# Patient Record
Sex: FEMALE | Race: BLACK OR AFRICAN AMERICAN | NOT HISPANIC OR LATINO | Employment: OTHER | URBAN - METROPOLITAN AREA
[De-identification: names, ages, dates, MRNs, and addresses within clinical notes are randomized per-mention and may not be internally consistent; named-entity substitution may affect disease eponyms.]

---

## 2017-02-23 ENCOUNTER — OFFICE VISIT (OUTPATIENT)
Dept: OPTOMETRY | Facility: CLINIC | Age: 82
End: 2017-02-23
Payer: MEDICARE

## 2017-02-23 DIAGNOSIS — H52.4 HYPEROPIA WITH ASTIGMATISM AND PRESBYOPIA, BILATERAL: ICD-10-CM

## 2017-02-23 DIAGNOSIS — Z13.5 GLAUCOMA SCREENING: ICD-10-CM

## 2017-02-23 DIAGNOSIS — H16.231 NEUROTROPHIC KERATOPATHY OF RIGHT EYE: Primary | ICD-10-CM

## 2017-02-23 DIAGNOSIS — H52.203 HYPEROPIA WITH ASTIGMATISM AND PRESBYOPIA, BILATERAL: ICD-10-CM

## 2017-02-23 DIAGNOSIS — H52.03 HYPEROPIA WITH ASTIGMATISM AND PRESBYOPIA, BILATERAL: ICD-10-CM

## 2017-02-23 DIAGNOSIS — H25.13 NUCLEAR SCLEROSIS, BILATERAL: ICD-10-CM

## 2017-02-23 PROCEDURE — 92015 DETERMINE REFRACTIVE STATE: CPT | Mod: ,,, | Performed by: OPTOMETRIST

## 2017-02-23 PROCEDURE — 92004 COMPRE OPH EXAM NEW PT 1/>: CPT | Mod: S$PBB,,, | Performed by: OPTOMETRIST

## 2017-02-23 PROCEDURE — 99999 PR PBB SHADOW E&M-NEW PATIENT-LVL III: CPT | Mod: PBBFAC,,, | Performed by: OPTOMETRIST

## 2017-02-23 PROCEDURE — 99203 OFFICE O/P NEW LOW 30 MIN: CPT | Mod: PBBFAC | Performed by: OPTOMETRIST

## 2017-02-23 RX ORDER — RIVAROXABAN 20 MG/1
TABLET, FILM COATED ORAL
Refills: 2 | COMMUNITY
Start: 2017-01-16

## 2017-02-23 RX ORDER — OMEPRAZOLE 40 MG/1
40 CAPSULE, DELAYED RELEASE ORAL NIGHTLY
Refills: 2 | COMMUNITY
Start: 2016-12-12

## 2017-02-23 RX ORDER — AMLODIPINE BESYLATE 5 MG/1
5 TABLET ORAL NIGHTLY
COMMUNITY

## 2017-02-23 RX ORDER — LISINOPRIL 10 MG/1
10 TABLET ORAL NIGHTLY
COMMUNITY

## 2017-02-23 NOTE — PATIENT INSTRUCTIONS
Cataract Surgery FAQs  Frequently Asked Questions    My doctor told me I have a cataract     What do I do next?   Relax. Cataracts are a normal part of aging, and cataract surgery is among the safest and most common procedures performed today.  Most patients who have had cataract surgery report significant improvement in their quality of life because of their improved vision, with a speedy recovery and minimal discomfort or inconvenience.    Your optometrist will recommend a cataract surgeon who will examine your eyes, evaluate the need for surgery, and discuss the details with you and your family.     What exactly is a cataract?   A cataract is simply a darkening or clouding of the eyes internal lens, which blurs your vision.  It is not a film which grows over the eye, but rather a degenerative process which causes the eyes normally clear lens to become cloudy or hazy.     Because this degenerative process occurs slowly over many years, we generally wait until the cataract begins to significantly impact your vision, before recommend surgery.                     How is cataract surgery performed?  Cataract surgery involves removal of the dark or cloudy lens from the eye, and implantation of a new, clear lens implant or IOL.  Cataract surgery is a lens replacement surgery.          Modern cataract surgery is performed as a same-day surgery and typically takes about 15 minutes to complete.  It is performed while you are awake, but you will be medicated so that you are relaxed and comfortable. Of course, the eye is anesthetized so that you dont feel any discomfort, and many people only vaguely remember the actual procedure, recalling only lights and the sensation of moisture on the eye.     Although is takes about a week to fully heal, most people are able to see better and resume their normal activities the very next day!  You will need to use eye drops for about one month after your surgery.     Will I  need glasses after cataract surgery?   The purpose of cataract surgery is to improve the overall quality of your vision, but it does not necessarily eliminate the need for glasses. Although some people dont need to wear glasses after standard cataract surgery, most people will still need to wear glasses for certain tasks.  If you are interested in minimizing or even eliminating the need for glasses, you should ask your surgeon about Refractive Cataract Surgery.    What is Refractive Cataract Surgery?   Refractive Cataract Surgery refers to the use of additional techniques performed either at the time of your cataract surgery or soon afterwards, designed to minimize and often eliminate your need for glasses.  Technologies such as LASIK, Astigmatism Correcting Incisions, Multifocal, Accommodating, or Toric lens implants can all be used, depending on the particular needs of each patient. Only your surgeon can determine if you are a candidate and which techniques are appropriate for your goals and your eye.                         LASIK                Multifocal IOL         Will my insurance cover these additional procedures?   Although your insurance will fully cover your cataract surgery, any other additional procedures -designed solely to eliminate the need for glasses- are considered elective (not medically necessary), and therefore not covered by your insurance.  Rest assured that your vision will be better after cataract surgery, in either case.  You simply need to decide whether minimizing your dependence on glasses is worth the additional investment in your eyes.  (Costs typically range between $1,000 to $2,000 per eye, depending on your needs).    View a 1hr video discussing cataract surgery with live patient questions and answers on the [employer] Web Site (Keywords: [facility] Cataract):    [email or web address]_[email or web address]

## 2017-02-23 NOTE — MR AVS SNAPSHOT
[facility]  [address]  [facility] [address]  Phone: [phone]  Fax: [phone]                  Amirah Davey   2017 10:00 AM   Office Visit    Description:  Female : 1935   Provider:  Azar Ochoa OD   Department:  [facility]           Reason for Visit     Concerns About Ocular Health     Blurred Vision           Diagnoses this Visit        Comments    Neurotrophic keratopathy of right eye    -  Primary     Nuclear sclerosis, bilateral         Glaucoma screening         Hyperopia with astigmatism and presbyopia, bilateral                To Do List           Goals (5 Years of Data)     None      [facility]     [facility] Care [facility] -  Assistance  Registered nurses in the [facility] On Call Center provide clinical advisement, health education, appointment booking, and other advisory services.  Call for this free service at [phone].             Medications           Message regarding Medications     Verify the changes and/or additions to your medication regime listed below are the same as discussed with your clinician today.  If any of these changes or additions are incorrect, please notify your healthcare provider.             Verify that the below list of medications is an accurate representation of the medications you are currently taking.  If none reported, the list may be blank. If incorrect, please contact your healthcare provider. Carry this list with you in case of emergency.           Current Medications     amlodipine (NORVASC) 5 MG tablet Take 5 mg by mouth once daily.    lisinopril 10 MG tablet Take 10 mg by mouth once daily.    omeprazole (PRILOSEC) 40 MG capsule Take 40 mg by mouth once daily.    XARELTO 20 mg Tab TAKE 1 TABLET BY MOUTH DAILY AT DINNER           Clinical Reference Information           Allergies as of 2017     No Known Allergies      Immunizations Administered on Date of Encounter - 2017     None       Orders Placed During Today's Visit      Normal Orders This Visit    Ambulatory Referral to Ophthalmology       [facility] Sign-Up     Activating your [facility] account is as easy as 1-2-3!     1) Visit [email or web address], select Sign Up Now, enter this activation code and your date of birth, then select Next.  [email or web address]  Expires: 4/9/2017 11:14 AM      2) Create a username and password to use when you visit [facility] in the future and select a security question in case you lose your password and select Next.    3) Enter your e-mail address and click Sign Up!    Additional Information  If you have questions, please e-mail [email or web address] or call [phone] to talk to our [facility] staff. Remember, [facility] is NOT to be used for urgent needs. For medical emergencies, dial 911.         Instructions        Cataract Surgery FAQs  Frequently Asked Questions    My doctor told me I have a cataract     What do I do next?   Relax. Cataracts are a normal part of aging, and cataract surgery is among the safest and most common procedures performed today.  Most patients who have had cataract surgery report significant improvement in their quality of life because of their improved vision, with a speedy recovery and minimal discomfort or inconvenience.    Your optometrist will recommend a cataract surgeon who will examine your eyes, evaluate the need for surgery, and discuss the details with you and your family.     What exactly is a cataract?   A cataract is simply a darkening or clouding of the eyes internal lens, which blurs your vision.  It is not a film which grows over the eye, but rather a degenerative process which causes the eyes normally clear lens to become cloudy or hazy.     Because this degenerative process occurs slowly over many years, we generally wait until the cataract begins to significantly impact your vision, before recommend surgery.                     How is cataract surgery  performed?  Cataract surgery involves removal of the dark or cloudy lens from the eye, and implantation of a new, clear lens implant or IOL.  Cataract surgery is a lens replacement surgery.          Modern cataract surgery is performed as a same-day surgery and typically takes about 15 minutes to complete.  It is performed while you are awake, but you will be medicated so that you are relaxed and comfortable. Of course, the eye is anesthetized so that you dont feel any discomfort, and many people only vaguely remember the actual procedure, recalling only lights and the sensation of moisture on the eye.     Although is takes about a week to fully heal, most people are able to see better and resume their normal activities the very next day!  You will need to use eye drops for about one month after your surgery.     Will I need glasses after cataract surgery?   The purpose of cataract surgery is to improve the overall quality of your vision, but it does not necessarily eliminate the need for glasses. Although some people dont need to wear glasses after standard cataract surgery, most people will still need to wear glasses for certain tasks.  If you are interested in minimizing or even eliminating the need for glasses, you should ask your surgeon about Refractive Cataract Surgery.    What is Refractive Cataract Surgery?   Refractive Cataract Surgery refers to the use of additional techniques performed either at the time of your cataract surgery or soon afterwards, designed to minimize and often eliminate your need for glasses.  Technologies such as LASIK, Astigmatism Correcting Incisions, Multifocal, Accommodating, or Toric lens implants can all be used, depending on the particular needs of each patient. Only your surgeon can determine if you are a candidate and which techniques are appropriate for your goals and your eye.                         LASIK                Multifocal IOL         Will my insurance cover  these additional procedures?   Although your insurance will fully cover your cataract surgery, any other additional procedures -designed solely to eliminate the need for glasses- are considered elective (not medically necessary), and therefore not covered by your insurance.  Rest assured that your vision will be better after cataract surgery, in either case.  You simply need to decide whether minimizing your dependence on glasses is worth the additional investment in your eyes.  (Costs typically range between $1,000 to $2,000 per eye, depending on your needs).    View a 1hr video discussing cataract surgery with live patient questions and answers on the [facility] Web Site (Keywords: [facility] Cataract):    [email or web address]_cataracts/           Language Assistance Services     ATTENTION: Language assistance services are available, free of charge. Please call [phone].      ATENCIÓN: Si andreila mamadou, tiene a natarajan disposición servicios gratuitos de asistencia lingüística. Llame al [phone].     JACOBO Ý: N?u b?n nói Ti?ng Vi?t, có các d?ch v? h? tr? ngôn ng? mi?n phí dành cho b?n. G?i s? [phone].         [facility] complies with applicable Federal civil rights laws and does not discriminate on the basis of race, color, national origin, age, disability, or sex.

## 2017-02-23 NOTE — PROGRESS NOTES
ADELE MENDOZA: 3 years ago at [facility]    Patient is here c/o blurry vision OD x 1 month. She has a h/o stroke   03/2013 affected the left side of body. H/o shingles with ocular   involvement in [id number]. She states she could see a black spot in her   vision since her shingles episode but she was able to still see clearly.   The spot is still there but now she is having blurry vision.     No eye meds at this time.        Last edited by Hyacinth Ulloa on 2/23/2017 10:30 AM.         Assessment /Plan     For exam results, see Encounter Report.    Neurotrophic keratopathy of right eye  -Zoster 20 yrs ago, large nasal scar with neovascularization    Nuclear sclerosis, bilateral  -     Ambulatory Referral to Ophthalmology  -Hypermature OD>OS  -referral Dr. Case    Glaucoma screening  -Monitor with annual eye exam and IOP check  -narrow angle OD>OS, benefit from CE    Hyperopia with astigmatism and presbyopia, bilateral  -Hold sRx secondary to cataracts       RTC as directed OMD

## 2017-03-15 ENCOUNTER — OFFICE VISIT (OUTPATIENT)
Dept: OPHTHALMOLOGY | Facility: CLINIC | Age: 82
End: 2017-03-15
Payer: MEDICARE

## 2017-03-15 DIAGNOSIS — H17.9 CORNEAL OPACITY: ICD-10-CM

## 2017-03-15 DIAGNOSIS — H25.13 NUCLEAR SCLEROSIS, BILATERAL: Primary | ICD-10-CM

## 2017-03-15 PROCEDURE — 99212 OFFICE O/P EST SF 10 MIN: CPT | Mod: PBBFAC | Performed by: OPHTHALMOLOGY

## 2017-03-15 PROCEDURE — 99999 PR PBB SHADOW E&M-EST. PATIENT-LVL II: CPT | Mod: PBBFAC,,, | Performed by: OPHTHALMOLOGY

## 2017-03-15 PROCEDURE — 92136 OPHTHALMIC BIOMETRY: CPT | Mod: 50,PBBFAC | Performed by: OPHTHALMOLOGY

## 2017-03-15 PROCEDURE — 92014 COMPRE OPH EXAM EST PT 1/>: CPT | Mod: S$PBB,,, | Performed by: OPHTHALMOLOGY

## 2017-03-15 RX ORDER — LIDOCAINE HYDROCHLORIDE 10 MG/ML
1 INJECTION, SOLUTION EPIDURAL; INFILTRATION; INTRACAUDAL; PERINEURAL ONCE
Status: CANCELLED | OUTPATIENT
Start: 2017-03-15 | End: 2017-03-15

## 2017-03-15 RX ORDER — MOXIFLOXACIN 5 MG/ML
1 SOLUTION/ DROPS OPHTHALMIC
Status: CANCELLED | OUTPATIENT
Start: 2017-03-15

## 2017-03-15 RX ORDER — TROPICAMIDE 10 MG/ML
1 SOLUTION/ DROPS OPHTHALMIC
Status: CANCELLED | OUTPATIENT
Start: 2017-03-15

## 2017-03-15 RX ORDER — PHENYLEPHRINE HYDROCHLORIDE 25 MG/ML
1 SOLUTION/ DROPS OPHTHALMIC
Status: CANCELLED | OUTPATIENT
Start: 2017-03-15

## 2017-03-15 RX ORDER — TETRACAINE HYDROCHLORIDE 5 MG/ML
1 SOLUTION OPHTHALMIC
Status: CANCELLED | OUTPATIENT
Start: 2017-03-15

## 2017-03-15 NOTE — MR AVS SNAPSHOT
[facility]  [address]  [facility] [address]  Phone: [phone]  Fax: [phone]                  Amirah Davey   3/15/2017 9:30 AM   Office Visit    Description:  Female : 1935   Provider:  Ca Case MD   Department:  [facility]           Reason for Visit     Eye Problem           Diagnoses this Visit        Comments    Nuclear sclerosis, bilateral    -  Primary     Corneal opacity                To Do List           Goals (5 Years of Data)     None      [facility]     [facility] On Call Nurse Care Line -  Assistance  Registered nurses in the [facility] On Call Center provide clinical advisement, health education, appointment booking, and other advisory services.  Call for this free service at [phone].             Medications           Message regarding Medications     Verify the changes and/or additions to your medication regime listed below are the same as discussed with your clinician today.  If any of these changes or additions are incorrect, please notify your healthcare provider.             Verify that the below list of medications is an accurate representation of the medications you are currently taking.  If none reported, the list may be blank. If incorrect, please contact your healthcare provider. Carry this list with you in case of emergency.           Current Medications     amlodipine (NORVASC) 5 MG tablet Take 5 mg by mouth once daily.    lisinopril 10 MG tablet Take 10 mg by mouth once daily.    omeprazole (PRILOSEC) 40 MG capsule Take 40 mg by mouth once daily.    XARELTO 20 mg Tab TAKE 1 TABLET BY MOUTH DAILY AT DINNER           Clinical Reference Information           Allergies as of 3/15/2017     No Known Allergies      Immunizations Administered on Date of Encounter - 3/15/2017     None      Orders Placed During Today's Visit      Normal Orders This Visit    IOL Master - OU - Both Eyes       [facility] Sign-Up     Activating  your [facility] account is as easy as 1-2-3!     1) Visit [email or web address], select Sign Up Now, enter this activation code and your date of birth, then select Next.  [email or web address]  Expires: 4/9/2017 12:14 PM      2) Create a username and password to use when you visit [facility] in the future and select a security question in case you lose your password and select Next.    3) Enter your e-mail address and click Sign Up!    Additional Information  If you have questions, please e-mail [employer] [employer] [email or web address] or call [phone] to talk to our [facility] staff. Remember, [facility] is NOT to be used for urgent needs. For medical emergencies, dial 911.         Language Assistance Services     ATTENTION: Language assistance services are available, free of charge. Please call [phone].      ATENCIÓN: Si habla español, tiene a natarajan disposición servicios gratuitos de asistencia lingüística. Llame al [phone].     CHÚ Ý: N?u b?n nói Ti?ng Vi?t, có các d?ch v? h? tr? ngôn ng? mi?n phí dành cho b?n. G?i s? [phone].         [facility] complies with applicable Federal civil rights laws and does not discriminate on the basis of race, color, national origin, age, disability, or sex.

## 2017-03-15 NOTE — PROGRESS NOTES
HPI     Eye Problem    Additional comments: Referred by Dr. Ochoa           Comments   DLS 2/23/17    Pt states she has had a dark spot in her  VA OD since she had the shingles   ~20 yrs ago. Was told it was a cornea scar.  Lately the VA OD has blurred   and is cloudy. OS is still clear compared to the other eye.      POH: shingles with ocular involvement            Stroke affecting left side 2013       Last edited by Jumana Steele on 3/15/2017  9:57 AM. (History)            Assessment /Plan     For exam results, see Encounter Report.    Nuclear sclerosis, bilateral  -     IOL Master - OU - Both Eyes    Corneal opacity      Visually Significant Cataract: Patient reports decreased vision consistent with the clinical amount of lenticular opacity, which reaches the level of visual significance and affects activities of daily living. Risks, benefits, and alternatives to cataract surgery were discussed and the consent reviewed. IOL options were discussed, including ATIOLs and the associated side effects and additional patient cost associated with them.   IOL Selections:   Right eye  IOL: pcboo 21.5 (K scar)     Left eye  IOL: pcboo 22.5    Pt wishes to have right eye done first.  K scar OD

## 2017-04-04 ENCOUNTER — TELEPHONE (OUTPATIENT)
Dept: OPHTHALMOLOGY | Facility: CLINIC | Age: 82
End: 2017-04-04

## 2017-04-04 DIAGNOSIS — H25.11 NUCLEAR SCLEROTIC CATARACT OF RIGHT EYE: Primary | ICD-10-CM

## 2017-04-11 ENCOUNTER — TELEPHONE (OUTPATIENT)
Dept: OPHTHALMOLOGY | Facility: CLINIC | Age: 82
End: 2017-04-11

## 2017-04-11 DIAGNOSIS — H25.12 NUCLEAR SCLEROTIC CATARACT OF LEFT EYE: Primary | ICD-10-CM

## 2017-04-26 ENCOUNTER — TELEPHONE (OUTPATIENT)
Dept: OPTOMETRY | Facility: CLINIC | Age: 82
End: 2017-04-26

## 2017-05-01 ENCOUNTER — SURGERY (OUTPATIENT)
Age: 82
End: 2017-05-01

## 2017-05-01 ENCOUNTER — ANESTHESIA EVENT (OUTPATIENT)
Dept: SURGERY | Facility: OTHER | Age: 82
End: 2017-05-01
Payer: MEDICARE

## 2017-05-01 ENCOUNTER — HOSPITAL ENCOUNTER (OUTPATIENT)
Facility: OTHER | Age: 82
Discharge: HOME | End: 2017-05-01
Attending: OPHTHALMOLOGY | Admitting: OPHTHALMOLOGY
Payer: MEDICARE

## 2017-05-01 ENCOUNTER — ANESTHESIA (OUTPATIENT)
Dept: SURGERY | Facility: OTHER | Age: 82
End: 2017-05-01
Payer: MEDICARE

## 2017-05-01 VITALS
DIASTOLIC BLOOD PRESSURE: 71 MMHG | WEIGHT: 168 LBS | HEIGHT: 60 IN | TEMPERATURE: 98 F | RESPIRATION RATE: 16 BRPM | HEART RATE: 63 BPM | BODY MASS INDEX: 32.98 KG/M2 | OXYGEN SATURATION: 98 % | SYSTOLIC BLOOD PRESSURE: 145 MMHG

## 2017-05-01 DIAGNOSIS — H25.13 NUCLEAR SCLEROSIS, BILATERAL: ICD-10-CM

## 2017-05-01 DIAGNOSIS — H25.11 NUCLEAR SCLEROSIS, RIGHT: Primary | ICD-10-CM

## 2017-05-01 PROBLEM — H25.10 NUCLEAR SCLEROSIS: Status: ACTIVE | Noted: 2017-05-01

## 2017-05-01 PROCEDURE — 25000003 PHARM REV CODE 250: Performed by: OPHTHALMOLOGY

## 2017-05-01 PROCEDURE — 36000707: Performed by: OPHTHALMOLOGY

## 2017-05-01 PROCEDURE — 37000009 HC ANESTHESIA EA ADD 15 MINS: Performed by: OPHTHALMOLOGY

## 2017-05-01 PROCEDURE — 36000706: Performed by: OPHTHALMOLOGY

## 2017-05-01 PROCEDURE — 63600175 PHARM REV CODE 636 W HCPCS: Performed by: NURSE ANESTHETIST, CERTIFIED REGISTERED

## 2017-05-01 PROCEDURE — 71000015 HC POSTOP RECOV 1ST HR: Performed by: OPHTHALMOLOGY

## 2017-05-01 PROCEDURE — 37000008 HC ANESTHESIA 1ST 15 MINUTES: Performed by: OPHTHALMOLOGY

## 2017-05-01 PROCEDURE — V2632 POST CHMBR INTRAOCULAR LENS: HCPCS | Performed by: OPHTHALMOLOGY

## 2017-05-01 PROCEDURE — 66984 XCAPSL CTRC RMVL W/O ECP: CPT | Mod: RT,,, | Performed by: OPHTHALMOLOGY

## 2017-05-01 DEVICE — LENS IOL ITEC PRELOAD 21.5D: Type: IMPLANTABLE DEVICE | Site: EYE | Status: FUNCTIONAL

## 2017-05-01 RX ORDER — ACETAMINOPHEN 325 MG/1
650 TABLET ORAL EVERY 4 HOURS PRN
Status: DISCONTINUED | OUTPATIENT
Start: 2017-05-01 | End: 2017-05-01 | Stop reason: HOSPADM

## 2017-05-01 RX ORDER — MIDAZOLAM HYDROCHLORIDE 1 MG/ML
INJECTION INTRAMUSCULAR; INTRAVENOUS
Status: DISCONTINUED | OUTPATIENT
Start: 2017-05-01 | End: 2017-05-01

## 2017-05-01 RX ORDER — TETRACAINE HYDROCHLORIDE 5 MG/ML
SOLUTION OPHTHALMIC
Status: DISCONTINUED | OUTPATIENT
Start: 2017-05-01 | End: 2017-05-01 | Stop reason: HOSPADM

## 2017-05-01 RX ORDER — LIDOCAINE HYDROCHLORIDE 40 MG/ML
INJECTION, SOLUTION RETROBULBAR
Status: DISCONTINUED | OUTPATIENT
Start: 2017-05-01 | End: 2017-05-01 | Stop reason: HOSPADM

## 2017-05-01 RX ORDER — TROPICAMIDE 10 MG/ML
1 SOLUTION/ DROPS OPHTHALMIC
Status: COMPLETED | OUTPATIENT
Start: 2017-05-01 | End: 2017-05-01

## 2017-05-01 RX ORDER — LIDOCAINE HYDROCHLORIDE 10 MG/ML
1 INJECTION, SOLUTION EPIDURAL; INFILTRATION; INTRACAUDAL; PERINEURAL ONCE
Status: DISCONTINUED | OUTPATIENT
Start: 2017-05-01 | End: 2017-05-01 | Stop reason: HOSPADM

## 2017-05-01 RX ORDER — PHENYLEPHRINE HYDROCHLORIDE 25 MG/ML
1 SOLUTION/ DROPS OPHTHALMIC
Status: COMPLETED | OUTPATIENT
Start: 2017-05-01 | End: 2017-05-01

## 2017-05-01 RX ORDER — MOXIFLOXACIN 5 MG/ML
1 SOLUTION/ DROPS OPHTHALMIC
Status: COMPLETED | OUTPATIENT
Start: 2017-05-01 | End: 2017-05-01

## 2017-05-01 RX ORDER — TETRACAINE HYDROCHLORIDE 5 MG/ML
1 SOLUTION OPHTHALMIC
Status: COMPLETED | OUTPATIENT
Start: 2017-05-01 | End: 2017-05-01

## 2017-05-01 RX ORDER — PROPARACAINE HYDROCHLORIDE 5 MG/ML
1 SOLUTION/ DROPS OPHTHALMIC
Status: DISCONTINUED | OUTPATIENT
Start: 2017-05-01 | End: 2017-05-01 | Stop reason: HOSPADM

## 2017-05-01 RX ORDER — MOXIFLOXACIN 5 MG/ML
SOLUTION/ DROPS OPHTHALMIC
Status: DISCONTINUED | OUTPATIENT
Start: 2017-05-01 | End: 2017-05-01 | Stop reason: HOSPADM

## 2017-05-01 RX ADMIN — PHENYLEPHRINE HYDROCHLORIDE 1 DROP: 25 SOLUTION/ DROPS OPHTHALMIC at 06:05

## 2017-05-01 RX ADMIN — MOXIFLOXACIN HYDROCHLORIDE 1 DROP: 5 SOLUTION/ DROPS OPHTHALMIC at 06:05

## 2017-05-01 RX ADMIN — LIDOCAINE HYDROCHLORIDE 2 DROP: 40 INJECTION, SOLUTION RETROBULBAR; TOPICAL at 07:05

## 2017-05-01 RX ADMIN — MOXIFLOXACIN HYDROCHLORIDE 1 DROP: 5 SOLUTION/ DROPS OPHTHALMIC at 08:05

## 2017-05-01 RX ADMIN — BALANCED SALT SOLUTION ENRICHED WITH BICARBONATE, DEXTROSE, AND GLUTATHIONE 15 ML: KIT at 07:05

## 2017-05-01 RX ADMIN — TROPICAMIDE 1 DROP: 10 SOLUTION/ DROPS OPHTHALMIC at 06:05

## 2017-05-01 RX ADMIN — SODIUM CHONDROITIN SULFATE / SODIUM HYALURONATE 0.5 ML: 0.55-0.5 INJECTION INTRAOCULAR at 07:05

## 2017-05-01 RX ADMIN — TETRACAINE HYDROCHLORIDE 1 DROP: 5 SOLUTION OPHTHALMIC at 06:05

## 2017-05-01 RX ADMIN — MIDAZOLAM HYDROCHLORIDE 2 MG: 1 INJECTION, SOLUTION INTRAMUSCULAR; INTRAVENOUS at 07:05

## 2017-05-01 RX ADMIN — TETRACAINE HYDROCHLORIDE 2 DROP: 5 SOLUTION OPHTHALMIC at 07:05

## 2017-05-01 NOTE — ANESTHESIA POSTPROCEDURE EVALUATION
Anesthesia Post Evaluation    Patient: Amirah Davey    Procedure(s) Performed: Procedure(s) (LRB):  PHACOEMULSIFICATION-ASPIRATION-CATARACT (Right)  INSERTION-INTRAOCULAR LENS (IOL) (Right)    Final Anesthesia Type: MAC  Patient location during evaluation: [facility]  Patient participation: Yes- Able to Participate  Level of consciousness: awake and alert  Post-procedure vital signs: reviewed and stable  Pain management: adequate  Airway patency: patent  PONV status at discharge: No PONV  Anesthetic complications: no      Cardiovascular status: stable  Respiratory status: unassisted, spontaneous ventilation and room air  Hydration status: euvolemic  Follow-up not needed.        Visit Vitals    BP (!) 162/85 (BP Location: Right arm, Patient Position: Lying, BP Method: Automatic)    Pulse 76    Temp 36.8 °C (98.2 °F) (Oral)    Resp 16    Ht 5' (1.524 m)    Wt 76.2 kg (168 lb)    SpO2 99%    Breastfeeding No    BMI 32.81 kg/m2       Pain/Emily Score: Pain Assessment Performed: Yes (5/1/2017  6:03 AM)  Presence of Pain: denies (5/1/2017  6:03 AM)

## 2017-05-01 NOTE — DISCHARGE INSTRUCTIONS
Ca Case MD  [facility]  Department of Ophthalmology      AFTER: Cataract Surgery:  Relax at home and DO NOT exert yourself for the rest of the day.  Plan to see Dr. Case tomorrow at the eye clinic:   [facility] [address]  [place] [address]    Refer to attached eye drop instruction sheet     Precautions:  DO NOT rub your eye.  You may resume moderate activity the day after surgery.  Wear protective sunglasses during the day and a shield at night for 1(one) week.  If you have pain, redness and decreased vision, call Dr. Case (or the on-call doctor after hours) @[phone].            Home Care Instructions for Eye Surgeries    1. ACTIVITY:  Limit your activity today. Relax at home and DO NOT exert yourself for the rest of the day. Increase activity gradually. You may return to work or school as directed by your physician.    2. DIET:  Drink plenty of fluids. Resume your normal diet unless instructed otherwise.    3. PAIN:  Expect a moderate amount of pain. If a prescription for pain is not sent home with you, you may take your commonly used pain reliever as directed. If this is not sufficient, call your physician. You may resume any other prescription medication unless otherwise directed by your physician.     Discuss any problem with your physician as soon as it arises. Do not Delay.      EMERGENCY- If you are unable to contact your physician, please go to the nearest Emergency Room.       Anesthesia: Monitored Anesthesia Care (MAC)    Anesthesia Safety  · Have an adult family member or friend drive you home after the procedure.  · For the first 24 hours after your surgery:  · Do not drive or use heavy equipment.  · Do not make important decisions or sign documents.  · Avoid alcohol.  Have someone stay with you, if possible. They can watch for problems and help keep you safe.  Anesthesia: Monitored Anesthesia Care (MAC)  Youre due to have surgery. During surgery, youll be given  medication called anesthesia. This will keep you comfortable and pain-free. Your surgeon will use monitored anesthesia care (MAC). This sheet tells you more about this type of anesthesia.    What is monitored anesthesia care?  MAC keeps you very drowsy during surgery. You may be awake, but you will likely not remember much. And you wont feel pain. With MAC, medications are given through an IV line into a vein in your arm or hand. A local anesthetic will usually be injected into the skin and muscle around the surgical site to numb it. The anesthesia provider monitors you during the procedure. He or she checks your heart rate and rhythm, blood pressure, and blood oxygen level.  Anesthesia tools and medications that may be near you during your procedure  You will likely have:  · A pulse oximeter on the end of your finger. This measures your blood oxygen level.  · Electrocardiography leads (electrodes) on your chest. These record your heart rate and rhythm.  · Medications given through an IV. These relax you and prevent pain. You may be awake or sleep lightly. If you have local anesthetic, it is injected directly into your skin.  · A facemask to give you oxygen, if needed.  Risks and Possible Complications  MAC has some risks. These include:  · Breathing problems  · Nausea and vomiting  · Allergic reaction to the anesthetic    Anesthesia safety  · Follow all instructions you are given for how long not to eat or drink before your procedure.  · Be sure your doctor knows what medications you take, especially any anti-inflammatory medication or blood thinners. This includes aspirin and any other over-the-counter medications, herbs, and supplements.  · Have an adult family member or friend drive you home after the procedure.  · For the first 24 hours after your surgery:  ¨ Do not drive or use heavy equipment.  ¨ Do not make important decisions or sign documents.  ¨ Avoid alcohol.  ¨ Have someone stay with you, if possible.  They can watch for problems and help keep you safe.  Date Last Reviewed: 10/16/2014  © 3362-6535 [employer]. [address] [place] [address], [place] [address]. All rights reserved. This information is not intended as a substitute for professional medical care. Always follow your healthcare professional's instructions.

## 2017-05-01 NOTE — ANESTHESIA PREPROCEDURE EVALUATION
05/01/2017  Amirah Davey is a 81 y.o., female.    Anesthesia Evaluation    I have reviewed the Patient Summary Reports.    I have reviewed the Nursing Notes.   I have reviewed the Medications.     Review of Systems  Anesthesia Hx:  Denies Family Hx of Anesthesia complications.   Denies Personal Hx of Anesthesia complications.   Hematology/Oncology:     Oncology Normal     Cardiovascular:   Exercise tolerance: good Hypertension    Pulmonary:  Pulmonary Normal    Renal/:  Renal/ Normal     Hepatic/GI:   GERD    Neurological:   CVA    Endocrine:  Endocrine Normal        Physical Exam  General:  Obesity    Airway/Jaw/Neck:  Airway Findings: Mouth Opening: Normal Tongue: Normal  General Airway Assessment: Adult  Mallampati: II  TM Distance: Normal, at least 6 cm      Dental:  Dental Findings: Upper Dentures        Mental Status:  Mental Status Findings:  Alert and Oriented, Cooperative         Anesthesia Plan  Type of Anesthesia, risks & benefits discussed:  Anesthesia Type:  MAC  Patient's Preference:   Intra-op Monitoring Plan:   Intra-op Monitoring Plan Comments:   Post Op Pain Control Plan:   Post Op Pain Control Plan Comments:   Induction:    Beta Blocker:         Informed Consent: Patient understands risks and agrees with Anesthesia plan.  Questions answered.   ASA Score: 3     Day of Surgery Review of History & Physical:    H&P update referred to the surgeon.         Ready For Surgery From Anesthesia Perspective.

## 2017-05-01 NOTE — PLAN OF CARE
Patient prefers to have daughter Sonya present for discharge teaching. Please contact them @[phone].

## 2017-05-01 NOTE — DISCHARGE SUMMARY
Outcome: Successful outpatient ophthalmic surgical procedure  Preprinted Instructions given to patient.  Regular diet.  Activity: No restrictions  Meds: see Med Rec  Condition: stable  Follow up: 1 day with Dr Case  Disposition: Home  Diagnosis: s/p eye surgery

## 2017-05-01 NOTE — PLAN OF CARE
Amirah Farry has met all discharge criteria from Phase II. Vital Signs are stable, ambulating  without difficulty. Discharge instructions given, patient verbalized understanding. Discharged from facility via wheelchair in stable condition.

## 2017-05-01 NOTE — IP AVS SNAPSHOT
[facility], [place], operated by [facility]  [address] [place] [address]  Phone: [phone]           Patient Discharge Instructions   Our goal is to set you up for success. This packet includes information on your condition, medications, and your home care.  It will help you care for yourself to prevent having to return to the hospital.     Please ask your nurse if you have any questions.      There are many details to remember when preparing to leave the hospital. Here is what you will need to do:    1. Take your medicine. If you are prescribed medications, review your Medication List on the following pages. You may have new medications to  at the pharmacy and others that you'll need to stop taking. Review the instructions for how and when to take your medications. Talk with your doctor or nurses if you are unsure of what to do.     2. Go to your follow-up appointments. Specific follow-up information is listed in the following pages. Your may be contacted by a nurse or clinical provider about future appointments. Be sure we have all of the phone numbers to reach you. Please contact your provider's office if you are unable to make an appointment.     3. Watch for warning signs. Your doctor or nurse will give you detailed warning signs to watch for and when to call for assistance. These instructions may also include educational information about your condition. If you experience any of warning signs to your health, call your doctor.               ** Verify the list of medication(s) below is accurate and up to date. Carry this with you in case of emergency. If your medications have changed, please notify your healthcare provider.             Medication List      ASK your doctor about these medications        Additional Info                      amlodipine 5 MG tablet   Commonly known as:  NORVASC   Refills:  0   Dose:  5 mg    Instructions:  Take 5 mg by mouth every evening.     Begin Date    AM     Noon    PM    Bedtime       lisinopril 10 MG tablet   Refills:  0   Dose:  10 mg    Instructions:  Take 10 mg by mouth every evening.     Begin Date    AM    Noon    PM    Bedtime       omeprazole 40 MG capsule   Commonly known as:  PRILOSEC   Refills:  2   Dose:  40 mg    Instructions:  Take 40 mg by mouth every evening.     Begin Date    AM    Noon    PM    Bedtime       XARELTO 20 mg Tab   Refills:  2   Generic drug:  rivaroxaban    Instructions:  TAKE 1 TABLET BY MOUTH DAILY AT DINNER     Begin Date    AM    Noon    PM    Bedtime                  Please bring to all follow up appointments:    1. A copy of your discharge instructions.  2. All medicines you are currently taking in their original bottles.  3. Identification and insurance card.    Please arrive 15 minutes ahead of scheduled appointment time.    Please call 24 hours in advance if you must reschedule your appointment and/or time.        Your Future Surgeries/Procedures     May 01, 2017   Surgery with Ca Case MD   [facility] ([facility])    [address] [place] [address] [phone]            May 29, 2017   Surgery with Ca Case MD   [facility] ([facility])    [address] [place] [address] [phone]                  Discharge Instructions       Ca Case MD  [facility]  Department of Ophthalmology      AFTER: Cataract Surgery:  Relax at home and DO NOT exert yourself for the rest of the day.  Plan to see Dr. Case tomorrow at the eye clinic:   [facility] [place] [address]    Refer to attached eye drop instruction sheet     Precautions:  DO NOT rub your eye.  You may resume moderate activity the day after surgery.  Wear protective sunglasses during the day and a shield at night for 1(one) week.  If you have pain, redness and decreased vision, call Dr. Case (or the on-call doctor after hours)  @[phone].            Home Care Instructions for Eye Surgeries    1. ACTIVITY:  Limit your activity today. Relax at home and DO NOT exert yourself for the rest of the day. Increase activity gradually. You may return to work or school as directed by your physician.    2. DIET:  Drink plenty of fluids. Resume your normal diet unless instructed otherwise.    3. PAIN:  Expect a moderate amount of pain. If a prescription for pain is not sent home with you, you may take your commonly used pain reliever as directed. If this is not sufficient, call your physician. You may resume any other prescription medication unless otherwise directed by your physician.     Discuss any problem with your physician as soon as it arises. Do not Delay.      EMERGENCY- If you are unable to contact your physician, please go to the nearest Emergency Room.       Anesthesia: Monitored Anesthesia Care (MAC)    Anesthesia Safety  · Have an adult family member or friend drive you home after the procedure.  · For the first 24 hours after your surgery:  · Do not drive or use heavy equipment.  · Do not make important decisions or sign documents.  · Avoid alcohol.  Have someone stay with you, if possible. They can watch for problems and help keep you safe.  Anesthesia: Monitored Anesthesia Care (MAC)  Youre due to have surgery. During surgery, youll be given medication called anesthesia. This will keep you comfortable and pain-free. Your surgeon will use monitored anesthesia care (MAC). This sheet tells you more about this type of anesthesia.    What is monitored anesthesia care?  MAC keeps you very drowsy during surgery. You may be awake, but you will likely not remember much. And you wont feel pain. With MAC, medications are given through an IV line into a vein in your arm or hand. A local anesthetic will usually be injected into the skin and muscle around the surgical site to numb it. The anesthesia provider monitors you during the procedure.  He or she checks your heart rate and rhythm, blood pressure, and blood oxygen level.  Anesthesia tools and medications that may be near you during your procedure  You will likely have:  · A pulse oximeter on the end of your finger. This measures your blood oxygen level.  · Electrocardiography leads (electrodes) on your chest. These record your heart rate and rhythm.  · Medications given through an IV. These relax you and prevent pain. You may be awake or sleep lightly. If you have local anesthetic, it is injected directly into your skin.  · A facemask to give you oxygen, if needed.  Risks and Possible Complications  MAC has some risks. These include:  · Breathing problems  · Nausea and vomiting  · Allergic reaction to the anesthetic    Anesthesia safety  · Follow all instructions you are given for how long not to eat or drink before your procedure.  · Be sure your doctor knows what medications you take, especially any anti-inflammatory medication or blood thinners. This includes aspirin and any other over-the-counter medications, herbs, and supplements.  · Have an adult family member or friend drive you home after the procedure.  · For the first 24 hours after your surgery:  ¨ Do not drive or use heavy equipment.  ¨ Do not make important decisions or sign documents.  ¨ Avoid alcohol.  ¨ Have someone stay with you, if possible. They can watch for problems and help keep you safe.  Date Last Reviewed: 10/16/2014  © 7819-8366 [employer]. [address] [place] [address]. All rights reserved. This information is not intended as a substitute for professional medical care. Always follow your healthcare professional's instructions.            Admission Information     Date & Time Provider Department CSN    5/1/2017  5:47 AM Ca Case MD [facility] [id number]      Care Providers     Provider Role Specialty Primary office phone    Ca Case MD Attending Provider  Ophthalmology [phone]    Ca Case MD Surgeon  Ophthalmology [phone]      Your Vitals Were     BP Pulse Temp Resp Height Weight    162/85 (BP Location: Right arm, Patient Position: Lying, BP Method: Automatic) 76 98.2 °F (36.8 °C) (Oral) 16 5' (1.524 m) 76.2 kg (168 lb)    SpO2 BMI             99% 32.81 kg/m2         Recent Lab Values     No lab values to display.      Allergies as of 5/1/2017     No Known Allergies      [facility]     [facility] - 24/7 Assistance  Unless otherwise directed by your provider, please contact [facility], our nurse care line that is available for 24/7 assistance.     Registered nurses in the [facility] provide clinical advisement, health education, appointment booking, and other advisory services.  Call for this free service at [phone].        Advance Directives     An advance directive is a document which, in the event you are no longer able to make decisions for yourself, tells your healthcare team what kind of treatment you do or do not want to receive, or who you would like to make those decisions for you.  If you do not currently have an advance directive, [facility] encourages you to create one.  For more information call:  [phone]), [phone] ([phone]),  or log on to [email or web address].        Language Assistance Services     ATTENTION: Language assistance services are available, free of charge. Please call [phone].      ATENCIÓN: Si habla español, tiene a natarajan disposición servicios gratuitos de asistencia lingüística. Llame al [phone].     CHÚ Ý: N?u b?n nói Ti?ng Vi?t, có các d?ch v? h? tr? ngôn ng? mi?n phí dành cho b?n. G?i s? [phone].        [facility] Information           [facility] Sign-Up     Activating your [facility] account is as easy as 1-2-3!     1) Visit [email or web address], select Sign Up Now, enter this activation code and your date of birth, then select  Next.  [email or web address]  Expires: 6/15/2017  6:00 AM      2) Create a username and password to use when you visit [facility] in the future and select a security question in case you lose your password and select Next.    3) Enter your e-mail address and click Sign Up!    Additional Information  If you have questions, please e-mail [email or web address] or call [phone] to talk to our [facility] staff. Remember, [facility] is NOT to be used for urgent needs. For medical emergencies, dial 911.          [facility] complies with applicable Federal civil rights laws and does not discriminate on the basis of race, color, national origin, age, disability, or sex.

## 2017-05-01 NOTE — OP NOTE
SURGEON:  Ca Case M.D.    PREOPERATIVE DIAGNOSIS:    Nuclear Sclerotic Cataract Right Eye    POSTOPERATIVE DIAGNOSIS:    Nuclear Sclerotic Cataract Right Eye    PROCEDURES:    Phacoemulsification with  intraocular lens, Right eye (02840)    DATE OF SURGERY: 05/01/2017    IMPLANT: pcboo 21.5    ANESTHESIA:  MAC with topical Lidocaine    COMPLICATIONS:  None    ESTIMATED BLOOD LOSS: None    SPECIMENS: None    INDICATIONS:    The patient has a history of painless progressive visual loss and  difficulty with activities of daily living secondary to cataract formation.  After a thorough discussion of the risks, benefits, and alternatives to cataract surgery, including, but not limited to, the rare risks of infection, retinal detachment, hemorrhage, need for additional surgery, loss of vision, and even loss of the eye, the patient voices understanding and desires to proceed.    DESCRIPTION OF PROCEDURE:    The patients IOL calculations were reviewed, and the lens selection confirmed.   After verification and marking of the proper eye in the preop holding area, the patient was brought to the operating room in supine position where the eye was prepped and draped in standard sterile fashion with 5% Betadine and a lid speculum placed in the eye.   Topical 4% Lidocaine was used in addition to the preoperative anesthesia and the procedure was begun by the creation of a paracentesis incision through which viscoelastic was used to fill the anterior chamber.  Next, a keratome blade was used to create a triplanar temporal clear corneal incision and a cystotome and Utrata forceps used to fashion a continuous curvilinear capsulorrhexis.  Hydrodissection was carried out using the Weinstein hydrodissection cannula and the nucleus was found to be mobile.  Phacoemulsification of the nucleus was carried out using a quick chop technique, and all remaining epinuclear and cortical material was removed.  The eye was then reformed with  Viscoelastic and the  intraocular lens was implanted into the capsular bag.  All remaining viscoelastics were removed from the eye and at the end of the case the pupil was round, the lens was well-centered within the capsular bag and all wounds were found to be water tight.  Drops of Vigamox and Pred Forte were instilled and a shield was placed over the eye. The patient will follow up with Dr. Case in the morning.

## 2017-05-02 ENCOUNTER — OFFICE VISIT (OUTPATIENT)
Dept: OPHTHALMOLOGY | Facility: CLINIC | Age: 82
End: 2017-05-02
Attending: OPHTHALMOLOGY
Payer: MEDICARE

## 2017-05-02 DIAGNOSIS — H17.9 CORNEAL OPACITY: ICD-10-CM

## 2017-05-02 DIAGNOSIS — Z98.890 POST-OPERATIVE STATE: Primary | ICD-10-CM

## 2017-05-02 DIAGNOSIS — H25.11 NUCLEAR SCLEROTIC CATARACT OF RIGHT EYE: ICD-10-CM

## 2017-05-02 PROCEDURE — 99999 PR PBB SHADOW E&M-EST. PATIENT-LVL I: CPT | Mod: PBBFAC,,, | Performed by: OPHTHALMOLOGY

## 2017-05-02 PROCEDURE — 99024 POSTOP FOLLOW-UP VISIT: CPT | Mod: ,,, | Performed by: OPHTHALMOLOGY

## 2017-05-02 PROCEDURE — 99211 OFF/OP EST MAY X REQ PHY/QHP: CPT | Mod: PBBFAC | Performed by: OPHTHALMOLOGY

## 2017-05-02 NOTE — MR AVS SNAPSHOT
[facility]  [address] [facility] [address]  Phone: [phone]  Fax: [phone]                  Amirah Davey   2017 8:00 AM   Office Visit    Description:  Female : 1935   Provider:  Ca Case MD   Department:  [facility]           Reason for Visit     Post-op Evaluation           Diagnoses this Visit        Comments    Post-operative state    -  Primary     Nuclear sclerotic cataract of right eye         Corneal opacity                To Do List           Your Future Surgeries/Procedures     May 29, 2017   Surgery with Ca Case MD   [facility]    [address] [facility] [address] [phone]              Goals (5 Years of Data)     None      [facility]     [facility] Care [facility] - 24/7 Assistance  Unless otherwise directed by your provider, please contact [facility], our nurse care line that is available for 24/7 assistance.     Registered nurses in the [facility] provide: appointment scheduling, clinical advisement, health education, and other advisory services.  Call: [phone] (toll free)               Medications           Message regarding Medications     Verify the changes and/or additions to your medication regime listed below are the same as discussed with your clinician today.  If any of these changes or additions are incorrect, please notify your healthcare provider.             Verify that the below list of medications is an accurate representation of the medications you are currently taking.  If none reported, the list may be blank. If incorrect, please contact your healthcare provider. Carry this list with you in case of emergency.           Current Medications     amlodipine (NORVASC) 5 MG tablet Take 5 mg by mouth every evening.     lisinopril 10 MG tablet Take 10 mg by mouth every evening.     omeprazole (PRILOSEC) 40 MG capsule Take  40 mg by mouth every evening.     XARELTO 20 mg Tab TAKE 1 TABLET BY MOUTH DAILY AT DINNER           Clinical Reference Information           Allergies as of 5/2/2017     No Known Allergies      Immunizations Administered on Date of Encounter - 5/2/2017     None      [facility] Sign-Up     Activating your [facility] account is as easy as 1-2-3!     1) Visit [email or web address], select Sign Up Now, enter this activation code and your date of birth, then select Next.  [email or web address]  Expires: 6/15/2017  6:00 AM      2) Create a username and password to use when you visit [facility] in the future and select a security question in case you lose your password and select Next.    3) Enter your e-mail address and click Sign Up!    Additional Information  If you have questions, please e-mail [email or web address] or call [phone] to talk to our [facility] staff. Remember, [facility] is NOT to be used for urgent needs. For medical emergencies, dial 911.         Language Assistance Services     ATTENTION: Language assistance services are available, free of charge. Please call [phone].      ATENCIÓN: Si habla mamadou, tiene a natarajan disposición servicios gratuitos de asistencia lingüística. Llame al [phone].     CHÚ Ý: N?u b?n nói Ti?ng Vi?t, có các d?ch v? h? tr? ngôn ng? mi?n phí dành cho b?n. G?i s? [phone].         [facility] complies with applicable Federal civil rights laws and does not discriminate on the basis of race, color, national origin, age, disability, or sex.

## 2017-05-02 NOTE — PROGRESS NOTES
HPI     S/P Phaco w/IOL OD 5/1/17  Dr. Case    Pt states: her VA OD is improved.  She is very pleased.      Eye meds:  P/m/B TID        Last edited by Jumana Steele on 5/2/2017  9:17 AM.         Assessment /Plan     For exam results, see Encounter Report.    Post-operative state    Nuclear sclerotic cataract of right eye    Corneal opacity      Slit lamp exam:  L/L: nl  K: clear, wound sealed  AC: 1+ cell  Lens: IOL centered and stable    POD1 s/p Phaco/IOL  Appropriate precautions and post op medications reviewed.  Patient instructed to call or come in if symptoms of redness, decreased vision, or pain are experienced.

## 2017-05-16 ENCOUNTER — OFFICE VISIT (OUTPATIENT)
Dept: OPHTHALMOLOGY | Facility: CLINIC | Age: 82
End: 2017-05-16
Attending: OPHTHALMOLOGY
Payer: MEDICARE

## 2017-05-16 DIAGNOSIS — Z98.890 POST-OPERATIVE STATE: Primary | ICD-10-CM

## 2017-05-16 DIAGNOSIS — H25.12 NUCLEAR SCLEROTIC CATARACT OF LEFT EYE: ICD-10-CM

## 2017-05-16 DIAGNOSIS — H17.9 CORNEAL OPACITY: ICD-10-CM

## 2017-05-16 DIAGNOSIS — H25.11 NUCLEAR SCLEROTIC CATARACT OF RIGHT EYE: ICD-10-CM

## 2017-05-16 PROCEDURE — 99999 PR PBB SHADOW E&M-EST. PATIENT-LVL I: CPT | Mod: PBBFAC,,, | Performed by: OPHTHALMOLOGY

## 2017-05-16 PROCEDURE — 99211 OFF/OP EST MAY X REQ PHY/QHP: CPT | Mod: PBBFAC | Performed by: OPHTHALMOLOGY

## 2017-05-16 PROCEDURE — 99024 POSTOP FOLLOW-UP VISIT: CPT | Mod: ,,, | Performed by: OPHTHALMOLOGY

## 2017-05-16 RX ORDER — TETRACAINE HYDROCHLORIDE 5 MG/ML
1 SOLUTION OPHTHALMIC
Status: CANCELLED | OUTPATIENT
Start: 2017-05-16

## 2017-05-16 RX ORDER — LIDOCAINE HYDROCHLORIDE 10 MG/ML
1 INJECTION, SOLUTION EPIDURAL; INFILTRATION; INTRACAUDAL; PERINEURAL ONCE
Status: CANCELLED | OUTPATIENT
Start: 2017-05-16 | End: 2017-05-16

## 2017-05-16 RX ORDER — TROPICAMIDE 10 MG/ML
1 SOLUTION/ DROPS OPHTHALMIC
Status: CANCELLED | OUTPATIENT
Start: 2017-05-16

## 2017-05-16 RX ORDER — MOXIFLOXACIN 5 MG/ML
1 SOLUTION/ DROPS OPHTHALMIC
Status: CANCELLED | OUTPATIENT
Start: 2017-05-16

## 2017-05-16 RX ORDER — PHENYLEPHRINE HYDROCHLORIDE 25 MG/ML
1 SOLUTION/ DROPS OPHTHALMIC
Status: CANCELLED | OUTPATIENT
Start: 2017-05-16

## 2017-05-16 NOTE — PROGRESS NOTES
HPI     S/P Phaco w/IOL OD 5/1/17  Dr. Case    Pt states: her VA OD is improved.  Ready to proceed with cataract surgery   OS    Eye meds:  P/m/B TID        Last edited by Jumana Steele on 5/16/2017  1:17 PM.         Assessment /Plan     For exam results, see Encounter Report.    Post-operative state    Nuclear sclerotic cataract of right eye    Corneal opacity    Nuclear sclerotic cataract of left eye      Slit lamp exam:  L/L: nl  K: clear, wound sealed  AC: trace cell  Iris/Lens: IOL centered and stable    POW1 s/p phaco: Surgery healing well with no signs of infection or abnormal inflammation.    Patient wishes to proceed with surgery in the second eye. Risks, benefits, alternatives reviewed. IOL selection reviewed.     Left eye  IOL: pcboo 22.5

## 2017-05-16 NOTE — MR AVS SNAPSHOT
[facility]  [address] [facility] [address]  Phone: [phone]  Fax: [phone]                  Amirah Davey   2017 1:00 PM   Office Visit    Description:  Female : 1935   Provider:  Ca Case MD   Department:  [facility] Op[facility]           Diagnoses this Visit        Comments    Post-operative state    -  Primary     Nuclear sclerotic cataract of right eye         Corneal opacity         Nuclear sclerotic cataract of left eye                To Do List           Your Future Surgeries/Procedures     May 29, 2017   Surgery with Ca Case MD   [facility] ([facility])    [address] [facility] [address] [phone]              Goals (5 Years of Data)     None      [facility]     [facility] Care [facility] - 24/7 Assistance  Unless otherwise directed by your provider, please contact [facility], our nurse care line that is available for 24/7 assistance.     Registered nurses in the [facility] provide: appointment scheduling, clinical advisement, health education, and other advisory services.  Call: [phone] (toll free)               Medications           Message regarding Medications     Verify the changes and/or additions to your medication regime listed below are the same as discussed with your clinician today.  If any of these changes or additions are incorrect, please notify your healthcare provider.             Verify that the below list of medications is an accurate representation of the medications you are currently taking.  If none reported, the list may be blank. If incorrect, please contact your healthcare provider. Carry this list with you in case of emergency.           Current Medications     amlodipine (NORVASC) 5 MG tablet Take 5 mg by mouth every evening.     lisinopril 10 MG tablet Take 10 mg by mouth every evening.     omeprazole (PRILOSEC) 40 MG capsule Take 40  mg by mouth every evening.     XARELTO 20 mg Tab TAKE 1 TABLET BY MOUTH DAILY AT DINNER           Clinical Reference Information           Allergies as of 5/16/2017     No Known Allergies      Immunizations Administered on Date of Encounter - 5/16/2017     None      [facility] Sign-Up     Activating your [facility] account is as easy as 1-2-3!     1) Visit [email or web address], select Sign Up Now, enter this activation code and your date of birth, then select Next.  [email or web address]  Expires: 6/15/2017  6:00 AM      2) Create a username and password to use when you visit [facility] in the future and select a security question in case you lose your password and select Next.    3) Enter your e-mail address and click Sign Up!    Additional Information  If you have questions, please e-mail [email or web address] or call [phone] to talk to our [facility] staff. Remember, [facility] is NOT to be used for urgent needs. For medical emergencies, dial 911.         Language Assistance Services     ATTENTION: Language assistance services are available, free of charge. Please call [phone].      ATENCIÓN: Si habla español, tiene a natarajan disposición servicios gratuitos de asistencia lingüística. Llame al [phone].     CHÚ Ý: N?u b?n nói Ti?ng Vi?t, có các d?ch v? h? tr? ngôn ng? mi?n phí dành cho b?n. G?i s? [phone].         [facility] complies with applicable Federal civil rights laws and does not discriminate on the basis of race, color, national origin, age, disability, or sex.

## 2017-05-25 ENCOUNTER — TELEPHONE (OUTPATIENT)
Dept: OPTOMETRY | Facility: CLINIC | Age: 82
End: 2017-05-25

## 2017-05-29 ENCOUNTER — ANESTHESIA (OUTPATIENT)
Dept: SURGERY | Facility: OTHER | Age: 82
End: 2017-05-29
Payer: MEDICARE

## 2017-05-29 ENCOUNTER — HOSPITAL ENCOUNTER (OUTPATIENT)
Facility: OTHER | Age: 82
Discharge: HOME | End: 2017-05-29
Attending: OPHTHALMOLOGY | Admitting: OPHTHALMOLOGY
Payer: MEDICARE

## 2017-05-29 ENCOUNTER — ANESTHESIA EVENT (OUTPATIENT)
Dept: SURGERY | Facility: OTHER | Age: 82
End: 2017-05-29
Payer: MEDICARE

## 2017-05-29 ENCOUNTER — SURGERY (OUTPATIENT)
Age: 82
End: 2017-05-29

## 2017-05-29 VITALS
RESPIRATION RATE: 16 BRPM | DIASTOLIC BLOOD PRESSURE: 86 MMHG | BODY MASS INDEX: 32.79 KG/M2 | HEART RATE: 56 BPM | WEIGHT: 167 LBS | HEIGHT: 60 IN | SYSTOLIC BLOOD PRESSURE: 166 MMHG | TEMPERATURE: 98 F | OXYGEN SATURATION: 100 %

## 2017-05-29 DIAGNOSIS — Z98.890 POST-OPERATIVE STATE: ICD-10-CM

## 2017-05-29 DIAGNOSIS — H25.12 NUCLEAR SCLEROTIC CATARACT OF LEFT EYE: Primary | ICD-10-CM

## 2017-05-29 DIAGNOSIS — H25.11 NUCLEAR SCLEROTIC CATARACT OF RIGHT EYE: ICD-10-CM

## 2017-05-29 PROCEDURE — 37000009 HC ANESTHESIA EA ADD 15 MINS: Performed by: OPHTHALMOLOGY

## 2017-05-29 PROCEDURE — 36000706: Performed by: OPHTHALMOLOGY

## 2017-05-29 PROCEDURE — 37000008 HC ANESTHESIA 1ST 15 MINUTES: Performed by: OPHTHALMOLOGY

## 2017-05-29 PROCEDURE — 36000707: Performed by: OPHTHALMOLOGY

## 2017-05-29 PROCEDURE — 25000003 PHARM REV CODE 250: Performed by: OPHTHALMOLOGY

## 2017-05-29 PROCEDURE — 63600175 PHARM REV CODE 636 W HCPCS: Performed by: NURSE ANESTHETIST, CERTIFIED REGISTERED

## 2017-05-29 PROCEDURE — 66984 XCAPSL CTRC RMVL W/O ECP: CPT | Mod: 79,LT,, | Performed by: OPHTHALMOLOGY

## 2017-05-29 PROCEDURE — 25000003 PHARM REV CODE 250: Performed by: NURSE ANESTHETIST, CERTIFIED REGISTERED

## 2017-05-29 PROCEDURE — 71000015 HC POSTOP RECOV 1ST HR: Performed by: OPHTHALMOLOGY

## 2017-05-29 PROCEDURE — V2632 POST CHMBR INTRAOCULAR LENS: HCPCS | Performed by: OPHTHALMOLOGY

## 2017-05-29 DEVICE — IMPLANTABLE DEVICE: Type: IMPLANTABLE DEVICE | Site: EYE | Status: FUNCTIONAL

## 2017-05-29 RX ORDER — LIDOCAINE HYDROCHLORIDE 40 MG/ML
INJECTION, SOLUTION RETROBULBAR
Status: DISCONTINUED | OUTPATIENT
Start: 2017-05-29 | End: 2017-05-29 | Stop reason: HOSPADM

## 2017-05-29 RX ORDER — PROPARACAINE HYDROCHLORIDE 5 MG/ML
1 SOLUTION/ DROPS OPHTHALMIC
Status: DISCONTINUED | OUTPATIENT
Start: 2017-05-29 | End: 2017-05-29 | Stop reason: HOSPADM

## 2017-05-29 RX ORDER — PHENYLEPHRINE HYDROCHLORIDE 25 MG/ML
1 SOLUTION/ DROPS OPHTHALMIC
Status: COMPLETED | OUTPATIENT
Start: 2017-05-29 | End: 2017-05-29

## 2017-05-29 RX ORDER — MIDAZOLAM HYDROCHLORIDE 1 MG/ML
INJECTION INTRAMUSCULAR; INTRAVENOUS
Status: DISCONTINUED | OUTPATIENT
Start: 2017-05-29 | End: 2017-05-29

## 2017-05-29 RX ORDER — TETRACAINE HYDROCHLORIDE 5 MG/ML
SOLUTION OPHTHALMIC
Status: DISCONTINUED | OUTPATIENT
Start: 2017-05-29 | End: 2017-05-29 | Stop reason: HOSPADM

## 2017-05-29 RX ORDER — LIDOCAINE HYDROCHLORIDE 10 MG/ML
1 INJECTION, SOLUTION EPIDURAL; INFILTRATION; INTRACAUDAL; PERINEURAL ONCE
Status: DISCONTINUED | OUTPATIENT
Start: 2017-05-29 | End: 2017-05-29 | Stop reason: HOSPADM

## 2017-05-29 RX ORDER — TETRACAINE HYDROCHLORIDE 5 MG/ML
1 SOLUTION OPHTHALMIC
Status: COMPLETED | OUTPATIENT
Start: 2017-05-29 | End: 2017-05-29

## 2017-05-29 RX ORDER — TROPICAMIDE 10 MG/ML
1 SOLUTION/ DROPS OPHTHALMIC
Status: COMPLETED | OUTPATIENT
Start: 2017-05-29 | End: 2017-05-29

## 2017-05-29 RX ORDER — MOXIFLOXACIN 5 MG/ML
1 SOLUTION/ DROPS OPHTHALMIC
Status: COMPLETED | OUTPATIENT
Start: 2017-05-29 | End: 2017-05-29

## 2017-05-29 RX ORDER — ACETAMINOPHEN 325 MG/1
650 TABLET ORAL EVERY 4 HOURS PRN
Status: DISCONTINUED | OUTPATIENT
Start: 2017-05-29 | End: 2017-05-29 | Stop reason: HOSPADM

## 2017-05-29 RX ORDER — SODIUM CHLORIDE 9 MG/ML
INJECTION, SOLUTION INTRAVENOUS CONTINUOUS PRN
Status: DISCONTINUED | OUTPATIENT
Start: 2017-05-29 | End: 2017-05-29

## 2017-05-29 RX ORDER — MOXIFLOXACIN 5 MG/ML
SOLUTION/ DROPS OPHTHALMIC
Status: DISCONTINUED | OUTPATIENT
Start: 2017-05-29 | End: 2017-05-29 | Stop reason: HOSPADM

## 2017-05-29 RX ADMIN — TROPICAMIDE 1 DROP: 10 SOLUTION/ DROPS OPHTHALMIC at 11:05

## 2017-05-29 RX ADMIN — MIDAZOLAM HYDROCHLORIDE 2 MG: 1 INJECTION, SOLUTION INTRAMUSCULAR; INTRAVENOUS at 12:05

## 2017-05-29 RX ADMIN — PHENYLEPHRINE HYDROCHLORIDE 1 DROP: 25 SOLUTION/ DROPS OPHTHALMIC at 11:05

## 2017-05-29 RX ADMIN — LIDOCAINE HYDROCHLORIDE 4 DROP: 40 INJECTION, SOLUTION RETROBULBAR; TOPICAL at 12:05

## 2017-05-29 RX ADMIN — TETRACAINE HYDROCHLORIDE 1 DROP: 5 SOLUTION OPHTHALMIC at 11:05

## 2017-05-29 RX ADMIN — MOXIFLOXACIN HYDROCHLORIDE 1 DROP: 5 SOLUTION/ DROPS OPHTHALMIC at 01:05

## 2017-05-29 RX ADMIN — MOXIFLOXACIN HYDROCHLORIDE 1 DROP: 5 SOLUTION/ DROPS OPHTHALMIC at 11:05

## 2017-05-29 RX ADMIN — SODIUM CHONDROITIN SULFATE / SODIUM HYALURONATE 0.5 ML: 0.55-0.5 INJECTION INTRAOCULAR at 12:05

## 2017-05-29 RX ADMIN — MOXIFLOXACIN HYDROCHLORIDE 1 DROP: 5 SOLUTION/ DROPS OPHTHALMIC at 12:05

## 2017-05-29 RX ADMIN — BALANCED SALT SOLUTION ENRICHED WITH BICARBONATE, DEXTROSE, AND GLUTATHIONE 500 ML: KIT at 12:05

## 2017-05-29 RX ADMIN — TETRACAINE HYDROCHLORIDE 1 DROP: 5 SOLUTION OPHTHALMIC at 12:05

## 2017-05-29 RX ADMIN — SODIUM CHLORIDE: 0.9 INJECTION, SOLUTION INTRAVENOUS at 12:05

## 2017-05-29 NOTE — ANESTHESIA POSTPROCEDURE EVALUATION
Anesthesia Post Evaluation    Patient: Amirah Davey    Procedure(s) Performed: Procedure(s) (LRB):  PHACOEMULSIFICATION-ASPIRATION-CATARACT (Left)  INSERTION-INTRAOCULAR LENS (IOL) (Left)    Final Anesthesia Type: MAC  Patient location during evaluation: [facility]  Patient participation: Yes- Able to Participate  Level of consciousness: awake and alert  Post-procedure vital signs: reviewed and stable  Pain management: adequate  Airway patency: patent  PONV status at discharge: No PONV  Anesthetic complications: no      Cardiovascular status: blood pressure returned to baseline  Respiratory status: unassisted  Hydration status: euvolemic  Follow-up not needed.        Visit Vitals  BP (!) 162/87 (BP Location: Right arm, Patient Position: Lying, BP Method: Automatic)   Pulse (!) 53   Temp 37 °C (98.6 °F) (Oral)   Resp 16   Ht 5' (1.524 m)   Wt 75.8 kg (167 lb)   SpO2 98%   Breastfeeding? No   BMI 32.61 kg/m²       Pain/Emily Score: Pain Assessment Performed: Yes (5/29/2017 11:42 AM)  Presence of Pain: denies (5/29/2017 11:42 AM)

## 2017-05-29 NOTE — DISCHARGE INSTRUCTIONS
Ca Case MD  [facility]  Department of Ophthalmology      AFTER: Cataract Surgery:  Relax at home and DO NOT exert yourself for the rest of the day.  Plan to see Dr. Case tomorrow at the eye clinic:   [facility] [facility] [address]  [place] [address]    Refer to attached eye drop instruction sheet     Precautions:  DO NOT rub your eye.  You may resume moderate activity the day after surgery.  Wear protective sunglasses during the day and a shield at night for 1(one) week.  If you have pain, redness and decreased vision, call Dr. Case (or the on-call doctor after hours) @[phone].            Home Care Instructions for Eye Surgeries    1. ACTIVITY:  Limit your activity today. Relax at home and DO NOT exert yourself for the rest of the day. Increase activity gradually. You may return to work or school as directed by your physician.    2. DIET:  Drink plenty of fluids. Resume your normal diet unless instructed otherwise.    3. PAIN:  Expect a moderate amount of pain. If a prescription for pain is not sent home with you, you may take your commonly used pain reliever as directed. If this is not sufficient, call your physician. You may resume any other prescription medication unless otherwise directed by your physician.     Discuss any problem with your physician as soon as it arises. Do not Delay.      EMERGENCY- If you are unable to contact your physician, please go to the nearest Emergency Room.       Anesthesia: Monitored Anesthesia Care (MAC)    Anesthesia Safety  · Have an adult family member or friend drive you home after the procedure.  · For the first 24 hours after your surgery:  · Do not drive or use heavy equipment.  · Do not make important decisions or sign documents.  · Avoid alcohol.  · Have someone stay with you, if possible. They can watch for problems and help keep you safe.

## 2017-05-29 NOTE — OP NOTE
SURGEON:  Ca Case M.D.    PREOPERATIVE DIAGNOSIS:    Nuclear Sclerotic Cataract Left Eye    POSTOPERATIVE DIAGNOSIS:    Nuclear Sclerotic Cataract Left Eye    PROCEDURES:    Phacoemulsification with  intraocular lens, Left eye (62236)    DATE OF SURGERY: 05/29/2017    IMPLANT: pcboo 22.5    ANESTHESIA:  MAC with topical Lidocaine    COMPLICATIONS:  None    ESTIMATED BLOOD LOSS: None    SPECIMENS: None    INDICATIONS:    The patient has a history of painless progressive visual loss and  difficulty with activities of daily living secondary to cataract formation.  After a thorough discussion of the risks, benefits, and alternatives to cataract surgery, including, but not limited to, the rare risks of infection, retinal detachment, hemorrhage, need for additional surgery, loss of vision, and even loss of the eye, the patient voices understanding and desires to proceed.    DESCRIPTION OF PROCEDURE:    The patients IOL calculations were reviewed, and the lens selection confirmed.   After verification and marking of the proper eye in the preop holding area, the patient was brought to the operating room in supine position where the eye was prepped and draped in standard sterile fashion with 5% Betadine and a lid speculum placed in the eye.   Topical 4% Lidocaine was used in addition to the preoperative anesthesia and the procedure was begun by the creation of a paracentesis incision through which viscoelastic was used to fill the anterior chamber.  Next, a keratome blade was used to create a triplanar temporal clear corneal incision and a cystotome and Utrata forceps used to fashion a continuous curvilinear capsulorrhexis.  Hydrodissection was carried out using the Weinstein hydrodissection cannula and the nucleus was found to be mobile.  Phacoemulsification of the nucleus was carried out using a quick chop technique, and all remaining epinuclear and cortical material was removed.  The eye was then reformed with  Viscoelastic and the  intraocular lens was implanted into the capsular bag.  All remaining viscoelastics were removed from the eye and at the end of the case the pupil was round, the lens was well-centered within the capsular bag and all wounds were found to be water tight.  Drops of Vigamox and Pred Forte were instilled and a shield was placed over the eye. The patient will follow up with Dr. Case in the morning.

## 2017-05-29 NOTE — ANESTHESIA PREPROCEDURE EVALUATION
05/29/2017  Amirah Davey is a 81 y.o., female.    Pre-op Assessment    I have reviewed the Patient Summary Reports.     I have reviewed the Nursing Notes.   I have reviewed the Medications.     Review of Systems  Anesthesia Hx:  Denies Family Hx of Anesthesia complications.   Denies Personal Hx of Anesthesia complications.   Hematology/Oncology:     Oncology Normal     Cardiovascular:   Exercise tolerance: good Hypertension    Pulmonary:  Pulmonary Normal    Renal/:  Renal/ Normal     Hepatic/GI:   GERD    Neurological:   CVA    Endocrine:  Endocrine Normal        Physical Exam  General:  Obesity    Airway/Jaw/Neck:  Airway Findings: Mouth Opening: Normal Tongue: Normal  General Airway Assessment: Adult  Mallampati: II  TM Distance: Normal, at least 6 cm      Dental:  Dental Findings: Upper Dentures        Mental Status:  Mental Status Findings:  Alert and Oriented, Cooperative         Anesthesia Plan  Type of Anesthesia, risks & benefits discussed:  Anesthesia Type:  MAC  Patient's Preference:   Intra-op Monitoring Plan:   Intra-op Monitoring Plan Comments:   Post Op Pain Control Plan:   Post Op Pain Control Plan Comments:   Induction:    Beta Blocker:         Informed Consent: Patient understands risks and agrees with Anesthesia plan.  Questions answered.   ASA Score: 3     Day of Surgery Review of History & Physical:    H&P update referred to the surgeon.         Ready For Surgery From Anesthesia Perspective.

## 2017-05-29 NOTE — PLAN OF CARE
Patient prefers to have Sonya(daughter) present for discharge teaching. Please contact them @[phone].

## 2017-05-30 ENCOUNTER — OFFICE VISIT (OUTPATIENT)
Dept: OPHTHALMOLOGY | Facility: CLINIC | Age: 82
End: 2017-05-30
Attending: OPHTHALMOLOGY
Payer: MEDICARE

## 2017-05-30 DIAGNOSIS — H25.12 NUCLEAR SCLEROTIC CATARACT OF LEFT EYE: ICD-10-CM

## 2017-05-30 DIAGNOSIS — Z98.890 POST-OPERATIVE STATE: Primary | ICD-10-CM

## 2017-05-30 PROCEDURE — 99999 PR PBB SHADOW E&M-EST. PATIENT-LVL II: CPT | Mod: PBBFAC,,, | Performed by: OPHTHALMOLOGY

## 2017-05-30 PROCEDURE — 99024 POSTOP FOLLOW-UP VISIT: CPT | Mod: ,,, | Performed by: OPHTHALMOLOGY

## 2017-05-30 PROCEDURE — 99212 OFFICE O/P EST SF 10 MIN: CPT | Mod: PBBFAC | Performed by: OPHTHALMOLOGY

## 2017-05-30 NOTE — PROGRESS NOTES
HPI     Post-op Evaluation    Additional comments: 1 day check.            Comments   POD 1 CE with IOL OS    Pt states doing well, no complaints.  Using drops as directed.         Last edited by Gail Napoles on 5/30/2017  1:19 PM. (History)            Assessment /Plan     For exam results, see Encounter Report.    Post-operative state    Nuclear sclerotic cataract of left eye      Slit lamp exam:  L/L: nl  K: clear, wound sealed  AC: 1+ cell  Lens: IOL centered and stable    POD1 s/p Phaco/IOL  Appropriate precautions and post op medications reviewed.  Patient instructed to call or come in if symptoms of redness, decreased vision, or pain are experienced.

## 2017-06-30 ENCOUNTER — OFFICE VISIT (OUTPATIENT)
Dept: OPTOMETRY | Facility: CLINIC | Age: 82
End: 2017-06-30
Payer: MEDICARE

## 2017-06-30 DIAGNOSIS — Z98.42 S/P BILATERAL CATARACT EXTRACTION: Primary | ICD-10-CM

## 2017-06-30 DIAGNOSIS — Z98.41 S/P BILATERAL CATARACT EXTRACTION: Primary | ICD-10-CM

## 2017-06-30 PROCEDURE — 92134 CPTRZ OPH DX IMG PST SGM RTA: CPT | Mod: PBBFAC | Performed by: OPTOMETRIST

## 2017-06-30 PROCEDURE — 99999 PR PBB SHADOW E&M-EST. PATIENT-LVL II: CPT | Mod: PBBFAC,,, | Performed by: OPTOMETRIST

## 2017-06-30 PROCEDURE — 99212 OFFICE O/P EST SF 10 MIN: CPT | Mod: PBBFAC | Performed by: OPTOMETRIST

## 2017-06-30 PROCEDURE — 99024 POSTOP FOLLOW-UP VISIT: CPT | Mod: ,,, | Performed by: OPTOMETRIST

## 2017-06-30 NOTE — PROGRESS NOTES
HPI     Post-op Evaluation    Additional comments: 1 month phaco OS           Comments   Last eye exam was 5/30/17 with Dr. Case.  Patient states vision is better since cataract sx's.  Patient denies diplopia, headaches, flashes/floaters, and pain.    05/01/2017 IMPLANT: pcboo 21.5 OD  05/29/2017 IMPLANT: pcboo 22.5 OS       Last edited by Maya Styles on 6/30/2017  8:08 AM. (History)            Assessment /Plan     For exam results, see Encounter Report.    S/P bilateral cataract extraction                         1.  Pt doing well.  No rx given. Eye health normal OU.  Return care to Dr. Ochoa.

## 2019-06-20 ENCOUNTER — OFFICE VISIT (OUTPATIENT)
Dept: INTERNAL MEDICINE | Facility: CLINIC | Age: 84
End: 2019-06-20
Payer: MEDICARE

## 2019-06-20 ENCOUNTER — LAB VISIT (OUTPATIENT)
Dept: LAB | Facility: HOSPITAL | Age: 84
End: 2019-06-20
Attending: INTERNAL MEDICINE
Payer: MEDICARE

## 2019-06-20 VITALS
TEMPERATURE: 99 F | OXYGEN SATURATION: 96 % | SYSTOLIC BLOOD PRESSURE: 180 MMHG | HEIGHT: 60 IN | DIASTOLIC BLOOD PRESSURE: 88 MMHG | HEART RATE: 44 BPM | WEIGHT: 150.38 LBS | BODY MASS INDEX: 29.52 KG/M2

## 2019-06-20 DIAGNOSIS — I10 HYPERTENSION, UNSPECIFIED TYPE: ICD-10-CM

## 2019-06-20 DIAGNOSIS — R82.90 ABNORMAL URINALYSIS: ICD-10-CM

## 2019-06-20 DIAGNOSIS — B37.0 THRUSH: ICD-10-CM

## 2019-06-20 DIAGNOSIS — R00.1 BRADYCARDIA: Primary | ICD-10-CM

## 2019-06-20 LAB
ALBUMIN SERPL BCP-MCNC: 3.6 G/DL (ref 3.5–5.2)
ALP SERPL-CCNC: 72 U/L (ref 55–135)
ALT SERPL W/O P-5'-P-CCNC: 13 U/L (ref 10–44)
ANION GAP SERPL CALC-SCNC: 10 MMOL/L (ref 8–16)
AST SERPL-CCNC: 15 U/L (ref 10–40)
BILIRUB SERPL-MCNC: 0.3 MG/DL (ref 0.1–1)
BILIRUB SERPL-MCNC: ABNORMAL MG/DL
BLOOD URINE, POC: ABNORMAL
BUN SERPL-MCNC: 15 MG/DL (ref 8–23)
CALCIUM SERPL-MCNC: 9.3 MG/DL (ref 8.7–10.5)
CHLORIDE SERPL-SCNC: 107 MMOL/L (ref 95–110)
CO2 SERPL-SCNC: 22 MMOL/L (ref 23–29)
COLOR, POC UA: YELLOW
CREAT SERPL-MCNC: 1.1 MG/DL (ref 0.5–1.4)
EST. GFR  (AFRICAN AMERICAN): 54 ML/MIN/1.73 M^2
EST. GFR  (NON AFRICAN AMERICAN): 47 ML/MIN/1.73 M^2
GLUCOSE SERPL-MCNC: 97 MG/DL (ref 70–110)
GLUCOSE UR QL STRIP: 250
KETONES UR QL STRIP: ABNORMAL
LEUKOCYTE ESTERASE URINE, POC: ABNORMAL
NITRITE, POC UA: ABNORMAL
PH, POC UA: 5
POTASSIUM SERPL-SCNC: 3.9 MMOL/L (ref 3.5–5.1)
PROT SERPL-MCNC: 7.3 G/DL (ref 6–8.4)
PROTEIN, POC: 30
SODIUM SERPL-SCNC: 139 MMOL/L (ref 136–145)
SPECIFIC GRAVITY, POC UA: 1.01
UROBILINOGEN, POC UA: NORMAL

## 2019-06-20 PROCEDURE — 99999 PR PBB SHADOW E&M-EST. PATIENT-LVL III: CPT | Mod: PBBFAC,,, | Performed by: INTERNAL MEDICINE

## 2019-06-20 PROCEDURE — 93005 ELECTROCARDIOGRAM TRACING: CPT | Mod: PBBFAC | Performed by: INTERNAL MEDICINE

## 2019-06-20 PROCEDURE — 36415 COLL VENOUS BLD VENIPUNCTURE: CPT

## 2019-06-20 PROCEDURE — 99213 OFFICE O/P EST LOW 20 MIN: CPT | Mod: PBBFAC | Performed by: INTERNAL MEDICINE

## 2019-06-20 PROCEDURE — 81002 URINALYSIS NONAUTO W/O SCOPE: CPT | Mod: PBBFAC | Performed by: INTERNAL MEDICINE

## 2019-06-20 PROCEDURE — 99999 PR PBB SHADOW E&M-EST. PATIENT-LVL III: ICD-10-PCS | Mod: PBBFAC,,, | Performed by: INTERNAL MEDICINE

## 2019-06-20 PROCEDURE — 99214 OFFICE O/P EST MOD 30 MIN: CPT | Mod: S$PBB,,, | Performed by: INTERNAL MEDICINE

## 2019-06-20 PROCEDURE — 99214 PR OFFICE/OUTPT VISIT, EST, LEVL IV, 30-39 MIN: ICD-10-PCS | Mod: S$PBB,,, | Performed by: INTERNAL MEDICINE

## 2019-06-20 PROCEDURE — 93010 ELECTROCARDIOGRAM REPORT: CPT | Mod: S$PBB,,, | Performed by: INTERNAL MEDICINE

## 2019-06-20 PROCEDURE — 80053 COMPREHEN METABOLIC PANEL: CPT

## 2019-06-20 PROCEDURE — 93010 EKG 12-LEAD: ICD-10-PCS | Mod: S$PBB,,, | Performed by: INTERNAL MEDICINE

## 2019-06-20 PROCEDURE — 87086 URINE CULTURE/COLONY COUNT: CPT

## 2019-06-20 RX ORDER — PANTOPRAZOLE SODIUM 40 MG/1
TABLET, DELAYED RELEASE ORAL
COMMUNITY
Start: 2015-06-24

## 2019-06-20 RX ORDER — LISINOPRIL 10 MG/1
TABLET ORAL
COMMUNITY
Start: 2015-06-22

## 2019-06-20 RX ORDER — AMLODIPINE BESYLATE 5 MG/1
0.5 TABLET ORAL
COMMUNITY
Start: 2012-08-07 | End: 2019-06-20

## 2019-06-20 RX ORDER — LISINOPRIL 20 MG/1
20 TABLET ORAL DAILY
Qty: 90 TABLET | Refills: 3 | Status: SHIPPED | OUTPATIENT
Start: 2019-06-20 | End: 2020-07-06

## 2019-06-20 RX ORDER — NYSTATIN 100000 [USP'U]/ML
4 SUSPENSION ORAL 4 TIMES DAILY
Qty: 475 ML | Refills: 0 | Status: SHIPPED | OUTPATIENT
Start: 2019-06-20 | End: 2019-06-30

## 2019-06-20 RX ORDER — HYDROCHLOROTHIAZIDE 12.5 MG/1
25 CAPSULE ORAL
COMMUNITY
Start: 2015-06-22

## 2019-06-20 NOTE — PROGRESS NOTES
Subjective:       Patient ID: Amirah Davey is a 83 y.o. female.    Chief Complaint: Hypertension (nausea, tongue discolor)    83 year old lady complains that her BP has been 180-200/ for 3-4 days.  Today she is dizzy and nauseated.  Takes her meds faithfully.  Is usually followed at     Review of Systems   Constitutional: Negative for activity change, chills, fatigue and fever.   HENT: Negative for congestion, ear pain, nosebleeds, postnasal drip, sinus pressure and sore throat.    Eyes: Negative.  Negative for visual disturbance.   Respiratory: Negative for cough, chest tightness, shortness of breath and wheezing.    Cardiovascular: Negative for chest pain.   Gastrointestinal: Negative for abdominal pain, diarrhea, nausea and vomiting.   Genitourinary: Negative for difficulty urinating, dysuria, frequency and urgency.   Musculoskeletal: Negative for arthralgias and neck stiffness.   Skin: Negative for rash.   Neurological: Negative for dizziness, weakness and headaches.   Psychiatric/Behavioral: Negative for sleep disturbance. The patient is not nervous/anxious.        Objective:      Physical Exam   Constitutional: She is oriented to person, place, and time. She appears well-developed and well-nourished.  Non-toxic appearance. No distress.   HENT:   Head: Normocephalic and atraumatic.   Right Ear: Tympanic membrane, external ear and ear canal normal.   Left Ear: Tympanic membrane, external ear and ear canal normal.   Eyes: Pupils are equal, round, and reactive to light. EOM are normal. No scleral icterus.   Neck: Normal range of motion. Neck supple. No thyromegaly present.   Cardiovascular: Regular rhythm and normal heart sounds.  Occasional extrasystoles are present. Bradycardia present.   Pulmonary/Chest: Effort normal and breath sounds normal.   Abdominal: Soft. Bowel sounds are normal. She exhibits no mass. There is no tenderness. There is no rebound.   Musculoskeletal: Normal range of motion.    Lymphadenopathy:     She has no cervical adenopathy.   Neurological: She is alert and oriented to person, place, and time. She has normal reflexes. She displays normal reflexes. No cranial nerve deficit. She exhibits normal muscle tone. Coordination normal.   Skin: Skin is warm and dry.   Psychiatric: She has a normal mood and affect. Her behavior is normal.       Assessment:       1. Bradycardia    2. Hypertension, unspecified type        Plan:   Amirah was seen today for hypertension.    Diagnoses and all orders for this visit:    Bradycardia  -     EKG 12-lead    Hypertension, unspecified type  -     EKG 12-lead  -     POCT urine dipstick without microscope  -     Comprehensive metabolic panel; Future    Discussed need for pacemaker with patient and daughter.  She will think about it.  Advised take 2 extra lisinopril tonight and increase daily dose to 20 mg.  Follow up with PCP

## 2019-06-22 LAB — BACTERIA UR CULT: NO GROWTH

## 2020-07-06 RX ORDER — LISINOPRIL 20 MG/1
TABLET ORAL
Qty: 30 TABLET | Refills: 1 | Status: SHIPPED | OUTPATIENT
Start: 2020-07-06

## 2022-07-21 ENCOUNTER — IMMUNIZATION (OUTPATIENT)
Dept: PRIMARY CARE CLINIC | Facility: CLINIC | Age: 87
End: 2022-07-21
Payer: MEDICARE

## 2022-07-21 DIAGNOSIS — Z23 NEED FOR VACCINATION: Primary | ICD-10-CM

## 2022-07-21 PROCEDURE — 0064A COVID-19, MRNA, LNP-S, PF, 100 MCG/0.25 ML DOSE VACCINE (MODERNA BOOSTER): CPT | Mod: PBBFAC | Performed by: INTERNAL MEDICINE

## 2022-07-21 PROCEDURE — 91306 COVID-19, MRNA, LNP-S, PF, 100 MCG/0.25 ML DOSE VACCINE (MODERNA BOOSTER): CPT | Mod: PBBFAC | Performed by: INTERNAL MEDICINE

## 2022-10-13 ENCOUNTER — IMMUNIZATION (OUTPATIENT)
Dept: PRIMARY CARE CLINIC | Facility: CLINIC | Age: 87
End: 2022-10-13
Payer: MEDICARE

## 2022-10-13 DIAGNOSIS — Z23 NEED FOR VACCINATION: Primary | ICD-10-CM

## 2022-10-13 PROCEDURE — 91313 COVID-19, MRNA, LNP-S, BIVALENT BOOSTER, PF, 50 MCG/0.5 ML: CPT | Mod: PBBFAC | Performed by: INTERNAL MEDICINE

## 2022-10-13 PROCEDURE — 0134A COVID-19, MRNA, LNP-S, BIVALENT BOOSTER, PF, 50 MCG/0.5 ML: CPT | Mod: PBBFAC | Performed by: INTERNAL MEDICINE

## 2024-01-01 ENCOUNTER — HOSPITAL ENCOUNTER (INPATIENT)
Facility: HOSPITAL | Age: 89
LOS: 2 days | DRG: 683 | End: 2024-07-28
Attending: EMERGENCY MEDICINE | Admitting: STUDENT IN AN ORGANIZED HEALTH CARE EDUCATION/TRAINING PROGRAM
Payer: MEDICARE

## 2024-01-01 VITALS
SYSTOLIC BLOOD PRESSURE: 114 MMHG | RESPIRATION RATE: 19 BRPM | DIASTOLIC BLOOD PRESSURE: 55 MMHG | OXYGEN SATURATION: 90 % | HEIGHT: 61 IN | TEMPERATURE: 98 F | BODY MASS INDEX: 21.97 KG/M2 | WEIGHT: 116.38 LBS | HEART RATE: 84 BPM

## 2024-01-01 DIAGNOSIS — E86.0 DEHYDRATION: ICD-10-CM

## 2024-01-01 DIAGNOSIS — R41.82 ALTERED MENTAL STATUS: ICD-10-CM

## 2024-01-01 DIAGNOSIS — E87.0 HYPERNATREMIA: ICD-10-CM

## 2024-01-01 DIAGNOSIS — R07.9 CHEST PAIN: ICD-10-CM

## 2024-01-01 LAB
ALBUMIN SERPL BCP-MCNC: 3.3 G/DL (ref 3.5–5.2)
ALP SERPL-CCNC: 57 U/L (ref 55–135)
ALT SERPL W/O P-5'-P-CCNC: 16 U/L (ref 10–44)
ANION GAP SERPL CALC-SCNC: 16 MMOL/L (ref 8–16)
AST SERPL-CCNC: 28 U/L (ref 10–40)
BACTERIA #/AREA URNS AUTO: ABNORMAL /HPF
BASOPHILS # BLD AUTO: 0.02 K/UL (ref 0–0.2)
BASOPHILS NFR BLD: 0.1 % (ref 0–1.9)
BILIRUB SERPL-MCNC: 0.5 MG/DL (ref 0.1–1)
BILIRUB UR QL STRIP: ABNORMAL
BUN SERPL-MCNC: 61 MG/DL (ref 8–23)
CALCIUM SERPL-MCNC: 10.2 MG/DL (ref 8.7–10.5)
CHLORIDE SERPL-SCNC: 122 MMOL/L (ref 95–110)
CLARITY UR REFRACT.AUTO: ABNORMAL
CO2 SERPL-SCNC: 22 MMOL/L (ref 23–29)
COLOR UR AUTO: YELLOW
CREAT SERPL-MCNC: 3.8 MG/DL (ref 0.5–1.4)
DIFFERENTIAL METHOD BLD: ABNORMAL
EOSINOPHIL # BLD AUTO: 0 K/UL (ref 0–0.5)
EOSINOPHIL NFR BLD: 0.1 % (ref 0–8)
ERYTHROCYTE [DISTWIDTH] IN BLOOD BY AUTOMATED COUNT: 19.4 % (ref 11.5–14.5)
EST. GFR  (NO RACE VARIABLE): 10.9 ML/MIN/1.73 M^2
GLUCOSE SERPL-MCNC: 94 MG/DL (ref 70–110)
GLUCOSE UR QL STRIP: NEGATIVE
HCT VFR BLD AUTO: 37.1 % (ref 37–48.5)
HGB BLD-MCNC: 11 G/DL (ref 12–16)
HGB UR QL STRIP: ABNORMAL
HYALINE CASTS UR QL AUTO: 106 /LPF
IMM GRANULOCYTES # BLD AUTO: 0.18 K/UL (ref 0–0.04)
IMM GRANULOCYTES NFR BLD AUTO: 1 % (ref 0–0.5)
KETONES UR QL STRIP: ABNORMAL
LEUKOCYTE ESTERASE UR QL STRIP: NEGATIVE
LYMPHOCYTES # BLD AUTO: 1.4 K/UL (ref 1–4.8)
LYMPHOCYTES NFR BLD: 7.5 % (ref 18–48)
MAGNESIUM SERPL-MCNC: 2.1 MG/DL (ref 1.6–2.6)
MCH RBC QN AUTO: 24.2 PG (ref 27–31)
MCHC RBC AUTO-ENTMCNC: 29.6 G/DL (ref 32–36)
MCV RBC AUTO: 82 FL (ref 82–98)
MICROSCOPIC COMMENT: ABNORMAL
MONOCYTES # BLD AUTO: 1.1 K/UL (ref 0.3–1)
MONOCYTES NFR BLD: 6.2 % (ref 4–15)
NEUTROPHILS # BLD AUTO: 15.5 K/UL (ref 1.8–7.7)
NEUTROPHILS NFR BLD: 85.1 % (ref 38–73)
NITRITE UR QL STRIP: NEGATIVE
NRBC BLD-RTO: 0 /100 WBC
OHS QRS DURATION: 110 MS
OHS QTC CALCULATION: 468 MS
PH UR STRIP: 5 [PH] (ref 5–8)
PLATELET # BLD AUTO: 276 K/UL (ref 150–450)
PMV BLD AUTO: ABNORMAL FL (ref 9.2–12.9)
POTASSIUM SERPL-SCNC: 3.3 MMOL/L (ref 3.5–5.1)
PROT SERPL-MCNC: 7.5 G/DL (ref 6–8.4)
PROT UR QL STRIP: ABNORMAL
RBC # BLD AUTO: 4.55 M/UL (ref 4–5.4)
RBC #/AREA URNS AUTO: 2 /HPF (ref 0–4)
SODIUM SERPL-SCNC: 160 MMOL/L (ref 136–145)
SP GR UR STRIP: 1.03 (ref 1–1.03)
SQUAMOUS #/AREA URNS AUTO: 1 /HPF
URN SPEC COLLECT METH UR: ABNORMAL
WBC # BLD AUTO: 18.21 K/UL (ref 3.9–12.7)
WBC #/AREA URNS AUTO: 4 /HPF (ref 0–5)

## 2024-01-01 PROCEDURE — 99239 HOSP IP/OBS DSCHRG MGMT >30: CPT | Mod: ,,, | Performed by: STUDENT IN AN ORGANIZED HEALTH CARE EDUCATION/TRAINING PROGRAM

## 2024-01-01 PROCEDURE — 11000001 HC ACUTE MED/SURG PRIVATE ROOM

## 2024-01-01 PROCEDURE — 99285 EMERGENCY DEPT VISIT HI MDM: CPT | Mod: 25

## 2024-01-01 PROCEDURE — 63600175 PHARM REV CODE 636 W HCPCS: Performed by: STUDENT IN AN ORGANIZED HEALTH CARE EDUCATION/TRAINING PROGRAM

## 2024-01-01 PROCEDURE — 96360 HYDRATION IV INFUSION INIT: CPT

## 2024-01-01 PROCEDURE — 21400001 HC TELEMETRY ROOM

## 2024-01-01 PROCEDURE — 99497 ADVNCD CARE PLAN 30 MIN: CPT | Mod: 25,,, | Performed by: STUDENT IN AN ORGANIZED HEALTH CARE EDUCATION/TRAINING PROGRAM

## 2024-01-01 PROCEDURE — 81001 URINALYSIS AUTO W/SCOPE: CPT

## 2024-01-01 PROCEDURE — 80053 COMPREHEN METABOLIC PANEL: CPT

## 2024-01-01 PROCEDURE — G0378 HOSPITAL OBSERVATION PER HR: HCPCS

## 2024-01-01 PROCEDURE — 93010 ELECTROCARDIOGRAM REPORT: CPT | Mod: ,,, | Performed by: INTERNAL MEDICINE

## 2024-01-01 PROCEDURE — 93005 ELECTROCARDIOGRAM TRACING: CPT

## 2024-01-01 PROCEDURE — 99223 1ST HOSP IP/OBS HIGH 75: CPT | Mod: AI,,, | Performed by: STUDENT IN AN ORGANIZED HEALTH CARE EDUCATION/TRAINING PROGRAM

## 2024-01-01 PROCEDURE — 63600175 PHARM REV CODE 636 W HCPCS: Mod: JZ,JG

## 2024-01-01 PROCEDURE — 83735 ASSAY OF MAGNESIUM: CPT

## 2024-01-01 PROCEDURE — 99233 SBSQ HOSP IP/OBS HIGH 50: CPT | Mod: ,,, | Performed by: STUDENT IN AN ORGANIZED HEALTH CARE EDUCATION/TRAINING PROGRAM

## 2024-01-01 PROCEDURE — 99238 HOSP IP/OBS DSCHRG MGMT 30/<: CPT | Mod: ,,, | Performed by: STUDENT IN AN ORGANIZED HEALTH CARE EDUCATION/TRAINING PROGRAM

## 2024-01-01 PROCEDURE — 85025 COMPLETE CBC W/AUTO DIFF WBC: CPT

## 2024-01-01 PROCEDURE — 63600175 PHARM REV CODE 636 W HCPCS: Performed by: PHYSICIAN ASSISTANT

## 2024-01-01 RX ORDER — ONDANSETRON HYDROCHLORIDE 2 MG/ML
4 INJECTION, SOLUTION INTRAVENOUS EVERY 8 HOURS PRN
Status: DISCONTINUED | OUTPATIENT
Start: 2024-01-01 | End: 2024-01-01

## 2024-01-01 RX ORDER — LORAZEPAM 2 MG/ML
1 INJECTION INTRAMUSCULAR EVERY 6 HOURS PRN
Status: DISCONTINUED | OUTPATIENT
Start: 2024-01-01 | End: 2024-01-01

## 2024-01-01 RX ORDER — GLUCAGON 1 MG
1 KIT INJECTION
Status: DISCONTINUED | OUTPATIENT
Start: 2024-01-01 | End: 2024-01-01

## 2024-01-01 RX ORDER — MORPHINE SULFATE 4 MG/ML
4 INJECTION, SOLUTION INTRAMUSCULAR; INTRAVENOUS EVERY 30 MIN PRN
Status: DISCONTINUED | OUTPATIENT
Start: 2024-01-01 | End: 2024-01-01 | Stop reason: HOSPADM

## 2024-01-01 RX ORDER — HALOPERIDOL 5 MG/ML
5 INJECTION INTRAMUSCULAR EVERY 6 HOURS PRN
Status: DISCONTINUED | OUTPATIENT
Start: 2024-01-01 | End: 2024-01-01

## 2024-01-01 RX ORDER — GLYCOPYRROLATE 0.2 MG/ML
0.3 INJECTION INTRAMUSCULAR; INTRAVENOUS EVERY 4 HOURS PRN
Status: DISCONTINUED | OUTPATIENT
Start: 2024-01-01 | End: 2024-01-01

## 2024-01-01 RX ORDER — MORPHINE SULFATE 4 MG/ML
4 INJECTION, SOLUTION INTRAMUSCULAR; INTRAVENOUS EVERY 30 MIN PRN
Status: DISCONTINUED | OUTPATIENT
Start: 2024-01-01 | End: 2024-01-01

## 2024-01-01 RX ORDER — LORAZEPAM 2 MG/ML
1 INJECTION INTRAMUSCULAR EVERY 30 MIN PRN
Status: DISCONTINUED | OUTPATIENT
Start: 2024-01-01 | End: 2024-01-01

## 2024-01-01 RX ORDER — IBUPROFEN 200 MG
24 TABLET ORAL
Status: DISCONTINUED | OUTPATIENT
Start: 2024-01-01 | End: 2024-01-01

## 2024-01-01 RX ORDER — OLANZAPINE 10 MG/2ML
5 INJECTION, POWDER, FOR SOLUTION INTRAMUSCULAR EVERY 6 HOURS PRN
Status: DISCONTINUED | OUTPATIENT
Start: 2024-01-01 | End: 2024-01-01

## 2024-01-01 RX ORDER — GLYCOPYRROLATE 0.2 MG/ML
0.3 INJECTION INTRAMUSCULAR; INTRAVENOUS EVERY 4 HOURS PRN
Status: DISCONTINUED | OUTPATIENT
Start: 2024-01-01 | End: 2024-01-01 | Stop reason: HOSPADM

## 2024-01-01 RX ORDER — BISACODYL 10 MG/1
10 SUPPOSITORY RECTAL DAILY PRN
Status: DISCONTINUED | OUTPATIENT
Start: 2024-01-01 | End: 2024-01-01 | Stop reason: HOSPADM

## 2024-01-01 RX ORDER — OLANZAPINE 10 MG/2ML
2.5 INJECTION, POWDER, FOR SOLUTION INTRAMUSCULAR
Status: COMPLETED | OUTPATIENT
Start: 2024-01-01 | End: 2024-01-01

## 2024-01-01 RX ORDER — IBUPROFEN 200 MG
16 TABLET ORAL
Status: DISCONTINUED | OUTPATIENT
Start: 2024-01-01 | End: 2024-01-01

## 2024-01-01 RX ORDER — DEXTROSE, SODIUM CHLORIDE, SODIUM LACTATE, POTASSIUM CHLORIDE, AND CALCIUM CHLORIDE 5; .6; .31; .03; .02 G/100ML; G/100ML; G/100ML; G/100ML; G/100ML
INJECTION, SOLUTION INTRAVENOUS CONTINUOUS
Status: DISCONTINUED | OUTPATIENT
Start: 2024-01-01 | End: 2024-01-01

## 2024-01-01 RX ORDER — ACETAMINOPHEN 325 MG/1
650 TABLET ORAL EVERY 4 HOURS PRN
Status: DISCONTINUED | OUTPATIENT
Start: 2024-01-01 | End: 2024-01-01

## 2024-01-01 RX ORDER — ALUMINUM HYDROXIDE, MAGNESIUM HYDROXIDE, AND SIMETHICONE 1200; 120; 1200 MG/30ML; MG/30ML; MG/30ML
30 SUSPENSION ORAL 4 TIMES DAILY PRN
Status: DISCONTINUED | OUTPATIENT
Start: 2024-01-01 | End: 2024-01-01

## 2024-01-01 RX ORDER — NALOXONE HCL 0.4 MG/ML
0.02 VIAL (ML) INJECTION
Status: DISCONTINUED | OUTPATIENT
Start: 2024-01-01 | End: 2024-01-01

## 2024-01-01 RX ORDER — ACETAMINOPHEN 650 MG/1
650 SUPPOSITORY RECTAL EVERY 6 HOURS PRN
Status: DISCONTINUED | OUTPATIENT
Start: 2024-01-01 | End: 2024-01-01 | Stop reason: HOSPADM

## 2024-01-01 RX ORDER — ONDANSETRON HYDROCHLORIDE 2 MG/ML
4 INJECTION, SOLUTION INTRAVENOUS EVERY 8 HOURS PRN
Status: DISCONTINUED | OUTPATIENT
Start: 2024-01-01 | End: 2024-01-01 | Stop reason: HOSPADM

## 2024-01-01 RX ORDER — QUETIAPINE FUMARATE 25 MG/1
25 TABLET, FILM COATED ORAL 2 TIMES DAILY PRN
Status: DISCONTINUED | OUTPATIENT
Start: 2024-01-01 | End: 2024-01-01

## 2024-01-01 RX ORDER — HALOPERIDOL 5 MG/ML
5 INJECTION INTRAMUSCULAR EVERY 6 HOURS PRN
Status: DISCONTINUED | OUTPATIENT
Start: 2024-01-01 | End: 2024-01-01 | Stop reason: HOSPADM

## 2024-01-01 RX ORDER — TALC
6 POWDER (GRAM) TOPICAL NIGHTLY PRN
Status: DISCONTINUED | OUTPATIENT
Start: 2024-01-01 | End: 2024-01-01

## 2024-01-01 RX ORDER — MORPHINE SULFATE 4 MG/ML
4 INJECTION, SOLUTION INTRAMUSCULAR; INTRAVENOUS EVERY 4 HOURS PRN
Status: DISCONTINUED | OUTPATIENT
Start: 2024-01-01 | End: 2024-01-01

## 2024-01-01 RX ORDER — LORAZEPAM 2 MG/ML
1 INJECTION INTRAMUSCULAR EVERY 30 MIN PRN
Status: DISCONTINUED | OUTPATIENT
Start: 2024-01-01 | End: 2024-01-01 | Stop reason: HOSPADM

## 2024-01-01 RX ORDER — PROCHLORPERAZINE EDISYLATE 5 MG/ML
5 INJECTION INTRAMUSCULAR; INTRAVENOUS EVERY 6 HOURS PRN
Status: DISCONTINUED | OUTPATIENT
Start: 2024-01-01 | End: 2024-01-01 | Stop reason: HOSPADM

## 2024-01-01 RX ORDER — SODIUM CHLORIDE 0.9 % (FLUSH) 0.9 %
10 SYRINGE (ML) INJECTION EVERY 12 HOURS PRN
Status: DISCONTINUED | OUTPATIENT
Start: 2024-01-01 | End: 2024-01-01 | Stop reason: HOSPADM

## 2024-01-01 RX ADMIN — MORPHINE SULFATE 4 MG: 4 INJECTION INTRAVENOUS at 12:07

## 2024-01-01 RX ADMIN — OLANZAPINE 2.5 MG: 10 INJECTION, POWDER, FOR SOLUTION INTRAMUSCULAR at 04:07

## 2024-01-01 RX ADMIN — SODIUM CHLORIDE, SODIUM LACTATE, POTASSIUM CHLORIDE, CALCIUM CHLORIDE AND DEXTROSE MONOHYDRATE: 5; 600; 310; 30; 20 INJECTION, SOLUTION INTRAVENOUS at 10:07

## 2024-01-01 RX ADMIN — SODIUM CHLORIDE, SODIUM LACTATE, POTASSIUM CHLORIDE, CALCIUM CHLORIDE AND DEXTROSE MONOHYDRATE: 5; 600; 310; 30; 20 INJECTION, SOLUTION INTRAVENOUS at 06:07

## 2024-01-01 RX ADMIN — SODIUM CHLORIDE, SODIUM LACTATE, POTASSIUM CHLORIDE, CALCIUM CHLORIDE AND DEXTROSE MONOHYDRATE: 5; 600; 310; 30; 20 INJECTION, SOLUTION INTRAVENOUS at 02:07

## 2024-01-01 RX ADMIN — LORAZEPAM 1 MG: 2 INJECTION INTRAMUSCULAR; INTRAVENOUS at 07:07

## 2024-01-01 RX ADMIN — MORPHINE SULFATE 4 MG: 4 INJECTION INTRAVENOUS at 08:07

## 2024-01-01 RX ADMIN — LORAZEPAM 1 MG: 2 INJECTION INTRAMUSCULAR; INTRAVENOUS at 11:07

## 2024-01-01 RX ADMIN — MORPHINE SULFATE 4 MG: 4 INJECTION INTRAVENOUS at 04:07

## 2024-01-01 RX ADMIN — LORAZEPAM 1 MG: 2 INJECTION INTRAMUSCULAR; INTRAVENOUS at 12:07

## 2024-01-01 RX ADMIN — HALOPERIDOL LACTATE 5 MG: 5 INJECTION, SOLUTION INTRAMUSCULAR at 08:07

## 2024-01-01 RX ADMIN — SODIUM CHLORIDE, POTASSIUM CHLORIDE, SODIUM LACTATE AND CALCIUM CHLORIDE 1000 ML: 600; 310; 30; 20 INJECTION, SOLUTION INTRAVENOUS at 03:07

## 2024-01-01 RX ADMIN — MORPHINE SULFATE 4 MG: 4 INJECTION INTRAVENOUS at 01:07

## 2024-01-01 RX ADMIN — MORPHINE SULFATE 4 MG: 4 INJECTION INTRAVENOUS at 07:07

## 2024-01-01 RX ADMIN — LORAZEPAM 1 MG: 2 INJECTION INTRAMUSCULAR; INTRAVENOUS at 02:07

## 2024-01-01 RX ADMIN — MORPHINE SULFATE 4 MG: 4 INJECTION INTRAVENOUS at 11:07

## 2024-05-23 ENCOUNTER — HOSPITAL ENCOUNTER (INPATIENT)
Facility: HOSPITAL | Age: 89
LOS: 8 days | Discharge: NURSING, REHAB OR LONG-TERM CARE | DRG: 065 | End: 2024-05-31
Attending: STUDENT IN AN ORGANIZED HEALTH CARE EDUCATION/TRAINING PROGRAM | Admitting: PSYCHIATRY & NEUROLOGY
Payer: MEDICARE

## 2024-05-23 DIAGNOSIS — I69.391 DYSPHAGIA DUE TO RECENT STROKE: ICD-10-CM

## 2024-05-23 DIAGNOSIS — I48.91 ATRIAL FIBRILLATION: ICD-10-CM

## 2024-05-23 DIAGNOSIS — I63.411 CEREBROVASCULAR ACCIDENT (CVA) DUE TO EMBOLISM OF RIGHT MIDDLE CEREBRAL ARTERY: ICD-10-CM

## 2024-05-23 DIAGNOSIS — I63.9 ISCHEMIC STROKE: ICD-10-CM

## 2024-05-23 DIAGNOSIS — R13.10 DYSPHAGIA, UNSPECIFIED TYPE: Primary | ICD-10-CM

## 2024-05-23 DIAGNOSIS — I63.411 EMBOLIC STROKE INVOLVING RIGHT MIDDLE CEREBRAL ARTERY: ICD-10-CM

## 2024-05-23 PROBLEM — E66.9 OBESITY (BMI 30-39.9): Status: ACTIVE | Noted: 2024-05-23

## 2024-05-23 PROBLEM — H25.12 NUCLEAR SCLEROTIC CATARACT OF LEFT EYE: Status: RESOLVED | Noted: 2017-05-29 | Resolved: 2024-05-23

## 2024-05-23 PROBLEM — G81.94 HEMIPARESIS, LEFT: Status: ACTIVE | Noted: 2024-05-23

## 2024-05-23 PROBLEM — R47.1 DYSARTHRIA DUE TO ACUTE STROKE: Status: ACTIVE | Noted: 2024-05-23

## 2024-05-23 PROBLEM — Z98.890 POST-OPERATIVE STATE: Status: RESOLVED | Noted: 2017-05-29 | Resolved: 2024-05-23

## 2024-05-23 PROBLEM — H25.10 NUCLEAR SCLEROSIS: Status: RESOLVED | Noted: 2017-05-01 | Resolved: 2024-05-23

## 2024-05-23 PROCEDURE — 36415 COLL VENOUS BLD VENIPUNCTURE: CPT | Performed by: NURSE PRACTITIONER

## 2024-05-23 PROCEDURE — 11000001 HC ACUTE MED/SURG PRIVATE ROOM

## 2024-05-23 PROCEDURE — 80061 LIPID PANEL: CPT | Performed by: NURSE PRACTITIONER

## 2024-05-23 PROCEDURE — 25000003 PHARM REV CODE 250: Performed by: NURSE PRACTITIONER

## 2024-05-23 PROCEDURE — 84443 ASSAY THYROID STIM HORMONE: CPT | Performed by: NURSE PRACTITIONER

## 2024-05-23 RX ORDER — ACETAMINOPHEN 650 MG/1
650 SUPPOSITORY RECTAL EVERY 6 HOURS PRN
Status: DISCONTINUED | OUTPATIENT
Start: 2024-05-24 | End: 2024-05-28

## 2024-05-23 RX ORDER — SODIUM CHLORIDE 9 MG/ML
INJECTION, SOLUTION INTRAVENOUS CONTINUOUS
Status: DISCONTINUED | OUTPATIENT
Start: 2024-05-23 | End: 2024-05-26

## 2024-05-23 RX ORDER — LABETALOL HCL 20 MG/4 ML
10 SYRINGE (ML) INTRAVENOUS EVERY 6 HOURS PRN
Status: DISCONTINUED | OUTPATIENT
Start: 2024-05-24 | End: 2024-05-31 | Stop reason: HOSPADM

## 2024-05-23 RX ORDER — SODIUM CHLORIDE 0.9 % (FLUSH) 0.9 %
10 SYRINGE (ML) INJECTION
Status: DISCONTINUED | OUTPATIENT
Start: 2024-05-23 | End: 2024-05-31 | Stop reason: HOSPADM

## 2024-05-23 RX ORDER — ASPIRIN 300 MG/1
300 SUPPOSITORY RECTAL DAILY
Status: DISCONTINUED | OUTPATIENT
Start: 2024-05-24 | End: 2024-05-27

## 2024-05-23 RX ORDER — ENOXAPARIN SODIUM 100 MG/ML
40 INJECTION SUBCUTANEOUS EVERY 24 HOURS
Status: DISCONTINUED | OUTPATIENT
Start: 2024-05-24 | End: 2024-05-25

## 2024-05-23 RX ORDER — BISACODYL 10 MG/1
10 SUPPOSITORY RECTAL DAILY PRN
Status: DISCONTINUED | OUTPATIENT
Start: 2024-05-23 | End: 2024-05-31 | Stop reason: HOSPADM

## 2024-05-23 RX ORDER — PANTOPRAZOLE SODIUM 40 MG/10ML
40 INJECTION, POWDER, LYOPHILIZED, FOR SOLUTION INTRAVENOUS DAILY
Status: DISCONTINUED | OUTPATIENT
Start: 2024-05-24 | End: 2024-05-31 | Stop reason: HOSPADM

## 2024-05-23 RX ORDER — ATORVASTATIN CALCIUM 40 MG/1
40 TABLET, FILM COATED ORAL DAILY
Status: DISCONTINUED | OUTPATIENT
Start: 2024-05-24 | End: 2024-05-29

## 2024-05-23 RX ORDER — ONDANSETRON HYDROCHLORIDE 2 MG/ML
4 INJECTION, SOLUTION INTRAVENOUS EVERY 12 HOURS PRN
Status: DISCONTINUED | OUTPATIENT
Start: 2024-05-24 | End: 2024-05-31 | Stop reason: HOSPADM

## 2024-05-23 RX ADMIN — SODIUM CHLORIDE: 9 INJECTION, SOLUTION INTRAVENOUS at 11:05

## 2024-05-24 ENCOUNTER — DOCUMENTATION ONLY (OUTPATIENT)
Dept: ELECTROPHYSIOLOGY | Facility: CLINIC | Age: 89
End: 2024-05-24
Payer: MEDICARE

## 2024-05-24 ENCOUNTER — DOCUMENTATION ONLY (OUTPATIENT)
Dept: CARDIOLOGY | Facility: HOSPITAL | Age: 89
End: 2024-05-24
Payer: MEDICARE

## 2024-05-24 PROBLEM — Z74.09 REDUCED MOBILITY: Status: ACTIVE | Noted: 2024-05-24

## 2024-05-24 PROBLEM — G81.90 HEMIPLEGIA: Status: ACTIVE | Noted: 2024-05-23

## 2024-05-24 PROBLEM — E87.6 HYPOKALEMIA: Status: ACTIVE | Noted: 2024-05-24

## 2024-05-24 PROBLEM — Z79.01 CHRONIC ANTICOAGULATION: Status: ACTIVE | Noted: 2024-05-24

## 2024-05-24 PROBLEM — E83.42 HYPOMAGNESEMIA: Status: ACTIVE | Noted: 2024-05-24

## 2024-05-24 LAB
ALBUMIN SERPL BCP-MCNC: 2.6 G/DL (ref 3.5–5.2)
ALP SERPL-CCNC: 49 U/L (ref 55–135)
ALT SERPL W/O P-5'-P-CCNC: 22 U/L (ref 10–44)
ANION GAP SERPL CALC-SCNC: 9 MMOL/L (ref 8–16)
APTT PPP: 31 SEC (ref 21–32)
AST SERPL-CCNC: 24 U/L (ref 10–40)
BASOPHILS # BLD AUTO: 0.03 K/UL (ref 0–0.2)
BASOPHILS NFR BLD: 0.5 % (ref 0–1.9)
BILIRUB SERPL-MCNC: 0.5 MG/DL (ref 0.1–1)
BUN SERPL-MCNC: 13 MG/DL (ref 8–23)
CALCIUM SERPL-MCNC: 8.8 MG/DL (ref 8.7–10.5)
CHLORIDE SERPL-SCNC: 109 MMOL/L (ref 95–110)
CHOLEST SERPL-MCNC: 146 MG/DL (ref 120–199)
CHOLEST/HDLC SERPL: 3.4 {RATIO} (ref 2–5)
CO2 SERPL-SCNC: 18 MMOL/L (ref 23–29)
CREAT SERPL-MCNC: 0.8 MG/DL (ref 0.5–1.4)
DIFFERENTIAL METHOD BLD: ABNORMAL
EOSINOPHIL # BLD AUTO: 0.1 K/UL (ref 0–0.5)
EOSINOPHIL NFR BLD: 2.1 % (ref 0–8)
ERYTHROCYTE [DISTWIDTH] IN BLOOD BY AUTOMATED COUNT: 15.3 % (ref 11.5–14.5)
EST. GFR  (NO RACE VARIABLE): >60 ML/MIN/1.73 M^2
GLUCOSE SERPL-MCNC: 79 MG/DL (ref 70–110)
HCT VFR BLD AUTO: 31.4 % (ref 37–48.5)
HDLC SERPL-MCNC: 43 MG/DL (ref 40–75)
HDLC SERPL: 29.5 % (ref 20–50)
HGB BLD-MCNC: 9.8 G/DL (ref 12–16)
IMM GRANULOCYTES # BLD AUTO: 0.09 K/UL (ref 0–0.04)
IMM GRANULOCYTES NFR BLD AUTO: 1.5 % (ref 0–0.5)
INR PPP: 1.1 (ref 0.8–1.2)
LDLC SERPL CALC-MCNC: 83.2 MG/DL (ref 63–159)
LYMPHOCYTES # BLD AUTO: 0.9 K/UL (ref 1–4.8)
LYMPHOCYTES NFR BLD: 14.2 % (ref 18–48)
MAGNESIUM SERPL-MCNC: 1.8 MG/DL (ref 1.6–2.6)
MCH RBC QN AUTO: 25.3 PG (ref 27–31)
MCHC RBC AUTO-ENTMCNC: 31.2 G/DL (ref 32–36)
MCV RBC AUTO: 81 FL (ref 82–98)
MONOCYTES # BLD AUTO: 0.7 K/UL (ref 0.3–1)
MONOCYTES NFR BLD: 12.2 % (ref 4–15)
NEUTROPHILS # BLD AUTO: 4.2 K/UL (ref 1.8–7.7)
NEUTROPHILS NFR BLD: 69.5 % (ref 38–73)
NONHDLC SERPL-MCNC: 103 MG/DL
NRBC BLD-RTO: 0 /100 WBC
PHOSPHATE SERPL-MCNC: 2.9 MG/DL (ref 2.7–4.5)
PLATELET # BLD AUTO: 208 K/UL (ref 150–450)
PMV BLD AUTO: 11.2 FL (ref 9.2–12.9)
POCT GLUCOSE: 101 MG/DL (ref 70–110)
POCT GLUCOSE: 74 MG/DL (ref 70–110)
POTASSIUM SERPL-SCNC: 3.8 MMOL/L (ref 3.5–5.1)
PROT SERPL-MCNC: 6.1 G/DL (ref 6–8.4)
PROTHROMBIN TIME: 11.8 SEC (ref 9–12.5)
RBC # BLD AUTO: 3.87 M/UL (ref 4–5.4)
SODIUM SERPL-SCNC: 136 MMOL/L (ref 136–145)
TRIGL SERPL-MCNC: 99 MG/DL (ref 30–150)
TROPONIN I SERPL DL<=0.01 NG/ML-MCNC: 0.01 NG/ML (ref 0–0.03)
TSH SERPL DL<=0.005 MIU/L-ACNC: 1.48 UIU/ML (ref 0.4–4)
WBC # BLD AUTO: 6.07 K/UL (ref 3.9–12.7)

## 2024-05-24 PROCEDURE — 92610 EVALUATE SWALLOWING FUNCTION: CPT

## 2024-05-24 PROCEDURE — 84100 ASSAY OF PHOSPHORUS: CPT | Performed by: NURSE PRACTITIONER

## 2024-05-24 PROCEDURE — 84484 ASSAY OF TROPONIN QUANT: CPT | Performed by: NURSE PRACTITIONER

## 2024-05-24 PROCEDURE — 25000003 PHARM REV CODE 250

## 2024-05-24 PROCEDURE — 85610 PROTHROMBIN TIME: CPT | Performed by: NURSE PRACTITIONER

## 2024-05-24 PROCEDURE — 94761 N-INVAS EAR/PLS OXIMETRY MLT: CPT

## 2024-05-24 PROCEDURE — 80053 COMPREHEN METABOLIC PANEL: CPT | Performed by: NURSE PRACTITIONER

## 2024-05-24 PROCEDURE — 63600175 PHARM REV CODE 636 W HCPCS: Performed by: NURSE PRACTITIONER

## 2024-05-24 PROCEDURE — 99223 1ST HOSP IP/OBS HIGH 75: CPT | Mod: AI,FS,, | Performed by: PSYCHIATRY & NEUROLOGY

## 2024-05-24 PROCEDURE — 36415 COLL VENOUS BLD VENIPUNCTURE: CPT | Performed by: NURSE PRACTITIONER

## 2024-05-24 PROCEDURE — 83735 ASSAY OF MAGNESIUM: CPT | Performed by: NURSE PRACTITIONER

## 2024-05-24 PROCEDURE — 63600175 PHARM REV CODE 636 W HCPCS

## 2024-05-24 PROCEDURE — 11000001 HC ACUTE MED/SURG PRIVATE ROOM

## 2024-05-24 PROCEDURE — 97535 SELF CARE MNGMENT TRAINING: CPT

## 2024-05-24 PROCEDURE — 92523 SPEECH SOUND LANG COMPREHEN: CPT

## 2024-05-24 PROCEDURE — C9113 INJ PANTOPRAZOLE SODIUM, VIA: HCPCS | Performed by: NURSE PRACTITIONER

## 2024-05-24 PROCEDURE — 25000003 PHARM REV CODE 250: Performed by: NURSE PRACTITIONER

## 2024-05-24 PROCEDURE — 85730 THROMBOPLASTIN TIME PARTIAL: CPT | Performed by: NURSE PRACTITIONER

## 2024-05-24 PROCEDURE — 85025 COMPLETE CBC W/AUTO DIFF WBC: CPT | Performed by: NURSE PRACTITIONER

## 2024-05-24 PROCEDURE — 99222 1ST HOSP IP/OBS MODERATE 55: CPT | Mod: GC,,, | Performed by: STUDENT IN AN ORGANIZED HEALTH CARE EDUCATION/TRAINING PROGRAM

## 2024-05-24 RX ORDER — INSULIN ASPART 100 [IU]/ML
0-5 INJECTION, SOLUTION INTRAVENOUS; SUBCUTANEOUS EVERY 12 HOURS PRN
Status: DISCONTINUED | OUTPATIENT
Start: 2024-05-24 | End: 2024-05-31 | Stop reason: HOSPADM

## 2024-05-24 RX ORDER — POTASSIUM CHLORIDE 7.45 MG/ML
10 INJECTION INTRAVENOUS
Status: COMPLETED | OUTPATIENT
Start: 2024-05-24 | End: 2024-05-24

## 2024-05-24 RX ORDER — MAGNESIUM SULFATE HEPTAHYDRATE 40 MG/ML
2 INJECTION, SOLUTION INTRAVENOUS ONCE
Status: COMPLETED | OUTPATIENT
Start: 2024-05-24 | End: 2024-05-24

## 2024-05-24 RX ORDER — GLUCAGON 1 MG
1 KIT INJECTION
Status: DISCONTINUED | OUTPATIENT
Start: 2024-05-24 | End: 2024-05-31 | Stop reason: HOSPADM

## 2024-05-24 RX ORDER — NYSTATIN 100000 [USP'U]/ML
500000 SUSPENSION ORAL 4 TIMES DAILY
Status: DISCONTINUED | OUTPATIENT
Start: 2024-05-24 | End: 2024-05-31 | Stop reason: HOSPADM

## 2024-05-24 RX ADMIN — POTASSIUM CHLORIDE 10 MEQ: 7.46 INJECTION, SOLUTION INTRAVENOUS at 10:05

## 2024-05-24 RX ADMIN — MAGNESIUM SULFATE HEPTAHYDRATE 2 G: 40 INJECTION, SOLUTION INTRAVENOUS at 09:05

## 2024-05-24 RX ADMIN — NYSTATIN 500000 UNITS: 100000 SUSPENSION ORAL at 01:05

## 2024-05-24 RX ADMIN — POTASSIUM CHLORIDE 10 MEQ: 7.46 INJECTION, SOLUTION INTRAVENOUS at 09:05

## 2024-05-24 RX ADMIN — NYSTATIN 500000 UNITS: 100000 SUSPENSION ORAL at 05:05

## 2024-05-24 RX ADMIN — SODIUM CHLORIDE: 9 INJECTION, SOLUTION INTRAVENOUS at 06:05

## 2024-05-24 RX ADMIN — PANTOPRAZOLE SODIUM 40 MG: 40 INJECTION, POWDER, FOR SOLUTION INTRAVENOUS at 09:05

## 2024-05-24 RX ADMIN — ENOXAPARIN SODIUM 40 MG: 40 INJECTION SUBCUTANEOUS at 05:05

## 2024-05-24 RX ADMIN — ASPIRIN 300 MG: 300 SUPPOSITORY RECTAL at 09:05

## 2024-05-24 RX ADMIN — DEXTROSE MONOHYDRATE 125 ML: 100 INJECTION, SOLUTION INTRAVENOUS at 12:05

## 2024-05-24 RX ADMIN — NYSTATIN 500000 UNITS: 100000 SUSPENSION ORAL at 09:05

## 2024-05-24 NOTE — ASSESSMENT & PLAN NOTE
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 No thrombolytics as is on Xarelto. Went to IR but procedure aborted due to distal clot. So TICI 0. Etiology cardio embolic due to chronic afib.     Antithrombotics: ASA supp for now as no NG tube    Statins: Lipitor 40 mg daily but unable to get as no NG tube    Aggressive risk factor modification: HTN, Diet, Exercise, Obesity, A-Fib     Rehab efforts: The patient has been evaluated by a stroke team provider and the therapy needs have been fully considered based off the presenting complaints and exam findings. The following therapy evaluations are needed: PT evaluate and treat, OT evaluate and treat, SLP evaluate and treat, PM&R evaluate for appropriate placement    Diagnostics ordered/pending: None     VTE prophylaxis: Enoxaparin (BMI > 40) 30 mg SQ every 12 hours  Mechanical prophylaxis: Place SCDs    BP parameters: Infarct: SBP <180

## 2024-05-24 NOTE — PLAN OF CARE
[facility])  Initial Discharge Assessment       Primary Care Provider: Ivana Templeton MD    Admission Diagnosis: Cerebrovascular accident (CVA) due to embolism of right middle cerebral artery [I63.411]    Admission Date: 5/23/2024  Expected Discharge Date:     Transition of Care Barriers: (P) None    Payor: MEDICARE / Plan: MEDICARE PART A & B / Product Type: Government /     Extended Emergency Contact Information  Primary Emergency Contact: Sonya Sylvester  Address: [address] [place] [address] [facility]  Home Phone: [phone]  Mobile Phone: [phone]  Relation: Daughter    Discharge Plan A: (P) Rehab  Discharge Plan B: (P) Home Health, Home with family      [id number] - [place], [place] - [address]  [address]  [facility] [address]  Phone: [phone] Fax: [phone]      Initial Assessment (most recent)       Adult Discharge Assessment - 05/24/24 1227          Discharge Assessment    Assessment Type Discharge Planning Assessment (P)      Confirmed/corrected address, phone number and insurance Yes (P)      Confirmed Demographics Correct on Facesheet (P)      Source of Information family (P)      If unable to respond/provide information was family/caregiver contacted? Yes (P)      Contact Name/Number wiliam Hemphill [phone] (P)      When was your last doctors appointment? 08/09/23 (P)      Communicated JOAN with patient/caregiver Date not available/Unable to determine (P)      Reason For Admission stroke (P)      People in Home child(toan), adult (P)      Facility Arrived From: [facility] (P)      Do you expect to return to your current living situation? Yes (P)      Do you have help at home or someone to help you manage your care at home? Yes (P)      Who are your caregiver(s) and their phone number(s)? wiliam Sylvester [phone] (P)      Prior to hospitilization cognitive status: Unable to Assess (P)      Current cognitive  status: Alert/Oriented (P)      Walking or Climbing Stairs Difficulty no (P)      Dressing/Bathing Difficulty no (P)      Home Layout Able to live on 1st floor (P)      Equipment Currently Used at Home none (P)      Readmission within 30 days? No (P)      Patient currently being followed by outpatient case management? No (P)      Do you currently have service(s) that help you manage your care at home? No (P)      Do you take prescription medications? Yes (P)      Do you have prescription coverage? Yes (P)      Coverage medicare (P)      Do you have any problems affording any of your prescribed medications? No (P)      Is the patient taking medications as prescribed? yes (P)      Who is going to help you get home at discharge? dtr Kanchan (P)      How do you get to doctors appointments? car, drives self;family or friend will provide (P)      Are you on dialysis? No (P)      Do you take coumadin? No (P)      Discharge Plan A Rehab (P)      Discharge Plan B Home Health;Home with family (P)      Discharge Plan discussed with: Adult children (P)      Transition of Care Barriers None (P)         Physical Activity    On average, how many days per week do you engage in moderate to strenuous exercise (like a brisk walk)? Patient declined (P)      On average, how many minutes do you engage in exercise at this level? Patient declined (P)         Financial Resource Strain    How hard is it for you to pay for the very basics like food, housing, medical care, and heating? Patient declined (P)         Housing Stability    In the last 12 months, was there a time when you were not able to pay the mortgage or rent on time? Patient declined (P)      At any time in the past 12 months, were you homeless or living in a shelter (including now)? Patient declined (P)         Transportation Needs    Has the lack of transportation kept you from medical appointments, meetings, work or from getting things needed for daily living? Patient declined  (P)         Food Insecurity    Within the past 12 months, you worried that your food would run out before you got the money to buy more. Patient declined (P)      Within the past 12 months, the food you bought just didn't last and you didn't have money to get more. Patient declined (P)         Stress    Do you feel stress - tense, restless, nervous, or anxious, or unable to sleep at night because your mind is troubled all the time - these days? Patient declined (P)         Social Isolation    How often do you feel lonely or isolated from those around you?  Patient declined (P)         Alcohol Use    Q1: How often do you have a drink containing alcohol? Patient declined (P)      Q2: How many drinks containing alcohol do you have on a typical day when you are drinking? Patient declined (P)      Q3: How often do you have six or more drinks on one occasion? Patient declined (P)         Utilities    In the past 12 months has the electric, gas, oil, or water company threatened to shut off services in your home? Patient declined (P)         Health Literacy    How often do you need to have someone help you when you read instructions, pamphlets, or other written material from your doctor or pharmacy? Patient declines to respond (P)         OTHER    Name(s) of People in Home daughter Kanchan (P)                    SW attempted to meet with patient at bedside to complete assessment, but she asked that SW reach out to her daughter, who was in the waiting room.  KWADWO met with dtr Kanchan.  Patient lives with Kanchan in a [facility] with no steps.  Patient was independent with ADL's and and ambulation PTA and did not have any DME or HH needs.  She was also driving.  Patient is not on HD or Coumadin.      Dispo pending therapy recs.      Discharge Plan A and Plan B have been determined by review of patient's clinical status, future medical and therapeutic needs, and coverage/benefits for post-acute care in coordination with multidisciplinary team  members.    Harmony Box, LMSW [facility]  [phone]

## 2024-05-24 NOTE — CARE UPDATE
I have reviewed the chart of Amirah Davey who is hospitalized for the following:    Active Hospital Problems    Diagnosis    *Cerebrovascular accident (CVA) due to embolism of right middle cerebral artery    Chronic anticoagulation    Reduced mobility    Hypokalemia     POA  Monitor with cmp  replaced      Hypomagnesemia     POA  Monitor with cmp  replaced      Hemiplegia    Dysarthria due to acute stroke    Dysphagia due to recent stroke    Obesity (BMI 30-39.9)    Hypertension, benign    Chronic a-fib        Paige Camargo NP  [facility]

## 2024-05-24 NOTE — CONSULTS
"  [facility] ([facility])  Adult Nutrition  Consult Note    SUMMARY     Recommendations   1. If/when medically feasible rec'd initiation of TF's; Glucerna 1.5 to goal rate @ 40 ml/hr to provide 1440 kcals, 79 g of protein, and 729 ml of fluid   2. RD following    Goals: Meet % of EEN/EPN by RD f/u date  Nutrition Goal Status: goal not met  Communication of RD Recs: POC    Assessment and Plan    Nutrition Problem  Inadequate energy intake     Related to (etiology):   Physiological demands     Signs and Symptoms (as evidenced by):   NPO status     Interventions (treatment strategy):  Collaboration of nutrition care w/ other providers     Nutrition Diagnosis Status:   New      Reason for Assessment    Reason For Assessment: consult  Diagnosis: cardiac disease  Relevant Medical History: HTN, stroke  Interdisciplinary Rounds: did not attend  General Information Comments: RD consulted for education/full assessment. Pt is currently NPO w/ no alternative means of nutrition at this time- speech is recommending pt to remain NPO. Pt w/ issues chewing/swallowing. Alternate recommendations provided in POC. Per chart review limited weight hx available. Unable to complete NFPE, will complete during RD f/u. LBM noted-5/23/24  Nutrition Discharge Planning: Pt not appropriate for cardiac diet education at this time, RD will follow-up and see if education is appropriate during follow-up.    Nutrition Risk Screen    Nutrition Risk Screen: difficulty chewing/swallowing    Nutrition/Diet History    Spiritual, Cultural Beliefs, Anabaptist Practices, Values that Affect Care: no    Anthropometrics    Temp: 98.7 °F (37.1 °C)  Height Method: Stated  Height: 4' 11" (149.9 cm)  Height (inches): 59 in  Weight Method: Bed Scale  Weight: 67.5 kg (148 lb 13 oz)  Weight (lb): 148.81 lb  Ideal Body Weight (IBW), Female: 95 lb  % Ideal Body Weight, Female (lb): 156.64 %  BMI (Calculated): 30  BMI Grade: 30 - 34.9- obesity - grade I   "     Lab/Procedures/Meds    Pertinent Labs Reviewed: reviewed  Pertinent Labs Comments: GFR 47  Pertinent Medications Reviewed: reviewed  Pertinent Medications Comments: -    Estimated/Assessed Needs    Weight Used For Calorie Calculations: 67.5 kg (148 lb 13 oz)  Energy Calorie Requirements (kcal): 1687  Energy Need Method: Kcal/kg (25 kcal/kg)  Protein Requirements: 81 g (1.2 g/kg)  Weight Used For Protein Calculations: 67.5 kg (148 lb 13 oz)        RDA Method (mL): 1687         Nutrition Prescription Ordered    Current Diet Order: NPO    Evaluation of Received Nutrient/Fluid Intake    I/O: -483 ml since admit  Energy Calories Required: not meeting needs  Protein Required: not meeting needs  Fluid Required: not meeting needs  Total Fluid Intake (mL/kg): 1 ml or fluid per MD  Tolerance: tolerating  % Intake of Estimated Energy Needs: 0 - 25 %  % Meal Intake: NPO    Nutrition Risk    Level of Risk/Frequency of Follow-up: low ((1x/week))     Monitor and Evaluation    Food and Nutrient Intake: energy intake, food and beverage intake  Food and Nutrient Adminstration: diet order  Knowledge/Beliefs/Attitudes: food and nutrition knowledge/skill, beliefs and attitudes  Physical Activity and Function: nutrition-related ADLs and IADLs, factors affecting access to physical activity  Anthropometric Measurements: height/length, weight, weight change, body mass index, growth pattern indices/percentile ranks  Biochemical Data, Medical Tests and Procedures: electrolyte and renal panel, glucose/endocrine profile, inflammatory profile, lipid profile, gastrointestinal profile  Nutrition-Focused Physical Findings: overall appearance, extremities, muscles and bones, head and eyes, skin       Nutrition Follow-Up    RD Follow-up?: Yes

## 2024-05-24 NOTE — HPI
87 y/o female who on 5-18-24 called out to her daughter and was banging on the cabinets for help. Her daughter had worked the night shift. Patient with left sided weakness, dysarthria. She was taken to [facility] ED for evaluation and was found to have a R MCA infarct with distal M2 occlusion. She was not a thrombolytic candidate as she is on Xarelto for her chronic afib.  She was taken to intervention for possible thrombectomy and procedure was aborted due to clot too far out and unsafe to try to get it.  Was placed on Zosyn 4.5 gm IV Q8H on 5-21-24 and stopped on 5-22-24 for pneumoperitoneum. This was found after unable to place NG tube multiple times. IR was consulted for PEG and CTA abdomen revealed this. GI was consulted and was planning on doing EGD with PEG placement in am. Dysphagia with inability to manage secretions. She is using Yanker suction frequently. She had a US of neck soft tissue  done on 5-21-24 and no masses seen.   Patient [facility] as direct admit to [facility] under vascular neurology.  Examination reveals left hemiplegia, decrease in sensation on left, dysarthria, follow commands on right, answers question appropriately.facial droop.

## 2024-05-24 NOTE — CONSULTS
Inpatient consult to Physical Medicine Rehab  Consult performed by: Alina Maynard NP  Consult ordered by: Doris Porter NP      Consult received.     Alina Maynard NP  Physical Medicine & Rehabilitation   05/24/2024

## 2024-05-24 NOTE — PROGRESS NOTES
Received a call/message from Neurology  Department in relation to this patient needing to be scheduled for a MRI and has a [employer] Pacemaker.  Patient's Device is MRI compatible/conditional.  To meet protocol the Pacemaker/ICD must have been implanted no less than 6 weeks prior to scheduled date of Scan.  ICD/PPM and leads must be from same .  MRI informed ordering MD must input a Cardiac Device Check in clinic and hospital order, patient must have an xray within 6 months or less prior to MRI reprogramming.  Chest xray to be reviewed by Radiologist.       MRI has been added on for today.  Device rep has agreed to be available.       [id number] Serial number [id number] device MRI conditional for 1.5 Estela magnets

## 2024-05-24 NOTE — H&P
Onur Wright - Neurosurgery ([facility])  Vascular Neurology  Comprehensive Stroke Center  History & Physical    Consults  Assessment/Plan:     Patient is a 88 y.o. year old female with:    * Cerebrovascular accident (CVA) due to embolism of right middle cerebral artery  89 y/o female with R MCA stroke due to R M2 occlusio. No thrombolytics as is on Xarelto. Went to IR but procedure aborted due to distal clot. So TICI 0. Etiology cardio embolic due to chronic afib.     Antithrombotics: ASA supp for now as no NG tube    Statins: Lipitor 40 mg daily but unable to get as no NG tube    Aggressive risk factor modification: HTN, Diet, Exercise, Obesity, A-Fib     Rehab efforts: The patient has been evaluated by a stroke team provider and the therapy needs have been fully considered based off the presenting complaints and exam findings. The following therapy evaluations are needed: PT evaluate and treat, OT evaluate and treat, SLP evaluate and treat, PM&R evaluate for appropriate placement    Diagnostics ordered/pending: None     VTE prophylaxis: Enoxaparin (BMI > 40) 30 mg SQ every 12 hours  Mechanical prophylaxis: Place SCDs    BP parameters: Infarct: SBP <180        Chronic a-fib  Stroke risk factor  Was on Xarelto, will need to resume once feeding route resolved    Hypertension, benign  Stroke risk factor  SBP <180 as 5 days out    Hemiparesis, left  Due to stroke  Aggressive therapy      Dysarthria due to acute stroke  Due to stroke  Aggressive therapy          Dysphagia due to recent stroke  Due to stroke  Aggressive therapy  Speech therapy consult  May need PEG tube         Obesity (BMI 30-39.9)  Stroke risk factor   on diet and exercise        STROKE DOCUMENTATION          NIH Scale:  1a. Level of Consciousness: 0-->Alert, keenly responsive  1b. LOC Questions: 0-->Answers both questions correctly  1c. LOC Commands: 0-->Performs both tasks correctly  2. Best Gaze: 0-->Normal  3. Visual: 0-->No visual loss  4.  Facial Palsy: 1-->Minor paralysis (flattened nasolabial fold, asymmetry on smiling)  5a. Motor Arm, Left: 4-->No movement  5b. Motor Arm, Right: 1-->Drift, limb holds 90 (or 45) degrees, but drifts down before full 10 secs, does not hit bed or other support  6a. Motor Leg, Left: 4-->No movement  6b. Motor Leg, Right: 1-->Drift, leg falls by the end of the 5-sec period but does not hit bed  7. Limb Ataxia: 0-->Absent  8. Sensory: 1-->Mild-to-moderate sensory loss, patient feels pinprick is less sharp or is dull on the affected side, or there is a loss of superficial pain with pinprick, but patient is aware of being touched  9. Best Language: 0-->No aphasia, normal  10. Dysarthria: 2-->Severe dysarthria, patients speech is so slurred as to be unintelligible in the absence of or out of proportion to any dysphasia, or is mute/anarthric  11. Extinction and Inattention (formerly Neglect): 0-->No abnormality  Total (NIH Stroke Scale): 14     Modified Luis Miguel Score: 2  Alexander Coma Scale:15   ABCD2 Score:    [id number] Score:7  HAS -BLED Score:   ICH Score:   Hunt & Marie Classification:      Thrombolysis Candidate?  Stroke on 5-18-24 transfer from [facility]    Delays to Thrombolysis?  Not Applicable    Interventional Revascularization Candidate?   Is the patient eligible for mechanical endovascular reperfusion (KETURAH)?   Stroke on 5-18-24 transfer from [facility]    Delays to Thrombectomy? Not Applicable    Hemorrhagic change of an Ischemic Stroke: Does this patient have an ischemic stroke with hemorrhagic changes? No         Subjective:     History of Present Illness:  89 y/o female who on 5-18-24 called out to her daughter and was banging on the cabinets for help. Her daughter had worked the night shift. Patient with left sided weakness, dysarthria. She was taken to [facility] ED for evaluation and was found to have a R MCA infarct with distal M2 occlusion. She was not a thrombolytic candidate as she is on Xarelto for her chronic afib.  She  was taken to intervention for possible thrombectomy and procedure was aborted due to clot too far out and unsafe to try to get it.  Was placed on Zosyn 4.5 gm IV Q8H on 5-21-24 and stopped on 5-22-24 for pneumoperitoneum. This was found after unable to place NG tube multiple times. IR was consulted for PEG and CTA abdomen revealed this. GI was consulted and was planning on doing EGD with PEG placement in am. Dysphagia with inability to manage secretions. She is using Yanker suction frequently. She had a US of neck soft tissue  done on 5-21-24 and no masses seen.   Patient was transferred to [facility] as direct admit to [facility]PU under vascular neurology.  Examination reveals left hemiplegia, decrease in sensation on left, dysarthria, follow commands on right, answers question appropriately.facial droop.           Past Medical History:   Diagnosis Date    A-fib     Cerebrovascular accident (CVA) due to embolism of right middle cerebral artery 05/23/2024    Current use of long term anticoagulation     on Xarelto    Dysphagia due to recent stroke 05/23/2024    Hypertension     Stroke 03/20/2013     Past Surgical History:   Procedure Laterality Date    CARDIAC PACEMAKER PLACEMENT      Medtronic device MRI conditional for 1.5 Estela magnets    CATARACT EXTRACTION W/  INTRAOCULAR LENS IMPLANT Right 05/01/2017    Dr. Case    CATARACT EXTRACTION W/  INTRAOCULAR LENS IMPLANT Left 05/29/2017    Dr. Case    EYE SURGERY      HYSTERECTOMY       Social History     Tobacco Use    Smoking status: Never   Substance Use Topics    Alcohol use: No     Review of patient's allergies indicates:  No Known Allergies    Medications: I have reviewed the current medication administration record.    Medications Prior to Admission   Medication Sig Dispense Refill Last Dose    amlodipine (NORVASC) 5 MG tablet Take 5 mg by mouth every evening.        hydroCHLOROthiazide (MICROZIDE) 12.5 mg capsule Take 25 mg by mouth.       lisinopriL  (PRINIVIL,ZESTRIL) 20 MG tablet TAKE 1 TABLET BY MOUTH EVERY DAY 30 tablet 1     lisinopril 10 MG tablet Take 10 mg by mouth every evening.        lisinopril 10 MG tablet Take by mouth.       omeprazole (PRILOSEC) 40 MG capsule Take 40 mg by mouth every evening.   2     pantoprazole (PROTONIX) 40 MG tablet Take by mouth.       rivaroxaban (XARELTO) 20 mg Tab Take by mouth.       XARELTO 20 mg Tab TAKE 1 TABLET BY MOUTH DAILY AT DINNER  2        Review of Systems   Constitutional:  Negative for chills and fever.   HENT:  Positive for trouble swallowing. Negative for ear pain and facial swelling.    Eyes:  Negative for pain and itching.   Respiratory:  Positive for cough. Negative for shortness of breath.    Cardiovascular:  Negative for chest pain and leg swelling.   Gastrointestinal:  Negative for abdominal distention and abdominal pain.   Endocrine: Negative for polydipsia and polyphagia.   Genitourinary:  Negative for dyspareunia and dysuria.   Musculoskeletal:  Positive for arthralgias and back pain.   Skin:  Negative for rash and wound.   Allergic/Immunologic: Positive for immunocompromised state.   Neurological:  Positive for facial asymmetry, speech difficulty, weakness and numbness.   Hematological:  Bruises/bleeds easily.   Psychiatric/Behavioral:  Negative for agitation and confusion.      Objective:     Vital Signs (Most Recent):  Temp: 98.2 °F (36.8 °C) (05/23/24 2351)  Pulse: 70 (05/23/24 2358)  Resp: 16 (05/23/24 2351)  BP: (!) 159/72 (05/23/24 2351)  SpO2: (!) 94 % (05/23/24 2351)    Vital Signs Range (Last 24H):  Temp:  [96.9 °F (36.1 °C)-98.3 °F (36.8 °C)]   Pulse:  [69-95]   Resp:  [14-18]   BP: (141-177)/(62-91)   SpO2:  [94 %-100 %]        Physical Exam  Vitals and nursing note reviewed.   Constitutional:       Appearance: Normal appearance. She is obese.   HENT:      Head: Normocephalic and atraumatic.   Eyes:      Extraocular Movements: Extraocular movements intact.      Conjunctiva/sclera:  "Conjunctivae normal.      Pupils: Pupils are equal, round, and reactive to light.   Cardiovascular:      Rate and Rhythm: Normal rate and regular rhythm.   Pulmonary:      Effort: Pulmonary effort is normal.      Breath sounds: Normal breath sounds.   Abdominal:      General: Abdomen is flat. Bowel sounds are normal.      Palpations: Abdomen is soft.   Musculoskeletal:         General: Normal range of motion.      Cervical back: Normal range of motion.   Skin:     General: Skin is warm and dry.   Neurological:      Mental Status: She is alert and oriented to person, place, and time.      Sensory: Sensory deficit present.      Motor: Weakness present.   Psychiatric:         Mood and Affect: Mood normal.         Behavior: Behavior normal.              Neurological Exam:   LOC: alert  Attention Span: Good   Language: No aphasia  Articulation: Dysarthria  Orientation: Person, Place, Time   Visual Fields: Full  EOM (CN III, IV, VI): Full/intact  Pupils (CN II, III): PERRL  Facial Sensation (CN V): Normal  Facial Movement (CN VII): Lower facial weakness on the Left  Gag Reflex: present  Reflexes: flexor plantar responses bilaterally  Motor: Arm left  Plegia 0/5  Leg left  Plegia 0/5  Arm right  Normal 5/5  Leg right Normal 5/5  Cerebellum: No evidence of appendicular or axial ataxia  Sensation: Unruly-hypoesthesia left  Tone: Flaccid  LUE  and LLE      Laboratory:  CMP: No results for input(s): "GLUCOSE", "CALCIUM", "ALBUMIN", "PROT", "NA", "K", "CO2", "CL", "BUN", "CREATININE", "ALKPHOS", "ALT", "AST", "BILITOT" in the last 24 hours.  CBC:   Recent Labs   Lab 05/20/24 2007   WBC 10.0   HGB 11.0*   HCT 34.2*   MCV 79.2*   MCH 25.5*   MCHC 32.2     Lipid Panel:   Recent Labs   Lab 05/19/24  0300   CHOL 166   LDLCALC 89   HDL 65*   TRIG 59     Coagulation:   Recent Labs   Lab 05/19/24 0300   INR 1.0   APTT 26.1     Hgb A1C:   Recent Labs   Lab 05/19/24 0300   HGBA1C 6.4*     TSH:   Recent Labs   Lab 05/19/24  1434   TSH " 0.42*       Diagnostic Results:      Brain imaging:  No images to review as all done at [facility] but reports read    Vessel Imaging:      Cardiac Evaluation:           Doris Porter NP  [facility]  Department of Vascular Neurology   [facility] - Neurosurgery ([facility])

## 2024-05-24 NOTE — PLAN OF CARE
Problem: SLP  Goal: SLP Goal  Description: Speech Language Pathology Goals  Goals expected to be met by 5/31/24    1. Pt will participate in ongoing assessment of swallow function to determine safest, least restrictive means of nutrition/hydration  2. Pt will complete dysphagia exercises to improve pharyngeal strength/coordination x 10 ea with good effort 90% of attempts, MAX A  3. Pt will complete OMEs x10 ea to improve labial/lingual strength and coordination, with good effort 90% of attempts, MOD A  4. Pt will repeat short phrases with 90% intelligibility, MIN A  5. Pt will recall 2+ clear speech strategies, 90% of attempts, MIN A  6. Pt will attend to structured task up to 2 minutes in length at least 1x/session, Supervision  7. Pt will participate in ongoing assessment of reading, writing, visiospatial and delayed memory skills to determine ongoing POC needs  8. Educate Pt and family on aspiration precautions and SLP POC  9. Educate Pt and family on speech strategies and safety awareness     Outcome: Progressing     SLP Evaluation initiated. Pt presents with Mild Cognitive Impairment, Dysarthria and Dysphagia. RN notified and aware Pt with coated lingual surface. Full report to follow. REC: continue NPO with alternative means nutrition/hydration/medication and ST to continue to follow.     5/24/2024

## 2024-05-24 NOTE — PLAN OF CARE
05/24/24 1235   Post-Acute Status   Post-Acute Authorization Placement   Post-Acute Placement Status Referrals Sent   Discharge Plan   Discharge Plan A Rehab   Discharge Plan B Home with family;Home Health     Met with daughter Kanchan to review discharge recommendation of Rehab and is agreeable to plan    Patient/family provided list of facilities in-network with patient's payor plan. Providers that are owned, operated, or affiliated with [facility] are included on the list.     Notified that referral sent to below listed facilities from in-network list based on proximity to home/family support:   [facility]  2.  3.  4.  5. (can send more than 5)    Patient/family instructed to identify preference.    Preferred Facility: (if more than 1, listed in order of descending preference)  [facility]    If an additional preferred facility not listed above is identified, additional referral to be sent. If above facilities unable to accept, will send additional referrals to in-network providers.     Discharge Plan A and Plan B have been determined by review of patient's clinical status, future medical and therapeutic needs, and coverage/benefits for post-acute care in coordination with multidisciplinary team members.      Harmony Box, CURT  [facility]  [phone]

## 2024-05-24 NOTE — SUBJECTIVE & OBJECTIVE
Past Medical History:   Diagnosis Date    A-fib     Cerebrovascular accident (CVA) due to embolism of right middle cerebral artery 05/23/2024    Current use of long term anticoagulation     on Xarelto    Dysphagia due to recent stroke 05/23/2024    Hypertension     Stroke 03/20/2013     Past Surgical History:   Procedure Laterality Date    CARDIAC PACEMAKER PLACEMENT      Medtronic device MRI conditional for 1.5 Estela magnets    CATARACT EXTRACTION W/  INTRAOCULAR LENS IMPLANT Right 05/01/2017    Dr. Case    CATARACT EXTRACTION W/  INTRAOCULAR LENS IMPLANT Left 05/29/2017    Dr. Case    EYE SURGERY      HYSTERECTOMY       Social History     Tobacco Use    Smoking status: Never   Substance Use Topics    Alcohol use: No     Review of patient's allergies indicates:  No Known Allergies    Medications: I have reviewed the current medication administration record.    Medications Prior to Admission   Medication Sig Dispense Refill Last Dose    amlodipine (NORVASC) 5 MG tablet Take 5 mg by mouth every evening.        hydroCHLOROthiazide (MICROZIDE) 12.5 mg capsule Take 25 mg by mouth.       lisinopriL (PRINIVIL,ZESTRIL) 20 MG tablet TAKE 1 TABLET BY MOUTH EVERY DAY 30 tablet 1     lisinopril 10 MG tablet Take 10 mg by mouth every evening.        lisinopril 10 MG tablet Take by mouth.       omeprazole (PRILOSEC) 40 MG capsule Take 40 mg by mouth every evening.   2     pantoprazole (PROTONIX) 40 MG tablet Take by mouth.       rivaroxaban (XARELTO) 20 mg Tab Take by mouth.       XARELTO 20 mg Tab TAKE 1 TABLET BY MOUTH DAILY AT DINNER  2        Review of Systems   Constitutional:  Negative for chills and fever.   HENT:  Positive for trouble swallowing. Negative for ear pain and facial swelling.    Eyes:  Negative for pain and itching.   Respiratory:  Positive for cough. Negative for shortness of breath.    Cardiovascular:  Negative for chest pain and leg swelling.   Gastrointestinal:  Negative for abdominal distention  and abdominal pain.   Endocrine: Negative for polydipsia and polyphagia.   Genitourinary:  Negative for dyspareunia and dysuria.   Musculoskeletal:  Positive for arthralgias and back pain.   Skin:  Negative for rash and wound.   Allergic/Immunologic: Positive for immunocompromised state.   Neurological:  Positive for facial asymmetry, speech difficulty, weakness and numbness.   Hematological:  Bruises/bleeds easily.   Psychiatric/Behavioral:  Negative for agitation and confusion.      Objective:     Vital Signs (Most Recent):  Temp: 98.2 °F (36.8 °C) (05/23/24 2351)  Pulse: 70 (05/23/24 2358)  Resp: 16 (05/23/24 2351)  BP: (!) 159/72 (05/23/24 2351)  SpO2: (!) 94 % (05/23/24 2351)    Vital Signs Range (Last 24H):  Temp:  [96.9 °F (36.1 °C)-98.3 °F (36.8 °C)]   Pulse:  [69-95]   Resp:  [14-18]   BP: (141-177)/(62-91)   SpO2:  [94 %-100 %]        Physical Exam  Vitals and nursing note reviewed.   Constitutional:       Appearance: Normal appearance. She is obese.   HENT:      Head: Normocephalic and atraumatic.   Eyes:      Extraocular Movements: Extraocular movements intact.      Conjunctiva/sclera: Conjunctivae normal.      Pupils: Pupils are equal, round, and reactive to light.   Cardiovascular:      Rate and Rhythm: Normal rate and regular rhythm.   Pulmonary:      Effort: Pulmonary effort is normal.      Breath sounds: Normal breath sounds.   Abdominal:      General: Abdomen is flat. Bowel sounds are normal.      Palpations: Abdomen is soft.   Musculoskeletal:         General: Normal range of motion.      Cervical back: Normal range of motion.   Skin:     General: Skin is warm and dry.   Neurological:      Mental Status: She is alert and oriented to person, place, and time.      Sensory: Sensory deficit present.      Motor: Weakness present.   Psychiatric:         Mood and Affect: Mood normal.         Behavior: Behavior normal.              Neurological Exam:   LOC: alert  Attention Span: Good   Language: No  "aphasia  Articulation: Dysarthria  Orientation: Person, Place, Time   Visual Fields: Full  EOM (CN III, IV, VI): Full/intact  Pupils (CN II, III): PERRL  Facial Sensation (CN V): Normal  Facial Movement (CN VII): Lower facial weakness on the Left  Gag Reflex: present  Reflexes: flexor plantar responses bilaterally  Motor: Arm left  Plegia 0/5  Leg left  Plegia 0/5  Arm right  Normal 5/5  Leg right Normal 5/5  Cerebellum: No evidence of appendicular or axial ataxia  Sensation: Unruly-hypoesthesia left  Tone: Flaccid  LUE  and LLE      Laboratory:  CMP: No results for input(s): "GLUCOSE", "CALCIUM", "ALBUMIN", "PROT", "NA", "K", "CO2", "CL", "BUN", "CREATININE", "ALKPHOS", "ALT", "AST", "BILITOT" in the last 24 hours.  CBC:   Recent Labs   Lab 05/20/24 2007   WBC 10.0   HGB 11.0*   HCT 34.2*   MCV 79.2*   MCH 25.5*   MCHC 32.2     Lipid Panel:   Recent Labs   Lab 05/19/24  0300   CHOL 166   LDLCALC 89   HDL 65*   TRIG 59     Coagulation:   Recent Labs   Lab 05/19/24  0300   INR 1.0   APTT 26.1     Hgb A1C:   Recent Labs   Lab 05/19/24  0300   HGBA1C 6.4*     TSH:   Recent Labs   Lab 05/19/24  1434   TSH 0.42*       Diagnostic Results:      Brain imaging:  No images to review as all done at [facility] but reports read    Vessel Imaging:      Cardiac Evaluation:       "

## 2024-05-24 NOTE — PT/OT/SLP EVAL
"Speech Language Pathology Evaluation  Cognitive/Bedside Swallow    Patient Name:  Amirah Davey   MRN:  [id number]  Admitting Diagnosis: Cerebrovascular accident (CVA) due to embolism of right middle cerebral artery    Recommendations:                  General Recommendations:  Dysphagia therapy, Speech/language therapy, and Cognitive-linguistic therapy  Diet recommendations:  NPO, NPO   Aspiration Precautions: Alternate means of nutrition/hydration, Frequent oral care, and Strict aspiration precautions   General Precautions: Standard, aspiration, fall  Communication strategies:  provide increased time to answer and go to room if call light pushed    Assessment:     Amirah Davey is a 88 y.o. female with an SLP diagnosis of Dysphagia, Dysarthria, and Cognitive-Linguistic Impairment.      History:     Past Medical History:   Diagnosis Date    A-fib     Cerebrovascular accident (CVA) due to embolism of right middle cerebral artery 05/23/2024    Current use of long term anticoagulation     on Xarelto    Dysphagia due to recent stroke 05/23/2024    Hypertension     Stroke 03/20/2013       Past Surgical History:   Procedure Laterality Date    CARDIAC PACEMAKER PLACEMENT  09/25/2019    [employer] Model number [id number]  Serial number [id number] device MRI conditional for 1.5 Estela magnets    CATARACT EXTRACTION W/  INTRAOCULAR LENS IMPLANT Right 05/01/2017    Dr. Case    CATARACT EXTRACTION W/  INTRAOCULAR LENS IMPLANT Left 05/29/2017    Dr. Case    EYE SURGERY      HYSTERECTOMY         Social History: Patient lives with her Daughter.    Prior Intubation HX:  none this admission     Modified Barium Swallow: Pt with recently completed MBSS at outside hospital 5/22/24 prior to transfer to this campus which revealed the following impressions:   " Pt demonstrates severe oropharyngeal dysphagia c/b dcr'd labial seal with anterior spillage, dcr'd lingual strength with uncoordinated bolus control/formation and AP transit with " "premature spillage, and dcr'd oral clearance noted across all consistencies. Delayed swallow with dcr'd laryngeal elevation and excursion, reduced epiglottic inversion, and dcr'd laryngeal closure across all consistencies -- pt with frequent episodes of laryngeal penetration and/or aspiration during the swallow (occasionally silent aspiration), despite swallowing maneuvers. However, SLP unsure if pt performed supraglottic swallow consistently/appropriately given inconsistent results noted. Dcr'd tongue base retraction, dcr'd pharyngeal constriction, and reduced PES opening (all of which could be in part 2/2 posterior pharyngeal wall edema noted) with mild-moderate residue spread throughout pharyngeal cavity, though mostly in vallecula. Episodes of retrograde movement and backflow observed with x2 episodes of backflow from UES into pharynx for mildly thick liquids and puree textures."    Chest X-Rays: none recent     Prior diet: Pt and family reported she ate most textures except those with nuts/seeds 2/2 diverticulitis.    Occupation/hobbies/homemaking: Independent with ADLs prior to admit.    Subjective     SLP reviewed Pt with RN  Pt presents lethargic  She explains, "I'm worried about my swallow"   Daughter explains, "that is the clearest it has been, it had been bloody" re: secretions suctioned     Pain/Comfort:  Pain Rating 1: 1/10  Location - Orientation 1: generalized  Location 1: throat  Pain Addressed 1: Nurse notified  Pain Rating Post-Intervention 1: 1/10    Respiratory Status: Room air    Objective:     Cognitive Status:    Arousal/Alertness Delayed response to stimuli intermittently, Pt required occasional cues to wake and attend to structured tasks as assessment progressed.   Attention Sustained attention deficit , mild  Perseveration Not present  Orientation Oriented x4  Memory Immediate Recall Pt recalled 3-4 related items for immediate recall and Delayed: TBA  Problem Solving Solutions : Pt provided " appropriate solutions to household situations provided supervision for clarification x1   Safety awareness : Pt aware of call light precautions   Math reasoning: Simple calculation : intact for single unit addition, time management: WNL      Receptive Language:   Comprehension:      Questions Simple yes/no WNL  Complex yes/no WNL  Commands  One step WNL    Pragmatics:    Pt with inconsistent eye contact    Expressive Language:  Verbal:    Initiation : mildly delayed  Naming Confrontation WNL, Convergent WNL, and Single word responsive naming WNL  Conversational speech No word finding difficulty appreciated t/o conversational speech tasks       Motor Speech:  Dysarthria : Pt with moderately decreased precision and breath support and mildly fast ROS    Voice:   Quality Wet  Intensity : variable    Visual-Spatial:  TBA    Reading:   TBA      Written Expression:   TBA    Oral Musculature Evaluation  Oral Musculature: left weakness, gross asymmetry present  Dentition: uses dentures to eat, scattered dentition  Secretion Management: problems swallowing secretions, coughing on secretions  Mucosal Quality: dry, coated tongue  Mandibular Strength and Mobility: impaired  Oral Labial Strength and Mobility: impaired retraction, impaired coordination  Lingual Strength and Mobility: impaired strength, impaired protrusion  Volitional Cough: present, reduced in strength  Volitional Swallow: elicited, delayed  Voice Prior to PO Intake: wet, variable intensity    Bedside Swallow Eval:   Consistencies Assessed:  Pt with persistent cough on secretions and required Yankauer to clear secretions. PO trials deferred due to wet vocal quality, waxing/waning alertness, reduced strength of cough and known underlying risk of aspiration 2/2 completion of recent MBSS at outside facility prior to transfer to this facility.     Oral Phase:   PRUDENCIO    Pharyngeal Phase:   PRUDENCIO    Compensatory Strategies  PRUDENCIO    Treatment: Pt found upright in bed with  IVs in place and Yankauer to oral cavity.  Her Daughter was at the bedside. Pt with persistent wet vocal quality and used suction t/o assessment. When Pt cued to attempt to swallow secretions, pt with inconsistent timing of initiation of swallow, at times delayed, and eventual cough/gag on secretions. Pt with reduced strength of volitional throat clear and cough on command. Daughter at the bedside explained pt with traumatic attempt for NG placement and persistent c/o pain when swallowing. SLP educated Pt and family on SLP role, anatomy of swallow, phases of swallow, definition and risk of penetration/aspiration, recommendations for ongoing alternative means nutrition/hydration/medication and ongoing SLP POC.  Pt and Daughters asked questions about swallowing and speech therapy, SLP answered questions within ST scope and deferred additional questions to MD team. No additional questions. Pt remained upright in bed with call light in reach, Daughter in the room, upon SLP exit. MD team notified of findings and recommendations followings session.     Goals:   Multidisciplinary Problems       SLP Goals          Problem: SLP    Goal Priority Disciplines Outcome   SLP Goal     SLP Progressing   Description: Speech Language Pathology Goals  Goals expected to be met by 5/31/24    1. Pt will participate in ongoing assessment of swallow function to determine safest, least restrictive means of nutrition/hydration  2. Pt will complete dysphagia exercises to improve pharyngeal strength/coordination x 10 ea with good effort 90% of attempts, MAX A  3. Pt will complete OMEs x10 ea to improve labial/lingual strength and coordination, with good effort 90% of attempts, MOD A  4. Pt will repeat short phrases with 90% intelligibility, MIN A  5. Pt will recall 2+ clear speech strategies, 90% of attempts, MIN A  6. Pt will attend to structured task up to 2 minutes in length at least 1x/session, Supervision  7. Pt will participate in  ongoing assessment of reading, writing, visiospatial and delayed memory skills to determine ongoing POC needs  8. Educate Pt and family on aspiration precautions and SLP POC  9. Educate Pt and family on speech strategies and safety awareness                          Plan:     Patient to be seen:  5 x/week   Plan of Care expires:  06/23/24  Plan of Care reviewed with:  patient, daughter, family   SLP Follow-Up:  Yes       Discharge recommendations:  Therapy Intensity Recommendations at Discharge: High Intensity Therapy   Barriers to Discharge:   NPO    Time Tracking:     SLP Treatment Date:   05/24/24  Speech Start Time:  1011  Speech Stop Time:  1052     Speech Total Time (min):  41 min    Billable Minutes: Eval 10 , Eval Swallow and Oral Function 10, and Self Care/Home Management Training 20 05/24/2024

## 2024-05-24 NOTE — PROGRESS NOTES
An MRI has been ordered on this patient with a [employer] implantable cardiac device.    The patient's medical record has been reviewed for the following:    The SureScan system is implanted in the left or right pectoral region    The SureScan device is operating within the projected service life    The MRI conditional system has been implanted for a minimum of 6 weeks and is considered post the lead maturation period    The patient has no implanted lead extenders, lead adaptors, or abandoned leads    There are no known broken leads or leads with intermittent electrical contact as confirmed by the lead impedance history    Pacing capture thresholds are < 2.0 volts (V) at a pulse width of 0.40 milliseconds (ms)    For pacemakers, the measured pacing lead impedances are >/= 200 Ohms and </= 1500 Ohms    For ICD's, the measured pacing lead impedance value of >/= 200 Ohms and </= 3,000 Ohms; ICD shock lead impedances between 20 and 200 Ohms    There is no noted diaphragmatic stimulation at a pacing output of 5.0V and at a pulse width of 1.0 ms in patients whose device will be programmed to an asynchronous pacing mode    Parameters should be reprogrammed with MRI SureScan mode programmed to ON as per the manufacturers scanning guidelines.    Pacing parameters should be programmed as such: (select one and delete those not applicable)    VOO: (for single ventricular lead devices or patients with persistent atrial fibrillation) program base rate + 10 bpm above the patients intrinsic ventricular or +10 bpm above programmed base rate if ventricular pacing is noted

## 2024-05-24 NOTE — PT/OT/SLP PROGRESS
Occupational Therapy      Patient Name:  Amirah Davey   MRN:  [id number]    Patient not seen today secondary to Off the floor for MRI. Will follow-up 5/25 or as medically ready.    5/24/2024

## 2024-05-24 NOTE — PLAN OF CARE
Recommendations   1. If/when medically feasible rec'd initiation of TF's; Glucerna 1.5 to goal rate @ 40 ml/hr to provide 1440 kcals, 79 g of protein, and 729 ml of fluid   2. RD following     Goals: Meet % of EEN/EPN by RD f/u date  Nutrition Goal Status: goal not met  Communication of RD Recs: POC

## 2024-05-24 NOTE — NURSING
Patient Transferred to NPU Room 907       Upon arrival to the floor, patient greeted and oriented to room. Complete head to toe assessment completed per protocol. VSS, see flowsheet for details. Neuro assessment completed. Primary team (Hedy FARRELL at 2056) notified of patient's transfer to floor. NIHSS assessment completed on floor arrival. Patient's NIHSS score is 15 at this time. SCDs applied to patient per provider's orders. Abdi bedside swallow screen completed per stroke protocol. Patient has failed abdi bedside swallow screen, team notified of results. Stroke book individualized to patient and given to patient upon transfer. Reviewed stroke book with the patient at the bedside, see education flowsheets for details. All current and transfer orders reviewed/reconciled per protocol. All emergency equipment set up in patient's room. Fall  precautions initiated per providers orders. 4 Eyes skin assessment performed, see below for details. Tele and purewick applied to patient. War room notified of box # and pulled up on tele monitor in the nursing station. Waffle mattress applied to patient's per unit protocol for HAPI prevention. HAPI bundle ordered per nursing.  Reviewed assessment and rounding frequency with patient and family. Questions were encouraged and addressed. Repositioned patient for comfort with bed locked in lowest position, side rails up x 3, bed alarm set, and call light within reach. Instructed patient to call staff for mobility/assistance, verbalized understanding. No acute signs of distress noted at this time.           Nurses Note -- 4 Eyes      5/23/2024   8:50 PM      Skin assessed during: Admit      [x] No Altered Skin Integrity Present    [x]Prevention Measures Documented      [] Yes- Altered Skin Integrity Present or Discovered   [] LDA Added if Not in Epic (Describe Wound)   [] New Altered Skin Integrity was Present on Admit and Documented in LDA   [] Wound Image Taken    Wound Care  Consulted? No    Attending Nurse:  Yuli Beckham RN/Staff Member:  Kylie OLIVIA

## 2024-05-24 NOTE — PLAN OF CARE
Problem: Adult Inpatient Plan of Care  Goal: Plan of Care Review  Outcome: Progressing  Goal: Patient-Specific Goal (Individualized)  Outcome: Progressing  Goal: Absence of Hospital-Acquired Illness or Injury  Outcome: Progressing  Goal: Optimal Comfort and Wellbeing  Outcome: Progressing  Goal: Readiness for Transition of Care  Outcome: Progressing     Problem: Stroke, Ischemic (Includes Transient Ischemic Attack)  Goal: Optimal Coping  Outcome: Progressing  Goal: Effective Bowel Elimination  Outcome: Progressing  Goal: Optimal Cerebral Tissue Perfusion  Outcome: Progressing  Goal: Optimal Cognitive Function  Outcome: Progressing  Goal: Improved Communication Skills  Outcome: Progressing  Goal: Optimal Functional Ability  Outcome: Progressing  Goal: Optimal Nutrition Intake  Outcome: Progressing  Goal: Effective Oxygenation and Ventilation  Outcome: Progressing  Goal: Improved Sensorimotor Function  Outcome: Progressing  Goal: Safe and Effective Swallow  Outcome: Progressing  Goal: Effective Urinary Elimination  Outcome: Progressing     Problem: Fall Injury Risk  Goal: Absence of Fall and Fall-Related Injury  Outcome: Progressing     Problem: Infection  Goal: Absence of Infection Signs and Symptoms  Outcome: Progressing     Problem: Skin Injury Risk Increased  Goal: Skin Health and Integrity  Outcome: Progressing             POC updated and reviewed with the patient/daughter at the bedside. Questions regarding POC were encouraged and addressed. VSS, see flowsheets. Patient is AOX 4 at this time. Tele applied to patient per order.  Fall and safety precautions maintained, no signs of injury noted during shift. Patient repositioned with assistance in bed for comfort. Upon exiting room, patient's bed locked in low position, side rails up x 3, bed alarm set, with call light within reach. Instructed patient to call staff for mobility, verbalized understanding. Stroke book and education reviewed at the bedside, see  education flowsheets for details. No acute signs of distress noted at this time.

## 2024-05-24 NOTE — PT/OT/SLP PROGRESS
Physical Therapy      Patient Name:  Amirah Davey   MRN:  [id number]    Patient not seen today secondary to  (SANTI in MRI). Will follow-up as appropriate.

## 2024-05-24 NOTE — CONSULTS
[facility]  Gastroenterology  Consult Note    Patient Name: Amirah Davey  MRN: [id number]  Admission Date: 5/23/2024  Hospital Length of Stay: 1 days  Code Status: Full Code   Attending Provider: Gerardo Rodriges MD   Consulting Provider: Marko Kerr DO  Primary Care Physician: Ivana Templeton MD  Principal Problem:Cerebrovascular accident (CVA) due to embolism of right middle cerebral artery    Inpatient consult to Gastroenterology  Consult performed by: Marko Kerr DO  Consult ordered by: Hedy Cherry DNP        Subjective:     HPI: Amirah Davey is a 88 y.o. female with HTN, history of stroke, atrial fibrillation and recent R- MCA stroke due to R M2 occlusion that GI is now consulted on for G tube placement. Of note, the patient was recently found to have pneumoperitoneum on imaging at OSH and was empirically started on Zosyn on 5/21/24. This was stopped on 5/22/24. Pneumoperitoneum was discovered on imaging after failure to pass an NGT multiple times at OSH. Collateral is obtained from daughter at bedside as patient has difficulty with speech following stroke. No noted abdominal pain. Does have a history of possible small bowel resection 2/2 obstruction from some time during [id number]. This was done at  and records not available here.     Prior Endoscopies:   None on file     Past Medical History:   Diagnosis Date    A-fib     Cerebrovascular accident (CVA) due to embolism of right middle cerebral artery 05/23/2024    Current use of long term anticoagulation     on Xarelto    Dysphagia due to recent stroke 05/23/2024    Hypertension     Stroke 03/20/2013       Past Surgical History:   Procedure Laterality Date    CARDIAC PACEMAKER PLACEMENT  09/25/2019    [employer] Model number [id number]  Serial number [id number] device MRI conditional for 1.5 Estela magnets    CATARACT EXTRACTION W/  INTRAOCULAR LENS IMPLANT Right 05/01/2017    Dr. Case    CATARACT EXTRACTION W/   INTRAOCULAR LENS IMPLANT Left 05/29/2017    Dr. Case    EYE SURGERY      HYSTERECTOMY         No family history on file.    Social History     Socioeconomic History    Marital status:    Tobacco Use    Smoking status: Never   Substance and Sexual Activity    Alcohol use: No     Social Determinants of Health     Financial Resource Strain: Patient Declined (5/24/2024)    Overall Financial Resource Strain (CARDIA)     Difficulty of Paying Living Expenses: Patient declined   Food Insecurity: Patient Declined (5/24/2024)    Hunger Vital Sign     Worried About Running Out of Food in the Last Year: Patient declined     Ran Out of Food in the Last Year: Patient declined   Transportation Needs: Patient Declined (5/24/2024)    TRANSPORTATION NEEDS     Transportation : Patient declined   Physical Activity: Patient Declined (5/24/2024)    Exercise Vital Sign     Days of Exercise per Week: Patient declined     Minutes of Exercise per Session: Patient declined   Stress: Patient Declined (5/24/2024)    [employer] - Occupational Stress Questionnaire     Feeling of Stress : Patient declined   Housing Stability: Patient Declined (5/24/2024)    Housing Stability Vital Sign     Unable to Pay for Housing in the Last Year: Patient declined     Homeless in the Last Year: Patient declined       Current Facility-Administered Medications on File Prior to Encounter   Medication Dose Route Frequency Provider Last Rate Last Admin    [DISCONTINUED] hydrALAZINE injection  10 mg Intravenous Q6H PRN Provider, Generic External Data        [DISCONTINUED] labetalol 20 mg/4 mL (5 mg/mL) IV syring  10 mg Intravenous Q6H PRN Provider, Generic External Data         Current Outpatient Medications on File Prior to Encounter   Medication Sig Dispense Refill    amlodipine (NORVASC) 5 MG tablet Take 5 mg by mouth every evening.       hydroCHLOROthiazide (MICROZIDE) 12.5 mg capsule Take 25 mg by mouth.      lisinopriL  (PRINIVIL,ZESTRIL) 20 MG tablet TAKE 1 TABLET BY MOUTH EVERY DAY 30 tablet 1    lisinopril 10 MG tablet Take 10 mg by mouth every evening.       lisinopril 10 MG tablet Take by mouth.      omeprazole (PRILOSEC) 40 MG capsule Take 40 mg by mouth every evening.   2    pantoprazole (PROTONIX) 40 MG tablet Take by mouth.      rivaroxaban (XARELTO) 20 mg Tab Take by mouth.      XARELTO 20 mg Tab TAKE 1 TABLET BY MOUTH DAILY AT DINNER  2       Review of patient's allergies indicates:  No Known Allergies    Review of Systems   Unable to perform ROS: Other      Objective:     Vitals:    05/24/24 1227   BP:    Pulse: 70   Resp:    Temp:      Constitutional:  not in acute distress  HENT: Head: Normal, normocephalic, atraumatic.  Eyes: conjunctiva clear  Cardiovascular: regular rate and rhythm  Respiratory: normal chest expansion & respiratory effort   and no accessory muscle use  GI: soft, non-tender, without masses or organomegaly  Musculoskeletal: no muscular tenderness noted  Skin: normal color  Neurological: alert    Significant Labs:  Recent Labs   Lab 05/20/24 2007 05/24/24  0545   HGB 11.0* 9.8*       Lab Results   Component Value Date    WBC 6.07 05/24/2024    HGB 9.8 (L) 05/24/2024    HCT 31.4 (L) 05/24/2024    MCV 81 (L) 05/24/2024     05/24/2024       Lab Results   Component Value Date     05/24/2024    K 3.8 05/24/2024     05/24/2024    CO2 18 (L) 05/24/2024    BUN 13 05/24/2024    CREATININE 0.8 05/24/2024    CALCIUM 8.8 05/24/2024    ANIONGAP 9 05/24/2024    ESTGFRAFRICA 54 (A) 06/20/2019    EGFRNONAA 47 (A) 06/20/2019       Lab Results   Component Value Date    ALT 22 05/24/2024    AST 24 05/24/2024    ALKPHOS 49 (L) 05/24/2024    BILITOT 0.5 05/24/2024       Lab Results   Component Value Date    INR 1.1 05/24/2024    INR 1.0 05/19/2024    INR 1.0 05/18/2024       Significant Imaging:  Reviewed pertinent radiology findings.       Assessment/Plan:     Amirah Davey is a 88 y.o. female with  HTN, history of stroke, atrial fibrillation and recent R- MCA stroke due to R M2 occlusion that GI is now consulted on for G tube placement. Of note, the patient was recently found to have pneumoperitoneum on imaging at OSH and was empirically started on Zosyn on 5/21/24. This was stopped on 5/22/24. Pneumoperitoneum was discovered on imaging after failure to pass an NGT multiple times at OSH.     Problem List:  Dysphagia   R-MCA CVA -cardio embolic from A fib   Retroperitoneal Air on CT at OSH     Recommendations:  - Would obtain CT scan here to evaluate status of free air noted on scans at OSH   - Will continue to follow for possible G-tube placement early next week  - Diet NPO at midnight on 5/27   - Please hold AC prior to G tube placement  - Will need to order Ancef prior to G tube placement; GI will order mili procedurally once timing has been established     Thank you for involving us in the care of Amirah Davey. Please call with any additional questions, concerns or changes in the patient's clinical status. We will continue to follow.     Marko Kerr DO  Gastroenterology Fellow PGY- IV  [facility]

## 2024-05-25 LAB
ALBUMIN SERPL BCP-MCNC: 2.7 G/DL (ref 3.5–5.2)
ALP SERPL-CCNC: 53 U/L (ref 55–135)
ALT SERPL W/O P-5'-P-CCNC: 21 U/L (ref 10–44)
ANION GAP SERPL CALC-SCNC: 13 MMOL/L (ref 8–16)
APTT PPP: 31 SEC (ref 21–32)
APTT PPP: 54.7 SEC (ref 21–32)
AST SERPL-CCNC: 23 U/L (ref 10–40)
BASOPHILS # BLD AUTO: 0.02 K/UL (ref 0–0.2)
BASOPHILS # BLD AUTO: 0.03 K/UL (ref 0–0.2)
BASOPHILS NFR BLD: 0.3 % (ref 0–1.9)
BASOPHILS NFR BLD: 0.4 % (ref 0–1.9)
BILIRUB SERPL-MCNC: 0.6 MG/DL (ref 0.1–1)
BUN SERPL-MCNC: 13 MG/DL (ref 8–23)
CALCIUM SERPL-MCNC: 8.9 MG/DL (ref 8.7–10.5)
CHLORIDE SERPL-SCNC: 106 MMOL/L (ref 95–110)
CO2 SERPL-SCNC: 19 MMOL/L (ref 23–29)
CREAT SERPL-MCNC: 0.8 MG/DL (ref 0.5–1.4)
DIFFERENTIAL METHOD BLD: ABNORMAL
DIFFERENTIAL METHOD BLD: ABNORMAL
EOSINOPHIL # BLD AUTO: 0.1 K/UL (ref 0–0.5)
EOSINOPHIL # BLD AUTO: 0.2 K/UL (ref 0–0.5)
EOSINOPHIL NFR BLD: 1.7 % (ref 0–8)
EOSINOPHIL NFR BLD: 2.6 % (ref 0–8)
ERYTHROCYTE [DISTWIDTH] IN BLOOD BY AUTOMATED COUNT: 15 % (ref 11.5–14.5)
ERYTHROCYTE [DISTWIDTH] IN BLOOD BY AUTOMATED COUNT: 15.1 % (ref 11.5–14.5)
EST. GFR  (NO RACE VARIABLE): >60 ML/MIN/1.73 M^2
GLUCOSE SERPL-MCNC: 68 MG/DL (ref 70–110)
HCT VFR BLD AUTO: 32.2 % (ref 37–48.5)
HCT VFR BLD AUTO: 32.9 % (ref 37–48.5)
HGB BLD-MCNC: 10.2 G/DL (ref 12–16)
HGB BLD-MCNC: 10.4 G/DL (ref 12–16)
IMM GRANULOCYTES # BLD AUTO: 0.13 K/UL (ref 0–0.04)
IMM GRANULOCYTES # BLD AUTO: 0.14 K/UL (ref 0–0.04)
IMM GRANULOCYTES NFR BLD AUTO: 1.9 % (ref 0–0.5)
IMM GRANULOCYTES NFR BLD AUTO: 1.9 % (ref 0–0.5)
INR PPP: 1 (ref 0.8–1.2)
LYMPHOCYTES # BLD AUTO: 0.9 K/UL (ref 1–4.8)
LYMPHOCYTES # BLD AUTO: 1 K/UL (ref 1–4.8)
LYMPHOCYTES NFR BLD: 13.4 % (ref 18–48)
LYMPHOCYTES NFR BLD: 13.7 % (ref 18–48)
MCH RBC QN AUTO: 24.9 PG (ref 27–31)
MCH RBC QN AUTO: 25.1 PG (ref 27–31)
MCHC RBC AUTO-ENTMCNC: 31.6 G/DL (ref 32–36)
MCHC RBC AUTO-ENTMCNC: 31.7 G/DL (ref 32–36)
MCV RBC AUTO: 79 FL (ref 82–98)
MCV RBC AUTO: 79 FL (ref 82–98)
MONOCYTES # BLD AUTO: 0.8 K/UL (ref 0.3–1)
MONOCYTES # BLD AUTO: 0.8 K/UL (ref 0.3–1)
MONOCYTES NFR BLD: 11 % (ref 4–15)
MONOCYTES NFR BLD: 11.1 % (ref 4–15)
NEUTROPHILS # BLD AUTO: 4.9 K/UL (ref 1.8–7.7)
NEUTROPHILS # BLD AUTO: 5.2 K/UL (ref 1.8–7.7)
NEUTROPHILS NFR BLD: 70.7 % (ref 38–73)
NEUTROPHILS NFR BLD: 71.3 % (ref 38–73)
NRBC BLD-RTO: 0 /100 WBC
NRBC BLD-RTO: 0 /100 WBC
PLATELET # BLD AUTO: 221 K/UL (ref 150–450)
PLATELET # BLD AUTO: 224 K/UL (ref 150–450)
PMV BLD AUTO: 10.2 FL (ref 9.2–12.9)
PMV BLD AUTO: 11 FL (ref 9.2–12.9)
POCT GLUCOSE: 116 MG/DL (ref 70–110)
POCT GLUCOSE: 154 MG/DL (ref 70–110)
POCT GLUCOSE: 54 MG/DL (ref 70–110)
POCT GLUCOSE: 68 MG/DL (ref 70–110)
POCT GLUCOSE: 72 MG/DL (ref 70–110)
POCT GLUCOSE: 73 MG/DL (ref 70–110)
POTASSIUM SERPL-SCNC: 3.9 MMOL/L (ref 3.5–5.1)
PROT SERPL-MCNC: 6.2 G/DL (ref 6–8.4)
PROTHROMBIN TIME: 11.3 SEC (ref 9–12.5)
RBC # BLD AUTO: 4.07 M/UL (ref 4–5.4)
RBC # BLD AUTO: 4.17 M/UL (ref 4–5.4)
SODIUM SERPL-SCNC: 138 MMOL/L (ref 136–145)
WBC # BLD AUTO: 6.86 K/UL (ref 3.9–12.7)
WBC # BLD AUTO: 7.37 K/UL (ref 3.9–12.7)

## 2024-05-25 PROCEDURE — 97166 OT EVAL MOD COMPLEX 45 MIN: CPT

## 2024-05-25 PROCEDURE — 36415 COLL VENOUS BLD VENIPUNCTURE: CPT | Performed by: NURSE PRACTITIONER

## 2024-05-25 PROCEDURE — 85025 COMPLETE CBC W/AUTO DIFF WBC: CPT | Performed by: NURSE PRACTITIONER

## 2024-05-25 PROCEDURE — 25000003 PHARM REV CODE 250: Performed by: PSYCHIATRY & NEUROLOGY

## 2024-05-25 PROCEDURE — 36415 COLL VENOUS BLD VENIPUNCTURE: CPT | Mod: XB | Performed by: PSYCHIATRY & NEUROLOGY

## 2024-05-25 PROCEDURE — 25000003 PHARM REV CODE 250: Performed by: NURSE PRACTITIONER

## 2024-05-25 PROCEDURE — C9113 INJ PANTOPRAZOLE SODIUM, VIA: HCPCS | Performed by: NURSE PRACTITIONER

## 2024-05-25 PROCEDURE — 85730 THROMBOPLASTIN TIME PARTIAL: CPT | Mod: 91 | Performed by: PSYCHIATRY & NEUROLOGY

## 2024-05-25 PROCEDURE — 85730 THROMBOPLASTIN TIME PARTIAL: CPT

## 2024-05-25 PROCEDURE — 97530 THERAPEUTIC ACTIVITIES: CPT

## 2024-05-25 PROCEDURE — 11000001 HC ACUTE MED/SURG PRIVATE ROOM

## 2024-05-25 PROCEDURE — 80053 COMPREHEN METABOLIC PANEL: CPT | Performed by: NURSE PRACTITIONER

## 2024-05-25 PROCEDURE — 85025 COMPLETE CBC W/AUTO DIFF WBC: CPT | Mod: 91

## 2024-05-25 PROCEDURE — 99233 SBSQ HOSP IP/OBS HIGH 50: CPT | Mod: ,,, | Performed by: PSYCHIATRY & NEUROLOGY

## 2024-05-25 PROCEDURE — 94761 N-INVAS EAR/PLS OXIMETRY MLT: CPT

## 2024-05-25 PROCEDURE — 25000003 PHARM REV CODE 250

## 2024-05-25 PROCEDURE — 85610 PROTHROMBIN TIME: CPT

## 2024-05-25 PROCEDURE — 97112 NEUROMUSCULAR REEDUCATION: CPT

## 2024-05-25 PROCEDURE — 63600175 PHARM REV CODE 636 W HCPCS

## 2024-05-25 PROCEDURE — 97162 PT EVAL MOD COMPLEX 30 MIN: CPT

## 2024-05-25 PROCEDURE — 63600175 PHARM REV CODE 636 W HCPCS: Performed by: NURSE PRACTITIONER

## 2024-05-25 PROCEDURE — 36415 COLL VENOUS BLD VENIPUNCTURE: CPT

## 2024-05-25 RX ORDER — MUPIROCIN 20 MG/G
OINTMENT TOPICAL 2 TIMES DAILY
Status: COMPLETED | OUTPATIENT
Start: 2024-05-25 | End: 2024-05-30

## 2024-05-25 RX ORDER — HEPARIN SODIUM,PORCINE/D5W 25000/250
0-40 INTRAVENOUS SOLUTION INTRAVENOUS CONTINUOUS
Status: DISPENSED | OUTPATIENT
Start: 2024-05-25 | End: 2024-05-29

## 2024-05-25 RX ADMIN — PANTOPRAZOLE SODIUM 40 MG: 40 INJECTION, POWDER, FOR SOLUTION INTRAVENOUS at 11:05

## 2024-05-25 RX ADMIN — MUPIROCIN: 20 OINTMENT TOPICAL at 09:05

## 2024-05-25 RX ADMIN — NYSTATIN 500000 UNITS: 100000 SUSPENSION ORAL at 09:05

## 2024-05-25 RX ADMIN — ASPIRIN 300 MG: 300 SUPPOSITORY RECTAL at 11:05

## 2024-05-25 RX ADMIN — DEXTROSE MONOHYDRATE 125 ML: 100 INJECTION, SOLUTION INTRAVENOUS at 09:05

## 2024-05-25 RX ADMIN — NYSTATIN 500000 UNITS: 100000 SUSPENSION ORAL at 02:05

## 2024-05-25 RX ADMIN — DEXTROSE MONOHYDRATE 125 ML: 100 INJECTION, SOLUTION INTRAVENOUS at 05:05

## 2024-05-25 RX ADMIN — NYSTATIN 500000 UNITS: 100000 SUSPENSION ORAL at 05:05

## 2024-05-25 RX ADMIN — SODIUM CHLORIDE: 9 INJECTION, SOLUTION INTRAVENOUS at 02:05

## 2024-05-25 RX ADMIN — HEPARIN SODIUM 12 UNITS/KG/HR: 10000 INJECTION, SOLUTION INTRAVENOUS at 03:05

## 2024-05-25 NOTE — PT/OT/SLP EVAL
Occupational Therapy   Evaluation    Name: Amirah Davey  MRN: [id number]  Admitting Diagnosis: Cerebrovascular accident (CVA) due to embolism of right middle cerebral artery  Recent Surgery: Procedure(s) (LRB):  INSERTION, PEG TUBE (N/A)      Recommendations:     Discharge Recommendations: High Intensity Therapy  Discharge Equipment Recommendations:  hospital bed, lift device, wheelchair, bedside commode  Barriers to discharge:   (increased (A) required)    Assessment:     Amirah Davey is a 88 y.o. female with a medical diagnosis of Cerebrovascular accident (CVA) due to embolism of right middle cerebral artery.  She presents with decreased independence with ADL's. Performance deficits affecting function: weakness, impaired endurance, impaired sensation, impaired self care skills, impaired functional mobility, impaired balance, decreased safety awareness, decreased lower extremity function, decreased upper extremity function, decreased coordination, visual deficits, abnormal tone, decreased ROM, impaired coordination, impaired fine motor, pain.      Rehab Prognosis: Fair; patient would benefit from acute skilled OT services to address these deficits and reach maximum level of function.       Plan:     Patient to be seen 4 x/week to address the above listed problems via self-care/home management, therapeutic activities, therapeutic exercises, neuromuscular re-education, sensory integration  Plan of Care Expires: 06/24/24  Plan of Care Reviewed with: patient, daughter    Subjective     Chief Complaint: pain in LUE due to high tone  Patient/Family Comments/goals: Pt reported that it hurts to have her arm moved.    Occupational Profile:  Living Environment: Pt resides with daughter in 1 story house with 1 step to enter. Pt was independent with ADLs' & ambulation (used rollator for long distances).  Pt is an active  & is retired from being a nursing assistant for .  Pt enjoys TV & [employer].  Pt has a bathtub for  bathing.  Equipment Used at Home: rollator, shower chair  Assistance upon Discharge: family    Pain/Comfort:  Pain Rating 1: 0/10  Pain Addressed 1: Reposition, Distraction, Cessation of Activity, Nurse notified  Pain Rating Post-Intervention 1: 0/10 (no pain at rest however reported pain during ROM of LUE (high tone))    Patients cultural, spiritual, Scientology conflicts given the current situation: no    Objective:     Communicated with: RN prior to session.  Patient found supine with telemetry, PureWick, peripheral IV, SCD (daughter present during session) upon OT entry to room.    General Precautions: Standard, fall, aspiration, NPO  Orthopedic Precautions: N/A  Braces: N/A  Respiratory Status: Room air    Occupational Performance:    Bed Mobility:    Patient completed Rolling/Turning to Left with  moderate assistance  Patient completed Scooting/Bridging with total assistance scooting forward on EOB safely; total (A) with drawsheet transfer up HOB while supine  Patient completed Supine to Sit with maximal assistance  Patient completed Sit to Supine with total assistance    Functional Mobility/Transfers:  NT due to balance (A) at EOB.    Activities of Daily Living:  Grooming: maximal assistance while seated EOB with (A) for balance    Cognitive/Visual Perceptual:  Cognitive/Psychosocial Skills:     -       Oriented to: Person, Place, Time, and Situation   -       Follows Commands/attention:Follows multistep  commands  -       Communication: dysarthria  -       Safety awareness/insight to disability: impaired   Visual/Perceptual:      -Impaired  tracking, saccades, and noted left sided inattention      Physical Exam:  Sensation:    -       Impaired  light/touch LUE  Dominant hand:    -       right  Upper Extremity Range of Motion:  RUE AROM WFL, LUE PROM shoulder flexion limited by high tone & pain due to tone all others WFL  Upper Extremity Strength: RUE WFL, LUE 0/5 however trace noted for elbow flexion &  internal rotation; noted high tone throughout LUE especially for shoulder flexion, elbow ROM and pronation, & finger ROM   Strength: RUE WFL, 0/5 LUE  Fine Motor Coordination: RUE WFL, LUE impaired for all    AMPAC 6 Click ADL:  AMPAC Total Score: 7    Treatment & Education:  Pt required Mod-Total (A) for postural control while seated EOB due to leaning posterior & to the left due to pushing both with RUE & RLE.  Provided verbal & physical cues to facilitate postural control. Provided verbal & physical cues to decrease pushing with RUE & RLE (when RUE in function) while seated EOB. Provided extensive education on PROM for LUE & LLE that pt could assist with as well as how to perform (via demonstration) and how to work with high tone. Provided education on high tone, safety of LUE shoulder during bed mobility, positionig using pillow under LUE, visual inattention & how to work with that, need for call button to be on pt's right when no one is in room and safety of pt during mobility (pushing, need for platform due to pt height when seated EOB).  Provided education regarding role of OT, POC, & discharge recommendations with pt & daughter verbalizing understanding.  Pt had no further questions & when asked whether there were any concerns pt reported none.          Patient left supine with all lines intact, call button in reach, bed alarm on, RN notified, and daughter present    GOALS:   Multidisciplinary Problems       Occupational Therapy Goals          Problem: Occupational Therapy    Goal Priority Disciplines Outcome Interventions   Occupational Therapy Goal     OT, PT/OT Progressing    Description: Goals to be met by: 6/24/24     Patient will increase functional independence with ADLs by performing:    UE Dressing with Moderate Assistance.  LE Dressing with Maximum Assistance.  Grooming while seated with Minimal Assistance.  Toileting from bedside commode with Maximum Assistance for hygiene and clothing  management.   Sitting at edge of bed x15 minutes with Stand-by Assistance.  Rolling to Bilateral with Minimal Assistance.   Supine to sit with Minimal Assistance.  Stand pivot transfers with Minimal Assistance.                         History:     Past Medical History:   Diagnosis Date    A-fib     Cerebrovascular accident (CVA) due to embolism of right middle cerebral artery 05/23/2024    Current use of long term anticoagulation     on Xarelto    Dysphagia due to recent stroke 05/23/2024    Hypertension     Stroke 03/20/2013         Past Surgical History:   Procedure Laterality Date    CARDIAC PACEMAKER PLACEMENT  09/25/2019    [employer] Model number [id number]  Serial number [id number] device MRI conditional for 1.5 Estela magnets    CATARACT EXTRACTION W/  INTRAOCULAR LENS IMPLANT Right 05/01/2017    Dr. Case    CATARACT EXTRACTION W/  INTRAOCULAR LENS IMPLANT Left 05/29/2017    Dr. Case    EYE SURGERY      HYSTERECTOMY         Time Tracking:     OT Date of Treatment: 05/25/24  OT Start Time: 0931  OT Stop Time: 1010  OT Total Time (min): 39 min    Billable Minutes:Evaluation 15  Therapeutic Activity 14  Neuromuscular Re-education 10    5/25/2024

## 2024-05-25 NOTE — HOSPITAL COURSE
05/25/2024: No acute events overnight. Neuro exam stable. MRI Brain showed moderate sized R MCA infarct with scattered petechial hemorrhage and a suspected small hematoma. Patient's imaging discussed with staff, Dr. Rodriges, and patient's family. Heparin gtt initiated for AC in setting of atrial fibrillation while patient anticipates PEG placement on 5/27. Patient pending abdominal CT for eval of Pneumoperitoneum.  05/26/2024: No acute events overnight. Neuro exam stable. CT abdomen pending. Patient to be NPO at midnight/heparin gtt off at midnight in anticipation of PEG placement by GI tomorrow. Patient still remains NPO. IV labetalol 5mg scheduled q6 hours for BP control.   05/27/2024: Neuro exam stable, PEG placement today.   05/28/2024: New R wrist pain, with hard spot in forearm, looks to be thrombophlebitis, R wrist xray and RUE US pending, hot packs to forearm and wrist, if concern for cellulitis will add antibiotic covering  05/29/2024: continued R arm pain, improving with meds, xray no fracture signs of soft tissue swelling, US pending, PEG tube placement today.  05/30/2024: PEG tube in place, transitioning to meds through G tube, starting trickle feeds tonight  05/31/2024: tolerating tube feeds well, started on eliquis, does not need aspirin or plavix. Small sacral breakdown, able to be D/C to rehab today

## 2024-05-25 NOTE — SUBJECTIVE & OBJECTIVE
Neurologic Chief Complaint: R MCA infarct    Subjective:     Interval History: Patient is seen for follow-up neurological assessment and treatment recommendations: No acute events overnight. Neuro exam stable. MRI Brain showed moderate sized R MCA infarct with scattered petechial hemorrhage and a suspected small hematoma. Patient's imaging discussed with staff, Dr. Rodriges, and patient's family. Heparin gtt initiated for AC in setting of atrial fibrillation while patient anticipates PEG placement on 5/27. Patient pending abdominal CT for eval of Pneumoperitoneum.    HPI, Past Medical, Family, and Social History remains the same as documented in the initial encounter.     Review of Systems   HENT:  Positive for trouble swallowing.    Respiratory:  Positive for cough.    Musculoskeletal:  Positive for arthralgias and back pain.   Neurological:  Positive for facial asymmetry, weakness and numbness.   Hematological:  Bruises/bleeds easily.   All other systems reviewed and are negative.    Scheduled Meds:   aspirin  300 mg Rectal Daily    atorvastatin  40 mg Oral Daily    nystatin  500,000 Units Oral QID    pantoprazole  40 mg Intravenous Daily     Continuous Infusions:   sodium chloride 0.9%   Intravenous Continuous 50 mL/hr at 05/25/24 0643 Rate Verify at 05/25/24 0643    heparin (porcine) in D5W  0-40 Units/kg/hr (Adjusted) Intravenous Continuous         PRN Meds:  Current Facility-Administered Medications:     acetaminophen, 650 mg, Rectal, Q6H PRN    bisacodyL, 10 mg, Rectal, Daily PRN    dextrose 10%, 12.5 g, Intravenous, PRN    dextrose 10%, 12.5 g, Intravenous, PRN    dextrose 10%, 25 g, Intravenous, PRN    dextrose 10%, 25 g, Intravenous, PRN    glucagon (human recombinant), 1 mg, Intramuscular, PRN    insulin aspart U-100, 0-5 Units, Subcutaneous, Q12H PRN    labetalol, 10 mg, Intravenous, Q6H PRN    ondansetron, 4 mg, Intravenous, Q12H PRN    sodium chloride 0.9%, 500 mL, Intravenous, PRN    sodium chloride  "0.9%, 10 mL, Intravenous, PRN    Objective:     Vital Signs (Most Recent):  Temp: 98.8 °F (37.1 °C) (05/25/24 1233)  Pulse: 71 (05/25/24 1233)  Resp: 18 (05/25/24 1233)  BP: (!) 180/87 (05/25/24 1233)  SpO2: 100 % (05/25/24 1233)  BP Location: Left arm    Vital Signs Range (Last 24H):  Temp:  [97.9 °F (36.6 °C)-98.8 °F (37.1 °C)]   Pulse:  []   Resp:  [15-18]   BP: (165-183)/(77-87)   SpO2:  [97 %-100 %]   BP Location: Left arm       Physical Exam  Vitals reviewed.   HENT:      Head: Normocephalic.      Mouth/Throat:      Mouth: Mucous membranes are dry.   Pulmonary:      Effort: Pulmonary effort is normal.   Neurological:      Mental Status: She is alert and oriented to person, place, and time.      Sensory: Sensory deficit present.      Motor: Weakness present.   Psychiatric:         Mood and Affect: Mood normal.         Behavior: Behavior normal.          Neurological Exam:   LOC: alert  Attention Span: Good   Language: decreased fluency  Articulation: Dysarthria  Orientation: Person, Place, Time   Facial Movement (CN VII): Lower facial weakness on the Left  Motor: Arm left  Plegia 0/5  Leg left  Plegia 0/5  Arm right  Paresis: 4/5  Leg right Paresis: 4/5    Laboratory:  CMP:   Recent Labs   Lab 05/25/24  0648   CALCIUM 8.9   ALBUMIN 2.7*   PROT 6.2      K 3.9   CO2 19*      BUN 13   CREATININE 0.8   ALKPHOS 53*   ALT 21   AST 23   BILITOT 0.6     BMP:   Recent Labs   Lab 05/25/24  0648      K 3.9      CO2 19*   BUN 13   CREATININE 0.8   CALCIUM 8.9     CBC:   Recent Labs   Lab 05/25/24  1405   WBC 6.86   RBC 4.17   HGB 10.4*   HCT 32.9*      MCV 79*   MCH 24.9*   MCHC 31.6*     Lipid Panel:   Recent Labs   Lab 05/23/24  2328   CHOL 146   LDLCALC 83.2   HDL 43   TRIG 99     Coagulation:   Recent Labs   Lab 05/24/24  0545   INR 1.1   APTT 31.0     Platelet Aggregation Study: No results for input(s): "PLTAGG", "PLTAGINTERP", "PLTAGREGLACO", "ADPPLTAGGREG" in the last 168 " hours.  Hgb A1C:   Recent Labs   Lab 05/19/24  0300   HGBA1C 6.4*     TSH:   Recent Labs   Lab 05/23/24  2328   TSH 1.475       Diagnostic Results     Brain Imaging:  MRI Brain w/o Contrast 5/24/24  Impression:  Moderate-sized right MCA territorial infarction with scattered petechial hemorrhage and a suspected small hematoma.  Clinical correlation and short-term follow-up with CT is recommended.  Hazy visualization of the right MCA flow void as compared to the contralateral side with suspected occlusion of the distal right MCA.  Further evaluation with CTA may be performed.  Generalized cerebral volume loss and moderate chronic microvascular ischemic change.  Small well-circumscribed T2 hyperintense focus of at the left superomedial extraconal space measuring 0.7 cm.  Findings are nonspecific, and may relate to a small vascular lesion.  Further evaluation with dedicated imaging of the orbits as clinically indicated.     CT Head w/o Contrast 5/21/24  Impression  Evolving subtotal right MCA infarct on a background of moderate to advanced chronic microvascular ischemic disease. No progressive mass effect. No hemorrhagic transformation.    Vessel Imaging:  CTA Head and Neck 5/18/24  Impression  1. Occlusion of one of the right M2 segments.  2. Less than 50 percent stenosis right M1 segment.  3. Findings discussed with stroke team neurology resident.

## 2024-05-25 NOTE — PLAN OF CARE
PT evaluation complete and appropriate goals established.    Problem: Physical Therapy  Goal: Physical Therapy Goal  Description: Goals to be met by: 2024     Patient will increase functional independence with mobility by performin. Supine to sit with MInimal Assistance  2. Sit to supine with MInimal Assistance  3. Sit to stand transfer with Minimal Assistance  4. Bed to chair transfer with Moderate Assistance using LRAD  5. Gait  x 10 feet with Maximal Assistance using LRAD.   6. Sitting at edge of bed x5 minutes with Stand-by Assistance  7. Lower extremity exercise program x15 reps per handout, with assistance as needed    Outcome: Progressing     2024

## 2024-05-25 NOTE — ASSESSMENT & PLAN NOTE
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 No thrombolytics as is on Xarelto. Went to IR but procedure aborted due to distal clot. So TICI 0. Etiology cardio embolic due to chronic afib.     No acute events overnight. Neuro exam stable. MRI Brain showed moderate sized R MCA infarct with scattered petechial hemorrhage and a suspected small hematoma. Patient's imaging discussed with staff, Dr. Rodriges, and patient's family. Heparin gtt initiated for AC in setting of atrial fibrillation while patient anticipates PEG placement on 5/27. Patient pending abdominal CT for eval of Pneumoperitoneum.    Antithrombotics: ASA + minimal intensity heparin gtt. After patient is therapeutic on heparin gtt, ASA can be discontinued.    Statins: Lipitor 40 mg daily but unable to get as no NG tube    Aggressive risk factor modification: HTN, Diet, Exercise, Obesity, A-Fib     Rehab efforts: The patient has been evaluated by a stroke team provider and the therapy needs have been fully considered based off the presenting complaints and exam findings. The following therapy evaluations are needed:  recommendations include high intensity therapy    Diagnostics ordered/pending: Other: CT Abdomen    VTE prophylaxis: Mechanical prophylaxis: Place SCDs  None: Reason for No Pharmacological VTE Prophylaxis: Currently on anticoagulation    BP parameters: Infarct: SBP <180

## 2024-05-25 NOTE — PROGRESS NOTES
Onur Wright - Neurosurgery ([facility])  Vascular Neurology  Comprehensive Stroke Center  Progress Note    Assessment/Plan:     * Cerebrovascular accident (CVA) due to embolism of right middle cerebral artery  87 y/o female with R MCA stroke due to R M2 occlusio. No thrombolytics as is on Xarelto. Went to IR but procedure aborted due to distal clot. So TICI 0. Etiology cardio embolic due to chronic afib.     No acute events overnight. Neuro exam stable. MRI Brain showed moderate sized R MCA infarct with scattered petechial hemorrhage and a suspected small hematoma. Patient's imaging discussed with staff, Dr. Rodriges, and patient's family. Heparin gtt initiated for AC in setting of atrial fibrillation while patient anticipates PEG placement on 5/27. Patient pending abdominal CT for eval of Pneumoperitoneum.    Antithrombotics: ASA + minimal intensity heparin gtt. After patient is therapeutic on heparin gtt, ASA can be discontinued.    Statins: Lipitor 40 mg daily but unable to get as no NG tube    Aggressive risk factor modification: HTN, Diet, Exercise, Obesity, A-Fib     Rehab efforts: The patient has been evaluated by a stroke team provider and the therapy needs have been fully considered based off the presenting complaints and exam findings. The following therapy evaluations are needed:  recommendations include high intensity therapy    Diagnostics ordered/pending: Other: CT Abdomen    VTE prophylaxis: Mechanical prophylaxis: Place SCDs  None: Reason for No Pharmacological VTE Prophylaxis: Currently on anticoagulation    BP parameters: Infarct: SBP <180        Obesity (BMI 30-39.9)  Stroke risk factor   on diet and exercise    Dysphagia due to recent stroke  Due to stroke  Aggressive therapy  Speech therapy consult  PEG placed 5/24        Dysarthria due to acute stroke  Due to stroke  Aggressive therapy          Hemiplegia  Due to stroke  Aggressive therapy      Chronic a-fib  Stroke risk factor  Was on Xarelto,  will need to resume once feeding route resolved    Hypertension, benign  Stroke risk factor  SBP <180 as 5 days out         05/25/2024: No acute events overnight. Neuro exam stable. MRI Brain showed moderate sized R MCA infarct with scattered petechial hemorrhage and a suspected small hematoma. Patient's imaging discussed with staff, Dr. Rodriges, and patient's family. Heparin gtt initiated for AC in setting of atrial fibrillation while patient anticipates PEG placement on 5/27. Patient pending abdominal CT for eval of Pneumoperitoneum.    STROKE DOCUMENTATION        NIH Scale:  1a. Level of Consciousness: 0-->Alert, keenly responsive  1b. LOC Questions: 0-->Answers both questions correctly  1c. LOC Commands: 0-->Performs both tasks correctly  2. Best Gaze: 0-->Normal  3. Visual: 0-->No visual loss  4. Facial Palsy: 1-->Minor paralysis (flattened nasolabial fold, asymmetry on smiling)  5a. Motor Arm, Left: 4-->No movement  5b. Motor Arm, Right: 1-->Drift, limb holds 90 (or 45) degrees, but drifts down before full 10 secs, does not hit bed or other support  6a. Motor Leg, Left: 4-->No movement  6b. Motor Leg, Right: 1-->Drift, leg falls by the end of the 5-sec period but does not hit bed  7. Limb Ataxia: 0-->Absent  8. Sensory: 1-->Mild-to-moderate sensory loss, patient feels pinprick is less sharp or is dull on the affected side, or there is a loss of superficial pain with pinprick, but patient is aware of being touched  9. Best Language: 1-->Mild-to-moderate aphasia, some obvious loss of fluency or facility of comprehension, without significant limitation on ideas expressed or form of expression. Reduction of speech and/or comprehension, however, makes conversation. . . (see row details)  10. Dysarthria: 2-->Severe dysarthria, patients speech is so slurred as to be unintelligible in the absence of or out of proportion to any dysphasia, or is mute/anarthric  11. Extinction and Inattention (formerly Neglect): 0-->No  abnormality  Total (NIH Stroke Scale): 15       Modified Irion Score: 2  [place] Coma Scale:    ABCD2 Score:    [id number] Score:7  HAS -BLED Score:   ICH Score:   Hunt & Marie Classification:      Hemorrhagic change of an Ischemic Stroke: Does this patient have an ischemic stroke with hemorrhagic changes? No     Neurologic Chief Complaint: R MCA infarct    Subjective:     Interval History: Patient is seen for follow-up neurological assessment and treatment recommendations: No acute events overnight. Neuro exam stable. MRI Brain showed moderate sized R MCA infarct with scattered petechial hemorrhage and a suspected small hematoma. Patient's imaging discussed with staff, Dr. Rodriges, and patient's family. Heparin gtt initiated for AC in setting of atrial fibrillation while patient anticipates PEG placement on 5/27. Patient pending abdominal CT for eval of Pneumoperitoneum.    HPI, Past Medical, Family, and Social History remains the same as documented in the initial encounter.     Review of Systems   HENT:  Positive for trouble swallowing.    Respiratory:  Positive for cough.    Musculoskeletal:  Positive for arthralgias and back pain.   Neurological:  Positive for facial asymmetry, weakness and numbness.   Hematological:  Bruises/bleeds easily.   All other systems reviewed and are negative.    Scheduled Meds:   aspirin  300 mg Rectal Daily    atorvastatin  40 mg Oral Daily    nystatin  500,000 Units Oral QID    pantoprazole  40 mg Intravenous Daily     Continuous Infusions:   sodium chloride 0.9%   Intravenous Continuous 50 mL/hr at 05/25/24 0643 Rate Verify at 05/25/24 0643    heparin (porcine) in D5W  0-40 Units/kg/hr (Adjusted) Intravenous Continuous         PRN Meds:  Current Facility-Administered Medications:     acetaminophen, 650 mg, Rectal, Q6H PRN    bisacodyL, 10 mg, Rectal, Daily PRN    dextrose 10%, 12.5 g, Intravenous, PRN    dextrose 10%, 12.5 g, Intravenous, PRN    dextrose 10%, 25 g, Intravenous,  PRN    dextrose 10%, 25 g, Intravenous, PRN    glucagon (human recombinant), 1 mg, Intramuscular, PRN    insulin aspart U-100, 0-5 Units, Subcutaneous, Q12H PRN    labetalol, 10 mg, Intravenous, Q6H PRN    ondansetron, 4 mg, Intravenous, Q12H PRN    sodium chloride 0.9%, 500 mL, Intravenous, PRN    sodium chloride 0.9%, 10 mL, Intravenous, PRN    Objective:     Vital Signs (Most Recent):  Temp: 98.8 °F (37.1 °C) (05/25/24 1233)  Pulse: 71 (05/25/24 1233)  Resp: 18 (05/25/24 1233)  BP: (!) 180/87 (05/25/24 1233)  SpO2: 100 % (05/25/24 1233)  BP Location: Left arm    Vital Signs Range (Last 24H):  Temp:  [97.9 °F (36.6 °C)-98.8 °F (37.1 °C)]   Pulse:  []   Resp:  [15-18]   BP: (165-183)/(77-87)   SpO2:  [97 %-100 %]   BP Location: Left arm       Physical Exam  Vitals reviewed.   HENT:      Head: Normocephalic.      Mouth/Throat:      Mouth: Mucous membranes are dry.   Pulmonary:      Effort: Pulmonary effort is normal.   Neurological:      Mental Status: She is alert and oriented to person, place, and time.      Sensory: Sensory deficit present.      Motor: Weakness present.   Psychiatric:         Mood and Affect: Mood normal.         Behavior: Behavior normal.          Neurological Exam:   LOC: alert  Attention Span: Good   Language: decreased fluency  Articulation: Dysarthria  Orientation: Person, Place, Time   Facial Movement (CN VII): Lower facial weakness on the Left  Motor: Arm left  Plegia 0/5  Leg left  Plegia 0/5  Arm right  Paresis: 4/5  Leg right Paresis: 4/5    Laboratory:  CMP:   Recent Labs   Lab 05/25/24  0648   CALCIUM 8.9   ALBUMIN 2.7*   PROT 6.2      K 3.9   CO2 19*      BUN 13   CREATININE 0.8   ALKPHOS 53*   ALT 21   AST 23   BILITOT 0.6     BMP:   Recent Labs   Lab 05/25/24  0648      K 3.9      CO2 19*   BUN 13   CREATININE 0.8   CALCIUM 8.9     CBC:   Recent Labs   Lab 05/25/24  1405   WBC 6.86   RBC 4.17   HGB 10.4*   HCT 32.9*      MCV 79*   MCH 24.9*  "  MCHC 31.6*     Lipid Panel:   Recent Labs   Lab 05/23/24  2328   CHOL 146   LDLCALC 83.2   HDL 43   TRIG 99     Coagulation:   Recent Labs   Lab 05/24/24  0545   INR 1.1   APTT 31.0     Platelet Aggregation Study: No results for input(s): "PLTAGG", "PLTAGINTERP", "PLTAGREGLACO", "ADPPLTAGGREG" in the last 168 hours.  Hgb A1C:   Recent Labs   Lab 05/19/24  0300   HGBA1C 6.4*     TSH:   Recent Labs   Lab 05/23/24  2328   TSH 1.475       Diagnostic Results     Brain Imaging:  MRI Brain w/o Contrast 5/24/24  Impression:  Moderate-sized right MCA territorial infarction with scattered petechial hemorrhage and a suspected small hematoma.  Clinical correlation and short-term follow-up with CT is recommended.  Hazy visualization of the right MCA flow void as compared to the contralateral side with suspected occlusion of the distal right MCA.  Further evaluation with CTA may be performed.  Generalized cerebral volume loss and moderate chronic microvascular ischemic change.  Small well-circumscribed T2 hyperintense focus of at the left superomedial extraconal space measuring 0.7 cm.  Findings are nonspecific, and may relate to a small vascular lesion.  Further evaluation with dedicated imaging of the orbits as clinically indicated.     CT Head w/o Contrast 5/21/24  Impression  Evolving subtotal right MCA infarct on a background of moderate to advanced chronic microvascular ischemic disease. No progressive mass effect. No hemorrhagic transformation.    Vessel Imaging:  CTA Head and Neck 5/18/24  Impression  1. Occlusion of one of the right M2 segments.  2. Less than 50 percent stenosis right M1 segment.  3. Findings discussed with stroke team neurology resident.         Yeimi Hollins NP  [facility]  Department of Vascular Neurology   [facility] - Neurosurgery ([facility])        "

## 2024-05-25 NOTE — PT/OT/SLP EVAL
Physical Therapy Evaluation and Treatment    Patient Name:  Amirah Davey   MRN:  [id number]    Therapy tech utilized for session to maximize physical performance and safety  Recommendations:     Discharge Recommendations: High Intensity Therapy   Discharge Equipment Recommendations: hospital bed, lift device, wheelchair, bedside commode   Barriers to discharge: Increased level of assist    Assessment:     Amirah Davey is a 88 y.o. female admitted with a medical diagnosis of Cerebrovascular accident (CVA) due to embolism of right middle cerebral artery. She presents with the following impairments/functional limitations: weakness, impaired endurance, impaired sensation, impaired coordination, decreased upper extremity function, decreased lower extremity function, impaired self care skills, impaired functional mobility, gait instability, impaired balance, decreased safety awareness, abnormal tone, impaired fine motor, pain. Pt with good tolerance to therapy evaluation on this date. Pt presenting eager and motivated to participate in therapy session with 2 supportive daughters at bedside. Pt with absent L sided motor activation, poor postural stability while seated at EOB, hypertonicity in LUE/LLE, and decreased activity tolerance. Pt requiring increased assistance to complete all functional mobility this day and is considered a high fall risk at this time. Recommend high intensity therapy following discharge once medically stable in order to reduce fall risk, reduce caregiver burden, improve quality of life, and return to PLOF.  Patient presents with good participation, motivation, and family support to return to prior level of function and demonstrates appropriate endurance, strength, pain control, and fine motor control to participate in up to 3 hours daily or 15hrs weekly of multidisciplinary intensive therapy. Pt would continue to benefit from skilled acute PT in order to address current deficits and progress  "functional mobility.     Rehab Prognosis: Good; patient would benefit from acute skilled PT services 4 x/week to address these deficits and reach maximum level of function.  Recent Surgery: * No surgery found *      Plan:     During this hospitalization, patient to be seen 4 x/week to address the identified rehab impairments via gait training, therapeutic activities, therapeutic exercises, neuromuscular re-education and progress toward the following goals:    Plan of Care Expires:  06/25/24    Subjective     Chief Complaint: None verbalized  Patient/Family Comments/Goals: Pt's daughter: "We are so happy y'all came today to get her started."  Pain/Comfort:  Pain Rating 1: 0/10    Patients cultural, spiritual, Hindu conflicts given the current situation: no    Living Environment:  Living Environment: Patient lives with their daughter  in a single story house with number of outside stair(s): 1 and tub-shower combo.  Prior Level of Function: Prior to admission, patient was independent and driving.  Equipment Used at Home: none.  DME owned (not currently used): rolling walker  Assistance Upon Discharge: family    Objective:     Communicated with nursing prior to session. Patient found HOB elevated with telemetry, PureWick, peripheral IV, SCD upon PT entry to room.    General Precautions: Standard, aspiration, fall  Orthopedic Precautions:N/A    Braces: N/A    Exams:  Cognitive Exam:  Patient is oriented to Person, Place, Time, Situation, follows commands 100% of the time  RLE ROM: WFL  RLE Strength: WFL  LLE ROM: WFL  LLE Strength: 0/5  Sensation: -       Impaired  light/touch LLE and sharp/dull LLE    Functional Mobility:  Bed Mobility:  Verbal cues for sequencing and technique  Scooting: maximal assistance  Supine to Sit: maximal assistance for LE management and trunk management  Sit to Supine: maximal assistance for LE management and trunk management  Transfers:    Sit to Stand: x1 rep from EOB; maximal " assistance with no AD with cues for hand placement and foot placement  LLE block provided  Balance:   Static Sitting: Poor, able to maintain for 10 minute(s) with total assistance progressing to minimal assistance 2/2 posterior lean  Verbal and tactile cues provided for upright posture, increased cervical extension, maintenance of midline orientation, anterior trunk lean, core activation, command following, and improved used of BUE for support.  Dynamic Sitting: Poor: Patient unable to accept challenge or move without loss of balance, total assistance  PT completed PROM 1x10 to all major joints in LLE completed to reduce stiffness, maintain joint integrity, and prevent joint contractures while seated at EOB  Static Standing: Poor, able to maintain for 10 seconds with maximal assistance  Dynamic Standing: not assessed this visit    Therapeutic Activities and Exercises:  Patient educated on role of acute care PT and PT POC, safety while in hospital including calling nurse for mobility, and call light usage  Pt educated on the effects of bed rest and the importance of OOB activity. Pt encouraged to sit UIC majority of day as tolerated and continue daily transfers with nursing assist. Pt verbalized understanding.  Pt educated on importance of maximal participation in therapy session in order to reduce negative effects of prolonged sedentary positioning.   Answered all questions within PT scope of practice and addressed functional mobility concerns.    AM-PAC 6 CLICK MOBILITY  Total Score:10     Patient left HOB elevated with all lines intact, call button in reach, RN notified, bed alarm on, and daughters present.    GOALS:   Multidisciplinary Problems       Physical Therapy Goals          Problem: Physical Therapy    Goal Priority Disciplines Outcome Goal Variances Interventions   Physical Therapy Goal     PT, PT/OT Progressing     Description: Goals to be met by: 6/25/2024     Patient will increase functional  independence with mobility by performin. Supine to sit with MInimal Assistance  2. Sit to supine with MInimal Assistance  3. Sit to stand transfer with Minimal Assistance  4. Bed to chair transfer with Moderate Assistance using LRAD  5. Gait  x 10 feet with Maximal Assistance using LRAD.   6. Sitting at edge of bed x5 minutes with Stand-by Assistance  7. Lower extremity exercise program x15 reps per handout, with assistance as needed                         History:     Past Medical History:   Diagnosis Date    A-fib     Cerebrovascular accident (CVA) due to embolism of right middle cerebral artery 2024    Current use of long term anticoagulation     on Xarelto    Dysphagia due to recent stroke 2024    Hypertension     Stroke 2013       Past Surgical History:   Procedure Laterality Date    CARDIAC PACEMAKER PLACEMENT  2019    [employer] Model number [id number]  Serial number [id number] device MRI conditional for 1.5 Estela magnets    CATARACT EXTRACTION W/  INTRAOCULAR LENS IMPLANT Right 2017    Dr. Case    CATARACT EXTRACTION W/  INTRAOCULAR LENS IMPLANT Left 2017    Dr. Case    EYE SURGERY      HYSTERECTOMY         Time Tracking:     PT Received On: 24  PT Start Time: 08     PT Stop Time: 0831  PT Total Time (min): 25 min     Billable Minutes: Evaluation 10 Therapeutic Activity 15      2024

## 2024-05-25 NOTE — PLAN OF CARE
Problem: Adult Inpatient Plan of Care  Goal: Plan of Care Review  Outcome: Progressing  Goal: Patient-Specific Goal (Individualized)  Outcome: Progressing  Goal: Absence of Hospital-Acquired Illness or Injury  Outcome: Progressing  Goal: Optimal Comfort and Wellbeing  Outcome: Progressing  Goal: Readiness for Transition of Care  Outcome: Progressing     Problem: Stroke, Ischemic (Includes Transient Ischemic Attack)  Goal: Optimal Coping  Outcome: Progressing  Goal: Effective Bowel Elimination  Outcome: Progressing  Goal: Optimal Cerebral Tissue Perfusion  Outcome: Progressing  Goal: Optimal Cognitive Function  Outcome: Progressing  Goal: Improved Communication Skills  Outcome: Progressing  Goal: Optimal Functional Ability  Outcome: Progressing  Goal: Optimal Nutrition Intake  Outcome: Progressing  Goal: Effective Oxygenation and Ventilation  Outcome: Progressing  Goal: Improved Sensorimotor Function  Outcome: Progressing  Goal: Safe and Effective Swallow  Outcome: Progressing  Goal: Effective Urinary Elimination  Outcome: Progressing     Problem: Fall Injury Risk  Goal: Absence of Fall and Fall-Related Injury  Outcome: Progressing     Problem: Infection  Goal: Absence of Infection Signs and Symptoms  Outcome: Progressing     Problem: Skin Injury Risk Increased  Goal: Skin Health and Integrity  Outcome: Progressing               POC updated and reviewed with the patient/daughters  at the bedside. Questions regarding POC were encouraged and addressed. VSS, see flowsheets. Patient is AOX 4 at this time. Tele maintained per order. Fall and safety precautions maintained, no signs of injury noted during shift. Patient repositioned independently and with assistance in bed for comfort. Upon exiting room, patient's bed locked in low position, side rails up x 3, bed alarm set, with call light within reach. Instructed patient to call staff for mobility, verbalized understanding. Stroke book and education reviewed at the  bedside, see education flowsheets for details. No acute signs of distress noted at this time.

## 2024-05-25 NOTE — PLAN OF CARE
Problem: Occupational Therapy  Goal: Occupational Therapy Goal  Description: Goals to be met by: 6/24/24     Patient will increase functional independence with ADLs by performing:    UE Dressing with Moderate Assistance.  LE Dressing with Maximum Assistance.  Grooming while seated with Minimal Assistance.  Toileting from bedside commode with Maximum Assistance for hygiene and clothing management.   Sitting at edge of bed x15 minutes with Stand-by Assistance.  Rolling to Bilateral with Minimal Assistance.   Supine to sit with Minimal Assistance.  Stand pivot transfers with Minimal Assistance.    Outcome: Progressing     OT eval completed.

## 2024-05-25 NOTE — PLAN OF CARE
Problem: Adult Inpatient Plan of Care  Goal: Plan of Care Review  Outcome: Progressing  Goal: Patient-Specific Goal (Individualized)  Outcome: Progressing  Goal: Absence of Hospital-Acquired Illness or Injury  Outcome: Progressing  Goal: Optimal Comfort and Wellbeing  Outcome: Progressing  Goal: Readiness for Transition of Care  Outcome: Progressing     Problem: Stroke, Ischemic (Includes Transient Ischemic Attack)  Goal: Optimal Coping  Outcome: Progressing  Goal: Effective Bowel Elimination  Outcome: Progressing  Goal: Optimal Cerebral Tissue Perfusion  Outcome: Progressing  Goal: Optimal Cognitive Function  Outcome: Progressing  Goal: Improved Communication Skills  Outcome: Progressing  Goal: Optimal Functional Ability  Outcome: Progressing  Goal: Optimal Nutrition Intake  Outcome: Progressing  Goal: Effective Oxygenation and Ventilation  Outcome: Progressing  Goal: Improved Sensorimotor Function  Outcome: Progressing  Goal: Safe and Effective Swallow  Outcome: Progressing  Goal: Effective Urinary Elimination  Outcome: Progressing     Problem: Fall Injury Risk  Goal: Absence of Fall and Fall-Related Injury  Outcome: Progressing     Problem: Infection  Goal: Absence of Infection Signs and Symptoms  Outcome: Progressing     Problem: Skin Injury Risk Increased  Goal: Skin Health and Integrity  Outcome: Progressing

## 2024-05-26 LAB
APTT PPP: 50.9 SEC (ref 21–32)
APTT PPP: 51.4 SEC (ref 21–32)
BASOPHILS # BLD AUTO: 0.03 K/UL (ref 0–0.2)
BASOPHILS NFR BLD: 0.4 % (ref 0–1.9)
DIFFERENTIAL METHOD BLD: ABNORMAL
EOSINOPHIL # BLD AUTO: 0.1 K/UL (ref 0–0.5)
EOSINOPHIL NFR BLD: 0.9 % (ref 0–8)
ERYTHROCYTE [DISTWIDTH] IN BLOOD BY AUTOMATED COUNT: 14.7 % (ref 11.5–14.5)
HCT VFR BLD AUTO: 33 % (ref 37–48.5)
HGB BLD-MCNC: 10.2 G/DL (ref 12–16)
IMM GRANULOCYTES # BLD AUTO: 0.13 K/UL (ref 0–0.04)
IMM GRANULOCYTES NFR BLD AUTO: 1.7 % (ref 0–0.5)
LYMPHOCYTES # BLD AUTO: 0.7 K/UL (ref 1–4.8)
LYMPHOCYTES NFR BLD: 8.8 % (ref 18–48)
MCH RBC QN AUTO: 24.9 PG (ref 27–31)
MCHC RBC AUTO-ENTMCNC: 30.9 G/DL (ref 32–36)
MCV RBC AUTO: 81 FL (ref 82–98)
MONOCYTES # BLD AUTO: 0.9 K/UL (ref 0.3–1)
MONOCYTES NFR BLD: 12.2 % (ref 4–15)
NEUTROPHILS # BLD AUTO: 5.8 K/UL (ref 1.8–7.7)
NEUTROPHILS NFR BLD: 76 % (ref 38–73)
NRBC BLD-RTO: 0 /100 WBC
PLATELET # BLD AUTO: 222 K/UL (ref 150–450)
PMV BLD AUTO: 10.8 FL (ref 9.2–12.9)
POCT GLUCOSE: 107 MG/DL (ref 70–110)
POCT GLUCOSE: 143 MG/DL (ref 70–110)
POCT GLUCOSE: 90 MG/DL (ref 70–110)
POCT GLUCOSE: 97 MG/DL (ref 70–110)
RBC # BLD AUTO: 4.09 M/UL (ref 4–5.4)
WBC # BLD AUTO: 7.61 K/UL (ref 3.9–12.7)

## 2024-05-26 PROCEDURE — 25000003 PHARM REV CODE 250

## 2024-05-26 PROCEDURE — 85730 THROMBOPLASTIN TIME PARTIAL: CPT | Performed by: PSYCHIATRY & NEUROLOGY

## 2024-05-26 PROCEDURE — 25000003 PHARM REV CODE 250: Performed by: NURSE PRACTITIONER

## 2024-05-26 PROCEDURE — 99233 SBSQ HOSP IP/OBS HIGH 50: CPT | Mod: ,,, | Performed by: PSYCHIATRY & NEUROLOGY

## 2024-05-26 PROCEDURE — 85025 COMPLETE CBC W/AUTO DIFF WBC: CPT

## 2024-05-26 PROCEDURE — S5010 5% DEXTROSE AND 0.45% SALINE: HCPCS

## 2024-05-26 PROCEDURE — 63600175 PHARM REV CODE 636 W HCPCS: Mod: JZ,JG

## 2024-05-26 PROCEDURE — 25500020 PHARM REV CODE 255: Performed by: PSYCHIATRY & NEUROLOGY

## 2024-05-26 PROCEDURE — 11000001 HC ACUTE MED/SURG PRIVATE ROOM

## 2024-05-26 PROCEDURE — 63600175 PHARM REV CODE 636 W HCPCS: Performed by: NURSE PRACTITIONER

## 2024-05-26 PROCEDURE — 97530 THERAPEUTIC ACTIVITIES: CPT

## 2024-05-26 PROCEDURE — C9113 INJ PANTOPRAZOLE SODIUM, VIA: HCPCS | Performed by: NURSE PRACTITIONER

## 2024-05-26 PROCEDURE — 36415 COLL VENOUS BLD VENIPUNCTURE: CPT | Performed by: PSYCHIATRY & NEUROLOGY

## 2024-05-26 RX ORDER — LABETALOL HCL 20 MG/4 ML
10 SYRINGE (ML) INTRAVENOUS EVERY 6 HOURS
Status: COMPLETED | OUTPATIENT
Start: 2024-05-26 | End: 2024-05-30

## 2024-05-26 RX ORDER — DEXTROSE MONOHYDRATE AND SODIUM CHLORIDE 5; .45 G/100ML; G/100ML
INJECTION, SOLUTION INTRAVENOUS CONTINUOUS
Status: DISCONTINUED | OUTPATIENT
Start: 2024-05-26 | End: 2024-05-31

## 2024-05-26 RX ORDER — LABETALOL HCL 20 MG/4 ML
5 SYRINGE (ML) INTRAVENOUS EVERY 6 HOURS
Status: DISCONTINUED | OUTPATIENT
Start: 2024-05-26 | End: 2024-05-26

## 2024-05-26 RX ADMIN — PANTOPRAZOLE SODIUM 40 MG: 40 INJECTION, POWDER, FOR SOLUTION INTRAVENOUS at 09:05

## 2024-05-26 RX ADMIN — ASPIRIN 300 MG: 300 SUPPOSITORY RECTAL at 11:05

## 2024-05-26 RX ADMIN — NYSTATIN 500000 UNITS: 100000 SUSPENSION ORAL at 05:05

## 2024-05-26 RX ADMIN — MUPIROCIN: 20 OINTMENT TOPICAL at 09:05

## 2024-05-26 RX ADMIN — LABETALOL HYDROCHLORIDE 5 MG: 5 INJECTION, SOLUTION INTRAVENOUS at 09:05

## 2024-05-26 RX ADMIN — IOHEXOL 75 ML: 350 INJECTION, SOLUTION INTRAVENOUS at 10:05

## 2024-05-26 RX ADMIN — LABETALOL HYDROCHLORIDE 10 MG: 5 INJECTION, SOLUTION INTRAVENOUS at 11:05

## 2024-05-26 RX ADMIN — LABETALOL HYDROCHLORIDE 10 MG: 5 INJECTION, SOLUTION INTRAVENOUS at 04:05

## 2024-05-26 RX ADMIN — NYSTATIN 500000 UNITS: 100000 SUSPENSION ORAL at 09:05

## 2024-05-26 RX ADMIN — DEXTROSE AND SODIUM CHLORIDE: 5; 450 INJECTION, SOLUTION INTRAVENOUS at 09:05

## 2024-05-26 NOTE — ASSESSMENT & PLAN NOTE
89 y/o female with R MCA stroke due to R M2 occlusio. No thrombolytics as is on Xarelto. Went to IR but procedure aborted due to distal clot. So TICI 0. Etiology cardio embolic due to chronic afib.     No acute events overnight. Neuro exam stable. CT abdomen pending. Patient to be NPO at midnight/heparin gtt off at midnight in anticipation of PEG placement by GI tomorrow. Patient still remains NPO. IV labetalol 5mg scheduled q6 hours for BP control.     Antithrombotics: ASA + minimal intensity heparin gtt. After patient is therapeutic on heparin gtt, ASA can be discontinued.    Statins: Lipitor 40 mg daily but unable to get as no NG tube    Aggressive risk factor modification: HTN, Diet, Exercise, Obesity, A-Fib     Rehab efforts: The patient has been evaluated by a stroke team provider and the therapy needs have been fully considered based off the presenting complaints and exam findings. The following therapy evaluations are needed:  recommendations include high intensity therapy    Diagnostics ordered/pending: Other: CT Abdomen    VTE prophylaxis: Mechanical prophylaxis: Place SCDs  None: Reason for No Pharmacological VTE Prophylaxis: Currently on anticoagulation    BP parameters: Infarct: SBP <180

## 2024-05-26 NOTE — PT/OT/SLP PROGRESS
"Occupational Therapy   Treatment    Name: Amirah Davey  MRN: [id number]  Admitting Diagnosis:  Cerebrovascular accident (CVA) due to embolism of right middle cerebral artery       Recommendations:     Discharge Recommendations: High Intensity Therapy  Discharge Equipment Recommendations:  hospital bed, wheelchair, bedside commode  Barriers to discharge:  None    Assessment:     Amirah Davey is a 88 y.o. female with a medical diagnosis of Cerebrovascular accident (CVA) due to embolism of right middle cerebral artery.  She presents stating that she can't stand up. Pt agreed to try after encouragement. Performance deficits affecting function are weakness, impaired endurance, impaired sensation, impaired self care skills, impaired functional mobility, impaired balance, gait instability, decreased upper extremity function, decreased lower extremity function, decreased coordination, visual deficits, abnormal tone, decreased ROM, impaired coordination, impaired fine motor. Patient would benefit from continued skilled acute OT 4x/wk to improve functional mobility, increase independence with ADLs, and address established goals.Recommending high intensity therpay once medically appropriate for discharge to increase maximal independence, reduce burden of care, and ensure safety.     Rehab Prognosis:  Good; patient would benefit from acute skilled OT services to address these deficits and reach maximum level of function.       Plan:     Patient to be seen 4 x/week to address the above listed problems via self-care/home management, therapeutic activities, therapeutic exercises, neuromuscular re-education, sensory integration  Plan of Care Expires: 06/24/24  Plan of Care Reviewed with: patient, spouse, daughter    Subjective   " I'm afraid I'm going to fall."  Chief Complaint: none  Patient/Family Comments/goals: To see improvement in patients confidence.  Pain/Comfort:  Pain Rating 1: 0/10  Pain Rating Post-Intervention 1: " 0/10  Pain Rating Post-Intervention 2: 0/10    Objective:     Communicated with: RN prior to session.  Patient found HOB elevated with telemetry, PureWick, peripheral IV, SCD upon OT entry to room.    General Precautions: Standard, fall, aspiration    Orthopedic Precautions:N/A  Braces: N/A  Respiratory Status: Room air     Occupational Performance:     Bed Mobility:    Patient completed Rolling/Turning to Right with moderate assistance  Patient completed Scooting/Bridging with minimum assistance  Patient completed Supine to Sit with minimum assistance  Patient completed Sit to Supine with minimum assistance     Functional Mobility/Transfers:  Patient completed Sit <> Stand Transfer with moderate assistance  with  hand-held assist and rolling walker   Functional Mobility: Pt able to take three lateral and front steps with HHA and RW  Pt stood for 10 minutes with CGA and was able to release hands from RW and maintain balance.  Pt performed balance exercises standing at EOB. Pt able to complete R<>L lateral shift and regain midline with no instability.  Pt completed three sit<>stand and step with RW trials.    Activities of Daily Living:  Lower Body Dressing: maximal assistance to don socks supine.      [facility] 6 Click ADL: 9    Treatment & Education:  Role of OT and POC  Safety  ADL retraining  Functional mobility training  Discharge planning  Importance of EOB/OOB activity      Patient left HOB elevated with all lines intact, call button in reach, RN notified, and  and daughter present    GOALS:   Multidisciplinary Problems       Occupational Therapy Goals          Problem: Occupational Therapy    Goal Priority Disciplines Outcome Interventions   Occupational Therapy Goal     OT, PT/OT Progressing    Description: Goals to be met by: 6/24/24     Patient will increase functional independence with ADLs by performing:    UE Dressing with Moderate Assistance.  LE Dressing with Maximum Assistance.  Grooming while  seated with Minimal Assistance.  Toileting from bedside commode with Maximum Assistance for hygiene and clothing management.   Sitting at edge of bed x15 minutes with Stand-by Assistance.  Rolling to Bilateral with Minimal Assistance.   Supine to sit with Minimal Assistance.  Stand pivot transfers with Minimal Assistance.                         Time Tracking:     OT Date of Treatment:    OT Start Time: 1620  OT Stop Time: 1703  OT Total Time (min): 43 min    Billable Minutes:Therapeutic Activity 43    OT/PHILIP: OT          5/26/2024

## 2024-05-26 NOTE — NURSING
Nurses Note -- 4 Eyes      5/25/2024   7:15 PM      Skin assessed during: Daily Assessment      [x] No Altered Skin Integrity Present    [x]Prevention Measures Documented      [] Yes- Altered Skin Integrity Present or Discovered   [] LDA Added if Not in Epic (Describe Wound)   [] New Altered Skin Integrity was Present on Admit and Documented in LDA   [] Wound Image Taken    Wound Care Consulted? No    Attending Nurse:  Yuli Beckham RN/Staff Member:  Claudette

## 2024-05-26 NOTE — PROGRESS NOTES
[facility] - Neurosurgery ([facility])  Vascular Neurology  Comprehensive Stroke Center  Progress Note    Assessment/Plan:     * Cerebrovascular accident (CVA) due to embolism of right middle cerebral artery  89 y/o female with R MCA stroke due to R M2 occlusio. No thrombolytics as is on Xarelto. Went to IR but procedure aborted due to distal clot. So TICI 0. Etiology cardio embolic due to chronic afib.     No acute events overnight. Neuro exam stable. CT abdomen pending. Patient to be NPO at midnight/heparin gtt off at midnight in anticipation of PEG placement by GI tomorrow. Patient still remains NPO. IV labetalol 5mg scheduled q6 hours for BP control.     Antithrombotics: ASA + minimal intensity heparin gtt. After patient is therapeutic on heparin gtt, ASA can be discontinued.    Statins: Lipitor 40 mg daily but unable to get as no NG tube    Aggressive risk factor modification: HTN, Diet, Exercise, Obesity, A-Fib     Rehab efforts: The patient has been evaluated by a stroke team provider and the therapy needs have been fully considered based off the presenting complaints and exam findings. The following therapy evaluations are needed:  recommendations include high intensity therapy    Diagnostics ordered/pending: Other: CT Abdomen    VTE prophylaxis: Mechanical prophylaxis: Place SCDs  None: Reason for No Pharmacological VTE Prophylaxis: Currently on anticoagulation    BP parameters: Infarct: SBP <180        Obesity (BMI 30-39.9)  Stroke risk factor   on diet and exercise    Dysphagia due to recent stroke  Due to stroke  Aggressive therapy  Speech therapy consult  PEG placed 5/24        Dysarthria due to acute stroke  Due to stroke  Aggressive therapy          Hemiplegia  Due to stroke  Aggressive therapy      Chronic a-fib  Stroke risk factor  Was on Xarelto, will need to resume once feeding route resolved    Hypertension, benign  Stroke risk factor  SBP <180 as 5 days out  IV labetalol 5mg schedule q6 hours  for BP control 2^ patient being NPO         05/25/2024: No acute events overnight. Neuro exam stable. MRI Brain showed moderate sized R MCA infarct with scattered petechial hemorrhage and a suspected small hematoma. Patient's imaging discussed with staff, Dr. Rodriges, and patient's family. Heparin gtt initiated for AC in setting of atrial fibrillation while patient anticipates PEG placement on 5/27. Patient pending abdominal CT for eval of Pneumoperitoneum.  05/26/2024: No acute events overnight. Neuro exam stable. CT abdomen pending. Patient to be NPO at midnight/heparin gtt off at midnight in anticipation of PEG placement by GI tomorrow. Patient still remains NPO. IV labetalol 5mg scheduled q6 hours for BP control.     STROKE DOCUMENTATION        NIH Scale:  1a. Level of Consciousness: 0-->Alert, keenly responsive  1b. LOC Questions: 0-->Answers both questions correctly  1c. LOC Commands: 0-->Performs both tasks correctly  2. Best Gaze: 0-->Normal  3. Visual: 0-->No visual loss  4. Facial Palsy: 1-->Minor paralysis (flattened nasolabial fold, asymmetry on smiling)  5a. Motor Arm, Left: 4-->No movement  5b. Motor Arm, Right: 1-->Drift, limb holds 90 (or 45) degrees, but drifts down before full 10 secs, does not hit bed or other support  6a. Motor Leg, Left: 4-->No movement  6b. Motor Leg, Right: 1-->Drift, leg falls by the end of the 5-sec period but does not hit bed  7. Limb Ataxia: 0-->Absent  8. Sensory: 1-->Mild-to-moderate sensory loss, patient feels pinprick is less sharp or is dull on the affected side, or there is a loss of superficial pain with pinprick, but patient is aware of being touched  9. Best Language: 1-->Mild-to-moderate aphasia, some obvious loss of fluency or facility of comprehension, without significant limitation on ideas expressed or form of expression. Reduction of speech and/or comprehension, however, makes conversation. . . (see row details)  10. Dysarthria: 2-->Severe dysarthria,  patients speech is so slurred as to be unintelligible in the absence of or out of proportion to any dysphasia, or is mute/anarthric  11. Extinction and Inattention (formerly Neglect): 0-->No abnormality  Total (NIH Stroke Scale): 15       Modified Comal Score: 2  Alexander Coma Scale:    ABCD2 Score:    [id number] Score:7  HAS -BLED Score:   ICH Score:   Hunt & Marie Classification:      Hemorrhagic change of an Ischemic Stroke: Does this patient have an ischemic stroke with hemorrhagic changes? No     Neurologic Chief Complaint: R MCA infarct    Subjective:     Interval History: Patient is seen for follow-up neurological assessment and treatment recommendations: No acute events overnight. Neuro exam stable. CT abdomen pending. Patient to be NPO at midnight/heparin gtt off at midnight in anticipation of PEG placement by GI tomorrow. Patient still remains NPO. IV labetalol 5mg scheduled q6 hours for BP control.     HPI, Past Medical, Family, and Social History remains the same as documented in the initial encounter.     Review of Systems   HENT:  Positive for trouble swallowing.    Respiratory:  Positive for cough.    Musculoskeletal:  Positive for arthralgias and back pain.   Neurological:  Positive for facial asymmetry, weakness and numbness.   Hematological:  Bruises/bleeds easily.   All other systems reviewed and are negative.    Scheduled Meds:   aspirin  300 mg Rectal Daily    atorvastatin  40 mg Oral Daily    mupirocin   Nasal BID    nystatin  500,000 Units Oral QID    pantoprazole  40 mg Intravenous Daily     Continuous Infusions:   sodium chloride 0.9%   Intravenous Continuous   Stopped at 05/25/24 1704    heparin (porcine) in D5W  0-40 Units/kg/hr (Adjusted) Intravenous Continuous 5.6 mL/hr at 05/26/24 0628 10 Units/kg/hr at 05/26/24 0628     PRN Meds:  Current Facility-Administered Medications:     acetaminophen, 650 mg, Rectal, Q6H PRN    bisacodyL, 10 mg, Rectal, Daily PRN    dextrose 10%, 12.5 g,  "Intravenous, PRN    dextrose 10%, 12.5 g, Intravenous, PRN    dextrose 10%, 25 g, Intravenous, PRN    dextrose 10%, 25 g, Intravenous, PRN    glucagon (human recombinant), 1 mg, Intramuscular, PRN    insulin aspart U-100, 0-5 Units, Subcutaneous, Q12H PRN    iohexol, 15 mL, Oral, PRN    labetalol, 10 mg, Intravenous, Q6H PRN    ondansetron, 4 mg, Intravenous, Q12H PRN    sodium chloride 0.9%, 500 mL, Intravenous, PRN    sodium chloride 0.9%, 10 mL, Intravenous, PRN    Objective:     Vital Signs (Most Recent):  Temp: 98 °F (36.7 °C) (05/26/24 0733)  Pulse: 89 (05/26/24 0733)  Resp: 18 (05/26/24 0733)  BP: (!) 178/97 (05/26/24 0733)  SpO2: 97 % (05/26/24 0733)  BP Location: Right arm    Vital Signs Range (Last 24H):  Temp:  [98 °F (36.7 °C)-98.8 °F (37.1 °C)]   Pulse:  [69-89]   Resp:  [15-18]   BP: (153-180)/(71-97)   SpO2:  [94 %-100 %]   BP Location: Right arm       Physical Exam  Vitals reviewed.   HENT:      Head: Normocephalic.      Mouth/Throat:      Mouth: Mucous membranes are dry.   Pulmonary:      Effort: Pulmonary effort is normal.   Neurological:      Mental Status: She is alert and oriented to person, place, and time.      Sensory: Sensory deficit present.      Motor: Weakness present.   Psychiatric:         Mood and Affect: Mood normal.         Behavior: Behavior normal.            Neurological Exam:   LOC: alert  Attention Span: Good   Language: decreased fluency  Articulation: Dysarthria  Orientation: Person, Place, Time   Facial Movement (CN VII): Lower facial weakness on the Left  Motor: Arm left  Plegia 0/5  Leg left  Plegia 0/5  Arm right  Paresis: 4/5  Leg right Paresis: 4/5    Laboratory:  CMP:   No results for input(s): "GLUCOSE", "CALCIUM", "ALBUMIN", "PROT", "NA", "K", "CO2", "CL", "BUN", "CREATININE", "ALKPHOS", "ALT", "AST", "BILITOT" in the last 24 hours.    BMP:   Recent Labs   Lab 05/25/24  0648      K 3.9      CO2 19*   BUN 13   CREATININE 0.8   CALCIUM 8.9     CBC:   Recent " "Labs   Lab 05/26/24  0530   WBC 7.61   RBC 4.09   HGB 10.2*   HCT 33.0*      MCV 81*   MCH 24.9*   MCHC 30.9*     Lipid Panel:   Recent Labs   Lab 05/23/24  2328   CHOL 146   LDLCALC 83.2   HDL 43   TRIG 99     Coagulation:   Recent Labs   Lab 05/25/24  1405 05/25/24  2110 05/26/24  0530   INR 1.0  --   --    APTT 31.0   < > 50.9*    < > = values in this interval not displayed.     Platelet Aggregation Study: No results for input(s): "PLTAGG", "PLTAGINTERP", "PLTAGREGLACO", "ADPPLTAGGREG" in the last 168 hours.  Hgb A1C:   No results for input(s): "HGBA1C" in the last 168 hours.    TSH:   Recent Labs   Lab 05/23/24 2328   TSH 1.475       Diagnostic Results     Brain Imaging:  MRI Brain w/o Contrast 5/24/24  Impression:  Moderate-sized right MCA territorial infarction with scattered petechial hemorrhage and a suspected small hematoma.  Clinical correlation and short-term follow-up with CT is recommended.  Hazy visualization of the right MCA flow void as compared to the contralateral side with suspected occlusion of the distal right MCA.  Further evaluation with CTA may be performed.  Generalized cerebral volume loss and moderate chronic microvascular ischemic change.  Small well-circumscribed T2 hyperintense focus of at the left superomedial extraconal space measuring 0.7 cm.  Findings are nonspecific, and may relate to a small vascular lesion.  Further evaluation with dedicated imaging of the orbits as clinically indicated.     CT Head w/o Contrast 5/21/24  Impression  Evolving subtotal right MCA infarct on a background of moderate to advanced chronic microvascular ischemic disease. No progressive mass effect. No hemorrhagic transformation.    Vessel Imaging:  CTA Head and Neck 5/18/24  Impression  1. Occlusion of one of the right M2 segments.  2. Less than 50 percent stenosis right M1 segment.  3. Findings discussed with stroke team neurology resident.         Yeimi Hollins NP  Comprehensive Stroke " [place]  Department of Vascular Neurology   [facility] - Neurosurgery ([facility])         Returned to patient

## 2024-05-26 NOTE — PLAN OF CARE
Problem: Adult Inpatient Plan of Care  Goal: Plan of Care Review  Outcome: Progressing  Goal: Patient-Specific Goal (Individualized)  Outcome: Progressing  Goal: Absence of Hospital-Acquired Illness or Injury  Outcome: Progressing  Goal: Optimal Comfort and Wellbeing  Outcome: Progressing  Goal: Readiness for Transition of Care  Outcome: Progressing     Problem: Stroke, Ischemic (Includes Transient Ischemic Attack)  Goal: Optimal Coping  Outcome: Progressing  Goal: Effective Bowel Elimination  Outcome: Progressing  Goal: Optimal Cerebral Tissue Perfusion  Outcome: Progressing  Goal: Optimal Cognitive Function  Outcome: Progressing  Goal: Improved Communication Skills  Outcome: Progressing  Goal: Optimal Functional Ability  Outcome: Progressing  Goal: Optimal Nutrition Intake  Outcome: Progressing  Goal: Effective Oxygenation and Ventilation  Outcome: Progressing  Goal: Improved Sensorimotor Function  Outcome: Progressing  Goal: Safe and Effective Swallow  Outcome: Progressing  Goal: Effective Urinary Elimination  Outcome: Progressing     Problem: Fall Injury Risk  Goal: Absence of Fall and Fall-Related Injury  Outcome: Progressing     Problem: Infection  Goal: Absence of Infection Signs and Symptoms  Outcome: Progressing     Problem: Skin Injury Risk Increased  Goal: Skin Health and Integrity  Outcome: Progressing         POC updated and reviewed with the patient/daughters at the bedside. Questions regarding POC were encouraged and addressed. VSS, see flowsheets. Patient is AOX 4 at this time. Tele maintained per order. Heparin gtt continued as ordered and titrated per [facility] nomogram. See MAR for details.  Fall and safety precautions maintained, no signs of injury noted during shift. Patient repositioned independently and  with assistance in bed for comfort. Upon exiting room, patient's bed locked in low position, side rails up x 3, bed alarm set, with call light within reach. Instructed patient to call staff for  mobility, verbalized understanding. Stroke book and education reviewed at the bedside, see education flowsheets for details. No acute signs of distress noted at this time.

## 2024-05-26 NOTE — SUBJECTIVE & OBJECTIVE
Neurologic Chief Complaint: R MCA infarct    Subjective:     Interval History: Patient is seen for follow-up neurological assessment and treatment recommendations: No acute events overnight. Neuro exam stable. CT abdomen pending. Patient to be NPO at midnight/heparin gtt off at midnight in anticipation of PEG placement by GI tomorrow. Patient still remains NPO. IV labetalol 5mg scheduled q6 hours for BP control.     HPI, Past Medical, Family, and Social History remains the same as documented in the initial encounter.     Review of Systems   HENT:  Positive for trouble swallowing.    Respiratory:  Positive for cough.    Musculoskeletal:  Positive for arthralgias and back pain.   Neurological:  Positive for facial asymmetry, weakness and numbness.   Hematological:  Bruises/bleeds easily.   All other systems reviewed and are negative.    Scheduled Meds:   aspirin  300 mg Rectal Daily    atorvastatin  40 mg Oral Daily    mupirocin   Nasal BID    nystatin  500,000 Units Oral QID    pantoprazole  40 mg Intravenous Daily     Continuous Infusions:   sodium chloride 0.9%   Intravenous Continuous   Stopped at 05/25/24 1704    heparin (porcine) in D5W  0-40 Units/kg/hr (Adjusted) Intravenous Continuous 5.6 mL/hr at 05/26/24 0628 10 Units/kg/hr at 05/26/24 0628     PRN Meds:  Current Facility-Administered Medications:     acetaminophen, 650 mg, Rectal, Q6H PRN    bisacodyL, 10 mg, Rectal, Daily PRN    dextrose 10%, 12.5 g, Intravenous, PRN    dextrose 10%, 12.5 g, Intravenous, PRN    dextrose 10%, 25 g, Intravenous, PRN    dextrose 10%, 25 g, Intravenous, PRN    glucagon (human recombinant), 1 mg, Intramuscular, PRN    insulin aspart U-100, 0-5 Units, Subcutaneous, Q12H PRN    iohexol, 15 mL, Oral, PRN    labetalol, 10 mg, Intravenous, Q6H PRN    ondansetron, 4 mg, Intravenous, Q12H PRN    sodium chloride 0.9%, 500 mL, Intravenous, PRN    sodium chloride 0.9%, 10 mL, Intravenous, PRN    Objective:     Vital Signs (Most  "Recent):  Temp: 98 °F (36.7 °C) (05/26/24 0733)  Pulse: 89 (05/26/24 0733)  Resp: 18 (05/26/24 0733)  BP: (!) 178/97 (05/26/24 0733)  SpO2: 97 % (05/26/24 0733)  BP Location: Right arm    Vital Signs Range (Last 24H):  Temp:  [98 °F (36.7 °C)-98.8 °F (37.1 °C)]   Pulse:  [69-89]   Resp:  [15-18]   BP: (153-180)/(71-97)   SpO2:  [94 %-100 %]   BP Location: Right arm       Physical Exam  Vitals reviewed.   HENT:      Head: Normocephalic.      Mouth/Throat:      Mouth: Mucous membranes are dry.   Pulmonary:      Effort: Pulmonary effort is normal.   Neurological:      Mental Status: She is alert and oriented to person, place, and time.      Sensory: Sensory deficit present.      Motor: Weakness present.   Psychiatric:         Mood and Affect: Mood normal.         Behavior: Behavior normal.            Neurological Exam:   LOC: alert  Attention Span: Good   Language: decreased fluency  Articulation: Dysarthria  Orientation: Person, Place, Time   Facial Movement (CN VII): Lower facial weakness on the Left  Motor: Arm left  Plegia 0/5  Leg left  Plegia 0/5  Arm right  Paresis: 4/5  Leg right Paresis: 4/5    Laboratory:  CMP:   No results for input(s): "GLUCOSE", "CALCIUM", "ALBUMIN", "PROT", "NA", "K", "CO2", "CL", "BUN", "CREATININE", "ALKPHOS", "ALT", "AST", "BILITOT" in the last 24 hours.    BMP:   Recent Labs   Lab 05/25/24  0648      K 3.9      CO2 19*   BUN 13   CREATININE 0.8   CALCIUM 8.9     CBC:   Recent Labs   Lab 05/26/24  0530   WBC 7.61   RBC 4.09   HGB 10.2*   HCT 33.0*      MCV 81*   MCH 24.9*   MCHC 30.9*     Lipid Panel:   Recent Labs   Lab 05/23/24  2328   CHOL 146   LDLCALC 83.2   HDL 43   TRIG 99     Coagulation:   Recent Labs   Lab 05/25/24  1405 05/25/24  2110 05/26/24  0530   INR 1.0  --   --    APTT 31.0   < > 50.9*    < > = values in this interval not displayed.     Platelet Aggregation Study: No results for input(s): "PLTAGG", "PLTAGINTERP", "PLTAGREGLACO", "ADPPLTAGGREG" in " "the last 168 hours.  Hgb A1C:   No results for input(s): "HGBA1C" in the last 168 hours.    TSH:   Recent Labs   Lab 05/23/24  2328   TSH 1.475       Diagnostic Results     Brain Imaging:  MRI Brain w/o Contrast 5/24/24  Impression:  Moderate-sized right MCA territorial infarction with scattered petechial hemorrhage and a suspected small hematoma.  Clinical correlation and short-term follow-up with CT is recommended.  Hazy visualization of the right MCA flow void as compared to the contralateral side with suspected occlusion of the distal right MCA.  Further evaluation with CTA may be performed.  Generalized cerebral volume loss and moderate chronic microvascular ischemic change.  Small well-circumscribed T2 hyperintense focus of at the left superomedial extraconal space measuring 0.7 cm.  Findings are nonspecific, and may relate to a small vascular lesion.  Further evaluation with dedicated imaging of the orbits as clinically indicated.     CT Head w/o Contrast 5/21/24  Impression  Evolving subtotal right MCA infarct on a background of moderate to advanced chronic microvascular ischemic disease. No progressive mass effect. No hemorrhagic transformation.    Vessel Imaging:  CTA Head and Neck 5/18/24  Impression  1. Occlusion of one of the right M2 segments.  2. Less than 50 percent stenosis right M1 segment.  3. Findings discussed with stroke team neurology resident.       "

## 2024-05-26 NOTE — ASSESSMENT & PLAN NOTE
Stroke risk factor  SBP <180 as 5 days out  IV labetalol 5mg schedule q6 hours for BP control 2^ patient being NPO

## 2024-05-27 LAB
ALBUMIN SERPL BCP-MCNC: 2.5 G/DL (ref 3.5–5.2)
ALP SERPL-CCNC: 46 U/L (ref 55–135)
ALT SERPL W/O P-5'-P-CCNC: 22 U/L (ref 10–44)
ANION GAP SERPL CALC-SCNC: 8 MMOL/L (ref 8–16)
APTT PPP: 31 SEC (ref 21–32)
APTT PPP: 45.8 SEC (ref 21–32)
AST SERPL-CCNC: 26 U/L (ref 10–40)
BASOPHILS # BLD AUTO: 0.02 K/UL (ref 0–0.2)
BASOPHILS NFR BLD: 0.3 % (ref 0–1.9)
BILIRUB SERPL-MCNC: 0.4 MG/DL (ref 0.1–1)
BUN SERPL-MCNC: 11 MG/DL (ref 8–23)
CALCIUM SERPL-MCNC: 8.6 MG/DL (ref 8.7–10.5)
CHLORIDE SERPL-SCNC: 106 MMOL/L (ref 95–110)
CO2 SERPL-SCNC: 23 MMOL/L (ref 23–29)
CREAT SERPL-MCNC: 0.8 MG/DL (ref 0.5–1.4)
DIFFERENTIAL METHOD BLD: ABNORMAL
EOSINOPHIL # BLD AUTO: 0.1 K/UL (ref 0–0.5)
EOSINOPHIL NFR BLD: 1.1 % (ref 0–8)
ERYTHROCYTE [DISTWIDTH] IN BLOOD BY AUTOMATED COUNT: 14.9 % (ref 11.5–14.5)
EST. GFR  (NO RACE VARIABLE): >60 ML/MIN/1.73 M^2
GLUCOSE SERPL-MCNC: 118 MG/DL (ref 70–110)
HCT VFR BLD AUTO: 32.1 % (ref 37–48.5)
HGB BLD-MCNC: 9.9 G/DL (ref 12–16)
IMM GRANULOCYTES # BLD AUTO: 0.1 K/UL (ref 0–0.04)
IMM GRANULOCYTES NFR BLD AUTO: 1.3 % (ref 0–0.5)
LYMPHOCYTES # BLD AUTO: 0.8 K/UL (ref 1–4.8)
LYMPHOCYTES NFR BLD: 11.1 % (ref 18–48)
MCH RBC QN AUTO: 25.1 PG (ref 27–31)
MCHC RBC AUTO-ENTMCNC: 30.8 G/DL (ref 32–36)
MCV RBC AUTO: 82 FL (ref 82–98)
MONOCYTES # BLD AUTO: 1 K/UL (ref 0.3–1)
MONOCYTES NFR BLD: 13.7 % (ref 4–15)
NEUTROPHILS # BLD AUTO: 5.4 K/UL (ref 1.8–7.7)
NEUTROPHILS NFR BLD: 72.5 % (ref 38–73)
NRBC BLD-RTO: 0 /100 WBC
PLATELET # BLD AUTO: 226 K/UL (ref 150–450)
PMV BLD AUTO: 11.7 FL (ref 9.2–12.9)
POCT GLUCOSE: 130 MG/DL (ref 70–110)
POCT GLUCOSE: 132 MG/DL (ref 70–110)
POCT GLUCOSE: 134 MG/DL (ref 70–110)
POTASSIUM SERPL-SCNC: 3.5 MMOL/L (ref 3.5–5.1)
PROT SERPL-MCNC: 5.9 G/DL (ref 6–8.4)
RBC # BLD AUTO: 3.94 M/UL (ref 4–5.4)
SODIUM SERPL-SCNC: 137 MMOL/L (ref 136–145)
WBC # BLD AUTO: 7.46 K/UL (ref 3.9–12.7)

## 2024-05-27 PROCEDURE — 99233 SBSQ HOSP IP/OBS HIGH 50: CPT | Mod: GC,,, | Performed by: PSYCHIATRY & NEUROLOGY

## 2024-05-27 PROCEDURE — S5010 5% DEXTROSE AND 0.45% SALINE: HCPCS

## 2024-05-27 PROCEDURE — 63600175 PHARM REV CODE 636 W HCPCS

## 2024-05-27 PROCEDURE — 25000003 PHARM REV CODE 250

## 2024-05-27 PROCEDURE — 63600175 PHARM REV CODE 636 W HCPCS: Performed by: NURSE PRACTITIONER

## 2024-05-27 PROCEDURE — 97112 NEUROMUSCULAR REEDUCATION: CPT

## 2024-05-27 PROCEDURE — 85730 THROMBOPLASTIN TIME PARTIAL: CPT | Performed by: PSYCHIATRY & NEUROLOGY

## 2024-05-27 PROCEDURE — 80053 COMPREHEN METABOLIC PANEL: CPT

## 2024-05-27 PROCEDURE — C9113 INJ PANTOPRAZOLE SODIUM, VIA: HCPCS | Performed by: NURSE PRACTITIONER

## 2024-05-27 PROCEDURE — 36415 COLL VENOUS BLD VENIPUNCTURE: CPT | Mod: XB | Performed by: PSYCHIATRY & NEUROLOGY

## 2024-05-27 PROCEDURE — 36415 COLL VENOUS BLD VENIPUNCTURE: CPT

## 2024-05-27 PROCEDURE — 25000003 PHARM REV CODE 250: Performed by: NURSE PRACTITIONER

## 2024-05-27 PROCEDURE — 63600175 PHARM REV CODE 636 W HCPCS: Mod: JZ,JG

## 2024-05-27 PROCEDURE — 92507 TX SP LANG VOICE COMM INDIV: CPT

## 2024-05-27 PROCEDURE — 97110 THERAPEUTIC EXERCISES: CPT

## 2024-05-27 PROCEDURE — 97530 THERAPEUTIC ACTIVITIES: CPT

## 2024-05-27 PROCEDURE — 11000001 HC ACUTE MED/SURG PRIVATE ROOM

## 2024-05-27 PROCEDURE — 85025 COMPLETE CBC W/AUTO DIFF WBC: CPT

## 2024-05-27 RX ORDER — DICLOFENAC SODIUM 10 MG/G
2 GEL TOPICAL 3 TIMES DAILY PRN
Status: DISCONTINUED | OUTPATIENT
Start: 2024-05-27 | End: 2024-05-31 | Stop reason: HOSPADM

## 2024-05-27 RX ADMIN — DEXTROSE AND SODIUM CHLORIDE: 5; 450 INJECTION, SOLUTION INTRAVENOUS at 11:05

## 2024-05-27 RX ADMIN — ACETAMINOPHEN 650 MG: 650 SUPPOSITORY RECTAL at 11:05

## 2024-05-27 RX ADMIN — MUPIROCIN: 20 OINTMENT TOPICAL at 08:05

## 2024-05-27 RX ADMIN — LABETALOL HYDROCHLORIDE 10 MG: 5 INJECTION, SOLUTION INTRAVENOUS at 11:05

## 2024-05-27 RX ADMIN — LABETALOL HYDROCHLORIDE 10 MG: 5 INJECTION, SOLUTION INTRAVENOUS at 05:05

## 2024-05-27 RX ADMIN — NYSTATIN 500000 UNITS: 100000 SUSPENSION ORAL at 05:05

## 2024-05-27 RX ADMIN — DICLOFENAC SODIUM 2 G: 10 GEL TOPICAL at 11:05

## 2024-05-27 RX ADMIN — HEPARIN SODIUM 12 UNITS/KG/HR: 10000 INJECTION, SOLUTION INTRAVENOUS at 02:05

## 2024-05-27 RX ADMIN — MUPIROCIN: 20 OINTMENT TOPICAL at 09:05

## 2024-05-27 RX ADMIN — PANTOPRAZOLE SODIUM 40 MG: 40 INJECTION, POWDER, FOR SOLUTION INTRAVENOUS at 08:05

## 2024-05-27 RX ADMIN — NYSTATIN 500000 UNITS: 100000 SUSPENSION ORAL at 09:05

## 2024-05-27 RX ADMIN — NYSTATIN 500000 UNITS: 100000 SUSPENSION ORAL at 12:05

## 2024-05-27 RX ADMIN — NYSTATIN 500000 UNITS: 100000 SUSPENSION ORAL at 08:05

## 2024-05-27 RX ADMIN — DEXTROSE AND SODIUM CHLORIDE: 5; 450 INJECTION, SOLUTION INTRAVENOUS at 05:05

## 2024-05-27 RX ADMIN — LABETALOL HYDROCHLORIDE 10 MG: 5 INJECTION, SOLUTION INTRAVENOUS at 06:05

## 2024-05-27 NOTE — TREATMENT PLAN
Gastroenterology Treatment Plan    Amirah Davey is a 88 y.o. female admitted to hospital 5/23/2024 (Hospital Day: 5) due to Cerebrovascular accident (CVA) due to embolism of right middle cerebral artery.     Interval History    CT with persistent RP free air.     Objective  Temp:  [97.9 °F (36.6 °C)-99.6 °F (37.6 °C)] 98.4 °F (36.9 °C) (05/27 1055)  Pulse:  [] 69 (05/27 1055)  BP: (132-185)/(64-82) 173/79 (05/27 1055)  Resp:  [15-20] 18 (05/27 1055)  SpO2:  [94 %-99 %] 97 % (05/27 1055)    Laboratory    Lab Results   Component Value Date    WBC 7.46 05/27/2024    HGB 9.9 (L) 05/27/2024    HCT 32.1 (L) 05/27/2024    MCV 82 05/27/2024     05/27/2024       Lab Results   Component Value Date     05/27/2024    K 3.5 05/27/2024     05/27/2024    CO2 23 05/27/2024    BUN 11 05/27/2024    CREATININE 0.8 05/27/2024    CALCIUM 8.6 (L) 05/27/2024       Lab Results   Component Value Date    ALBUMIN 2.5 (L) 05/27/2024    ALT 22 05/27/2024    AST 26 05/27/2024    ALKPHOS 46 (L) 05/27/2024    BILITOT 0.4 05/27/2024       Lab Results   Component Value Date    INR 1.0 05/25/2024    INR 1.1 05/24/2024    INR 1.0 05/19/2024       Assessment    Amirah Davey is a 88 y.o. female with HTN, history of stroke, atrial fibrillation and recent R- MCA stroke due to R M2 occlusion that GI is now consulted on for G tube placement. Of note, the patient was recently found to have pneumoperitoneum on imaging at OSH and was empirically started on Zosyn on 5/21/24. This was stopped on 5/22/24. Pneumoperitoneum was discovered on imaging after failure to pass an NGT multiple times at OSH.      Problem List:  Dysphagia   R-MCA CVA -cardio embolic from A fib   Retroperitoneal Air on CT at OSH      Recommendations:  - Given imaging findings with evidence of persistent RP free air, we are concerned for hollow organ perforation as was clinically questioned at OSH. No plan for endoscopic G tube placement per GI team   - Recommend  surgical consult for possible perforation and potential G tube placement as NGT placement is not possible and failed multiple times   - Can consider TPN for nutrition in the interim   - Antibiotics at the discretion of the primary team     Thank you for involving us in the care of Amirah Davey. Please call with any additional concerns or questions.    We will sign off.     Marko Kerr DO   Gastroenterology Fellow PGY- IV   [facility]

## 2024-05-27 NOTE — HPI
88F with history of CVA, HTN, and Afib (on Heparin gtt) currently admitted to vascular neurology team. Patient has had dysphagia following her recent stroke and requires long term enteral access for feeds and medication. She has not been receiving any feeds as there has been no success in obtaining enteral access. Of note, prior to [place] [facility] an NGT was attempted multiple times without success. A CT scan obtained prior to transfer showed a small foci of free air in the retroperitoneum with no other evidence of hollow viscus injury. She has been hemodynamically stable, no findings of abdominal pain or symptoms on examination by primary team. An interval CT scan was performed here, which showed a single small foci of air in the RP, seen on only one slice of imaging. Normal appearing duodenum, stomach, small bowel, and colon. GI was consulted for PEG placement. Per primary, GI is not comfortable performing PEG placement given the CT results. They have deferred placement of feeding tube to general surgery due to the now, nearly resolved, pneumoperitoneum.   History of , otherwise no intra-abdominal surgeries. She has a small hiatal hernia but does appear to have an acceptable window to attempt PEG placement.

## 2024-05-27 NOTE — PLAN OF CARE
[facility] (Hospital)  Discharge Reassessment    Primary Care Provider: Ivana Templeton MD    Expected Discharge Date: 5/29/2024    Reassessment (most recent)       Discharge Reassessment - 05/27/24 1256          Discharge Reassessment    Assessment Type Discharge Planning Reassessment (P)      Did the patient's condition or plan change since previous assessment? Yes (P)      Discharge Plan discussed with: Adult children (P)      Communicated JOAN with patient/caregiver Date not available/Unable to determine (P)      Discharge Plan A Rehab (P)      Discharge Plan B Home with family;Home Health (P)      Transition of Care Barriers None (P)         Post-Acute Status    Post-Acute Authorization Placement (P)      Post-Acute Placement Status Pending medical clearance/testing (P)                    Patient not medically cleared.  Scheduled for a PEG with GI today, but they were unable to complete due to perforation.  Gen Surgery will be consulted.  Plan for Rehab at [facility] once medically ready.      Discharge Plan A and Plan B have been determined by review of patient's clinical status, future medical and therapeutic needs, and coverage/benefits for post-acute care in coordination with multidisciplinary team members.    Harmony Box, CURT  [facility]  [phone]

## 2024-05-27 NOTE — PLAN OF CARE
Problem: Adult Inpatient Plan of Care  Goal: Plan of Care Review  Outcome: Progressing  Goal: Patient-Specific Goal (Individualized)  Outcome: Progressing  Goal: Absence of Hospital-Acquired Illness or Injury  Outcome: Progressing  Goal: Optimal Comfort and Wellbeing  Outcome: Progressing  Goal: Readiness for Transition of Care  Outcome: Progressing     Problem: Stroke, Ischemic (Includes Transient Ischemic Attack)  Goal: Optimal Coping  Outcome: Progressing  Goal: Effective Bowel Elimination  Outcome: Progressing  Goal: Optimal Cerebral Tissue Perfusion  Outcome: Progressing  Goal: Optimal Cognitive Function  Outcome: Progressing  Goal: Improved Communication Skills  Outcome: Progressing  Goal: Optimal Functional Ability  Outcome: Progressing  Goal: Optimal Nutrition Intake  Outcome: Progressing  Goal: Effective Oxygenation and Ventilation  Outcome: Progressing  Goal: Improved Sensorimotor Function  Outcome: Progressing  Goal: Safe and Effective Swallow  Outcome: Progressing  Goal: Effective Urinary Elimination  Outcome: Progressing     Problem: Fall Injury Risk  Goal: Absence of Fall and Fall-Related Injury  Outcome: Progressing     Problem: Infection  Goal: Absence of Infection Signs and Symptoms  Outcome: Progressing     Problem: Skin Injury Risk Increased  Goal: Skin Health and Integrity  Outcome: Progressing           POC updated and reviewed with the patient/daughters at the bedside. Questions regarding POC were encouraged and addressed. VSS, see flowsheets. Patient is AOX 4 at this time. Tele maintained per order. Heparin GTT held at midnight per provider's orders for upcoming peg placement. CT abdomen and pelvis obtained and results are pending. IVFs continued as ordered. See flowsheets for details. Fall and safety precautions maintained, no signs of injury noted during shift. Patient repositioned with assistance in bed for comfort. Upon exiting room, patient's bed locked in low position, side rails up x  3, bed alarm set, with call light within reach. Instructed patient to call staff for mobility, verbalized understanding. Stroke book and education reviewed at the bedside, see education flowsheets for details. No acute signs of distress noted at this time.

## 2024-05-27 NOTE — PT/OT/SLP PROGRESS
"Speech Language Pathology Treatment    Patient Name:  Amirah Davey   MRN:  [id number]  Admitting Diagnosis: Cerebrovascular accident (CVA) due to embolism of right middle cerebral artery    Recommendations:                 General Recommendations: Dysphagia therapy, Speech/language therapy, and Cognitive-linguistic therapy  Diet recommendations:  NPO, NPO   Aspiration Precautions: Alternate means of nutrition/hydration/medication, Frequent oral care, and Strict aspiration precautions   General Precautions: Standard, aspiration, fall  Communication strategies:  provide increased time to answer and go to room if call light pushed    Assessment:     Amirah Davey is a 88 y.o. female with an SLP diagnosis of Dysphagia, Dysarthria, and Cognitive-Linguistic Impairment.  She presents with lethargy. ST to continue to follow.     Subjective     SLP reviewed Pt with RN, RN cleared for tx, explained Pt NPO for pending PEG placement this service day  Pt presents calm  She explains, "I've been trying" re: management of secretions     Pain/Comfort:  Pain Rating 1: 0/10    Respiratory Status: Room air    Objective:     Has the patient been evaluated by SLP for swallowing?   Yes  Keep patient NPO? Yes     Pt found partially upright in bed with her Daughter at the bedside. Her speech was moderately fast in rate with decreased precision which required mod cues to clarify. Her voice was wet with low intensity.  Her tongue was mildly coated, improved from prior session.  She used Yankauer to suction secretions as session continued. Her daughter explained  Pt continued to use Yankauer over the weekend to manage secretions; however,  Pt able to spontaneously swallow intermittently across attempts to complete dysphagia exercises. She was cued to use effortful swallows to attempt to clear secretions. Pt demonstrated effortful swallow 50% of attempts provided max cues.  She was educated on clear speech strategies and ongoing dysphagia " exercises. She provided return demonstration of lingual/labial OMEs x5 each with good effort across attempts provide mod verbal and visual cues. Pt visibly fatigued with inconsistent eye contact as session continued. MDs entered into the room to meet with Pt and SLP discontinued session for MD rounding.     Goals:   Multidisciplinary Problems       SLP Goals          Problem: SLP    Goal Priority Disciplines Outcome   SLP Goal     SLP Progressing   Description: Speech Language Pathology Goals  Goals expected to be met by 5/31/24    1. Pt will participate in ongoing assessment of swallow function to determine safest, least restrictive means of nutrition/hydration  2. Pt will complete dysphagia exercises to improve pharyngeal strength/coordination x 10 ea with good effort 90% of attempts, MAX A  3. Pt will complete OMEs x10 ea to improve labial/lingual strength and coordination, with good effort 90% of attempts, MOD A  4. Pt will repeat short phrases with 90% intelligibility, MIN A  5. Pt will recall 2+ clear speech strategies, 90% of attempts, MIN A  6. Pt will attend to structured task up to 2 minutes in length at least 1x/session, Supervision  7. Pt will participate in ongoing assessment of reading, writing, visiospatial and delayed memory skills to determine ongoing POC needs  8. Educate Pt and family on aspiration precautions and SLP POC  9. Educate Pt and family on speech strategies and safety awareness                          Plan:     Patient to be seen:  5 x/week   Plan of Care expires:  06/23/24  Plan of Care reviewed with:  patient, daughter   SLP Follow-Up:  Yes       Discharge recommendations:  High Intensity Therapy   Barriers to Discharge:   NPO    Time Tracking:     SLP Treatment Date:   05/27/24  Speech Start Time:  1135  Speech Stop Time:  1147     Speech Total Time (min):  12 min    Billable Minutes: Speech Therapy Individual 12    05/27/2024

## 2024-05-27 NOTE — PT/OT/SLP PROGRESS
Occupational Therapy   Treatment    Name: Amirah Davey  MRN: [id number]  Admitting Diagnosis:  Cerebrovascular accident (CVA) due to embolism of right middle cerebral artery  Day of Surgery    Recommendations:     Discharge Recommendations: High Intensity Therapy  Discharge Equipment Recommendations:  hospital bed, lift device, wheelchair  Barriers to discharge:  None    Assessment:     Amirah Davey is a 88 y.o. female with a medical diagnosis of Cerebrovascular accident (CVA) due to embolism of right middle cerebral artery.  She presents with the following performance deficits affecting function are weakness, impaired endurance, impaired self care skills, impaired functional mobility, gait instability, impaired balance, decreased upper extremity function, decreased lower extremity function, decreased safety awareness, edema, impaired fine motor, impaired coordination, decreased ROM, abnormal tone, impaired joint extensibility. Pt continues to demo good motivation and participation for therapy. She was able to participate in EOB sitting and several sit to stand transfers. However, pt remains limited by L sided deficits. Provided PROM and end range stretching to LUE, which pt reports some sensory sensitivity. Pt and her family were provided education on the importance of positioning of LUE for prevention of contractures. Pt would benefit from continued skilled acute OT services in order to maximize (I) and with ADLs and functional mobility to ensure safe return to PLOF in the least restrictive environment. OT recommending high intensity therapy once pt is medically appropriate for d/c.       Rehab Prognosis:  GOod; patient would benefit from acute skilled OT services to address these deficits and reach maximum level of function.       Plan:     Patient to be seen 4 x/week to address the above listed problems via self-care/home management, therapeutic activities, therapeutic exercises, neuromuscular re-education,  "sensory integration  Plan of Care Expires: 06/24/24  Plan of Care Reviewed with: patient, family    Subjective     Chief Complaint: Uncomfortable in bed   Patient/Family Comments/goals: "can we get her repositioned?"  Pain/Comfort:  Pain Rating 1: 0/10  Pain Addressed 1: Reposition, Cessation of Activity, Nurse notified, Distraction  Pain Rating Post-Intervention 1: 0/10  Pain Rating Post-Intervention 2: 0/10    Objective:     Communicated with: RN prior to session.  Patient found HOB elevated with telemetry, PureWick, peripheral IV, bed alarm upon OT entry to room.    General Precautions: Standard, aspiration, fall    Orthopedic Precautions:N/A  Braces: N/A  Respiratory Status: Room air     Occupational Performance:     Bed Mobility:    Patient completed Supine to Sit with maximal assistance  Patient completed Sit to Supine with maximal assistance     Functional Mobility/Transfers:  Patient completed Sit <> Stand Transfer with Max A x2 x2 reps and Total A x2 for thirs rep  with  hand-held assist    Pt demo difficulty with sustaining upright posture and required consistent cueing for hand/foot placement.     [facility] 6 Click ADL: 12    Treatment & Education:  OT completed PROM to LUE to decrease tightness in all joints. Pt reported some sensitivity with touch, however tolerated well.    Tightness noted in all joints which improved with end range stretching and repetition   Educated on edema management of L hand    Pt and family educated on:   Role of OT, POC, and d/c planning.   Safe transfer techniques and proper body mechanics for fall prevention and improved independence with functional transfers   Importance of OOB activities to increase endurance and tolerance for increased participation in daily ADLs.   Utilizing the call bell to request for assistance with all functional mobility to ensure safety during hospital stay.       Pt and /family verbalized understanding and all questions were addressed within the scope " of OT.     Patient left with bed in chair position with all lines intact, call button in reach, RN notified, and family present    GOALS:   Multidisciplinary Problems       Occupational Therapy Goals          Problem: Occupational Therapy    Goal Priority Disciplines Outcome Interventions   Occupational Therapy Goal     OT, PT/OT Progressing    Description: Goals to be met by: 6/24/24     Patient will increase functional independence with ADLs by performing:    UE Dressing with Moderate Assistance.  LE Dressing with Maximum Assistance.  Grooming while seated with Minimal Assistance.  Toileting from bedside commode with Maximum Assistance for hygiene and clothing management.   Sitting at edge of bed x15 minutes with Stand-by Assistance.  Rolling to Bilateral with Minimal Assistance.   Supine to sit with Minimal Assistance.  Stand pivot transfers with Minimal Assistance.                         Time Tracking:     OT Date of Treatment: 05/27/24  OT Start Time: 1352  OT Stop Time: 1416  OT Total Time (min): 24 min    Billable Minutes:Therapeutic Activity 13  Therapeutic Exercise 10    OT/PHILIP: OT          5/27/2024   Co-treatment performed due to patient's multiple deficits requiring two skilled therapists to appropriately and safely assess patient's strength, endurance, functional mobility, and ADL performance while facilitating functional tasks in addition to accommodating for patient's activity tolerance and medical acuity.

## 2024-05-27 NOTE — ASSESSMENT & PLAN NOTE
89 y/o female with R MCA stroke due to R M2 occlusio. No thrombolytics as is on Xarelto. Went to IR but procedure aborted due to distal clot. So TICI 0. Etiology cardio embolic due to chronic afib.     No acute events overnight. Neuro exam stable. CT abdomen pending. Patient to be NPO at midnight/heparin gtt off at midnight in anticipation of PEG placement by GI on 5/27. Patient still remains NPO. IV labetalol 5mg scheduled q6 hours for BP control. Pt pending rehab placement after peg tube.     Antithrombotics: ASA + minimal intensity heparin gtt. After patient is therapeutic on heparin gtt, ASA can be discontinued.    Statins: Lipitor 40 mg daily but unable to get as no NG tube    Aggressive risk factor modification: HTN, Diet, Exercise, Obesity, A-Fib     Rehab efforts: The patient has been evaluated by a stroke team provider and the therapy needs have been fully considered based off the presenting complaints and exam findings. The following therapy evaluations are needed:  recommendations include high intensity therapy    Diagnostics ordered/pending: Other: CT abd/pelvis    VTE prophylaxis: Mechanical prophylaxis: Place SCDs  None: Reason for No Pharmacological VTE Prophylaxis: Currently on anticoagulation    BP parameters: Infarct: SBP <180

## 2024-05-27 NOTE — SUBJECTIVE & OBJECTIVE
Neurologic Chief Complaint: R MCA infarct    Subjective:     Interval History: Patient is seen for follow-up neurological assessment and treatment recommendations: No acute events overnight. Neuro exam stable. CT abdomen pending. Patient to be NPO at midnight/heparin gtt off at midnight in anticipation of PEG placement by GI on 5/27. Patient still remains NPO. IV labetalol 5mg scheduled q6 hours for BP control.     HPI, Past Medical, Family, and Social History remains the same as documented in the initial encounter.     Review of Systems   HENT:  Positive for trouble swallowing.    Respiratory:  Positive for cough.    Musculoskeletal:  Positive for arthralgias and back pain.   Neurological:  Positive for facial asymmetry, weakness and numbness.   Hematological:  Bruises/bleeds easily.   All other systems reviewed and are negative.    Scheduled Meds:   atorvastatin  40 mg Oral Daily    labetalol  10 mg Intravenous Q6H    mupirocin   Nasal BID    nystatin  500,000 Units Oral QID    pantoprazole  40 mg Intravenous Daily     Continuous Infusions:   dextrose 5 % and 0.45 % NaCl   Intravenous Continuous 50 mL/hr at 05/27/24 0615 Rate Verify at 05/27/24 0615    heparin (porcine) in D5W  0-40 Units/kg/hr (Adjusted) Intravenous Continuous   Paused at 05/26/24 2215     PRN Meds:  Current Facility-Administered Medications:     acetaminophen, 650 mg, Rectal, Q6H PRN    bisacodyL, 10 mg, Rectal, Daily PRN    dextrose 10%, 12.5 g, Intravenous, PRN    dextrose 10%, 12.5 g, Intravenous, PRN    dextrose 10%, 25 g, Intravenous, PRN    dextrose 10%, 25 g, Intravenous, PRN    glucagon (human recombinant), 1 mg, Intramuscular, PRN    insulin aspart U-100, 0-5 Units, Subcutaneous, Q12H PRN    iohexol, 15 mL, Oral, PRN    labetalol, 10 mg, Intravenous, Q6H PRN    ondansetron, 4 mg, Intravenous, Q12H PRN    sodium chloride 0.9%, 500 mL, Intravenous, PRN    sodium chloride 0.9%, 10 mL, Intravenous, PRN    Objective:     Vital Signs (Most  "Recent):  Temp: 99.6 °F (37.6 °C) (05/27/24 0710)  Pulse: 81 (05/27/24 0710)  Resp: 20 (05/27/24 0710)  BP: 137/75 (05/27/24 0710)  SpO2: (!) 94 % (05/27/24 0710)  BP Location: Right arm    Vital Signs Range (Last 24H):  Temp:  [97.9 °F (36.6 °C)-99.6 °F (37.6 °C)]   Pulse:  []   Resp:  [15-20]   BP: (132-185)/(64-82)   SpO2:  [94 %-99 %]   BP Location: Right arm       Physical Exam  Vitals reviewed.   HENT:      Head: Normocephalic.      Mouth/Throat:      Mouth: Mucous membranes are dry.   Pulmonary:      Effort: Pulmonary effort is normal.   Neurological:      Mental Status: She is alert and oriented to person, place, and time.      Sensory: Sensory deficit present.      Motor: Weakness present.   Psychiatric:         Mood and Affect: Mood normal.         Behavior: Behavior normal.            Neurological Exam:   LOC: alert  Attention Span: Good   Language: decreased fluency  Articulation: Dysarthria  Orientation: Person, Place, Time   Facial Movement (CN VII): Lower facial weakness on the Left  Motor: Arm left  Plegia 0/5  Leg left  Plegia 0/5  Arm right  Paresis: 4/5  Leg right Paresis: 4/5    Laboratory:  CMP:   Recent Labs   Lab 05/27/24  0825   CALCIUM 8.6*   ALBUMIN 2.5*   PROT 5.9*      K 3.5   CO2 23      BUN 11   CREATININE 0.8   ALKPHOS 46*   ALT 22   AST 26   BILITOT 0.4       BMP:   Recent Labs   Lab 05/27/24  0825      K 3.5      CO2 23   BUN 11   CREATININE 0.8   CALCIUM 8.6*     CBC:   Recent Labs   Lab 05/27/24  0333   WBC 7.46   RBC 3.94*   HGB 9.9*   HCT 32.1*      MCV 82   MCH 25.1*   MCHC 30.8*     Lipid Panel:   Recent Labs   Lab 05/23/24  2328   CHOL 146   LDLCALC 83.2   HDL 43   TRIG 99     Coagulation:   Recent Labs   Lab 05/25/24  1405 05/25/24  2110 05/26/24  1237   INR 1.0  --   --    APTT 31.0   < > 51.4*    < > = values in this interval not displayed.     Platelet Aggregation Study: No results for input(s): "PLTAGG", "PLTAGINTERP", "PLTAGREGLACO", " ""ADPPLTAGGREG" in the last 168 hours.  Hgb A1C:   No results for input(s): "HGBA1C" in the last 168 hours.    TSH:   Recent Labs   Lab 05/23/24  2328   TSH 1.475       Diagnostic Results     Brain Imaging:  MRI Brain w/o Contrast 5/24/24  Impression:  Moderate-sized right MCA territorial infarction with scattered petechial hemorrhage and a suspected small hematoma.  Clinical correlation and short-term follow-up with CT is recommended.  Hazy visualization of the right MCA flow void as compared to the contralateral side with suspected occlusion of the distal right MCA.  Further evaluation with CTA may be performed.  Generalized cerebral volume loss and moderate chronic microvascular ischemic change.  Small well-circumscribed T2 hyperintense focus of at the left superomedial extraconal space measuring 0.7 cm.  Findings are nonspecific, and may relate to a small vascular lesion.  Further evaluation with dedicated imaging of the orbits as clinically indicated.     CT Head w/o Contrast 5/21/24  Impression  Evolving subtotal right MCA infarct on a background of moderate to advanced chronic microvascular ischemic disease. No progressive mass effect. No hemorrhagic transformation.    Vessel Imaging:  CTA Head and Neck 5/18/24  Impression  1. Occlusion of one of the right M2 segments.  2. Less than 50 percent stenosis right M1 segment.  3. Findings discussed with stroke team neurology resident.       "

## 2024-05-27 NOTE — NURSING
Nurses Note -- 4 Eyes      5/26/2024   7:10 PM      Skin assessed during: Daily Assessment      [x] No Altered Skin Integrity Present    [x]Prevention Measures Documented      [] Yes- Altered Skin Integrity Present or Discovered   [] LDA Added if Not in Epic (Describe Wound)   [] New Altered Skin Integrity was Present on Admit and Documented in LDA   [] Wound Image Taken    Wound Care Consulted? No    Attending Nurse:  Yuli Beckham RN/Staff Member:  Claudette

## 2024-05-27 NOTE — PROGRESS NOTES
[facility] - Neurosurgery ([facility])  Vascular Neurology  Comprehensive Stroke Center  Progress Note    Assessment/Plan:     * Cerebrovascular accident (CVA) due to embolism of right middle cerebral artery  87 y/o female with R MCA stroke due to R M2 occlusio. No thrombolytics as is on Xarelto. Went to IR but procedure aborted due to distal clot. So TICI 0. Etiology cardio embolic due to chronic afib.     No acute events overnight. Neuro exam stable. CT abdomen pending. Patient to be NPO at midnight/heparin gtt off at midnight in anticipation of PEG placement by GI on 5/27. Patient still remains NPO. IV labetalol 5mg scheduled q6 hours for BP control. Pt pending rehab placement after peg tube.     Antithrombotics: ASA + minimal intensity heparin gtt. After patient is therapeutic on heparin gtt, ASA can be discontinued.    Statins: Lipitor 40 mg daily but unable to get as no NG tube    Aggressive risk factor modification: HTN, Diet, Exercise, Obesity, A-Fib     Rehab efforts: The patient has been evaluated by a stroke team provider and the therapy needs have been fully considered based off the presenting complaints and exam findings. The following therapy evaluations are needed:  recommendations include high intensity therapy    Diagnostics ordered/pending: Other: CT abd/pelvis    VTE prophylaxis: Mechanical prophylaxis: Place SCDs  None: Reason for No Pharmacological VTE Prophylaxis: Currently on anticoagulation    BP parameters: Infarct: SBP <180        Obesity (BMI 30-39.9)  Stroke risk factor   on diet and exercise    Dysphagia due to recent stroke  Due to stroke  Aggressive therapy  Speech therapy consult  PEG placed 5/27,        Dysarthria due to acute stroke  Due to stroke  Aggressive therapy          Hemiplegia  Due to stroke  Aggressive therapy      Chronic a-fib  Stroke risk factor  Was on Xarelto, will need to resume once feeding route resolved    Hypertension, benign  Stroke risk factor  SBP <180 as  5 days out  IV labetalol 5mg schedule q6 hours for BP control 2^ patient being NPO         05/25/2024: No acute events overnight. Neuro exam stable. MRI Brain showed moderate sized R MCA infarct with scattered petechial hemorrhage and a suspected small hematoma. Patient's imaging discussed with staff, Dr. Rodriges, and patient's family. Heparin gtt initiated for AC in setting of atrial fibrillation while patient anticipates PEG placement on 5/27. Patient pending abdominal CT for eval of Pneumoperitoneum.  05/26/2024: No acute events overnight. Neuro exam stable. CT abdomen pending. Patient to be NPO at midnight/heparin gtt off at midnight in anticipation of PEG placement by GI tomorrow. Patient still remains NPO. IV labetalol 5mg scheduled q6 hours for BP control.   05/27/2024: Neuro exam stable, PEG placement today.     STROKE DOCUMENTATION        NIH Scale:  1a. Level of Consciousness: 0-->Alert, keenly responsive  1b. LOC Questions: 0-->Answers both questions correctly  1c. LOC Commands: 0-->Performs both tasks correctly  2. Best Gaze: 1-->Partial gaze palsy, gaze is abnormal in one or both eyes, but forced deviation or total gaze paresis is not present  3. Visual: 0-->No visual loss  4. Facial Palsy: 2-->Partial paralysis (total or near-total paralysis of lower face)  5a. Motor Arm, Left: 4-->No movement  5b. Motor Arm, Right: 0-->No drift, limb holds 90 (or 45) degrees for full 10 secs  6a. Motor Leg, Left: 4-->No movement  6b. Motor Leg, Right: 0-->No drift, leg holds 30 degree position for full 5 secs  7. Limb Ataxia: 0-->Absent  8. Sensory: 1-->Mild-to-moderate sensory loss, patient feels pinprick is less sharp or is dull on the affected side, or there is a loss of superficial pain with pinprick, but patient is aware of being touched  9. Best Language: 0-->No aphasia, normal  10. Dysarthria: 1-->Mild-to-moderate dysarthria, patient slurs at least some words and, at worst, can be understood with some  difficulty  11. Extinction and Inattention (formerly Neglect): 0-->No abnormality  Total (NIH Stroke Scale): 13       Modified Person Score: 2  Alexander Coma Scale:    ABCD2 Score:    [id number] Score:7  HAS -BLED Score:   ICH Score:   Hunt & Marie Classification:      Hemorrhagic change of an Ischemic Stroke: Does this patient have an ischemic stroke with hemorrhagic changes? No     Neurologic Chief Complaint: R MCA infarct    Subjective:     Interval History: Patient is seen for follow-up neurological assessment and treatment recommendations: No acute events overnight. Neuro exam stable. CT abdomen pending. Patient to be NPO at midnight/heparin gtt off at midnight in anticipation of PEG placement by GI on 5/27. Patient still remains NPO. IV labetalol 5mg scheduled q6 hours for BP control.     HPI, Past Medical, Family, and Social History remains the same as documented in the initial encounter.     Review of Systems   HENT:  Positive for trouble swallowing.    Respiratory:  Positive for cough.    Musculoskeletal:  Positive for arthralgias and back pain.   Neurological:  Positive for facial asymmetry, weakness and numbness.   Hematological:  Bruises/bleeds easily.   All other systems reviewed and are negative.    Scheduled Meds:   atorvastatin  40 mg Oral Daily    labetalol  10 mg Intravenous Q6H    mupirocin   Nasal BID    nystatin  500,000 Units Oral QID    pantoprazole  40 mg Intravenous Daily     Continuous Infusions:   dextrose 5 % and 0.45 % NaCl   Intravenous Continuous 50 mL/hr at 05/27/24 0615 Rate Verify at 05/27/24 0615    heparin (porcine) in D5W  0-40 Units/kg/hr (Adjusted) Intravenous Continuous   Paused at 05/26/24 2215     PRN Meds:  Current Facility-Administered Medications:     acetaminophen, 650 mg, Rectal, Q6H PRN    bisacodyL, 10 mg, Rectal, Daily PRN    dextrose 10%, 12.5 g, Intravenous, PRN    dextrose 10%, 12.5 g, Intravenous, PRN    dextrose 10%, 25 g, Intravenous, PRN    dextrose 10%, 25 g,  Intravenous, PRN    glucagon (human recombinant), 1 mg, Intramuscular, PRN    insulin aspart U-100, 0-5 Units, Subcutaneous, Q12H PRN    iohexol, 15 mL, Oral, PRN    labetalol, 10 mg, Intravenous, Q6H PRN    ondansetron, 4 mg, Intravenous, Q12H PRN    sodium chloride 0.9%, 500 mL, Intravenous, PRN    sodium chloride 0.9%, 10 mL, Intravenous, PRN    Objective:     Vital Signs (Most Recent):  Temp: 99.6 °F (37.6 °C) (05/27/24 0710)  Pulse: 81 (05/27/24 0710)  Resp: 20 (05/27/24 0710)  BP: 137/75 (05/27/24 0710)  SpO2: (!) 94 % (05/27/24 0710)  BP Location: Right arm    Vital Signs Range (Last 24H):  Temp:  [97.9 °F (36.6 °C)-99.6 °F (37.6 °C)]   Pulse:  []   Resp:  [15-20]   BP: (132-185)/(64-82)   SpO2:  [94 %-99 %]   BP Location: Right arm       Physical Exam  Vitals reviewed.   HENT:      Head: Normocephalic.      Mouth/Throat:      Mouth: Mucous membranes are dry.   Pulmonary:      Effort: Pulmonary effort is normal.   Neurological:      Mental Status: She is alert and oriented to person, place, and time.      Sensory: Sensory deficit present.      Motor: Weakness present.   Psychiatric:         Mood and Affect: Mood normal.         Behavior: Behavior normal.            Neurological Exam:   LOC: alert  Attention Span: Good   Language: decreased fluency  Articulation: Dysarthria  Orientation: Person, Place, Time   Facial Movement (CN VII): Lower facial weakness on the Left  Motor: Arm left  Plegia 0/5  Leg left  Plegia 0/5  Arm right  Paresis: 4/5  Leg right Paresis: 4/5    Laboratory:  CMP:   Recent Labs   Lab 05/27/24  0825   CALCIUM 8.6*   ALBUMIN 2.5*   PROT 5.9*      K 3.5   CO2 23      BUN 11   CREATININE 0.8   ALKPHOS 46*   ALT 22   AST 26   BILITOT 0.4       BMP:   Recent Labs   Lab 05/27/24  0825      K 3.5      CO2 23   BUN 11   CREATININE 0.8   CALCIUM 8.6*     CBC:   Recent Labs   Lab 05/27/24  0333   WBC 7.46   RBC 3.94*   HGB 9.9*   HCT 32.1*      MCV 82   MCH  "25.1*   MCHC 30.8*     Lipid Panel:   Recent Labs   Lab 05/23/24  2328   CHOL 146   LDLCALC 83.2   HDL 43   TRIG 99     Coagulation:   Recent Labs   Lab 05/25/24  1405 05/25/24  2110 05/26/24  1237   INR 1.0  --   --    APTT 31.0   < > 51.4*    < > = values in this interval not displayed.     Platelet Aggregation Study: No results for input(s): "PLTAGG", "PLTAGINTERP", "PLTAGREGLACO", "ADPPLTAGGREG" in the last 168 hours.  Hgb A1C:   No results for input(s): "HGBA1C" in the last 168 hours.    TSH:   Recent Labs   Lab 05/23/24 2328   TSH 1.475       Diagnostic Results     Brain Imaging:  MRI Brain w/o Contrast 5/24/24  Impression:  Moderate-sized right MCA territorial infarction with scattered petechial hemorrhage and a suspected small hematoma.  Clinical correlation and short-term follow-up with CT is recommended.  Hazy visualization of the right MCA flow void as compared to the contralateral side with suspected occlusion of the distal right MCA.  Further evaluation with CTA may be performed.  Generalized cerebral volume loss and moderate chronic microvascular ischemic change.  Small well-circumscribed T2 hyperintense focus of at the left superomedial extraconal space measuring 0.7 cm.  Findings are nonspecific, and may relate to a small vascular lesion.  Further evaluation with dedicated imaging of the orbits as clinically indicated.     CT Head w/o Contrast 5/21/24  Impression  Evolving subtotal right MCA infarct on a background of moderate to advanced chronic microvascular ischemic disease. No progressive mass effect. No hemorrhagic transformation.    Vessel Imaging:  CTA Head and Neck 5/18/24  Impression  1. Occlusion of one of the right M2 segments.  2. Less than 50 percent stenosis right M1 segment.  3. Findings discussed with stroke team neurology resident.         Nicholas Norwood,   [facility]  Department of Vascular Neurology   [facility] - Neurosurgery ([facility])        "

## 2024-05-27 NOTE — ASSESSMENT & PLAN NOTE
88F with multiple medical co-morbidities who requires long term enteral access for dysphagia secondary to recent stroke. GI has deferred PEG placement to Surgery team due to small foci of air seen in the retroperitoneum on CT scan. Patient is entirely asymptomatic, she has no evidence to suggest an ongoing issue/leak. She has contrast through to her colon on CT without evidence of extravasation. If perforation occurred it was almost certainly a microperforation from the numerous attempts at NGT placement, and has since sealed itself off without causing any clinically significant leak or inflammatory reaction.    - General Surgery will place PEG tube, OR date pending discussion with staff. Will update once decided.   - Consent to be obtained, will also consent for possible open G tube if unable to obtain safe window given her hiatal hernia  - Will need to hold Heparin gtt at midnight prior to PEG placement   - No further work up needed at this time for small foci of RP air    Please call with questions or concerns. Will update primary team with OR date once determined.

## 2024-05-27 NOTE — CONSULTS
[facility] - Neurosurgery ([facility])  General Surgery  Consult Note    Patient Name: Amirah Davey  MRN: [id number]  Code Status: Full Code  Admission Date: 5/23/2024  Hospital Length of Stay: 4 days  Attending Physician: Gerardo Rodriges MD  Primary Care Provider: Ivana Templeton MD    Patient information was obtained from patient, past medical records, and primary team.     Inpatient consult to General Surgery  Consult performed by: Debbie Hicks MD  Consult ordered by: Nicholas Norwood DO  Reason for consult: PEG  Assessment/Recommendations: Update:  - Will plan for PEG placement in OR on Wednesday 5/29/24, consent to be obtained. Will include possible open G tube on consent   - Please pause heparin gtt at midnight prior to surgery and hold all tube feeds/diet at midnight as well.    Please call with questions.        Subjective:     Principal Problem: Cerebrovascular accident (CVA) due to embolism of right middle cerebral artery    History of Present Illness: 88F with history of CVA, HTN, and Afib (on Heparin gtt) currently admitted to vascular neurology team. Patient has had dysphagia following her recent stroke and requires long term enteral access for feeds and medication. She has not been receiving any feeds as there has been no success in obtaining enteral access. Of note, prior to [place] [facility] an NGT was attempted multiple times without success. A CT scan obtained prior to transfer showed a small foci of free air in the retroperitoneum with no other evidence of hollow viscus injury. She has been hemodynamically stable, no findings of abdominal pain or symptoms on examination by primary team. An interval CT scan was performed here, which showed a single small foci of air in the RP, seen on only one slice of imaging. Normal appearing duodenum, stomach, small bowel, and colon. GI was consulted for PEG placement. Per primary, GI is not comfortable performing PEG placement given the CT results. They  have deferred placement of feeding tube to general surgery due to the now, nearly resolved, pneumoperitoneum.   History of , otherwise no intra-abdominal surgeries. She has a small hiatal hernia but does appear to have an acceptable window to attempt PEG placement.     No current facility-administered medications on file prior to encounter.     Current Outpatient Medications on File Prior to Encounter   Medication Sig    amlodipine (NORVASC) 5 MG tablet Take 5 mg by mouth every evening.     hydroCHLOROthiazide (MICROZIDE) 12.5 mg capsule Take 25 mg by mouth.    lisinopriL (PRINIVIL,ZESTRIL) 20 MG tablet TAKE 1 TABLET BY MOUTH EVERY DAY    lisinopril 10 MG tablet Take 10 mg by mouth every evening.     lisinopril 10 MG tablet Take by mouth.    omeprazole (PRILOSEC) 40 MG capsule Take 40 mg by mouth every evening.     pantoprazole (PROTONIX) 40 MG tablet Take by mouth.    rivaroxaban (XARELTO) 20 mg Tab Take by mouth.    XARELTO 20 mg Tab TAKE 1 TABLET BY MOUTH DAILY AT DINNER       Review of patient's allergies indicates:  No Known Allergies    Past Medical History:   Diagnosis Date    A-fib     Cerebrovascular accident (CVA) due to embolism of right middle cerebral artery 2024    Current use of long term anticoagulation     on Xarelto    Dysphagia due to recent stroke 2024    Hypertension     Stroke 2013     Past Surgical History:   Procedure Laterality Date    CARDIAC PACEMAKER PLACEMENT  2019    [employer] Model number [id number]  Serial number [id number] device MRI conditional for 1.5 Estela magnets    CATARACT EXTRACTION W/  INTRAOCULAR LENS IMPLANT Right 2017    Dr. Case    CATARACT EXTRACTION W/  INTRAOCULAR LENS IMPLANT Left 2017    Dr. Case    EYE SURGERY      HYSTERECTOMY       Family History    None       Tobacco Use    Smoking status: Never    Smokeless tobacco: Not on file   Substance and Sexual Activity    Alcohol use: No    Drug use: Not on file    Sexual  activity: Not on file     Review of Systems: Pertinent per HPI  Objective:     Vital Signs (Most Recent):  Temp: 98.4 °F (36.9 °C) (05/27/24 1055)  Pulse: 69 (05/27/24 1055)  Resp: 18 (05/27/24 1055)  BP: (!) 173/79 (05/27/24 1055)  SpO2: 97 % (05/27/24 1055) Vital Signs (24h Range):  Temp:  [97.9 °F (36.6 °C)-99.6 °F (37.6 °C)] 98.4 °F (36.9 °C)  Pulse:  [] 69  Resp:  [15-20] 18  SpO2:  [94 %-99 %] 97 %  BP: (132-185)/(64-82) 173/79     Weight: 67.5 kg (148 lb 13 oz)  Body mass index is 30.06 kg/m².     Physical Exam  Vitals reviewed.   Constitutional:       General: She is not in acute distress.  Eyes:      Extraocular Movements: Extraocular movements intact.   Cardiovascular:      Rate and Rhythm: Normal rate.   Pulmonary:      Effort: Pulmonary effort is normal.   Abdominal:      General: There is no distension.      Palpations: Abdomen is soft.      Tenderness: There is no abdominal tenderness. There is no guarding or rebound.   Skin:     General: Skin is warm and dry.   Neurological:      Mental Status: She is alert. Mental status is at baseline.            I have reviewed all pertinent lab results within the past 24 hours.  CBC:   Recent Labs   Lab 05/27/24  0333   WBC 7.46   RBC 3.94*   HGB 9.9*   HCT 32.1*      MCV 82   MCH 25.1*   MCHC 30.8*     CMP:   Recent Labs   Lab 05/27/24  0825   *   CALCIUM 8.6*   ALBUMIN 2.5*   PROT 5.9*      K 3.5   CO2 23      BUN 11   CREATININE 0.8   ALKPHOS 46*   ALT 22   AST 26   BILITOT 0.4       Significant Diagnostics:  I have reviewed all pertinent imaging results/findings within the past 24 hours.    Assessment/Plan:     * Cerebrovascular accident (CVA) due to embolism of right middle cerebral artery  88F with multiple medical co-morbidities who requires long term enteral access for dysphagia secondary to recent stroke. GI has deferred PEG placement to Surgery team due to small foci of air seen in the retroperitoneum on CT scan. Patient  is entirely asymptomatic, she has no evidence to suggest an ongoing issue/leak. She has contrast through to her colon on CT without evidence of extravasation. If perforation occurred it was almost certainly a microperforation from the numerous attempts at NGT placement, and has since sealed itself off without causing any clinically significant leak or inflammatory reaction.    - General Surgery will place PEG tube, OR date pending discussion with staff. Will update once decided.   - Consent to be obtained, will also consent for possible open G tube if unable to obtain safe window given her hiatal hernia  - Will need to hold Heparin gtt at midnight prior to PEG placement   - No further work up needed at this time for small foci of RP air    Please call with questions or concerns. Will update primary team with OR date once determined.      VTE Risk Mitigation (From admission, onward)           Ordered     heparin 25,000 units in dextrose 5% 250 ml (100 units/mL) infusion MINIMAL INTENSITY nomogram - [facility]  Continuous        Question:  Begin at (units/kg/hr)  Answer:  12    05/25/24 1345     IP VTE HIGH RISK PATIENT  Once         05/23/24 2303     Place sequential compression device  Until discontinued         05/23/24 2303                    Thank you for your consult. I will follow-up with patient. Please contact us if you have any additional questions.    Debbie Hicks MD  General Surgery  [facility] Neurosurgery ([facility])

## 2024-05-27 NOTE — SUBJECTIVE & OBJECTIVE
No current facility-administered medications on file prior to encounter.     Current Outpatient Medications on File Prior to Encounter   Medication Sig    amlodipine (NORVASC) 5 MG tablet Take 5 mg by mouth every evening.     hydroCHLOROthiazide (MICROZIDE) 12.5 mg capsule Take 25 mg by mouth.    lisinopriL (PRINIVIL,ZESTRIL) 20 MG tablet TAKE 1 TABLET BY MOUTH EVERY DAY    lisinopril 10 MG tablet Take 10 mg by mouth every evening.     lisinopril 10 MG tablet Take by mouth.    omeprazole (PRILOSEC) 40 MG capsule Take 40 mg by mouth every evening.     pantoprazole (PROTONIX) 40 MG tablet Take by mouth.    rivaroxaban (XARELTO) 20 mg Tab Take by mouth.    XARELTO 20 mg Tab TAKE 1 TABLET BY MOUTH DAILY AT DINNER       Review of patient's allergies indicates:  No Known Allergies    Past Medical History:   Diagnosis Date    A-fib     Cerebrovascular accident (CVA) due to embolism of right middle cerebral artery 05/23/2024    Current use of long term anticoagulation     on Xarelto    Dysphagia due to recent stroke 05/23/2024    Hypertension     Stroke 03/20/2013     Past Surgical History:   Procedure Laterality Date    CARDIAC PACEMAKER PLACEMENT  09/25/2019    [employer] Model number [id number]  Serial number [id number] device MRI conditional for 1.5 Estela magnets    CATARACT EXTRACTION W/  INTRAOCULAR LENS IMPLANT Right 05/01/2017    Dr. Case    CATARACT EXTRACTION W/  INTRAOCULAR LENS IMPLANT Left 05/29/2017    Dr. Case    EYE SURGERY      HYSTERECTOMY       Family History    None       Tobacco Use    Smoking status: Never    Smokeless tobacco: Not on file   Substance and Sexual Activity    Alcohol use: No    Drug use: Not on file    Sexual activity: Not on file     Review of Systems: Pertinent per HPI  Objective:     Vital Signs (Most Recent):  Temp: 98.4 °F (36.9 °C) (05/27/24 1055)  Pulse: 69 (05/27/24 1055)  Resp: 18 (05/27/24 1055)  BP: (!) 173/79 (05/27/24 1055)  SpO2: 97 % (05/27/24 1055) Vital Signs (24h  Range):  Temp:  [97.9 °F (36.6 °C)-99.6 °F (37.6 °C)] 98.4 °F (36.9 °C)  Pulse:  [] 69  Resp:  [15-20] 18  SpO2:  [94 %-99 %] 97 %  BP: (132-185)/(64-82) 173/79     Weight: 67.5 kg (148 lb 13 oz)  Body mass index is 30.06 kg/m².     Physical Exam  Vitals reviewed.   Constitutional:       General: She is not in acute distress.  Eyes:      Extraocular Movements: Extraocular movements intact.   Cardiovascular:      Rate and Rhythm: Normal rate.   Pulmonary:      Effort: Pulmonary effort is normal.   Abdominal:      General: There is no distension.      Palpations: Abdomen is soft.      Tenderness: There is no abdominal tenderness. There is no guarding or rebound.   Skin:     General: Skin is warm and dry.   Neurological:      Mental Status: She is alert. Mental status is at baseline.            I have reviewed all pertinent lab results within the past 24 hours.  CBC:   Recent Labs   Lab 05/27/24  0333   WBC 7.46   RBC 3.94*   HGB 9.9*   HCT 32.1*      MCV 82   MCH 25.1*   MCHC 30.8*     CMP:   Recent Labs   Lab 05/27/24  0825   *   CALCIUM 8.6*   ALBUMIN 2.5*   PROT 5.9*      K 3.5   CO2 23      BUN 11   CREATININE 0.8   ALKPHOS 46*   ALT 22   AST 26   BILITOT 0.4       Significant Diagnostics:  I have reviewed all pertinent imaging results/findings within the past 24 hours.

## 2024-05-27 NOTE — PT/OT/SLP PROGRESS
Physical Therapy Co-Treatment    Patient Name: Amirah Davey   MRN: [id number]    Co-treatment performed for this visit due to patient need for two skilled therapists to ensure patient and staff safety and to accommodate for patient activity tolerance/pain management   Recommendations:     Discharge Recommendations: High Intensity Therapy  Discharge Equipment Recommendations: hospital bed, lift device, wheelchair  Barriers to discharge: Increased level of assist    Assessment:     Amirah Davey is a 88 y.o. female admitted with a medical diagnosis of Cerebrovascular accident (CVA) due to embolism of right middle cerebral artery. She presents with the following impairments/functional limitations: weakness, impaired endurance, impaired self care skills, impaired functional mobility, gait instability, impaired balance, impaired sensation, impaired coordination, decreased ROM, impaired fine motor, decreased upper extremity function, decreased lower extremity function, decreased safety awareness, pain. Pt with good tolerance to therapy session on this date. Pt remains highly motivated to progress with therapy and has excellent family support at bedside. Pt continues to require increased assistance to complete functional mobility 2/2 absent L sided motor activation, poor trunk control, and decreased activity tolerance. Pt with good reception to education and cues provided this day with pt giving maximal effort. Pt remains appropriate for high intensity therapy following discharge once medically stable. Pt would continue to benefit from skilled acute PT in order to address current deficits and progress functional mobility.     Rehab Prognosis: Good; patient continues to benefit from acute skilled PT services to address these deficits and reach maximum level of function.  Recent Surgery: Procedure(s) (LRB):  INSERTION, PEG TUBE (N/A) Day of Surgery    Plan:     During this hospitalization, patient to be seen 4 x/week to  "address the identified rehab impairments via gait training, therapeutic activities, therapeutic exercises, neuromuscular re-education and progress toward the following goals:    Plan of Care Expires:  06/25/24    Subjective     Chief Complaint: Feeling uncomfortable in bed  Patient/Family Comments/Goals: "I can do this side (R) but I can't move that side (L). I've been trying."  Pain/Comfort:  Pain Rating 1: 0/10    Objective:     Communicated with RN prior to session. Patient found supine with telemetry, PureWick, peripheral IV, bed alarm upon PT entry to room.     General Precautions: Standard, aspiration, fall  Orthopedic Precautions: N/A  Braces: N/A    Functional Mobility:  Bed Mobility:  Verbal cues for sequencing and technique  Supine to Sit: maximal assistance for LE management and trunk management  Sit to Supine: maximal assistance for LE management and trunk management  Transfers:    Sit to Stand: x2 reps from EOB with maximal assistance of 2 persons and x1 rep from EOB with total assistance of 2 persons; with no AD with cues for hand placement and foot placement  Verbal cues for increased use of momentum, improved anterior weight shift, and increased RLE motor activation  LLE block provided, buckling noted  Balance:   Static Sitting: Poor, able to maintain for 15 minute(s) with maximal assistance  Verbal and tactile cues provided for upright posture, increased cervical extension, maintenance of midline orientation, anterior trunk lean, core activation, command following, and improved used of BUE for support.  Dynamic Sitting: Poor: Patient unable to accept challenge or move without loss of balance, maximal assistance  PT completed PROM 1x10 to all major joints in LLE completed to reduce stiffness, maintain joint integrity, and prevent joint contractures while seated at EOB  Patient performed 1 set(s) of 5 AAROM repetitions of  the following seated exercises: ankle pumps, long arc quads, marches, and knee " flexion for left LE. Patient required skilled PT for instruction of exercises and appropriate cues to perform exercises safely and appropriately.    Pt completed PROM and gentle stretching to LUE with OT while seated at EOB  Static Standing: Poor, able to maintain for 30 seconds with total assistance of 2 persons  Verbal cues for upright posture, increased hip extension, increased knee extension, and maintenance of midline orientation  LLE physically extended by therapist during standing trials to increase joint proprioception and weightbearing  Dynamic Standing: not assessed this visit    AM-PAC 6 CLICK MOBILITY  Turning over in bed (including adjusting bedclothes, sheets and blankets)?: 2  Sitting down on and standing up from a chair with arms (e.g., wheelchair, bedside commode, etc.): 2  Moving from lying on back to sitting on the side of the bed?: 2  Moving to and from a bed to a chair (including a wheelchair)?: 1  Need to walk in hospital room?: 1  Climbing 3-5 steps with a railing?: 1  Basic Mobility Total Score: 9     Therapeutic Activities and Exercises:  Patient educated on role of acute care PT and PT POC, safety while in hospital including calling nurse for mobility, and call light usage  Pt educated on importance of maximal participation in therapy session in order to reduce negative effects of prolonged sedentary positioning.   Answered all questions within PT scope of practice and addressed functional mobility concerns.    Patient left with bed in chair position with all lines intact, call button in reach, RN notified, bed alarm on, and family present.    GOALS:   Multidisciplinary Problems       Physical Therapy Goals          Problem: Physical Therapy    Goal Priority Disciplines Outcome Goal Variances Interventions   Physical Therapy Goal     PT, PT/OT Progressing     Description: Goals to be met by: 2024     Patient will increase functional independence with mobility by performin.  Supine to sit with MInimal Assistance  2. Sit to supine with MInimal Assistance  3. Sit to stand transfer with Minimal Assistance  4. Bed to chair transfer with Moderate Assistance using LRAD  5. Gait  x 10 feet with Maximal Assistance using LRAD.   6. Sitting at edge of bed x5 minutes with Stand-by Assistance  7. Lower extremity exercise program x15 reps per handout, with assistance as needed                         Time Tracking:     PT Received On: 05/27/24  PT Start Time: 1352     PT Stop Time: 1415  PT Total Time (min): 23 min     Billable Minutes: Therapeutic Activity 10 and Neuromuscular Re-education 13      Treatment Type: Treatment  PT/PTA: PT     Number of PTA visits since last PT visit: 0     05/27/2024

## 2024-05-28 ENCOUNTER — ANESTHESIA EVENT (OUTPATIENT)
Dept: SURGERY | Facility: HOSPITAL | Age: 89
DRG: 065 | End: 2024-05-28
Payer: MEDICARE

## 2024-05-28 PROBLEM — I80.9 THROMBOPHLEBITIS: Status: ACTIVE | Noted: 2024-05-28

## 2024-05-28 LAB
ALBUMIN SERPL BCP-MCNC: 2.4 G/DL (ref 3.5–5.2)
ALP SERPL-CCNC: 48 U/L (ref 55–135)
ALT SERPL W/O P-5'-P-CCNC: 18 U/L (ref 10–44)
ANION GAP SERPL CALC-SCNC: 9 MMOL/L (ref 8–16)
APTT PPP: 51 SEC (ref 21–32)
AST SERPL-CCNC: 21 U/L (ref 10–40)
BASOPHILS # BLD AUTO: 0.03 K/UL (ref 0–0.2)
BASOPHILS NFR BLD: 0.4 % (ref 0–1.9)
BILIRUB SERPL-MCNC: 0.5 MG/DL (ref 0.1–1)
BUN SERPL-MCNC: 11 MG/DL (ref 8–23)
CALCIUM SERPL-MCNC: 8.6 MG/DL (ref 8.7–10.5)
CHLORIDE SERPL-SCNC: 107 MMOL/L (ref 95–110)
CO2 SERPL-SCNC: 21 MMOL/L (ref 23–29)
CREAT SERPL-MCNC: 0.9 MG/DL (ref 0.5–1.4)
DIFFERENTIAL METHOD BLD: ABNORMAL
EOSINOPHIL # BLD AUTO: 0.1 K/UL (ref 0–0.5)
EOSINOPHIL NFR BLD: 1.3 % (ref 0–8)
ERYTHROCYTE [DISTWIDTH] IN BLOOD BY AUTOMATED COUNT: 15 % (ref 11.5–14.5)
EST. GFR  (NO RACE VARIABLE): >60 ML/MIN/1.73 M^2
GLUCOSE SERPL-MCNC: 134 MG/DL (ref 70–110)
HCT VFR BLD AUTO: 30.3 % (ref 37–48.5)
HGB BLD-MCNC: 9.7 G/DL (ref 12–16)
IMM GRANULOCYTES # BLD AUTO: 0.11 K/UL (ref 0–0.04)
IMM GRANULOCYTES NFR BLD AUTO: 1.3 % (ref 0–0.5)
LYMPHOCYTES # BLD AUTO: 1.2 K/UL (ref 1–4.8)
LYMPHOCYTES NFR BLD: 14.1 % (ref 18–48)
MCH RBC QN AUTO: 25.5 PG (ref 27–31)
MCHC RBC AUTO-ENTMCNC: 32 G/DL (ref 32–36)
MCV RBC AUTO: 80 FL (ref 82–98)
MONOCYTES # BLD AUTO: 1.2 K/UL (ref 0.3–1)
MONOCYTES NFR BLD: 14.5 % (ref 4–15)
NEUTROPHILS # BLD AUTO: 5.6 K/UL (ref 1.8–7.7)
NEUTROPHILS NFR BLD: 68.4 % (ref 38–73)
NRBC BLD-RTO: 0 /100 WBC
PLATELET # BLD AUTO: 230 K/UL (ref 150–450)
PMV BLD AUTO: 11.2 FL (ref 9.2–12.9)
POCT GLUCOSE: 126 MG/DL (ref 70–110)
POCT GLUCOSE: 149 MG/DL (ref 70–110)
POCT GLUCOSE: 154 MG/DL (ref 70–110)
POTASSIUM SERPL-SCNC: 3.4 MMOL/L (ref 3.5–5.1)
PROT SERPL-MCNC: 5.9 G/DL (ref 6–8.4)
RBC # BLD AUTO: 3.81 M/UL (ref 4–5.4)
SODIUM SERPL-SCNC: 137 MMOL/L (ref 136–145)
WBC # BLD AUTO: 8.25 K/UL (ref 3.9–12.7)

## 2024-05-28 PROCEDURE — 63600175 PHARM REV CODE 636 W HCPCS

## 2024-05-28 PROCEDURE — 92507 TX SP LANG VOICE COMM INDIV: CPT

## 2024-05-28 PROCEDURE — C9113 INJ PANTOPRAZOLE SODIUM, VIA: HCPCS | Performed by: NURSE PRACTITIONER

## 2024-05-28 PROCEDURE — 63600175 PHARM REV CODE 636 W HCPCS: Mod: JZ,JG

## 2024-05-28 PROCEDURE — 25000003 PHARM REV CODE 250: Performed by: NURSE PRACTITIONER

## 2024-05-28 PROCEDURE — 11000001 HC ACUTE MED/SURG PRIVATE ROOM

## 2024-05-28 PROCEDURE — S5010 5% DEXTROSE AND 0.45% SALINE: HCPCS

## 2024-05-28 PROCEDURE — 63600175 PHARM REV CODE 636 W HCPCS: Performed by: NURSE PRACTITIONER

## 2024-05-28 PROCEDURE — 99222 1ST HOSP IP/OBS MODERATE 55: CPT | Mod: ,,, | Performed by: NURSE PRACTITIONER

## 2024-05-28 PROCEDURE — 25000003 PHARM REV CODE 250: Performed by: PSYCHIATRY & NEUROLOGY

## 2024-05-28 PROCEDURE — 25000003 PHARM REV CODE 250

## 2024-05-28 PROCEDURE — 80053 COMPREHEN METABOLIC PANEL: CPT

## 2024-05-28 PROCEDURE — 97535 SELF CARE MNGMENT TRAINING: CPT

## 2024-05-28 PROCEDURE — 85025 COMPLETE CBC W/AUTO DIFF WBC: CPT

## 2024-05-28 PROCEDURE — 85730 THROMBOPLASTIN TIME PARTIAL: CPT | Performed by: PSYCHIATRY & NEUROLOGY

## 2024-05-28 PROCEDURE — 99233 SBSQ HOSP IP/OBS HIGH 50: CPT | Mod: GC,,, | Performed by: PSYCHIATRY & NEUROLOGY

## 2024-05-28 PROCEDURE — 36415 COLL VENOUS BLD VENIPUNCTURE: CPT | Performed by: PSYCHIATRY & NEUROLOGY

## 2024-05-28 RX ORDER — ACETAMINOPHEN 650 MG/1
650 SUPPOSITORY RECTAL EVERY 6 HOURS
Status: DISCONTINUED | OUTPATIENT
Start: 2024-05-28 | End: 2024-05-30

## 2024-05-28 RX ORDER — LIDOCAINE 50 MG/G
1 PATCH TOPICAL
Status: DISCONTINUED | OUTPATIENT
Start: 2024-05-28 | End: 2024-05-31 | Stop reason: HOSPADM

## 2024-05-28 RX ORDER — POTASSIUM CHLORIDE 7.45 MG/ML
10 INJECTION INTRAVENOUS ONCE
Status: COMPLETED | OUTPATIENT
Start: 2024-05-28 | End: 2024-05-28

## 2024-05-28 RX ADMIN — NYSTATIN 500000 UNITS: 100000 SUSPENSION ORAL at 12:05

## 2024-05-28 RX ADMIN — LABETALOL HYDROCHLORIDE 10 MG: 5 INJECTION, SOLUTION INTRAVENOUS at 12:05

## 2024-05-28 RX ADMIN — ACETAMINOPHEN 650 MG: 650 SUPPOSITORY RECTAL at 12:05

## 2024-05-28 RX ADMIN — LIDOCAINE 1 PATCH: 700 PATCH TOPICAL at 06:05

## 2024-05-28 RX ADMIN — ACETAMINOPHEN 650 MG: 650 SUPPOSITORY RECTAL at 05:05

## 2024-05-28 RX ADMIN — NYSTATIN 500000 UNITS: 100000 SUSPENSION ORAL at 05:05

## 2024-05-28 RX ADMIN — PANTOPRAZOLE SODIUM 40 MG: 40 INJECTION, POWDER, FOR SOLUTION INTRAVENOUS at 09:05

## 2024-05-28 RX ADMIN — NYSTATIN 500000 UNITS: 100000 SUSPENSION ORAL at 09:05

## 2024-05-28 RX ADMIN — LABETALOL HYDROCHLORIDE 10 MG: 5 INJECTION, SOLUTION INTRAVENOUS at 05:05

## 2024-05-28 RX ADMIN — DEXTROSE AND SODIUM CHLORIDE: 5; 450 INJECTION, SOLUTION INTRAVENOUS at 05:05

## 2024-05-28 RX ADMIN — MUPIROCIN: 20 OINTMENT TOPICAL at 09:05

## 2024-05-28 RX ADMIN — POTASSIUM CHLORIDE 10 MEQ: 7.46 INJECTION, SOLUTION INTRAVENOUS at 10:05

## 2024-05-28 NOTE — ASSESSMENT & PLAN NOTE
Pt complaining of new R wrist and forearm pain, patient is tender to the touch on her R forearm and wrist. Tender with movement. No noted warmth or redness, fingers warm to touch. Pt reports tylenol, voltaren and lidocaine patches are helping some. Concern for thrombophlebitis.    Plan  R wrist/forearm xray: some soft tissue swelling, no fracture  R UE ultrasound  Scheduled tylenol suppository with no GI access  Hot packs and warm compresses to area  Concern for cellulitis or sepsis. Low threshold for initiation of antibiotics and sepsis workup

## 2024-05-28 NOTE — PT/OT/SLP PROGRESS
"Speech Language Pathology Treatment    Patient Name:  Amirah Davey   MRN:  [id number]  Admitting Diagnosis: Cerebrovascular accident (CVA) due to embolism of right middle cerebral artery    Recommendations:                 General Recommendations:  Dysphagia therapy, Speech/language therapy, and Cognitive-linguistic therapy  Diet recommendations:  NPO, Liquid Diet Level: NPO   Aspiration Precautions: Strict aspiration precautions   General Precautions: Standard, aspiration, fall  Communication strategies:  provide increased time to answer    Assessment:     Amirah Davey is a 88 y.o. female with an SLP diagnosis of Dysphagia, Dysarthria, and Cognitive-Linguistic Impairment.  She presents with good participation in session    Subjective     "It hurts" re: her wrist  Patient goals: to go home     Pain/Comfort:  Pain Rating 1: 10/10  Location - Side 1: Right  Location 1: wrist  Pain Addressed 1: Pre-medicate for activity, Distraction, Reposition  Pain Rating Post-Intervention 1: 10/10    Respiratory Status: Room air    Objective:     Has the patient been evaluated by SLP for swallowing?   Yes  Keep patient NPO? Yes   Current Respiratory Status:        Pt seen bedside with her family present. Family reported pt did not sleep well due to wrist pain. Nursing aware of pain but pt not able to have pain medications yet. Pt wet vocal quality. Oral suctioning provided. Pt with improved speech intelligibility after oral suctioning. Reviewed speech strategies. Pt completed oral motor exercises with decreased lingual and labial strength and speed noted. Encouraged pt to practice exercises independently. Pt needed min cue to use speech strategies during conversational speech. Pt was ox3. She compared and contrasted items with 100% acc. Pt scheduled for peg tube placement. Education provided to pt and family re: speech and swallowing. All expressed understanding.   Goals:   Multidisciplinary Problems       SLP Goals          Problem: " SLP    Goal Priority Disciplines Outcome   SLP Goal     SLP Progressing   Description: Speech Language Pathology Goals  Goals expected to be met by 5/31/24    1. Pt will participate in ongoing assessment of swallow function to determine safest, least restrictive means of nutrition/hydration  2. Pt will complete dysphagia exercises to improve pharyngeal strength/coordination x 10 ea with good effort 90% of attempts, MAX A  3. Pt will complete OMEs x10 ea to improve labial/lingual strength and coordination, with good effort 90% of attempts, MOD A  4. Pt will repeat short phrases with 90% intelligibility, MIN A  5. Pt will recall 2+ clear speech strategies, 90% of attempts, MIN A  6. Pt will attend to structured task up to 2 minutes in length at least 1x/session, Supervision  7. Pt will participate in ongoing assessment of reading, writing, visiospatial and delayed memory skills to determine ongoing POC needs  8. Educate Pt and family on aspiration precautions and SLP POC  9. Educate Pt and family on speech strategies and safety awareness                          Plan:     Patient to be seen:  5 x/week   Plan of Care expires:  06/23/24  Plan of Care reviewed with:  patient, family   SLP Follow-Up:  Yes       Discharge recommendations:  High Intensity Therapy       Time Tracking:     SLP Treatment Date:   05/28/24  Speech Start Time:  0938  Speech Stop Time:  0955     Speech Total Time (min):  17 min    Billable Minutes: Speech Therapy Individual 9 and Self Care/Home Management Training 8    05/28/2024

## 2024-05-28 NOTE — ASSESSMENT & PLAN NOTE
89 y/o female with R MCA stroke due to R M2 occlusio. No thrombolytics as is on Xarelto. Went to IR but procedure aborted due to distal clot. So TICI 0. Etiology cardio embolic due to chronic afib.     No acute events overnight. Neuro exam stable. CT abdomen pending. Patient to be NPO at midnight/heparin gtt off at midnight in anticipation of PEG placement by GI on 5/27. Pt with some concerning finding of duodenal perf on CT abd/pelvis. Pt does not have peritonitis signs. General surgery did not feel this is of concern and patient vitals are stable, patient looks well. Gen surgery planning to place PEG tube on 5/29. Patient still remains NPO. IV labetalol 5mg scheduled q6 hours for BP control. Pt pending rehab placement after peg tube.     Antithrombotics: ASA + minimal intensity heparin gtt. After patient is therapeutic on heparin gtt, ASA can be discontinued.    Statins: Lipitor 40 mg daily but unable to get as no NG tube    Aggressive risk factor modification: HTN, Diet, Exercise, Obesity, A-Fib     Rehab efforts: The patient has been evaluated by a stroke team provider and the therapy needs have been fully considered based off the presenting complaints and exam findings. The following therapy evaluations are needed:  recommendations include high intensity therapy    Diagnostics ordered/pending: Other: CT abd/pelvis    VTE prophylaxis: Mechanical prophylaxis: Place SCDs  None: Reason for No Pharmacological VTE Prophylaxis: Currently on anticoagulation    BP parameters: Infarct: SBP <180

## 2024-05-28 NOTE — HOSPITAL COURSE
Per chart review,    PT- 05/27    Functional Mobility:  Bed Mobility:  Verbal cues for sequencing and technique  Supine to Sit: maximal assistance for LE management and trunk management  Sit to Supine: maximal assistance for LE management and trunk management  Transfers:    Sit to Stand: x2 reps from EOB with maximal assistance of 2 persons and x1 rep from EOB with total assistance of 2 persons; with no AD.   OT-05/27    Bed Mobility:    Patient completed Supine to Sit with maximal assistance  Patient completed Sit to Supine with maximal assistance      Functional Mobility/Transfers:  Patient completed Sit <> Stand Transfer with Max A x2 x2 reps and Total A x2 for thirs rep  with  hand-held assist.

## 2024-05-28 NOTE — ASSESSMENT & PLAN NOTE
See hospital course for functional status.    Recommendations  -  Encourage mobility, OOB in chair, and early ambulation as appropriate  -  PT/OT evaluate and treat  -  Pain management  -  DVT prophylaxis if appropriate   -  Monitor for and prevent skin breakdown and pressure ulcers  Early mobility, repositioning/weight shifting every 20-30 minutes when sitting, turn patient every 2 hours, proper mattress/overlay and chair cushioning, pressure relief/heel protector boots

## 2024-05-28 NOTE — HPI
Per chart review, 89 y/o female with R MCA stroke due to R M2 occlusio. No thrombolytics as is on Xarelto. IR procedure not performed due to distal clot. Etiology deemed to be cardio embolic in the setting of chronic Afib. PM &R was consulted to evaluate pt for post acute placement.         Functional History: Patient lives  with daughter  in a single  story home with one stair to enter.  Prior to admission, Pt was I with ADLs and mobility.  DME: None.

## 2024-05-28 NOTE — CONSULTS
[facility] ([facility])  Physical Medicine & Rehab  Consult Note    Patient Name: Amirah Davey  MRN: [id number]  Admission Date: 5/23/2024  Hospital Length of Stay: 5 days  Attending Physician: Gerardo Rodriges MD   Consults  Subjective:     Principal Problem: Cerebrovascular accident (CVA) due to embolism of right middle cerebral artery    HPI: Per chart review, 87 y/o female with R MCA stroke due to R M2 occlusio. No thrombolytics as is on Xarelto. IR procedure not performed due to distal clot. Etiology deemed to be cardio embolic in the setting of chronic Afib. PM &R was consulted to evaluate pt for post acute placement.         Functional History: Patient lives  with daughter  in a single  story home with one stair to enter.  Prior to admission, Pt was I with ADLs and mobility.  DME: None.     Hospital Course: Per chart review,    PT- 05/27    Functional Mobility:  Bed Mobility:  Verbal cues for sequencing and technique  Supine to Sit: maximal assistance for LE management and trunk management  Sit to Supine: maximal assistance for LE management and trunk management  Transfers:    Sit to Stand: x2 reps from EOB with maximal assistance of 2 persons and x1 rep from EOB with total assistance of 2 persons; with no AD.   OT-05/27    Bed Mobility:    Patient completed Supine to Sit with maximal assistance  Patient completed Sit to Supine with maximal assistance      Functional Mobility/Transfers:  Patient completed Sit <> Stand Transfer with Max A x2 x2 reps and Total A x2 for thirs rep  with  hand-held assist.          Past Medical History:   Diagnosis Date    A-fib     Cerebrovascular accident (CVA) due to embolism of right middle cerebral artery 05/23/2024    Current use of long term anticoagulation     on Xarelto    Dysphagia due to recent stroke 05/23/2024    Hypertension     Stroke 03/20/2013     Past Surgical History:   Procedure Laterality Date    CARDIAC PACEMAKER PLACEMENT  09/25/2019    [employer]  Model number [id number]  Serial number [id number] device MRI conditional for 1.5 Estela magnets    CATARACT EXTRACTION W/  INTRAOCULAR LENS IMPLANT Right 05/01/2017    Dr. Case    CATARACT EXTRACTION W/  INTRAOCULAR LENS IMPLANT Left 05/29/2017    Dr. Case    EYE SURGERY      HYSTERECTOMY       Review of patient's allergies indicates:  No Known Allergies    Scheduled Medications:    acetaminophen  650 mg Rectal Q6H    atorvastatin  40 mg Oral Daily    labetalol  10 mg Intravenous Q6H    LIDOcaine  1 patch Transdermal Q24H    mupirocin   Nasal BID    nystatin  500,000 Units Oral QID    pantoprazole  40 mg Intravenous Daily    potassium chloride  10 mEq Intravenous Once       PRN Medications:   Current Facility-Administered Medications:     bisacodyL, 10 mg, Rectal, Daily PRN    dextrose 10%, 12.5 g, Intravenous, PRN    dextrose 10%, 25 g, Intravenous, PRN    diclofenac sodium, 2 g, Topical (Top), TID PRN    glucagon (human recombinant), 1 mg, Intramuscular, PRN    insulin aspart U-100, 0-5 Units, Subcutaneous, Q12H PRN    iohexol, 15 mL, Oral, PRN    labetalol, 10 mg, Intravenous, Q6H PRN    ondansetron, 4 mg, Intravenous, Q12H PRN    sodium chloride 0.9%, 500 mL, Intravenous, PRN    sodium chloride 0.9%, 10 mL, Intravenous, PRN    Family History    None       Tobacco Use    Smoking status: Never    Smokeless tobacco: Not on file   Substance and Sexual Activity    Alcohol use: No    Drug use: Not on file    Sexual activity: Not on file     Review of Systems   Constitutional:  Positive for activity change.   Musculoskeletal:  Positive for gait problem.   Neurological:  Positive for facial asymmetry, speech difficulty and weakness.   Psychiatric/Behavioral:  Positive for decreased concentration.      Objective:     Vital Signs (Most Recent):  Temp: 98.7 °F (37.1 °C) (05/28/24 0720)  Pulse: 70 (05/28/24 0720)  Resp: 20 (05/28/24 0720)  BP: 134/63 (05/28/24 0720)  SpO2: 96 % (05/28/24 0720)    Vital Signs (24h  Range):  Temp:  [97.8 °F (36.6 °C)-98.7 °F (37.1 °C)] 98.7 °F (37.1 °C)  Pulse:  [69-82] 70  Resp:  [18-20] 20  SpO2:  [96 %-100 %] 96 %  BP: (134-187)/(63-86) 134/63     Body mass index is 30.06 kg/m².     Physical Exam  Vitals and nursing note reviewed.   Constitutional:       General: She is awake.   Abdominal:      General: There is no distension.      Palpations: Abdomen is soft.   Musculoskeletal:      Cervical back: Normal range of motion and neck supple.      Comments: Left sided hemiparesis   Skin:     General: Skin is warm and dry.   Neurological:      General: No focal deficit present.      Mental Status: She is alert and oriented to person, place, and time.      GCS: GCS eye subscore is 4. GCS verbal subscore is 5. GCS motor subscore is 6.      Cranial Nerves: Dysarthria and facial asymmetry present.      Motor: Weakness present.      Coordination: Coordination abnormal. Finger-Nose-Finger Test abnormal and Heel to Shin Test abnormal. Impaired rapid alternating movements.      Gait: Gait abnormal.   Psychiatric:         Mood and Affect: Mood normal.         Behavior: Behavior normal. Behavior is cooperative.          NEUROLOGICAL EXAMINATION:     MENTAL STATUS   Oriented to person, place, and time.     GAIT AND COORDINATION      Coordination   Finger to nose coordination: abnormal      Diagnostic Results: Labs: Reviewed  Assessment/Plan:     * Cerebrovascular accident (CVA) due to embolism of right middle cerebral artery  -MRI Brain- Moderate-sized right MCA territorial infarction with scattered petechial hemorrhage and a suspected small hematoma .  -CTH-Evolving subtotal right MCA infarct on a background of moderate to advanced chronic microvascular ischemic disease. No progressive mass effect. No hemorrhagic transformation.   -CTA- significant for Right M2 occlusion.   -IR procedure aborted due to distal clot.  -NPO per SLP. Will need PEG tube placement.  -PT/OT rec for high intensity therapies. Pt and  daughter amenable. Would like rehab facility near home in [facility].     Reduced mobility  See hospital course for functional status.    Recommendations  -  Encourage mobility, OOB in chair, and early ambulation as appropriate  -  PT/OT evaluate and treat  -  Pain management  -  DVT prophylaxis if appropriate   -  Monitor for and prevent skin breakdown and pressure ulcers  Early mobility, repositioning/weight shifting every 20-30 minutes when sitting, turn patient every 2 hours, proper mattress/overlay and chair cushioning, pressure relief/heel protector boots     Dysphagia due to recent stroke  -PEG tube in the plan.     Dysarthria due to acute stroke  -Will need aggressive SLP.     Chronic a-fib  -Was on Xarelto. Plan for PEG.     Hypertension, benign  -Monitor BP closely.     PM&R Recommendation:     At this time, the PM&R team has reviewed this patient's ongoing medical case including inpatient diagnosis, medical history, clinical examination, labs, vitals, current social and functional history.  We will continue to follow pt for a potential rehab candidate pending medical stability. Will need PEG tube with TF tolerance. Will need transition from Heparin gtt to PO AC.         Thank you for your consult.     Alina Maynard NP  Department of Physical Medicine & Rehab  [facility] ([facility])

## 2024-05-28 NOTE — SUBJECTIVE & OBJECTIVE
Past Medical History:   Diagnosis Date    A-fib     Cerebrovascular accident (CVA) due to embolism of right middle cerebral artery 05/23/2024    Current use of long term anticoagulation     on Xarelto    Dysphagia due to recent stroke 05/23/2024    Hypertension     Stroke 03/20/2013     Past Surgical History:   Procedure Laterality Date    CARDIAC PACEMAKER PLACEMENT  09/25/2019    [employer] Model number [id number]  Serial number [id number] device MRI conditional for 1.5 Estela magnets    CATARACT EXTRACTION W/  INTRAOCULAR LENS IMPLANT Right 05/01/2017    Dr. Case    CATARACT EXTRACTION W/  INTRAOCULAR LENS IMPLANT Left 05/29/2017    Dr. Case    EYE SURGERY      HYSTERECTOMY       Review of patient's allergies indicates:  No Known Allergies    Scheduled Medications:    acetaminophen  650 mg Rectal Q6H    atorvastatin  40 mg Oral Daily    labetalol  10 mg Intravenous Q6H    LIDOcaine  1 patch Transdermal Q24H    mupirocin   Nasal BID    nystatin  500,000 Units Oral QID    pantoprazole  40 mg Intravenous Daily    potassium chloride  10 mEq Intravenous Once       PRN Medications:   Current Facility-Administered Medications:     bisacodyL, 10 mg, Rectal, Daily PRN    dextrose 10%, 12.5 g, Intravenous, PRN    dextrose 10%, 25 g, Intravenous, PRN    diclofenac sodium, 2 g, Topical (Top), TID PRN    glucagon (human recombinant), 1 mg, Intramuscular, PRN    insulin aspart U-100, 0-5 Units, Subcutaneous, Q12H PRN    iohexol, 15 mL, Oral, PRN    labetalol, 10 mg, Intravenous, Q6H PRN    ondansetron, 4 mg, Intravenous, Q12H PRN    sodium chloride 0.9%, 500 mL, Intravenous, PRN    sodium chloride 0.9%, 10 mL, Intravenous, PRN    Family History    None       Tobacco Use    Smoking status: Never    Smokeless tobacco: Not on file   Substance and Sexual Activity    Alcohol use: No    Drug use: Not on file    Sexual activity: Not on file     Review of Systems   Constitutional:  Positive for activity change.   Musculoskeletal:   Positive for gait problem.   Neurological:  Positive for facial asymmetry, speech difficulty and weakness.   Psychiatric/Behavioral:  Positive for decreased concentration.      Objective:     Vital Signs (Most Recent):  Temp: 98.7 °F (37.1 °C) (05/28/24 0720)  Pulse: 70 (05/28/24 0720)  Resp: 20 (05/28/24 0720)  BP: 134/63 (05/28/24 0720)  SpO2: 96 % (05/28/24 0720)    Vital Signs (24h Range):  Temp:  [97.8 °F (36.6 °C)-98.7 °F (37.1 °C)] 98.7 °F (37.1 °C)  Pulse:  [69-82] 70  Resp:  [18-20] 20  SpO2:  [96 %-100 %] 96 %  BP: (134-187)/(63-86) 134/63     Body mass index is 30.06 kg/m².     Physical Exam  Vitals and nursing note reviewed.   Constitutional:       General: She is awake.   Abdominal:      General: There is no distension.      Palpations: Abdomen is soft.   Musculoskeletal:      Cervical back: Normal range of motion and neck supple.      Comments: Left sided hemiparesis   Skin:     General: Skin is warm and dry.   Neurological:      General: No focal deficit present.      Mental Status: She is alert and oriented to person, place, and time.      GCS: GCS eye subscore is 4. GCS verbal subscore is 5. GCS motor subscore is 6.      Cranial Nerves: Dysarthria and facial asymmetry present.      Motor: Weakness present.      Coordination: Coordination abnormal. Finger-Nose-Finger Test abnormal and Heel to Shin Test abnormal. Impaired rapid alternating movements.      Gait: Gait abnormal.   Psychiatric:         Mood and Affect: Mood normal.         Behavior: Behavior normal. Behavior is cooperative.          NEUROLOGICAL EXAMINATION:     MENTAL STATUS   Oriented to person, place, and time.     GAIT AND COORDINATION      Coordination   Finger to nose coordination: abnormal      Diagnostic Results: Labs: Reviewed

## 2024-05-28 NOTE — PLAN OF CARE
Problem: Adult Inpatient Plan of Care  Goal: Plan of Care Review  Outcome: Progressing  Goal: Optimal Comfort and Wellbeing  Outcome: Progressing     Problem: Stroke, Ischemic (Includes Transient Ischemic Attack)  Goal: Optimal Coping  Outcome: Progressing     Paged stroke team at beginning of shift about patient's c/o of R wrist pain and R shoulder pain. Provider ordered Voltaren ointment and patient was given PRN rectal Tylenol. Patient still was having c/o the pain throughout the night, repaged stroke team and spoke with JAMI Porter, and nothing was ordered. Charge nurse was notified d/t family's concern and patient's continuous c/o pain on that R side. Charge nurse Thien did speak with JAMI Porter, and a Lidocaine patch was ordered. Patient's neuro status and vitals remain stable. No other changes throughout shift. Heparin gtt continued. Wctm.

## 2024-05-28 NOTE — ASSESSMENT & PLAN NOTE
Due to stroke  Aggressive therapy  Speech therapy consult  PEG to be placed on 5/29 with general surgery

## 2024-05-28 NOTE — ANESTHESIA PREPROCEDURE EVALUATION
[facility]  Anesthesia Pre-Operative Evaluation         Patient Name: Amirah Davey  YOB: 1935  MRN: [id number]    SUBJECTIVE:     Pre-operative evaluation for Procedure(s) (LRB):  EGD, WITH PEG TUBE INSERTION (N/A)  GASTROSTOMY (N/A)     05/28/2024    Amirah Davey is a 88 y.o. female w/ a significant PMHx of stroke (2013), afib (on heparin gtt) s/p PPM, HTN, who is admitted for CVA 2/2 embolism of R MCA who requires long term enteral access for dysphagia secondary to recent stroke.    Patient now presents for the above procedure(s).      LDA:        Peripheral IV - Single Lumen 05/23/24 20 G Anterior;Left Forearm (Active)   Site Assessment Clean;Dry;Intact 05/28/24 0610   Extremity Assessment Distal to IV No abnormal discoloration 05/28/24 0610   Line Status Saline locked 05/28/24 0610   Dressing Status Clean;Dry;Intact 05/28/24 0610   Dressing Intervention Integrity maintained 05/28/24 0610   Dressing Change Due 05/27/24 05/28/24 0610   Site Change Due 05/27/24 05/27/24 2010   Reason Not Rotated Poor venous access 05/28/24 0610   Number of days: 5            Peripheral IV - Single Lumen 05/23/24 20 G Anterior;Left Forearm (Active)   Site Assessment Clean;Dry;Intact 05/28/24 0610   Extremity Assessment Distal to IV No abnormal discoloration 05/28/24 0610   Line Status Saline locked 05/28/24 0610   Dressing Status Clean;Dry;Intact 05/28/24 0610   Dressing Intervention Integrity maintained 05/28/24 0610   Dressing Change Due 05/27/24 05/28/24 0610   Site Change Due 05/27/24 05/27/24 2010   Reason Not Rotated Not due 05/28/24 0610   Number of days: 5       Female External Urinary Catheter w/ Suction 05/23/24 2050 (Active)   Skin no redness;no breakdown 05/28/24 0610   Tolerance no signs/symptoms of discomfort 05/28/24 0610   Suction Continuous suction at 50 mmHg 05/27/24 2010    Date of last wick change 05/27/24 05/27/24 2010   Time of last wick change 2000 05/27/24 2010   Output (mL) 500 mL 05/27/24 0615   Number of days: 4       Prev airway: None documented.    Drips:    dextrose 5 % and 0.45 % NaCl   Intravenous Continuous 50 mL/hr at 05/27/24 2303 New Bag at 05/27/24 2303    heparin (porcine) in D5W  0-40 Units/kg/hr (Adjusted) Intravenous Continuous 6.8 mL/hr at 05/28/24 0555 12 Units/kg/hr at 05/28/24 0555       Patient Active Problem List   Diagnosis    Hypertension, benign    Chronic a-fib    Cerebrovascular accident (CVA) due to embolism of right middle cerebral artery    Hemiplegia    Dysarthria due to acute stroke    Dysphagia due to recent stroke    Obesity (BMI 30-39.9)    Chronic anticoagulation    Reduced mobility    Hypokalemia    Hypomagnesemia       Review of patient's allergies indicates:  No Known Allergies    Current Inpatient Medications:   atorvastatin  40 mg Oral Daily    labetalol  10 mg Intravenous Q6H    LIDOcaine  1 patch Transdermal Q24H    mupirocin   Nasal BID    nystatin  500,000 Units Oral QID    pantoprazole  40 mg Intravenous Daily       No current facility-administered medications on file prior to encounter.     Current Outpatient Medications on File Prior to Encounter   Medication Sig Dispense Refill    amlodipine (NORVASC) 5 MG tablet Take 5 mg by mouth every evening.       hydroCHLOROthiazide (MICROZIDE) 12.5 mg capsule Take 25 mg by mouth.      lisinopriL (PRINIVIL,ZESTRIL) 20 MG tablet TAKE 1 TABLET BY MOUTH EVERY DAY 30 tablet 1    lisinopril 10 MG tablet Take 10 mg by mouth every evening.       lisinopril 10 MG tablet Take by mouth.      omeprazole (PRILOSEC) 40 MG capsule Take 40 mg by mouth every evening.   2    pantoprazole (PROTONIX) 40 MG tablet Take by mouth.      rivaroxaban (XARELTO) 20 mg Tab Take by mouth.      XARELTO 20 mg Tab TAKE 1 TABLET BY MOUTH DAILY AT DINNER  2       Past Surgical History:   Procedure Laterality Date    CARDIAC  PACEMAKER PLACEMENT  09/25/2019    Medtr[employer] Model number [id number]  Serial number [id number] device MRI conditional for 1.5 Estela magnets    CATARACT EXTRACTION W/  INTRAOCULAR LENS IMPLANT Right 05/01/2017    Dr. Case    CATARACT EXTRACTION W/  INTRAOCULAR LENS IMPLANT Left 05/29/2017    Dr. Case    EYE SURGERY      HYSTERECTOMY         Social History     Socioeconomic History    Marital status:    Tobacco Use    Smoking status: Never   Substance and Sexual Activity    Alcohol use: No     Social Determinants of Health     Financial Resource Strain: Patient Declined (5/24/2024)    Overall Financial Resource Strain (CARDIA)     Difficulty of Paying Living Expenses: Patient declined   Food Insecurity: Patient Declined (5/24/2024)    Hunger Vital Sign     Worried About Running Out of Food in the Last Year: Patient declined     Ran Out of Food in the Last Year: Patient declined   Transportation Needs: Patient Declined (5/24/2024)    TRANSPORTATION NEEDS     Transportation : Patient declined   Physical Activity: Patient Declined (5/24/2024)    Exercise Vital Sign     Days of Exercise per Week: Patient declined     Minutes of Exercise per Session: Patient declined   Stress: Patient Declined (5/24/2024)    [employer] [place] [employer] - Occupational Stress Questionnaire     Feeling of Stress : Patient declined   Housing Stability: Patient Declined (5/24/2024)    Housing Stability Vital Sign     Unable to Pay for Housing in the Last Year: Patient declined     Homeless in the Last Year: Patient declined       OBJECTIVE:     Vital Signs Range (Last 24H):  Temp:  [36.6 °C (97.8 °F)-37.1 °C (98.7 °F)]   Pulse:  [69-82]   Resp:  [18-20]   BP: (134-187)/(63-86)   SpO2:  [96 %-100 %]       Significant Labs:  Lab Results   Component Value Date    WBC 8.25 05/28/2024    HGB 9.7 (L) 05/28/2024    HCT 30.3 (L) 05/28/2024     05/28/2024    CHOL 146 05/23/2024    TRIG 99 05/23/2024    HDL 43 05/23/2024    ALT  18 05/28/2024    AST 21 05/28/2024     05/28/2024    K 3.4 (L) 05/28/2024     05/28/2024    CREATININE 0.9 05/28/2024    BUN 11 05/28/2024    CO2 21 (L) 05/28/2024    TSH 1.475 05/23/2024    INR 1.0 05/25/2024    HGBA1C 6.4 (H) 05/19/2024       Diagnostic Studies: No relevant studies.    EKG:   Results for orders placed or performed in visit on 06/20/19   EKG 12-lead    Collection Time: 06/20/19  5:25 PM    Narrative    Test Reason : R00.1,I10,    Vent. Rate : 042 BPM     Atrial Rate : 326 BPM     P-R Int : 000 ms          QRS Dur : 076 ms      QT Int : 476 ms       P-R-T Axes : 000 -17 100 degrees     QTc Int : 397 ms    Atrial fibrillation with slow ventricular response with premature  ventricular or aberrantly conducted complexes  LVH with secondary ST-T wave changes  Abnormal R wave progression  Abnormal ECG  No previous ECGs available  Confirmed by Shad Landon MD ([id number]) on 6/21/2019 12:30:51 PM    Referred By: DR LINDSEY           Confirmed By:Shad Landon MD       2D ECHO:  TTE:  No results found for this or any previous visit.    ADAMA:  No results found for this or any previous visit.    ASSESSMENT/PLAN:           Pre-op Assessment    I have reviewed the Patient Summary Reports.     I have reviewed the Nursing Notes. I have reviewed the NPO Status.   I have reviewed the Medications.     Review of Systems  Anesthesia Hx:  No problems with previous Anesthesia   History of prior surgery of interest to airway management or planning:          Denies Family Hx of Anesthesia complications.    Denies Personal Hx of Anesthesia complications.                    Social:  Non-Smoker, No Alcohol Use       Hematology/Oncology:       -- Denies Anemia:                                  Cardiovascular:    Pacemaker Hypertension    Denies CAD.    Dysrhythmias atrial fibrillation   Denies CHF.    no hyperlipidemia                             Pulmonary:    Denies COPD.  Denies Asthma.                    Renal/:   Denies  Chronic Renal Disease.                Hepatic/GI:      Denies GERD.             Musculoskeletal:  Denies Arthritis.               Neurological:   CVA, residual symptoms    Denies Seizures.                                Endocrine:  Denies Diabetes.         Obesity / BMI > 30  Psych:  Denies Psychiatric History.                  Physical Exam  General: Well nourished, Cooperative, Alert and Oriented    Airway:  Mallampati: II   Mouth Opening: Normal  TM Distance: Normal  Tongue: Normal  Neck ROM: Normal ROM    Dental:  Edentulous        Anesthesia Plan  Type of Anesthesia, risks & benefits discussed:    Anesthesia Type: Gen ETT, Gen Natural Airway  Intra-op Monitoring Plan: Standard ASA Monitors  Post Op Pain Control Plan: multimodal analgesia and IV/PO Opioids PRN  Induction:  IV  Airway Plan: Direct, Post-Induction  ASA Score: 3  Day of Surgery Review of History & Physical: H&P Update referred to the surgeon/provider.    Ready For Surgery From Anesthesia Perspective.     .

## 2024-05-28 NOTE — PROGRESS NOTES
[facility] - Neurosurgery ([facility])  Vascular Neurology  Comprehensive Stroke Center  Progress Note    Assessment/Plan:     * Cerebrovascular accident (CVA) due to embolism of right middle cerebral artery  89 y/o female with R MCA stroke due to R M2 occlusio. No thrombolytics as is on Xarelto. Went to IR but procedure aborted due to distal clot. So TICI 0. Etiology cardio embolic due to chronic afib.     No acute events overnight. Neuro exam stable. CT abdomen pending. Patient to be NPO at midnight/heparin gtt off at midnight in anticipation of PEG placement by GI on 5/27. Pt with some concerning finding of duodenal perf on CT abd/pelvis. Pt does not have peritonitis signs. General surgery did not feel this is of concern and patient vitals are stable, patient looks well. Gen surgery planning to place PEG tube on 5/29. Patient still remains NPO. IV labetalol 5mg scheduled q6 hours for BP control. Pt pending rehab placement after peg tube.     Antithrombotics: ASA + minimal intensity heparin gtt. After patient is therapeutic on heparin gtt, ASA can be discontinued.    Statins: Lipitor 40 mg daily but unable to get as no NG tube    Aggressive risk factor modification: HTN, Diet, Exercise, Obesity, A-Fib     Rehab efforts: The patient has been evaluated by a stroke team provider and the therapy needs have been fully considered based off the presenting complaints and exam findings. The following therapy evaluations are needed:  recommendations include high intensity therapy    Diagnostics ordered/pending: Other: CT abd/pelvis    VTE prophylaxis: Mechanical prophylaxis: Place SCDs  None: Reason for No Pharmacological VTE Prophylaxis: Currently on anticoagulation    BP parameters: Infarct: SBP <180        Thrombophlebitis  Pt complaining of new R wrist and forearm pain, patient is tender to the touch on her R forearm and wrist. Tender with movement. No noted warmth or redness, fingers warm to touch. Pt reports tylenol,  voltaren and lidocaine patches are helping some. Concern for thrombophlebitis.    Plan  R wrist/forearm xray   R UE ultrasound  Scheduled tylenol suppository with no GI access  Hot packs and warm compresses to area  Concern for cellulitis or sepsis. Low threshold for initiation of antibiotics and sepsis workup    Obesity (BMI 30-39.9)  Stroke risk factor   on diet and exercise    Dysphagia due to recent stroke  Due to stroke  Aggressive therapy  Speech therapy consult  PEG to be placed on 5/29 with general surgery        Dysarthria due to acute stroke  Due to stroke  Aggressive therapy          Hemiplegia  Due to stroke  Aggressive therapy      Chronic a-fib  Stroke risk factor  Was on Xarelto, will need to resume once feeding route resolved    Hypertension, benign  Stroke risk factor  SBP <180 as 5 days out  IV labetalol 5mg schedule q6 hours for BP control 2^ patient being NPO         05/25/2024: No acute events overnight. Neuro exam stable. MRI Brain showed moderate sized R MCA infarct with scattered petechial hemorrhage and a suspected small hematoma. Patient's imaging discussed with staff, Dr. Rodriges, and patient's family. Heparin gtt initiated for AC in setting of atrial fibrillation while patient anticipates PEG placement on 5/27. Patient pending abdominal CT for eval of Pneumoperitoneum.  05/26/2024: No acute events overnight. Neuro exam stable. CT abdomen pending. Patient to be NPO at midnight/heparin gtt off at midnight in anticipation of PEG placement by GI tomorrow. Patient still remains NPO. IV labetalol 5mg scheduled q6 hours for BP control.   05/27/2024: Neuro exam stable, PEG placement today.   05/28/2024: New R wrist pain, with hard spot in forearm, looks to be thrombophlebitis, R wrist xray and RUE US pending, hot packs to forearm and wrist, if concern for cellulitis will add antibiotic covering      STROKE DOCUMENTATION        NIH Scale:          Modified Schuyler Score: 2  [place] Coma  Scale:    ABCD2 Score:    [id number] Score:7  HAS -BLED Score:   ICH Score:   Hunt & Marie Classification:      Hemorrhagic change of an Ischemic Stroke: Does this patient have an ischemic stroke with hemorrhagic changes? No     Subjective:      Interval History: Patient is seen for follow-up neurological assessment and treatment recommendations: No acute events overnight. Neuro exam stable. CT abdomen pending. Patient to be NPO at midnight/heparin gtt off at midnight in anticipation of PEG placement by General surgery on 5/29. Patient still remains NPO. IV labetalol 5mg scheduled q6 hours for BP control. R wrist/forearm pain concern for thrombophlebitis. RUE US R wrist xray      HPI, Past Medical, Family, and Social History remains the same as documented in the initial encounter.     Review of Systems   HENT:  Positive for trouble swallowing.    Respiratory:  Positive for cough.    Musculoskeletal:  Positive for arthralgias and back pain.   Neurological:  Positive for facial asymmetry, weakness and numbness.   Hematological:  Bruises/bleeds easily.   All other systems reviewed and are negative.     Scheduled Meds:   acetaminophen  650 mg Rectal Q6H    atorvastatin  40 mg Oral Daily    labetalol  10 mg Intravenous Q6H    LIDOcaine  1 patch Transdermal Q24H    mupirocin   Nasal BID    nystatin  500,000 Units Oral QID    pantoprazole  40 mg Intravenous Daily      Continuous Infusions:   dextrose 5 % and 0.45 % NaCl   Intravenous Continuous 50 mL/hr at 05/27/24 2303 New Bag at 05/27/24 2303    heparin (porcine) in D5W  0-40 Units/kg/hr (Adjusted) Intravenous Continuous 6.8 mL/hr at 05/28/24 0555 12 Units/kg/hr at 05/28/24 0555      PRN Meds:  Current Facility-Administered Medications:     bisacodyL, 10 mg, Rectal, Daily PRN    dextrose 10%, 12.5 g, Intravenous, PRN    dextrose 10%, 25 g, Intravenous, PRN    diclofenac sodium, 2 g, Topical (Top), TID PRN    glucagon (human recombinant), 1 mg, Intramuscular, PRN     insulin aspart U-100, 0-5 Units, Subcutaneous, Q12H PRN    iohexol, 15 mL, Oral, PRN    labetalol, 10 mg, Intravenous, Q6H PRN    ondansetron, 4 mg, Intravenous, Q12H PRN    sodium chloride 0.9%, 500 mL, Intravenous, PRN    sodium chloride 0.9%, 10 mL, Intravenous, PRN     Objective:      Vital Signs (Most Recent):  Temp: 98.7 °F (37.1 °C) (05/28/24 0720)  Pulse: 70 (05/28/24 0720)  Resp: 20 (05/28/24 0720)  BP: 134/63 (05/28/24 0720)  SpO2: 96 % (05/28/24 0720)  BP Location: Right arm     Vital Signs Range (Last 24H):  Temp:  [97.8 °F (36.6 °C)-98.7 °F (37.1 °C)]   Pulse:  [69-82]   Resp:  [18-20]   BP: (134-187)/(63-86)   SpO2:  [96 %-100 %]   BP Location: Right arm     Physical Exam  Vitals reviewed.   HENT:      Head: Normocephalic.      Mouth/Throat:      Mouth: Mucous membranes are dry.   Pulmonary:      Effort: Pulmonary effort is normal.   Neurological:      Mental Status: She is alert and oriented to person, place, and time.      Sensory: Sensory deficit present.      Motor: Weakness present.   Psychiatric:         Mood and Affect: Mood normal.         Behavior: Behavior normal.         Difficult exam due to pain in RUE on 5/28. Pain with movement, tender to touch, hard nodule on forearm. Lidocaine patch in place, no redness noted     Neurological Exam:   LOC: alert  Attention Span: Good   Language: decreased fluency  Articulation: Dysarthria  Orientation: Person, Place, Time   Facial Movement (CN VII): Lower facial weakness on the Left  Motor: Arm left  Plegia 0/5  Leg left  Plegia 0/5  Arm right  Paresis: 4/5  Leg right Paresis: 4/5     Laboratory:  CMP:       Recent Labs   Lab 05/28/24 0446   CALCIUM 8.6*   ALBUMIN 2.4*   PROT 5.9*      K 3.4*   CO2 21*      BUN 11   CREATININE 0.9   ALKPHOS 48*   ALT 18   AST 21   BILITOT 0.5         BMP:       Recent Labs   Lab 05/28/24  0446      K 3.4*      CO2 21*   BUN 11   CREATININE 0.9   CALCIUM 8.6*      CBC:       Recent Labs   Lab  "05/28/24  0446   WBC 8.25   RBC 3.81*   HGB 9.7*   HCT 30.3*      MCV 80*   MCH 25.5*   MCHC 32.0      Lipid Panel:       Recent Labs   Lab 05/23/24  2328   CHOL 146   LDLCALC 83.2   HDL 43   TRIG 99      Coagulation:         Recent Labs   Lab 05/25/24  1405 05/25/24  2110 05/28/24  0446   INR 1.0  --   --    APTT 31.0   < > 51.0*    < > = values in this interval not displayed.      Platelet Aggregation Study: No results for input(s): "PLTAGG", "PLTAGINTERP", "PLTAGREGLACO", "ADPPLTAGGREG" in the last 168 hours.  Hgb A1C:   No results for input(s): "HGBA1C" in the last 168 hours.     TSH:       Recent Labs   Lab 05/23/24 2328   TSH 1.475         Diagnostic Results      Brain Imaging:  MRI Brain w/o Contrast 5/24/24  Impression:  Moderate-sized right MCA territorial infarction with scattered petechial hemorrhage and a suspected small hematoma.  Clinical correlation and short-term follow-up with CT is recommended.  Hazy visualization of the right MCA flow void as compared to the contralateral side with suspected occlusion of the distal right MCA.  Further evaluation with CTA may be performed.  Generalized cerebral volume loss and moderate chronic microvascular ischemic change.  Small well-circumscribed T2 hyperintense focus of at the left superomedial extraconal space measuring 0.7 cm.  Findings are nonspecific, and may relate to a small vascular lesion.  Further evaluation with dedicated imaging of the orbits as clinically indicated.     CT Head w/o Contrast 5/21/24  Impression  Evolving subtotal right MCA infarct on a background of moderate to advanced chronic microvascular ischemic disease. No progressive mass effect. No hemorrhagic transformation.     Vessel Imaging:  CTA Head and Neck 5/18/24  Impression  1. Occlusion of one of the right M2 segments.  2. Less than 50 percent stenosis right M1 segment.  3. Findings discussed with stroke team neurology resident.     CT abd/pelvis  Impression:     Tiny focus " of free air in the retroperitoneum, as detailed above.  Recommend correlation with outside imaging studies for stability.  Etiology of the retroperitoneal air is not apparent.  No large volume intraperitoneal free air.     Small left pleural effusion with basilar atelectasis.     Small to moderate size hiatal hernia with questioned thickening of the lower esophagus, possibly suggestive of esophagitis.     Cholelithiasis.    Nicholas Norwood DO  [facility]  Department of Vascular Neurology   [facility] Neuro[facility])

## 2024-05-28 NOTE — PROGRESS NOTES
Onur Wright - Neurosurgery ([facility])  Vascular Neurology  Comprehensive Stroke Center  Progress Note    Assessment/Plan:     * Cerebrovascular accident (CVA) due to embolism of right middle cerebral artery  89 y/o female with R MCA stroke due to R M2 occlusio. No thrombolytics as is on Xarelto. Went to IR but procedure aborted due to distal clot. So TICI 0. Etiology cardio embolic due to chronic afib.     No acute events overnight. Neuro exam stable. CT abdomen pending. Patient to be NPO at midnight/heparin gtt off at midnight in anticipation of PEG placement by GI on 5/27. Pt with some concerning finding of duodenal perf on CT abd/pelvis. Pt does not have peritonitis signs. General surgery was less concerned for the air that was seen and patient vitals are stable, patient looks well. Gen surgery planning to place PEG tube on 5/29. Patient still remains NPO. IV labetalol 5mg scheduled q6 hours for BP control. Pt pending rehab placement after peg tube.     Antithrombotics: ASA + minimal intensity heparin gtt. After patient is therapeutic on heparin gtt, ASA can be discontinued.    Statins: Lipitor 40 mg daily but unable to get as no NG tube    Aggressive risk factor modification: HTN, Diet, Exercise, Obesity, A-Fib     Rehab efforts: The patient has been evaluated by a stroke team provider and the therapy needs have been fully considered based off the presenting complaints and exam findings. The following therapy evaluations are needed:  recommendations include high intensity therapy    Diagnostics ordered/pending: Other: CT abd/pelvis    VTE prophylaxis: Mechanical prophylaxis: Place SCDs  None: Reason for No Pharmacological VTE Prophylaxis: Currently on anticoagulation    BP parameters: Infarct: SBP <180        Thrombophlebitis  Pt complaining of new R wrist and forearm pain, patient is tender to the touch on her R forearm and wrist. Tender with movement. No noted warmth or redness, fingers warm to touch. Pt reports  tylenol, voltaren and lidocaine patches are helping some. Concern for thrombophlebitis.    Plan  R wrist/forearm xray   R UE ultrasound  Scheduled tylenol suppository with no GI access  Hot packs and warm compresses to area  Concern for cellulitis or sepsis. Low threshold for initiation of antibiotics and sepsis workup    Obesity (BMI 30-39.9)  Stroke risk factor   on diet and exercise    Dysphagia due to recent stroke  Due to stroke  Aggressive therapy  Speech therapy consult  PEG to be placed on 5/29 with general surgery        Dysarthria due to acute stroke  Due to stroke  Aggressive therapy          Hemiplegia  Due to stroke  Aggressive therapy      Chronic a-fib  Stroke risk factor  Was on Xarelto, will need to resume once feeding route resolved    Hypertension, benign  Stroke risk factor  SBP <180 as 5 days out  IV labetalol 5mg schedule q6 hours for BP control 2^ patient being NPO         05/25/2024: No acute events overnight. Neuro exam stable. MRI Brain showed moderate sized R MCA infarct with scattered petechial hemorrhage and a suspected small hematoma. Patient's imaging discussed with staff, Dr. Rodriges, and patient's family. Heparin gtt initiated for AC in setting of atrial fibrillation while patient anticipates PEG placement on 5/27. Patient pending abdominal CT for eval of Pneumoperitoneum.  05/26/2024: No acute events overnight. Neuro exam stable. CT abdomen pending. Patient to be NPO at midnight/heparin gtt off at midnight in anticipation of PEG placement by GI tomorrow. Patient still remains NPO. IV labetalol 5mg scheduled q6 hours for BP control.   05/27/2024: Neuro exam stable, PEG placement today.   05/28/2024: New R wrist pain, with hard spot in forearm, looks to be thrombophlebitis, R wrist xray and RUE US pending, hot packs to forearm and wrist, if concern for cellulitis will add antibiotic covering      STROKE DOCUMENTATION        NIH Scale:          Modified Luis Miguel Score: 2  Alexander  Coma Scale:    ABCD2 Score:    [id number] Score:7  HAS -BLED Score:   ICH Score:   Hunt & Marie Classification:      Hemorrhagic change of an Ischemic Stroke: Does this patient have an ischemic stroke with hemorrhagic changes? No     Neurologic Chief Complaint: R MCA infarct    Subjective:     Interval History: Patient is seen for follow-up neurological assessment and treatment recommendations: No acute events overnight. Neuro exam stable. CT abdomen pending. Patient to be NPO at midnight/heparin gtt off at midnight in anticipation of PEG placement by General surgery on 5/29. Patient still remains NPO. IV labetalol 5mg scheduled q6 hours for BP control. R wrist/forearm pain concern for thrombophlebitis. RUE US R wrist xray     HPI, Past Medical, Family, and Social History remains the same as documented in the initial encounter.     Review of Systems   HENT:  Positive for trouble swallowing.    Respiratory:  Positive for cough.    Musculoskeletal:  Positive for arthralgias and back pain.   Neurological:  Positive for facial asymmetry, weakness and numbness.   Hematological:  Bruises/bleeds easily.   All other systems reviewed and are negative.    Scheduled Meds:   acetaminophen  650 mg Rectal Q6H    atorvastatin  40 mg Oral Daily    labetalol  10 mg Intravenous Q6H    LIDOcaine  1 patch Transdermal Q24H    mupirocin   Nasal BID    nystatin  500,000 Units Oral QID    pantoprazole  40 mg Intravenous Daily     Continuous Infusions:   dextrose 5 % and 0.45 % NaCl   Intravenous Continuous 50 mL/hr at 05/27/24 2303 New Bag at 05/27/24 2303    heparin (porcine) in D5W  0-40 Units/kg/hr (Adjusted) Intravenous Continuous 6.8 mL/hr at 05/28/24 0555 12 Units/kg/hr at 05/28/24 0555     PRN Meds:  Current Facility-Administered Medications:     bisacodyL, 10 mg, Rectal, Daily PRN    dextrose 10%, 12.5 g, Intravenous, PRN    dextrose 10%, 25 g, Intravenous, PRN    diclofenac sodium, 2 g, Topical (Top), TID PRN    glucagon (human  recombinant), 1 mg, Intramuscular, PRN    insulin aspart U-100, 0-5 Units, Subcutaneous, Q12H PRN    iohexol, 15 mL, Oral, PRN    labetalol, 10 mg, Intravenous, Q6H PRN    ondansetron, 4 mg, Intravenous, Q12H PRN    sodium chloride 0.9%, 500 mL, Intravenous, PRN    sodium chloride 0.9%, 10 mL, Intravenous, PRN    Objective:     Vital Signs (Most Recent):  Temp: 98.7 °F (37.1 °C) (05/28/24 0720)  Pulse: 70 (05/28/24 0720)  Resp: 20 (05/28/24 0720)  BP: 134/63 (05/28/24 0720)  SpO2: 96 % (05/28/24 0720)  BP Location: Right arm    Vital Signs Range (Last 24H):  Temp:  [97.8 °F (36.6 °C)-98.7 °F (37.1 °C)]   Pulse:  [69-82]   Resp:  [18-20]   BP: (134-187)/(63-86)   SpO2:  [96 %-100 %]   BP Location: Right arm       Physical Exam  Vitals reviewed.   HENT:      Head: Normocephalic.      Mouth/Throat:      Mouth: Mucous membranes are dry.   Pulmonary:      Effort: Pulmonary effort is normal.   Neurological:      Mental Status: She is alert and oriented to person, place, and time.      Sensory: Sensory deficit present.      Motor: Weakness present.   Psychiatric:         Mood and Affect: Mood normal.         Behavior: Behavior normal.          Difficult exam due to pain in RUE on 5/28. Pain with movement, tender to touch, hard nodule on forearm. Lidocaine patch in place, no redness noted    Neurological Exam:   LOC: alert  Attention Span: Good   Language: decreased fluency  Articulation: Dysarthria  Orientation: Person, Place, Time   Facial Movement (CN VII): Lower facial weakness on the Left  Motor: Arm left  Plegia 0/5  Leg left  Plegia 0/5  Arm right  Paresis: 4/5  Leg right Paresis: 4/5    Laboratory:  CMP:   Recent Labs   Lab 05/28/24  0446   CALCIUM 8.6*   ALBUMIN 2.4*   PROT 5.9*      K 3.4*   CO2 21*      BUN 11   CREATININE 0.9   ALKPHOS 48*   ALT 18   AST 21   BILITOT 0.5       BMP:   Recent Labs   Lab 05/28/24  0446      K 3.4*      CO2 21*   BUN 11   CREATININE 0.9   CALCIUM 8.6*  "    CBC:   Recent Labs   Lab 05/28/24  0446   WBC 8.25   RBC 3.81*   HGB 9.7*   HCT 30.3*      MCV 80*   MCH 25.5*   MCHC 32.0     Lipid Panel:   Recent Labs   Lab 05/23/24  2328   CHOL 146   LDLCALC 83.2   HDL 43   TRIG 99     Coagulation:   Recent Labs   Lab 05/25/24  1405 05/25/24  2110 05/28/24  0446   INR 1.0  --   --    APTT 31.0   < > 51.0*    < > = values in this interval not displayed.     Platelet Aggregation Study: No results for input(s): "PLTAGG", "PLTAGINTERP", "PLTAGREGLACO", "ADPPLTAGGREG" in the last 168 hours.  Hgb A1C:   No results for input(s): "HGBA1C" in the last 168 hours.    TSH:   Recent Labs   Lab 05/23/24  2328   TSH 1.475       Diagnostic Results     Brain Imaging:  MRI Brain w/o Contrast 5/24/24  Impression:  Moderate-sized right MCA territorial infarction with scattered petechial hemorrhage and a suspected small hematoma.  Clinical correlation and short-term follow-up with CT is recommended.  Hazy visualization of the right MCA flow void as compared to the contralateral side with suspected occlusion of the distal right MCA.  Further evaluation with CTA may be performed.  Generalized cerebral volume loss and moderate chronic microvascular ischemic change.  Small well-circumscribed T2 hyperintense focus of at the left superomedial extraconal space measuring 0.7 cm.  Findings are nonspecific, and may relate to a small vascular lesion.  Further evaluation with dedicated imaging of the orbits as clinically indicated.     CT Head w/o Contrast 5/21/24  Impression  Evolving subtotal right MCA infarct on a background of moderate to advanced chronic microvascular ischemic disease. No progressive mass effect. No hemorrhagic transformation.    Vessel Imaging:  CTA Head and Neck 5/18/24  Impression  1. Occlusion of one of the right M2 segments.  2. Less than 50 percent stenosis right M1 segment.  3. Findings discussed with stroke team neurology resident.    CT abd/pelvis  Impression:     Tiny " focus of free air in the retroperitoneum, as detailed above.  Recommend correlation with outside imaging studies for stability.  Etiology of the retroperitoneal air is not apparent.  No large volume intraperitoneal free air.     Small left pleural effusion with basilar atelectasis.     Small to moderate size hiatal hernia with questioned thickening of the lower esophagus, possibly suggestive of esophagitis.     Cholelithiasis.    Nicholas Norwood DO  [facility]  Department of Vascular Neurology   [facility])

## 2024-05-28 NOTE — ASSESSMENT & PLAN NOTE
-MRI Brain- Moderate-sized right MCA territorial infarction with scattered petechial hemorrhage and a suspected small hematoma .  -CTH-Evolving subtotal right MCA infarct on a background of moderate to advanced chronic microvascular ischemic disease. No progressive mass effect. No hemorrhagic transformation.   -CTA- significant for Right M2 occlusion.   -IR procedure aborted due to distal clot.  -NPO per SLP. Will need PEG tube placement.  -PT/OT rec for high intensity therapies. Pt and daughter amenable. [place] [facility] [place]

## 2024-05-28 NOTE — ASSESSMENT & PLAN NOTE
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 No thrombolytics as is on Xarelto. Went to IR but procedure aborted due to distal clot. So TICI 0. Etiology cardio embolic due to chronic afib.     No acute events overnight. Neuro exam stable. CT abdomen pending. Patient to be NPO at midnight/heparin gtt off at midnight in anticipation of PEG placement by GI on 5/27. Pt with some concerning finding of duodenal perf on CT abd/pelvis. Pt does not have peritonitis signs. General surgery was less concerned for the air that was seen and patient vitals are stable, patient looks well. Gen surgery planning to place PEG tube on 5/29. Patient still remains NPO. IV labetalol 5mg scheduled q6 hours for BP control. Pt pending rehab placement after peg tube.     Antithrombotics: ASA + minimal intensity heparin gtt. After patient is therapeutic on heparin gtt, ASA can be discontinued.    Statins: Lipitor 40 mg daily but unable to get as no NG tube    Aggressive risk factor modification: HTN, Diet, Exercise, Obesity, A-Fib     Rehab efforts: The patient has been evaluated by a stroke team provider and the therapy needs have been fully considered based off the presenting complaints and exam findings. The following therapy evaluations are needed:  recommendations include high intensity therapy    Diagnostics ordered/pending: Other: CT abd/pelvis    VTE prophylaxis: Mechanical prophylaxis: Place SCDs  None: Reason for No Pharmacological VTE Prophylaxis: Currently on anticoagulation    BP parameters: Infarct: SBP <180

## 2024-05-28 NOTE — SUBJECTIVE & OBJECTIVE
Neurologic Chief Complaint: R MCA infarct    Subjective:     Interval History: Patient is seen for follow-up neurological assessment and treatment recommendations: No acute events overnight. Neuro exam stable. CT abdomen pending. Patient to be NPO at midnight/heparin gtt off at midnight in anticipation of PEG placement by General surgery on 5/29. Patient still remains NPO. IV labetalol 5mg scheduled q6 hours for BP control. R wrist/forearm pain concern for thrombophlebitis. RUE US pending,  R wrist xray no fracture, some soft tissue swelling    HPI, Past Medical, Family, and Social History remains the same as documented in the initial encounter.     Review of Systems   HENT:  Positive for trouble swallowing.    Respiratory:  Positive for cough.    Musculoskeletal:  Positive for arthralgias and back pain.   Neurological:  Positive for facial asymmetry, weakness and numbness.   Hematological:  Bruises/bleeds easily.   All other systems reviewed and are negative.    Scheduled Meds:   acetaminophen  650 mg Rectal Q6H    atorvastatin  40 mg Oral Daily    labetalol  10 mg Intravenous Q6H    LIDOcaine  1 patch Transdermal Q24H    mupirocin   Nasal BID    nystatin  500,000 Units Oral QID    pantoprazole  40 mg Intravenous Daily     Continuous Infusions:   dextrose 5 % and 0.45 % NaCl   Intravenous Continuous 50 mL/hr at 05/27/24 2303 New Bag at 05/27/24 2303    heparin (porcine) in D5W  0-40 Units/kg/hr (Adjusted) Intravenous Continuous 6.8 mL/hr at 05/28/24 0555 12 Units/kg/hr at 05/28/24 0555     PRN Meds:  Current Facility-Administered Medications:     bisacodyL, 10 mg, Rectal, Daily PRN    dextrose 10%, 12.5 g, Intravenous, PRN    dextrose 10%, 25 g, Intravenous, PRN    diclofenac sodium, 2 g, Topical (Top), TID PRN    glucagon (human recombinant), 1 mg, Intramuscular, PRN    insulin aspart U-100, 0-5 Units, Subcutaneous, Q12H PRN    iohexol, 15 mL, Oral, PRN    labetalol, 10 mg, Intravenous, Q6H PRN    ondansetron, 4  mg, Intravenous, Q12H PRN    sodium chloride 0.9%, 500 mL, Intravenous, PRN    sodium chloride 0.9%, 10 mL, Intravenous, PRN    Objective:     Vital Signs (Most Recent):  Temp: 98.7 °F (37.1 °C) (05/28/24 0720)  Pulse: 70 (05/28/24 0720)  Resp: 20 (05/28/24 0720)  BP: 134/63 (05/28/24 0720)  SpO2: 96 % (05/28/24 0720)  BP Location: Right arm    Vital Signs Range (Last 24H):  Temp:  [97.8 °F (36.6 °C)-98.7 °F (37.1 °C)]   Pulse:  [69-82]   Resp:  [18-20]   BP: (134-187)/(63-86)   SpO2:  [96 %-100 %]   BP Location: Right arm       Physical Exam  Vitals reviewed.   HENT:      Head: Normocephalic.      Mouth/Throat:      Mouth: Mucous membranes are dry.   Pulmonary:      Effort: Pulmonary effort is normal.   Neurological:      Mental Status: She is alert and oriented to person, place, and time.      Sensory: Sensory deficit present.      Motor: Weakness present.   Psychiatric:         Mood and Affect: Mood normal.         Behavior: Behavior normal.          Pain with movement, tender to touch, hard nodule on forearm. Lidocaine patch in place, no redness noted    Neurological Exam:   LOC: alert  Attention Span: Good   Language: decreased fluency  Articulation: Dysarthria  Orientation: Person, Place, Time   Facial Movement (CN VII): Lower facial weakness on the Left  Motor: Arm left  Plegia 0/5  Leg left  Plegia 0/5  Arm right  Paresis: 4/5  Leg right Paresis: 4/5    Laboratory:  CMP:   Recent Labs   Lab 05/28/24 0446   CALCIUM 8.6*   ALBUMIN 2.4*   PROT 5.9*      K 3.4*   CO2 21*      BUN 11   CREATININE 0.9   ALKPHOS 48*   ALT 18   AST 21   BILITOT 0.5       BMP:   Recent Labs   Lab 05/28/24  0446      K 3.4*      CO2 21*   BUN 11   CREATININE 0.9   CALCIUM 8.6*     CBC:   Recent Labs   Lab 05/28/24  0446   WBC 8.25   RBC 3.81*   HGB 9.7*   HCT 30.3*      MCV 80*   MCH 25.5*   MCHC 32.0     Lipid Panel:   Recent Labs   Lab 05/23/24  2328   CHOL 146   LDLCALC 83.2   HDL 43   TRIG 99  "    Coagulation:   Recent Labs   Lab 05/25/24  1405 05/25/24  2110 05/28/24  0446   INR 1.0  --   --    APTT 31.0   < > 51.0*    < > = values in this interval not displayed.     Platelet Aggregation Study: No results for input(s): "PLTAGG", "PLTAGINTERP", "PLTAGREGLACO", "ADPPLTAGGREG" in the last 168 hours.  Hgb A1C:   No results for input(s): "HGBA1C" in the last 168 hours.    TSH:   Recent Labs   Lab 05/23/24  2328   TSH 1.475       Diagnostic Results     Brain Imaging:  MRI Brain w/o Contrast 5/24/24  Impression:  Moderate-sized right MCA territorial infarction with scattered petechial hemorrhage and a suspected small hematoma.  Clinical correlation and short-term follow-up with CT is recommended.  Hazy visualization of the right MCA flow void as compared to the contralateral side with suspected occlusion of the distal right MCA.  Further evaluation with CTA may be performed.  Generalized cerebral volume loss and moderate chronic microvascular ischemic change.  Small well-circumscribed T2 hyperintense focus of at the left superomedial extraconal space measuring 0.7 cm.  Findings are nonspecific, and may relate to a small vascular lesion.  Further evaluation with dedicated imaging of the orbits as clinically indicated.     CT Head w/o Contrast 5/21/24  Impression  Evolving subtotal right MCA infarct on a background of moderate to advanced chronic microvascular ischemic disease. No progressive mass effect. No hemorrhagic transformation.    Vessel Imaging:  CTA Head and Neck 5/18/24  Impression  1. Occlusion of one of the right M2 segments.  2. Less than 50 percent stenosis right M1 segment.  3. Findings discussed with stroke team neurology resident.    CT abd/pelvis  Impression:     Tiny focus of free air in the retroperitoneum, as detailed above.  Recommend correlation with outside imaging studies for stability.  Etiology of the retroperitoneal air is not apparent.  No large volume intraperitoneal free air.   "   Small left pleural effusion with basilar atelectasis.     Small to moderate size hiatal hernia with questioned thickening of the lower esophagus, possibly suggestive of esophagitis.     Cholelithiasis.    R wrist xray  Impression:     Abnormal study as above with soft tissue swelling and irregularity about the dorsum of the hand and wrist.  Correlation with physical findings would be helpful.     Advanced degenerative change.  Probable ligamentous disruption scapholunate.

## 2024-05-28 NOTE — PROGRESS NOTES
Nurses Note -- 4 Eyes      5/28/2024   5:49 AM      Skin assessed during: Q Shift Change      [x] No Altered Skin Integrity Present    [x]Prevention Measures Documented      [] Yes- Altered Skin Integrity Present or Discovered   [] LDA Added if Not in Epic (Describe Wound)   [] New Altered Skin Integrity was Present on Admit and Documented in LDA   [] Wound Image Taken    Wound Care Consulted? No    Attending Nurse:  Jaida Beckham RN/Staff Membe: Yuli

## 2024-05-29 ENCOUNTER — DOCUMENTATION ONLY (OUTPATIENT)
Dept: ELECTROPHYSIOLOGY | Facility: CLINIC | Age: 89
End: 2024-05-29
Payer: MEDICARE

## 2024-05-29 ENCOUNTER — ANESTHESIA (OUTPATIENT)
Dept: SURGERY | Facility: HOSPITAL | Age: 89
DRG: 065 | End: 2024-05-29
Payer: MEDICARE

## 2024-05-29 LAB
ALBUMIN SERPL BCP-MCNC: 2.2 G/DL (ref 3.5–5.2)
ALP SERPL-CCNC: 50 U/L (ref 55–135)
ALT SERPL W/O P-5'-P-CCNC: 17 U/L (ref 10–44)
ANION GAP SERPL CALC-SCNC: 7 MMOL/L (ref 8–16)
APTT PPP: 33 SEC (ref 21–32)
APTT PPP: 45.7 SEC (ref 21–32)
AST SERPL-CCNC: 18 U/L (ref 10–40)
BASOPHILS # BLD AUTO: 0.01 K/UL (ref 0–0.2)
BASOPHILS # BLD AUTO: 0.02 K/UL (ref 0–0.2)
BASOPHILS NFR BLD: 0.2 % (ref 0–1.9)
BASOPHILS NFR BLD: 0.3 % (ref 0–1.9)
BILIRUB SERPL-MCNC: 0.5 MG/DL (ref 0.1–1)
BUN SERPL-MCNC: 11 MG/DL (ref 8–23)
CALCIUM SERPL-MCNC: 8.7 MG/DL (ref 8.7–10.5)
CHLORIDE SERPL-SCNC: 108 MMOL/L (ref 95–110)
CO2 SERPL-SCNC: 22 MMOL/L (ref 23–29)
CREAT SERPL-MCNC: 0.9 MG/DL (ref 0.5–1.4)
DIFFERENTIAL METHOD BLD: ABNORMAL
DIFFERENTIAL METHOD BLD: ABNORMAL
EOSINOPHIL # BLD AUTO: 0.1 K/UL (ref 0–0.5)
EOSINOPHIL # BLD AUTO: 0.1 K/UL (ref 0–0.5)
EOSINOPHIL NFR BLD: 1.6 % (ref 0–8)
EOSINOPHIL NFR BLD: 1.7 % (ref 0–8)
ERYTHROCYTE [DISTWIDTH] IN BLOOD BY AUTOMATED COUNT: 15.1 % (ref 11.5–14.5)
ERYTHROCYTE [DISTWIDTH] IN BLOOD BY AUTOMATED COUNT: 15.2 % (ref 11.5–14.5)
EST. GFR  (NO RACE VARIABLE): >60 ML/MIN/1.73 M^2
GLUCOSE SERPL-MCNC: 121 MG/DL (ref 70–110)
HCT VFR BLD AUTO: 29.9 % (ref 37–48.5)
HCT VFR BLD AUTO: 30.2 % (ref 37–48.5)
HGB BLD-MCNC: 9.2 G/DL (ref 12–16)
HGB BLD-MCNC: 9.4 G/DL (ref 12–16)
IMM GRANULOCYTES # BLD AUTO: 0.05 K/UL (ref 0–0.04)
IMM GRANULOCYTES # BLD AUTO: 0.07 K/UL (ref 0–0.04)
IMM GRANULOCYTES NFR BLD AUTO: 0.8 % (ref 0–0.5)
IMM GRANULOCYTES NFR BLD AUTO: 1 % (ref 0–0.5)
INR PPP: 1.1 (ref 0.8–1.2)
LYMPHOCYTES # BLD AUTO: 0.6 K/UL (ref 1–4.8)
LYMPHOCYTES # BLD AUTO: 0.9 K/UL (ref 1–4.8)
LYMPHOCYTES NFR BLD: 11.8 % (ref 18–48)
LYMPHOCYTES NFR BLD: 9.9 % (ref 18–48)
MAGNESIUM SERPL-MCNC: 1.9 MG/DL (ref 1.6–2.6)
MCH RBC QN AUTO: 25.1 PG (ref 27–31)
MCH RBC QN AUTO: 25.1 PG (ref 27–31)
MCHC RBC AUTO-ENTMCNC: 30.8 G/DL (ref 32–36)
MCHC RBC AUTO-ENTMCNC: 31.1 G/DL (ref 32–36)
MCV RBC AUTO: 81 FL (ref 82–98)
MCV RBC AUTO: 82 FL (ref 82–98)
MONOCYTES # BLD AUTO: 0.5 K/UL (ref 0.3–1)
MONOCYTES # BLD AUTO: 0.8 K/UL (ref 0.3–1)
MONOCYTES NFR BLD: 11 % (ref 4–15)
MONOCYTES NFR BLD: 8.8 % (ref 4–15)
NEUTROPHILS # BLD AUTO: 4.9 K/UL (ref 1.8–7.7)
NEUTROPHILS # BLD AUTO: 5.3 K/UL (ref 1.8–7.7)
NEUTROPHILS NFR BLD: 74.2 % (ref 38–73)
NEUTROPHILS NFR BLD: 78.7 % (ref 38–73)
NRBC BLD-RTO: 0 /100 WBC
NRBC BLD-RTO: 0 /100 WBC
PHOSPHATE SERPL-MCNC: 3 MG/DL (ref 2.7–4.5)
PLATELET # BLD AUTO: 254 K/UL (ref 150–450)
PLATELET # BLD AUTO: 260 K/UL (ref 150–450)
PMV BLD AUTO: 11.1 FL (ref 9.2–12.9)
PMV BLD AUTO: 12.2 FL (ref 9.2–12.9)
POCT GLUCOSE: 102 MG/DL (ref 70–110)
POTASSIUM SERPL-SCNC: 3.1 MMOL/L (ref 3.5–5.1)
PROT SERPL-MCNC: 5.8 G/DL (ref 6–8.4)
PROTHROMBIN TIME: 11.6 SEC (ref 9–12.5)
RBC # BLD AUTO: 3.67 M/UL (ref 4–5.4)
RBC # BLD AUTO: 3.74 M/UL (ref 4–5.4)
SODIUM SERPL-SCNC: 137 MMOL/L (ref 136–145)
WBC # BLD AUTO: 6.16 K/UL (ref 3.9–12.7)
WBC # BLD AUTO: 7.18 K/UL (ref 3.9–12.7)

## 2024-05-29 PROCEDURE — 63600175 PHARM REV CODE 636 W HCPCS

## 2024-05-29 PROCEDURE — 25000003 PHARM REV CODE 250: Performed by: NURSE ANESTHETIST, CERTIFIED REGISTERED

## 2024-05-29 PROCEDURE — 63600175 PHARM REV CODE 636 W HCPCS: Mod: JZ,JG

## 2024-05-29 PROCEDURE — 25000003 PHARM REV CODE 250

## 2024-05-29 PROCEDURE — 36000706: Performed by: SURGERY

## 2024-05-29 PROCEDURE — 85730 THROMBOPLASTIN TIME PARTIAL: CPT | Performed by: PSYCHIATRY & NEUROLOGY

## 2024-05-29 PROCEDURE — 83735 ASSAY OF MAGNESIUM: CPT

## 2024-05-29 PROCEDURE — 36415 COLL VENOUS BLD VENIPUNCTURE: CPT

## 2024-05-29 PROCEDURE — 99233 SBSQ HOSP IP/OBS HIGH 50: CPT | Mod: GC,,, | Performed by: PSYCHIATRY & NEUROLOGY

## 2024-05-29 PROCEDURE — 25000003 PHARM REV CODE 250: Performed by: NURSE PRACTITIONER

## 2024-05-29 PROCEDURE — 63600175 PHARM REV CODE 636 W HCPCS: Performed by: NURSE ANESTHETIST, CERTIFIED REGISTERED

## 2024-05-29 PROCEDURE — 43246 EGD PLACE GASTROSTOMY TUBE: CPT | Mod: ,,, | Performed by: SURGERY

## 2024-05-29 PROCEDURE — 37000008 HC ANESTHESIA 1ST 15 MINUTES: Performed by: SURGERY

## 2024-05-29 PROCEDURE — 85610 PROTHROMBIN TIME: CPT | Performed by: NURSE PRACTITIONER

## 2024-05-29 PROCEDURE — 36415 COLL VENOUS BLD VENIPUNCTURE: CPT | Mod: XB | Performed by: NURSE PRACTITIONER

## 2024-05-29 PROCEDURE — S5010 5% DEXTROSE AND 0.45% SALINE: HCPCS

## 2024-05-29 PROCEDURE — D9220A PRA ANESTHESIA: Mod: CRNA,,, | Performed by: NURSE ANESTHETIST, CERTIFIED REGISTERED

## 2024-05-29 PROCEDURE — 85025 COMPLETE CBC W/AUTO DIFF WBC: CPT | Mod: 91 | Performed by: NURSE PRACTITIONER

## 2024-05-29 PROCEDURE — 94761 N-INVAS EAR/PLS OXIMETRY MLT: CPT

## 2024-05-29 PROCEDURE — 84100 ASSAY OF PHOSPHORUS: CPT

## 2024-05-29 PROCEDURE — 27201423 OPTIME MED/SURG SUP & DEVICES STERILE SUPPLY: Performed by: SURGERY

## 2024-05-29 PROCEDURE — 36415 COLL VENOUS BLD VENIPUNCTURE: CPT | Performed by: PSYCHIATRY & NEUROLOGY

## 2024-05-29 PROCEDURE — D9220A PRA ANESTHESIA: Mod: ANES,,, | Performed by: ANESTHESIOLOGY

## 2024-05-29 PROCEDURE — 85025 COMPLETE CBC W/AUTO DIFF WBC: CPT

## 2024-05-29 PROCEDURE — 25000003 PHARM REV CODE 250: Performed by: ANESTHESIOLOGY

## 2024-05-29 PROCEDURE — 71000015 HC POSTOP RECOV 1ST HR: Performed by: SURGERY

## 2024-05-29 PROCEDURE — 71000033 HC RECOVERY, INTIAL HOUR: Performed by: SURGERY

## 2024-05-29 PROCEDURE — 0DH63UZ INSERTION OF FEEDING DEVICE INTO STOMACH, PERCUTANEOUS APPROACH: ICD-10-PCS | Performed by: SURGERY

## 2024-05-29 PROCEDURE — 36000707: Performed by: SURGERY

## 2024-05-29 PROCEDURE — 99232 SBSQ HOSP IP/OBS MODERATE 35: CPT | Mod: 25,,, | Performed by: STUDENT IN AN ORGANIZED HEALTH CARE EDUCATION/TRAINING PROGRAM

## 2024-05-29 PROCEDURE — 85730 THROMBOPLASTIN TIME PARTIAL: CPT | Mod: 91 | Performed by: NURSE PRACTITIONER

## 2024-05-29 PROCEDURE — 11000001 HC ACUTE MED/SURG PRIVATE ROOM

## 2024-05-29 PROCEDURE — C9113 INJ PANTOPRAZOLE SODIUM, VIA: HCPCS | Performed by: NURSE PRACTITIONER

## 2024-05-29 PROCEDURE — 80053 COMPREHEN METABOLIC PANEL: CPT

## 2024-05-29 PROCEDURE — 63600175 PHARM REV CODE 636 W HCPCS: Performed by: NURSE PRACTITIONER

## 2024-05-29 PROCEDURE — 63600175 PHARM REV CODE 636 W HCPCS: Performed by: ANESTHESIOLOGY

## 2024-05-29 PROCEDURE — 37000009 HC ANESTHESIA EA ADD 15 MINS: Performed by: SURGERY

## 2024-05-29 RX ORDER — POTASSIUM CHLORIDE 7.45 MG/ML
10 INJECTION INTRAVENOUS
Status: COMPLETED | OUTPATIENT
Start: 2024-05-29 | End: 2024-05-29

## 2024-05-29 RX ORDER — PROPOFOL 10 MG/ML
VIAL (ML) INTRAVENOUS
Status: DISCONTINUED | OUTPATIENT
Start: 2024-05-29 | End: 2024-05-29

## 2024-05-29 RX ORDER — HEPARIN SODIUM,PORCINE/D5W 25000/250
0-40 INTRAVENOUS SOLUTION INTRAVENOUS CONTINUOUS
Status: DISCONTINUED | OUTPATIENT
Start: 2024-05-29 | End: 2024-05-31

## 2024-05-29 RX ORDER — FENTANYL CITRATE 50 UG/ML
INJECTION, SOLUTION INTRAMUSCULAR; INTRAVENOUS
Status: DISCONTINUED | OUTPATIENT
Start: 2024-05-29 | End: 2024-05-29

## 2024-05-29 RX ORDER — SODIUM CHLORIDE 0.9 % (FLUSH) 0.9 %
10 SYRINGE (ML) INJECTION
Status: DISCONTINUED | OUTPATIENT
Start: 2024-05-29 | End: 2024-05-29 | Stop reason: HOSPADM

## 2024-05-29 RX ORDER — HALOPERIDOL 5 MG/ML
0.5 INJECTION INTRAMUSCULAR EVERY 10 MIN PRN
Status: DISCONTINUED | OUTPATIENT
Start: 2024-05-29 | End: 2024-05-29 | Stop reason: HOSPADM

## 2024-05-29 RX ORDER — ROCURONIUM BROMIDE 10 MG/ML
INJECTION, SOLUTION INTRAVENOUS
Status: DISCONTINUED | OUTPATIENT
Start: 2024-05-29 | End: 2024-05-29

## 2024-05-29 RX ORDER — ONDANSETRON HYDROCHLORIDE 2 MG/ML
INJECTION, SOLUTION INTRAVENOUS
Status: DISCONTINUED | OUTPATIENT
Start: 2024-05-29 | End: 2024-05-29

## 2024-05-29 RX ORDER — OXYCODONE HYDROCHLORIDE 5 MG/1
5 TABLET ORAL
Status: DISCONTINUED | OUTPATIENT
Start: 2024-05-29 | End: 2024-05-29 | Stop reason: HOSPADM

## 2024-05-29 RX ORDER — ATORVASTATIN CALCIUM 40 MG/1
40 TABLET, FILM COATED ORAL DAILY
Status: DISCONTINUED | OUTPATIENT
Start: 2024-05-30 | End: 2024-05-31 | Stop reason: HOSPADM

## 2024-05-29 RX ORDER — HYDROMORPHONE HYDROCHLORIDE 1 MG/ML
0.2 INJECTION, SOLUTION INTRAMUSCULAR; INTRAVENOUS; SUBCUTANEOUS EVERY 5 MIN PRN
Status: DISCONTINUED | OUTPATIENT
Start: 2024-05-29 | End: 2024-05-29 | Stop reason: HOSPADM

## 2024-05-29 RX ORDER — LIDOCAINE HYDROCHLORIDE 20 MG/ML
INJECTION INTRAVENOUS
Status: DISCONTINUED | OUTPATIENT
Start: 2024-05-29 | End: 2024-05-29

## 2024-05-29 RX ORDER — MAGNESIUM SULFATE HEPTAHYDRATE 40 MG/ML
2 INJECTION, SOLUTION INTRAVENOUS ONCE
Status: COMPLETED | OUTPATIENT
Start: 2024-05-29 | End: 2024-05-29

## 2024-05-29 RX ADMIN — SUGAMMADEX 200 MG: 100 INJECTION, SOLUTION INTRAVENOUS at 02:05

## 2024-05-29 RX ADMIN — ROCURONIUM BROMIDE 30 MG: 10 INJECTION, SOLUTION INTRAVENOUS at 02:05

## 2024-05-29 RX ADMIN — SUGAMMADEX 100 MG: 100 INJECTION, SOLUTION INTRAVENOUS at 02:05

## 2024-05-29 RX ADMIN — MUPIROCIN: 20 OINTMENT TOPICAL at 08:05

## 2024-05-29 RX ADMIN — LABETALOL HYDROCHLORIDE 10 MG: 5 INJECTION, SOLUTION INTRAVENOUS at 06:05

## 2024-05-29 RX ADMIN — POTASSIUM CHLORIDE 10 MEQ: 7.46 INJECTION, SOLUTION INTRAVENOUS at 08:05

## 2024-05-29 RX ADMIN — ACETAMINOPHEN 650 MG: 650 SUPPOSITORY RECTAL at 12:05

## 2024-05-29 RX ADMIN — PROPOFOL 120 MG: 10 INJECTION, EMULSION INTRAVENOUS at 02:05

## 2024-05-29 RX ADMIN — POTASSIUM CHLORIDE 10 MEQ: 7.46 INJECTION, SOLUTION INTRAVENOUS at 11:05

## 2024-05-29 RX ADMIN — MAGNESIUM SULFATE HEPTAHYDRATE 2 G: 40 INJECTION, SOLUTION INTRAVENOUS at 08:05

## 2024-05-29 RX ADMIN — FENTANYL CITRATE 100 MCG: 50 INJECTION, SOLUTION INTRAMUSCULAR; INTRAVENOUS at 02:05

## 2024-05-29 RX ADMIN — LIDOCAINE HYDROCHLORIDE 80 MG: 20 INJECTION INTRAVENOUS at 02:05

## 2024-05-29 RX ADMIN — LABETALOL HYDROCHLORIDE 10 MG: 5 INJECTION, SOLUTION INTRAVENOUS at 05:05

## 2024-05-29 RX ADMIN — LABETALOL HYDROCHLORIDE 10 MG: 5 INJECTION, SOLUTION INTRAVENOUS at 12:05

## 2024-05-29 RX ADMIN — NYSTATIN 500000 UNITS: 100000 SUSPENSION ORAL at 08:05

## 2024-05-29 RX ADMIN — NYSTATIN 500000 UNITS: 100000 SUSPENSION ORAL at 09:05

## 2024-05-29 RX ADMIN — ACETAMINOPHEN 650 MG: 650 SUPPOSITORY RECTAL at 06:05

## 2024-05-29 RX ADMIN — PANTOPRAZOLE SODIUM 40 MG: 40 INJECTION, POWDER, FOR SOLUTION INTRAVENOUS at 08:05

## 2024-05-29 RX ADMIN — LABETALOL HYDROCHLORIDE 10 MG: 5 INJECTION, SOLUTION INTRAVENOUS at 11:05

## 2024-05-29 RX ADMIN — DEXTROSE AND SODIUM CHLORIDE: 5; 450 INJECTION, SOLUTION INTRAVENOUS at 05:05

## 2024-05-29 RX ADMIN — LIDOCAINE 1 PATCH: 700 PATCH TOPICAL at 06:05

## 2024-05-29 RX ADMIN — POTASSIUM CHLORIDE 10 MEQ: 7.46 INJECTION, SOLUTION INTRAVENOUS at 04:05

## 2024-05-29 RX ADMIN — ONDANSETRON 4 MG: 2 INJECTION INTRAMUSCULAR; INTRAVENOUS at 02:05

## 2024-05-29 RX ADMIN — MUPIROCIN: 20 OINTMENT TOPICAL at 09:05

## 2024-05-29 RX ADMIN — POTASSIUM CHLORIDE 10 MEQ: 7.46 INJECTION, SOLUTION INTRAVENOUS at 09:05

## 2024-05-29 RX ADMIN — SODIUM CHLORIDE, SODIUM GLUCONATE, SODIUM ACETATE, POTASSIUM CHLORIDE, MAGNESIUM CHLORIDE, SODIUM PHOSPHATE, DIBASIC, AND POTASSIUM PHOSPHATE: .53; .5; .37; .037; .03; .012; .00082 INJECTION, SOLUTION INTRAVENOUS at 02:05

## 2024-05-29 RX ADMIN — HEPARIN SODIUM 12 UNITS/KG/HR: 10000 INJECTION, SOLUTION INTRAVENOUS at 10:05

## 2024-05-29 NOTE — PROGRESS NOTES
Patient has been identified as having an implanted cardiac rhythm device (CRD); the implanted device is a Medtronic pacemaker.      Planned procedure:  EGD, WITH PEG TUBE INSERTION, GASTROSTOMY     No noted pacer dependency.      PACEMAKERS/NON-DEPENDENT    Per protocol, no pacemaker reprogramming is required in this non-dependent patient.    For additional questions, please contact the Arrhythmia Department at Ext [phone].

## 2024-05-29 NOTE — SUBJECTIVE & OBJECTIVE
Interval History: NAEON. Afebrile. HDS. To OR today for PEG    Medications:  Continuous Infusions:   dextrose 5 % and 0.45 % NaCl   Intravenous Continuous 50 mL/hr at 05/28/24 1722 New Bag at 05/28/24 1722     Scheduled Meds:   acetaminophen  650 mg Rectal Q6H    atorvastatin  40 mg Oral Daily    labetalol  10 mg Intravenous Q6H    LIDOcaine  1 patch Transdermal Q24H    magnesium sulfate IVPB  2 g Intravenous Once    mupirocin   Nasal BID    nystatin  500,000 Units Oral QID    pantoprazole  40 mg Intravenous Daily    potassium chloride  10 mEq Intravenous Q1H     PRN Meds:  Current Facility-Administered Medications:     bisacodyL, 10 mg, Rectal, Daily PRN    dextrose 10%, 12.5 g, Intravenous, PRN    dextrose 10%, 25 g, Intravenous, PRN    diclofenac sodium, 2 g, Topical (Top), TID PRN    glucagon (human recombinant), 1 mg, Intramuscular, PRN    insulin aspart U-100, 0-5 Units, Subcutaneous, Q12H PRN    iohexol, 15 mL, Oral, PRN    labetalol, 10 mg, Intravenous, Q6H PRN    ondansetron, 4 mg, Intravenous, Q12H PRN    sodium chloride 0.9%, 500 mL, Intravenous, PRN    sodium chloride 0.9%, 10 mL, Intravenous, PRN     Review of patient's allergies indicates:  No Known Allergies  Objective:     Vital Signs (Most Recent):  Temp: 97.3 °F (36.3 °C) (05/29/24 0722)  Pulse: 70 (05/29/24 0722)  Resp: 20 (05/29/24 0722)  BP: 126/60 (05/29/24 0722)  SpO2: 100 % (05/29/24 0722) Vital Signs (24h Range):  Temp:  [97.3 °F (36.3 °C)-98.2 °F (36.8 °C)] 97.3 °F (36.3 °C)  Pulse:  [69-89] 70  Resp:  [18-21] 20  SpO2:  [95 %-100 %] 100 %  BP: (126-156)/(60-88) 126/60     Weight: 67.5 kg (148 lb 13 oz)  Body mass index is 30.06 kg/m².    Intake/Output - Last 3 Shifts         05/27 0700 05/28 0659 05/28 0700 05/29 0659 05/29 0700 05/30 0659    I.V. (mL/kg)       Total Intake(mL/kg)       Urine (mL/kg/hr) 650 (0.4)      Stool       Total Output 650      Net -650             Stool Occurrence 0 x               Physical Exam  Vitals and  nursing note reviewed.   Constitutional:       General: She is not in acute distress.     Appearance: She is not diaphoretic.      Comments: Room air   HENT:      Mouth/Throat:      Mouth: Mucous membranes are dry.      Pharynx: Oropharynx is clear.   Eyes:      Extraocular Movements: Extraocular movements intact.      Conjunctiva/sclera: Conjunctivae normal.   Cardiovascular:      Rate and Rhythm: Normal rate.   Pulmonary:      Effort: Pulmonary effort is normal. No respiratory distress.   Abdominal:      General: There is no distension.      Palpations: Abdomen is soft.      Tenderness: There is no abdominal tenderness. There is no guarding or rebound.   Musculoskeletal:         General: No deformity.   Skin:     General: Skin is warm and dry.   Neurological:      Mental Status: She is alert.      Motor: Weakness present.          Significant Labs:  I have reviewed all pertinent lab results within the past 24 hours.    Significant Diagnostics:  I have reviewed all pertinent imaging results/findings within the past 24 hours.

## 2024-05-29 NOTE — PT/OT/SLP PROGRESS
Occupational Therapy      Patient Name:  Amirah Davey   MRN:  [id number]    Patient not seen today secondary to off unit for RUE ultrasound x2 attempts, plan to get PEG in PM. Will follow-up as appropriate.  Tracey Arnold, OT    5/29/2024

## 2024-05-29 NOTE — PLAN OF CARE
Pre-op checklist complete. All consents signed and witnessed. Pt's daughter states pt has a Medtronic pacemaker implanted. No pacemaker is in pt's chart. Pacemaker clinic called. They state no reprogramming is necessary, if cautery is to be used they will need to place a magnet. If no cautery is to be used, nothing needs to be done. Vitals stable, no distress noted. Heel pads replaced from a few days ago. Family is at bedside.

## 2024-05-29 NOTE — ANESTHESIA POSTPROCEDURE EVALUATION
Anesthesia Post Evaluation    Patient: Amirah aDvey    Procedure(s) Performed: Procedure(s) (LRB):  EGD, WITH PEG TUBE INSERTION (N/A)    Final Anesthesia Type: general      Patient location during evaluation: PACU  Patient participation: Yes- Able to Participate  Level of consciousness: awake and alert  Post-procedure vital signs: reviewed and stable  Pain management: adequate  Airway patency: patent    PONV status at discharge: No PONV  Anesthetic complications: no      Cardiovascular status: blood pressure returned to baseline  Respiratory status: unassisted  Hydration status: euvolemic  Follow-up not needed.              Vitals Value Taken Time   /73 05/29/24 1531   Temp 36.6 °C (97.9 °F) 05/29/24 1510   Pulse 69 05/29/24 1541   Resp 13 05/29/24 1541   SpO2 97 % 05/29/24 1541   Vitals shown include unfiled device data.      Event Time   Out of Recovery 05/29/2024 15:30:00         Pain/Emily Score: Pain Rating Prior to Med Admin: 8 (5/29/2024  6:31 AM)  Pain Rating Post Med Admin: 0 (5/28/2024  6:03 PM)  Emily Score: 9 (5/29/2024  3:30 PM)

## 2024-05-29 NOTE — PROGRESS NOTES
[facility] - Neurosurgery ([facility])  Vascular Neurology  Comprehensive Stroke Center  Progress Note    Assessment/Plan:     * Cerebrovascular accident (CVA) due to embolism of right middle cerebral artery  87 y/o female with R MCA stroke due to R M2 occlusio. No thrombolytics as is on Xarelto. Went to IR but procedure aborted due to distal clot. So TICI 0. Etiology cardio embolic due to chronic afib.     No acute events overnight. Neuro exam stable. CT abdomen pending. Patient to be NPO at midnight/heparin gtt off at midnight in anticipation of PEG placement by GI on 5/27. Pt with some concerning finding of duodenal perf on CT abd/pelvis. Pt does not have peritonitis signs. General surgery did not feel this is of concern and patient vitals are stable, patient looks well. Gen surgery planning to place PEG tube on 5/29. Patient still remains NPO. IV labetalol 5mg scheduled q6 hours for BP control. Pt pending rehab placement after peg tube.     Antithrombotics: ASA + minimal intensity heparin gtt. After patient is therapeutic on heparin gtt, ASA can be discontinued.    Statins: Lipitor 40 mg daily but unable to get as no NG tube    Aggressive risk factor modification: HTN, Diet, Exercise, Obesity, A-Fib     Rehab efforts: The patient has been evaluated by a stroke team provider and the therapy needs have been fully considered based off the presenting complaints and exam findings. The following therapy evaluations are needed:  recommendations include high intensity therapy    Diagnostics ordered/pending: Other: CT abd/pelvis    VTE prophylaxis: Mechanical prophylaxis: Place SCDs  None: Reason for No Pharmacological VTE Prophylaxis: Currently on anticoagulation    BP parameters: Infarct: SBP <180        Thrombophlebitis  Pt complaining of new R wrist and forearm pain, patient is tender to the touch on her R forearm and wrist. Tender with movement. No noted warmth or redness, fingers warm to touch. Pt reports tylenol,  voltaren and lidocaine patches are helping some. Concern for thrombophlebitis.    Plan  R wrist/forearm xray: some soft tissue swelling, no fracture  R UE ultrasound  Scheduled tylenol suppository with no GI access  Hot packs and warm compresses to area  Concern for cellulitis or sepsis. Low threshold for initiation of antibiotics and sepsis workup    Obesity (BMI 30-39.9)  Stroke risk factor   on diet and exercise    Dysphagia due to recent stroke  Due to stroke  Aggressive therapy  Speech therapy consult  PEG to be placed on 5/29 with general surgery        Dysarthria due to acute stroke  Due to stroke  Aggressive therapy          Hemiplegia  Due to stroke  Aggressive therapy      Chronic a-fib  Stroke risk factor  Was on Xarelto, will need to resume once feeding route resolved    Hypertension, benign  Stroke risk factor  SBP <180 as 5 days out  IV labetalol 5mg schedule q6 hours for BP control 2^ patient being NPO         05/25/2024: No acute events overnight. Neuro exam stable. MRI Brain showed moderate sized R MCA infarct with scattered petechial hemorrhage and a suspected small hematoma. Patient's imaging discussed with staff, Dr. Rodriges, and patient's family. Heparin gtt initiated for AC in setting of atrial fibrillation while patient anticipates PEG placement on 5/27. Patient pending abdominal CT for eval of Pneumoperitoneum.  05/26/2024: No acute events overnight. Neuro exam stable. CT abdomen pending. Patient to be NPO at midnight/heparin gtt off at midnight in anticipation of PEG placement by GI tomorrow. Patient still remains NPO. IV labetalol 5mg scheduled q6 hours for BP control.   05/27/2024: Neuro exam stable, PEG placement today.   05/28/2024: New R wrist pain, with hard spot in forearm, looks to be thrombophlebitis, R wrist xray and RUE US pending, hot packs to forearm and wrist, if concern for cellulitis will add antibiotic covering  05/29/2024: continued R arm pain, improving with meds,  xray no fracture signs of soft tissue swelling, US pending, PEG tube placement today.        STROKE DOCUMENTATION        NIH Scale:  1a. Level of Consciousness: 0-->Alert, keenly responsive  1b. LOC Questions: 0-->Answers both questions correctly  1c. LOC Commands: 0-->Performs both tasks correctly  2. Best Gaze: 1-->Partial gaze palsy, gaze is abnormal in one or both eyes, but forced deviation or total gaze paresis is not present  3. Visual: 0-->No visual loss  4. Facial Palsy: 2-->Partial paralysis (total or near-total paralysis of lower face)  5a. Motor Arm, Left: 3-->No effort against gravity, limb falls  5b. Motor Arm, Right: 0-->No drift, limb holds 90 (or 45) degrees for full 10 secs  6a. Motor Leg, Left: 3-->No effort against gravity, leg falls to bed immediately  6b. Motor Leg, Right: 1-->Drift, leg falls by the end of the 5-sec period but does not hit bed  7. Limb Ataxia: 0-->Absent (Difficult to assess on L side)  8. Sensory: 1-->Mild-to-moderate sensory loss, patient feels pinprick is less sharp or is dull on the affected side, or there is a loss of superficial pain with pinprick, but patient is aware of being touched  9. Best Language: 0-->No aphasia, normal  10. Dysarthria: 1-->Mild-to-moderate dysarthria, patient slurs at least some words and, at worst, can be understood with some difficulty  11. Extinction and Inattention (formerly Neglect): 0-->No abnormality  Total (NIH Stroke Scale): 12       Modified [place] Score: 2  Alexander Coma Scale:    ABCD2 Score:    [id number] Score:7  HAS -BLED Score:   ICH Score:   Hunt & Marie Classification:      Hemorrhagic change of an Ischemic Stroke: Does this patient have an ischemic stroke with hemorrhagic changes? No     Neurologic Chief Complaint: R MCA infarct    Subjective:     Interval History: Patient is seen for follow-up neurological assessment and treatment recommendations: No acute events overnight. Neuro exam stable. CT abdomen pending. Patient to be  NPO at midnight/heparin gtt off at midnight in anticipation of PEG placement by General surgery on 5/29. Patient still remains NPO. IV labetalol 5mg scheduled q6 hours for BP control. R wrist/forearm pain concern for thrombophlebitis. RUE US pending,  R wrist xray no fracture, some soft tissue swelling    HPI, Past Medical, Family, and Social History remains the same as documented in the initial encounter.     Review of Systems   HENT:  Positive for trouble swallowing.    Respiratory:  Positive for cough.    Musculoskeletal:  Positive for arthralgias and back pain.   Neurological:  Positive for facial asymmetry, weakness and numbness.   Hematological:  Bruises/bleeds easily.   All other systems reviewed and are negative.    Scheduled Meds:   acetaminophen  650 mg Rectal Q6H    atorvastatin  40 mg Oral Daily    labetalol  10 mg Intravenous Q6H    LIDOcaine  1 patch Transdermal Q24H    mupirocin   Nasal BID    nystatin  500,000 Units Oral QID    pantoprazole  40 mg Intravenous Daily     Continuous Infusions:   dextrose 5 % and 0.45 % NaCl   Intravenous Continuous 50 mL/hr at 05/27/24 2303 New Bag at 05/27/24 2303    heparin (porcine) in D5W  0-40 Units/kg/hr (Adjusted) Intravenous Continuous 6.8 mL/hr at 05/28/24 0555 12 Units/kg/hr at 05/28/24 0555     PRN Meds:  Current Facility-Administered Medications:     bisacodyL, 10 mg, Rectal, Daily PRN    dextrose 10%, 12.5 g, Intravenous, PRN    dextrose 10%, 25 g, Intravenous, PRN    diclofenac sodium, 2 g, Topical (Top), TID PRN    glucagon (human recombinant), 1 mg, Intramuscular, PRN    insulin aspart U-100, 0-5 Units, Subcutaneous, Q12H PRN    iohexol, 15 mL, Oral, PRN    labetalol, 10 mg, Intravenous, Q6H PRN    ondansetron, 4 mg, Intravenous, Q12H PRN    sodium chloride 0.9%, 500 mL, Intravenous, PRN    sodium chloride 0.9%, 10 mL, Intravenous, PRN    Objective:     Vital Signs (Most Recent):  Temp: 98.7 °F (37.1 °C) (05/28/24 0720)  Pulse: 70 (05/28/24 0720)  Resp:  "20 (05/28/24 0720)  BP: 134/63 (05/28/24 0720)  SpO2: 96 % (05/28/24 0720)  BP Location: Right arm    Vital Signs Range (Last 24H):  Temp:  [97.8 °F (36.6 °C)-98.7 °F (37.1 °C)]   Pulse:  [69-82]   Resp:  [18-20]   BP: (134-187)/(63-86)   SpO2:  [96 %-100 %]   BP Location: Right arm       Physical Exam  Vitals reviewed.   HENT:      Head: Normocephalic.      Mouth/Throat:      Mouth: Mucous membranes are dry.   Pulmonary:      Effort: Pulmonary effort is normal.   Neurological:      Mental Status: She is alert and oriented to person, place, and time.      Sensory: Sensory deficit present.      Motor: Weakness present.   Psychiatric:         Mood and Affect: Mood normal.         Behavior: Behavior normal.          Pain with movement, tender to touch, hard nodule on forearm. Lidocaine patch in place, no redness noted    Neurological Exam:   LOC: alert  Attention Span: Good   Language: decreased fluency  Articulation: Dysarthria  Orientation: Person, Place, Time   Facial Movement (CN VII): Lower facial weakness on the Left  Motor: Arm left  Plegia 0/5  Leg left  Plegia 0/5  Arm right  Paresis: 4/5  Leg right Paresis: 4/5    Laboratory:  CMP:   Recent Labs   Lab 05/28/24  0446   CALCIUM 8.6*   ALBUMIN 2.4*   PROT 5.9*      K 3.4*   CO2 21*      BUN 11   CREATININE 0.9   ALKPHOS 48*   ALT 18   AST 21   BILITOT 0.5       BMP:   Recent Labs   Lab 05/28/24  0446      K 3.4*      CO2 21*   BUN 11   CREATININE 0.9   CALCIUM 8.6*     CBC:   Recent Labs   Lab 05/28/24  0446   WBC 8.25   RBC 3.81*   HGB 9.7*   HCT 30.3*      MCV 80*   MCH 25.5*   MCHC 32.0     Lipid Panel:   Recent Labs   Lab 05/23/24 2328   CHOL 146   LDLCALC 83.2   HDL 43   TRIG 99     Coagulation:   Recent Labs   Lab 05/25/24  1405 05/25/24  2110 05/28/24  0446   INR 1.0  --   --    APTT 31.0   < > 51.0*    < > = values in this interval not displayed.     Platelet Aggregation Study: No results for input(s): "PLTAGG", " ""PLTAGINTERP", "PLTAGREGLACO", "ADPPLTAGGREG" in the last 168 hours.  Hgb A1C:   No results for input(s): "HGBA1C" in the last 168 hours.    TSH:   Recent Labs   Lab 05/23/24  2328   TSH 1.475       Diagnostic Results     Brain Imaging:  MRI Brain w/o Contrast 5/24/24  Impression:  Moderate-sized right MCA territorial infarction with scattered petechial hemorrhage and a suspected small hematoma.  Clinical correlation and short-term follow-up with CT is recommended.  Hazy visualization of the right MCA flow void as compared to the contralateral side with suspected occlusion of the distal right MCA.  Further evaluation with CTA may be performed.  Generalized cerebral volume loss and moderate chronic microvascular ischemic change.  Small well-circumscribed T2 hyperintense focus of at the left superomedial extraconal space measuring 0.7 cm.  Findings are nonspecific, and may relate to a small vascular lesion.  Further evaluation with dedicated imaging of the orbits as clinically indicated.     CT Head w/o Contrast 5/21/24  Impression  Evolving subtotal right MCA infarct on a background of moderate to advanced chronic microvascular ischemic disease. No progressive mass effect. No hemorrhagic transformation.    Vessel Imaging:  CTA Head and Neck 5/18/24  Impression  1. Occlusion of one of the right M2 segments.  2. Less than 50 percent stenosis right M1 segment.  3. Findings discussed with stroke team neurology resident.    CT abd/pelvis  Impression:     Tiny focus of free air in the retroperitoneum, as detailed above.  Recommend correlation with outside imaging studies for stability.  Etiology of the retroperitoneal air is not apparent.  No large volume intraperitoneal free air.     Small left pleural effusion with basilar atelectasis.     Small to moderate size hiatal hernia with questioned thickening of the lower esophagus, possibly suggestive of esophagitis.     Cholelithiasis.    R wrist xray  Impression:   "   Abnormal study as above with soft tissue swelling and irregularity about the dorsum of the hand and wrist.  Correlation with physical findings would be helpful.     Advanced degenerative change.  Probable ligamentous disruption scapholunate.      Nicholas Norwood DO  [facility]  Department of Vascular Neurology   [facility])

## 2024-05-29 NOTE — PROGRESS NOTES
[facility] - Neurosurgery ([facility])  General Surgery  Progress Note    Subjective:     History of Present Illness:  88F with history of CVA, HTN, and Afib (on Heparin gtt) currently admitted to vascular neurology team. Patient has had dysphagia following her recent stroke and requires long term enteral access for feeds and medication. She has not been receiving any feeds as there has been no success in obtaining enteral access. Of note, prior to transfer to [facility] an NGT was attempted multiple times without success. A CT scan obtained prior to transfer showed a small foci of free air in the retroperitoneum with no other evidence of hollow viscus injury. She has been hemodynamically stable, no findings of abdominal pain or symptoms on examination by primary team. An interval CT scan was performed here, which showed a single small foci of air in the RP, seen on only one slice of imaging. Normal appearing duodenum, stomach, small bowel, and colon. GI was consulted for PEG placement. Per primary, GI is not comfortable performing PEG placement given the CT results. They have deferred placement of feeding tube to general surgery due to the now, nearly resolved, pneumoperitoneum.   History of , otherwise no intra-abdominal surgeries. She has a small hiatal hernia but does appear to have an acceptable window to attempt PEG placement.     Post-Op Info:  Procedure(s) (LRB):  INSERTION, PEG TUBE (N/A)   2 Days Post-Op     Interval History: NAEON. Afebrile. HDS. To OR today for PEG    Medications:  Continuous Infusions:   dextrose 5 % and 0.45 % NaCl   Intravenous Continuous 50 mL/hr at 24 1722 New Bag at 24 1722     Scheduled Meds:   acetaminophen  650 mg Rectal Q6H    atorvastatin  40 mg Oral Daily    labetalol  10 mg Intravenous Q6H    LIDOcaine  1 patch Transdermal Q24H    magnesium sulfate IVPB  2 g Intravenous Once    mupirocin   Nasal BID    nystatin  500,000 Units Oral QID    pantoprazole  40  mg Intravenous Daily    potassium chloride  10 mEq Intravenous Q1H     PRN Meds:  Current Facility-Administered Medications:     bisacodyL, 10 mg, Rectal, Daily PRN    dextrose 10%, 12.5 g, Intravenous, PRN    dextrose 10%, 25 g, Intravenous, PRN    diclofenac sodium, 2 g, Topical (Top), TID PRN    glucagon (human recombinant), 1 mg, Intramuscular, PRN    insulin aspart U-100, 0-5 Units, Subcutaneous, Q12H PRN    iohexol, 15 mL, Oral, PRN    labetalol, 10 mg, Intravenous, Q6H PRN    ondansetron, 4 mg, Intravenous, Q12H PRN    sodium chloride 0.9%, 500 mL, Intravenous, PRN    sodium chloride 0.9%, 10 mL, Intravenous, PRN     Review of patient's allergies indicates:  No Known Allergies  Objective:     Vital Signs (Most Recent):  Temp: 97.3 °F (36.3 °C) (05/29/24 0722)  Pulse: 70 (05/29/24 0722)  Resp: 20 (05/29/24 0722)  BP: 126/60 (05/29/24 0722)  SpO2: 100 % (05/29/24 0722) Vital Signs (24h Range):  Temp:  [97.3 °F (36.3 °C)-98.2 °F (36.8 °C)] 97.3 °F (36.3 °C)  Pulse:  [69-89] 70  Resp:  [18-21] 20  SpO2:  [95 %-100 %] 100 %  BP: (126-156)/(60-88) 126/60     Weight: 67.5 kg (148 lb 13 oz)  Body mass index is 30.06 kg/m².    Intake/Output - Last 3 Shifts         05/27 0700 05/28 0659 05/28 0700 05/29 0659 05/29 0700 05/30 0659    I.V. (mL/kg)       Total Intake(mL/kg)       Urine (mL/kg/hr) 650 (0.4)      Stool       Total Output 650      Net -650             Stool Occurrence 0 x               Physical Exam  Vitals and nursing note reviewed.   Constitutional:       General: She is not in acute distress.     Appearance: She is not diaphoretic.      Comments: Room air   HENT:      Mouth/Throat:      Mouth: Mucous membranes are dry.      Pharynx: Oropharynx is clear.   Eyes:      Extraocular Movements: Extraocular movements intact.      Conjunctiva/sclera: Conjunctivae normal.   Cardiovascular:      Rate and Rhythm: Normal rate.   Pulmonary:      Effort: Pulmonary effort is normal. No respiratory distress.    Abdominal:      General: There is no distension.      Palpations: Abdomen is soft.      Tenderness: There is no abdominal tenderness. There is no guarding or rebound.   Musculoskeletal:         General: No deformity.   Skin:     General: Skin is warm and dry.   Neurological:      Mental Status: She is alert.      Motor: Weakness present.          Significant Labs:  I have reviewed all pertinent lab results within the past 24 hours.    Significant Diagnostics:  I have reviewed all pertinent imaging results/findings within the past 24 hours.  Assessment/Plan:     * Cerebrovascular accident (CVA) due to embolism of right middle cerebral artery  Patient is an 88 year old female with multiple medical co-morbidities who requires long term enteral access for dysphagia secondary to recent stroke. GI has deferred PEG placement to Surgery team due to small foci of air seen in the retroperitoneum on CT scan. Patient is entirely asymptomatic, she has no evidence to suggest an ongoing issue/leak. She has contrast through to her colon on CT without evidence of extravasation. If perforation occurred it was almost certainly a microperforation from the numerous attempts at NGT placement, and has since sealed itself off without causing any clinically significant leak or inflammatory reaction.    - NPO  - To OR today for PEG vs open G   - Consented  - Will need to hold Heparin gtt at midnight prior to OR  - No further work up needed at this time for small foci of RP air  - Remainder of care per primary team  - Please contact general surgery with any questions, concerns, or clinical status changes    Case discussed with Dr. Minaya.      HERBERT CortezC  General Surgery  [facility] - Neurosurgery ([facility])

## 2024-05-29 NOTE — NURSING
Nurses Note -- 4 Eyes      5/29/2024   5:21 PM      Skin assessed during: Q Shift Change      [x] No Altered Skin Integrity Present    []Prevention Measures Documented      [] Yes- Altered Skin Integrity Present or Discovered   [] LDA Added if Not in Epic (Describe Wound)   [] New Altered Skin Integrity was Present on Admit and Documented in LDA   [] Wound Image Taken    Wound Care Consulted? No    Attending Nurse:  Ciro Beckham RN/Staff Member:  tulio

## 2024-05-29 NOTE — TREATMENT PLAN
Routine Post-Op PEG Care:     Please keep tube to gravity for 24 hours after placement.  May administer meds after 6 hours and clamp for 30 minutes after medication administration.  We will give further recs about when to restart tube feeds.   Please notify General Surgery with any significant changes in patient's vital signs within the first 24 hours after PEG placement.  Please call with questions about PEG tube.      OK to resume heparin gtt 6 hours after placement of gastrostomy tube.     Chicho Gonzales MD  General Surgery, PGY-3

## 2024-05-29 NOTE — ASSESSMENT & PLAN NOTE
Patient is an 88 year old female with multiple medical co-morbidities who requires long term enteral access for dysphagia secondary to recent stroke. GI has deferred PEG placement to Surgery team due to small foci of air seen in the retroperitoneum on CT scan. Patient is entirely asymptomatic, she has no evidence to suggest an ongoing issue/leak. She has contrast through to her colon on CT without evidence of extravasation. If perforation occurred it was almost certainly a microperforation from the numerous attempts at NGT placement, and has since sealed itself off without causing any clinically significant leak or inflammatory reaction.    - NPO  - To OR today for PEG vs open G   - Consented  - Will need to hold Heparin gtt at midnight prior to OR  - No further work up needed at this time for small foci of RP air  - Remainder of care per primary team  - Please contact general surgery with any questions, concerns, or clinical status changes

## 2024-05-29 NOTE — PT/OT/SLP PROGRESS
Physical Therapy      Patient Name:  Amirah Davey   MRN:  [id number]    Patient not seen today secondary to  (SANTI in [place] in AM and SANTI for PEG placement in PM.). Will follow-up tomorrow as appropriate.

## 2024-05-29 NOTE — PT/OT/SLP PROGRESS
Speech Language Pathology      Amirah Davey  MRN: [id number]    Patient not seen today secondary to NPO for EGD and PEG tube insertion this afternoon  . SLP will follow-up next service date.

## 2024-05-29 NOTE — ANESTHESIA PROCEDURE NOTES
Intubation    Date/Time: 5/29/2024 2:30 PM    Performed by: Juliette Khoury MD  Authorized by: Juliette Khoury MD    Intubation:     Induction:  Intravenous    Intubated:  Postinduction    Mask Ventilation:  Easy with oral airway    Attempts:  1    Attempted By:  Staff anesthesiologist    Method of Intubation:  Video laryngoscopy    Blade:  Casarez 3    Laryngeal View Grade: Grade I - full view of cords      Difficult Airway Encountered?: No      Complications:  None    Airway Device:  Oral endotracheal tube    Airway Device Size:  7.0    Style/Cuff Inflation:  Cuffed (inflated to minimal occlusive pressure)    Inflation Amount (mL):  4    Tube secured:  23    Secured at:  The teeth    Placement Verified By:  Capnometry    Complicating Factors:  None    Findings Post-Intubation:  BS equal bilateral and atraumatic/condition of teeth unchanged

## 2024-05-29 NOTE — TRANSFER OF CARE
"Anesthesia Transfer of Care Note    Patient: Amirah Davey    Procedure(s) Performed: Procedure(s) (LRB):  EGD, WITH PEG TUBE INSERTION (N/A)    Patient location: PACU    Anesthesia Type: general    Transport from OR: Transported from OR on 6-10 L/min O2 by face mask with adequate spontaneous ventilation    Post pain: adequate analgesia    Post assessment: no apparent anesthetic complications and tolerated procedure well    Post vital signs: stable    Level of consciousness: awake    Nausea/Vomiting: no nausea/vomiting    Complications: none    Transfer of care protocol was followed    Last vitals: Visit Vitals  BP (!) 145/77 (BP Location: Left arm, Patient Position: Lying)   Pulse 71   Temp 36.4 °C (97.5 °F) (Temporal)   Resp 18   Ht 4' 11" (1.499 m)   Wt 67.1 kg (148 lb)   SpO2 99%   Breastfeeding No   BMI 29.89 kg/m²     "

## 2024-05-29 NOTE — PLAN OF CARE
SW received phone call from Radha at [facility].  She requested update on patient's PEG placement and also stated that they have accepted patient to come over once med cleared.  SW let her know that gen surg to put in PEG tomorrow (5/29) and patient should be ready by Friday.  KWADWO will continue to follow.      Discharge Plan A and Plan B have been determined by review of patient's clinical status, future medical and therapeutic needs, and coverage/benefits for post-acute care in coordination with multidisciplinary team members.    Harmony Box, CURT  [facility]  [phone]

## 2024-05-29 NOTE — NURSING TRANSFER
Nursing Transfer Note      5/29/2024   4:01 PM    Nurse giving handoff:Beth BLANCHARD PACU  Nurse receiving handoff:Ciro BLANCHARD NPU    Reason patient is being transferred: s/p anesthesia recovery    Transfer To: Returning to room 907    Transfer via stretcher    Transfer with cardiac monitoring    Transported by PACU PCT    Transfer Vital Signs: see flowsheet    Telemetry: Box Number [id number], Rate 70, and Rhythm atrial rhythm  Order for Tele Monitor? Yes    Additional Lines: external catheter    Any special needs or follow-up needed: restart heparin gtt at 2100    Patient belongings transferred with patient: No    Chart send with patient: Yes    Notified: daughter    Patient reassessed at: 5/29/24 at 1530 (date, time)

## 2024-05-29 NOTE — BRIEF OP NOTE
On[facility] - Surgery ([facility])  Brief Operative Note    SUMMARY     Surgery Date: 5/29/2024     Surgeons and Role:     * Imer Minaya MD - Primary     * Chicho Gonzales MD - Resident - Assisting        Pre-op Diagnosis:  Cerebrovascular accident (CVA) due to embolism of right middle cerebral artery [I63.411]  Dysphagia due to recent stroke [I69.391]    Post-op Diagnosis:  Post-Op Diagnosis Codes:     * Cerebrovascular accident (CVA) due to embolism of right middle cerebral artery [I63.411]     * Dysphagia due to recent stroke [I69.391]    Procedure(s) (LRB):  EGD, WITH PEG TUBE INSERTION (N/A)    Anesthesia: General    Implants:  * No implants in log *    Operative Findings: 20 [place] gastrostomy tube placed percutaneously. 3.5 cm at the skin.     Estimated Blood Loss: * No values recorded between 5/29/2024  2:38 PM and 5/29/2024  2:57 PM *    Estimated Blood Loss has not been documented. EBL = Less than 5 cc.         Specimens:   Specimen (24h ago, onward)      None            [id number]

## 2024-05-30 LAB
ALBUMIN SERPL BCP-MCNC: 2.2 G/DL (ref 3.5–5.2)
ALP SERPL-CCNC: 51 U/L (ref 55–135)
ALT SERPL W/O P-5'-P-CCNC: 16 U/L (ref 10–44)
ANION GAP SERPL CALC-SCNC: 8 MMOL/L (ref 8–16)
APTT PPP: 41.2 SEC (ref 21–32)
APTT PPP: 43.7 SEC (ref 21–32)
AST SERPL-CCNC: 17 U/L (ref 10–40)
BASOPHILS # BLD AUTO: 0.01 K/UL (ref 0–0.2)
BASOPHILS # BLD AUTO: 0.01 K/UL (ref 0–0.2)
BASOPHILS NFR BLD: 0.1 % (ref 0–1.9)
BASOPHILS NFR BLD: 0.1 % (ref 0–1.9)
BILIRUB SERPL-MCNC: 0.4 MG/DL (ref 0.1–1)
BUN SERPL-MCNC: 10 MG/DL (ref 8–23)
CALCIUM SERPL-MCNC: 8.5 MG/DL (ref 8.7–10.5)
CHLORIDE SERPL-SCNC: 108 MMOL/L (ref 95–110)
CO2 SERPL-SCNC: 21 MMOL/L (ref 23–29)
CREAT SERPL-MCNC: 0.9 MG/DL (ref 0.5–1.4)
DIFFERENTIAL METHOD BLD: ABNORMAL
DIFFERENTIAL METHOD BLD: ABNORMAL
EOSINOPHIL # BLD AUTO: 0.1 K/UL (ref 0–0.5)
EOSINOPHIL # BLD AUTO: 0.1 K/UL (ref 0–0.5)
EOSINOPHIL NFR BLD: 1 % (ref 0–8)
EOSINOPHIL NFR BLD: 1 % (ref 0–8)
ERYTHROCYTE [DISTWIDTH] IN BLOOD BY AUTOMATED COUNT: 15.3 % (ref 11.5–14.5)
ERYTHROCYTE [DISTWIDTH] IN BLOOD BY AUTOMATED COUNT: 15.3 % (ref 11.5–14.5)
EST. GFR  (NO RACE VARIABLE): >60 ML/MIN/1.73 M^2
GLUCOSE SERPL-MCNC: 102 MG/DL (ref 70–110)
HCT VFR BLD AUTO: 30.2 % (ref 37–48.5)
HCT VFR BLD AUTO: 30.2 % (ref 37–48.5)
HGB BLD-MCNC: 9.2 G/DL (ref 12–16)
HGB BLD-MCNC: 9.2 G/DL (ref 12–16)
IMM GRANULOCYTES # BLD AUTO: 0.06 K/UL (ref 0–0.04)
IMM GRANULOCYTES # BLD AUTO: 0.06 K/UL (ref 0–0.04)
IMM GRANULOCYTES NFR BLD AUTO: 0.7 % (ref 0–0.5)
IMM GRANULOCYTES NFR BLD AUTO: 0.7 % (ref 0–0.5)
LYMPHOCYTES # BLD AUTO: 0.7 K/UL (ref 1–4.8)
LYMPHOCYTES # BLD AUTO: 0.7 K/UL (ref 1–4.8)
LYMPHOCYTES NFR BLD: 8.7 % (ref 18–48)
LYMPHOCYTES NFR BLD: 8.7 % (ref 18–48)
MAGNESIUM SERPL-MCNC: 2.3 MG/DL (ref 1.6–2.6)
MCH RBC QN AUTO: 25.1 PG (ref 27–31)
MCH RBC QN AUTO: 25.1 PG (ref 27–31)
MCHC RBC AUTO-ENTMCNC: 30.5 G/DL (ref 32–36)
MCHC RBC AUTO-ENTMCNC: 30.5 G/DL (ref 32–36)
MCV RBC AUTO: 82 FL (ref 82–98)
MCV RBC AUTO: 82 FL (ref 82–98)
MONOCYTES # BLD AUTO: 0.7 K/UL (ref 0.3–1)
MONOCYTES # BLD AUTO: 0.7 K/UL (ref 0.3–1)
MONOCYTES NFR BLD: 8.2 % (ref 4–15)
MONOCYTES NFR BLD: 8.2 % (ref 4–15)
NEUTROPHILS # BLD AUTO: 6.6 K/UL (ref 1.8–7.7)
NEUTROPHILS # BLD AUTO: 6.6 K/UL (ref 1.8–7.7)
NEUTROPHILS NFR BLD: 81.3 % (ref 38–73)
NEUTROPHILS NFR BLD: 81.3 % (ref 38–73)
NRBC BLD-RTO: 0 /100 WBC
NRBC BLD-RTO: 0 /100 WBC
PHOSPHATE SERPL-MCNC: 2.8 MG/DL (ref 2.7–4.5)
PLATELET # BLD AUTO: 265 K/UL (ref 150–450)
PLATELET # BLD AUTO: 265 K/UL (ref 150–450)
PMV BLD AUTO: 11.9 FL (ref 9.2–12.9)
PMV BLD AUTO: 11.9 FL (ref 9.2–12.9)
POCT GLUCOSE: 128 MG/DL (ref 70–110)
POTASSIUM SERPL-SCNC: 3.9 MMOL/L (ref 3.5–5.1)
PROT SERPL-MCNC: 5.8 G/DL (ref 6–8.4)
RBC # BLD AUTO: 3.67 M/UL (ref 4–5.4)
RBC # BLD AUTO: 3.67 M/UL (ref 4–5.4)
SODIUM SERPL-SCNC: 137 MMOL/L (ref 136–145)
WBC # BLD AUTO: 8.16 K/UL (ref 3.9–12.7)
WBC # BLD AUTO: 8.16 K/UL (ref 3.9–12.7)

## 2024-05-30 PROCEDURE — S5010 5% DEXTROSE AND 0.45% SALINE: HCPCS

## 2024-05-30 PROCEDURE — 25000003 PHARM REV CODE 250

## 2024-05-30 PROCEDURE — 92507 TX SP LANG VOICE COMM INDIV: CPT

## 2024-05-30 PROCEDURE — 85025 COMPLETE CBC W/AUTO DIFF WBC: CPT

## 2024-05-30 PROCEDURE — 97535 SELF CARE MNGMENT TRAINING: CPT

## 2024-05-30 PROCEDURE — 97112 NEUROMUSCULAR REEDUCATION: CPT | Mod: CO

## 2024-05-30 PROCEDURE — 97530 THERAPEUTIC ACTIVITIES: CPT | Mod: CO

## 2024-05-30 PROCEDURE — 85730 THROMBOPLASTIN TIME PARTIAL: CPT | Mod: 91 | Performed by: PSYCHIATRY & NEUROLOGY

## 2024-05-30 PROCEDURE — 84100 ASSAY OF PHOSPHORUS: CPT

## 2024-05-30 PROCEDURE — 99233 SBSQ HOSP IP/OBS HIGH 50: CPT | Mod: GC,,, | Performed by: PSYCHIATRY & NEUROLOGY

## 2024-05-30 PROCEDURE — 36415 COLL VENOUS BLD VENIPUNCTURE: CPT | Performed by: PSYCHIATRY & NEUROLOGY

## 2024-05-30 PROCEDURE — 63600175 PHARM REV CODE 636 W HCPCS: Mod: JZ,JG

## 2024-05-30 PROCEDURE — 63600175 PHARM REV CODE 636 W HCPCS

## 2024-05-30 PROCEDURE — C9113 INJ PANTOPRAZOLE SODIUM, VIA: HCPCS

## 2024-05-30 PROCEDURE — 80053 COMPREHEN METABOLIC PANEL: CPT

## 2024-05-30 PROCEDURE — 83735 ASSAY OF MAGNESIUM: CPT

## 2024-05-30 PROCEDURE — 11000001 HC ACUTE MED/SURG PRIVATE ROOM

## 2024-05-30 PROCEDURE — 25000003 PHARM REV CODE 250: Performed by: NURSE PRACTITIONER

## 2024-05-30 RX ORDER — AMLODIPINE BESYLATE 5 MG/1
5 TABLET ORAL DAILY
Status: DISCONTINUED | OUTPATIENT
Start: 2024-05-30 | End: 2024-05-31 | Stop reason: HOSPADM

## 2024-05-30 RX ORDER — ACETAMINOPHEN 500 MG
1000 TABLET ORAL EVERY 8 HOURS PRN
Status: DISCONTINUED | OUTPATIENT
Start: 2024-05-30 | End: 2024-05-31 | Stop reason: HOSPADM

## 2024-05-30 RX ADMIN — ACETAMINOPHEN 650 MG: 650 SUPPOSITORY RECTAL at 01:05

## 2024-05-30 RX ADMIN — LIDOCAINE 1 PATCH: 700 PATCH TOPICAL at 06:05

## 2024-05-30 RX ADMIN — LABETALOL HYDROCHLORIDE 10 MG: 5 INJECTION, SOLUTION INTRAVENOUS at 06:05

## 2024-05-30 RX ADMIN — ATORVASTATIN CALCIUM 40 MG: 40 TABLET, FILM COATED ORAL at 09:05

## 2024-05-30 RX ADMIN — NYSTATIN 500000 UNITS: 100000 SUSPENSION ORAL at 09:05

## 2024-05-30 RX ADMIN — ACETAMINOPHEN 1000 MG: 500 TABLET ORAL at 09:05

## 2024-05-30 RX ADMIN — MUPIROCIN: 20 OINTMENT TOPICAL at 09:05

## 2024-05-30 RX ADMIN — LABETALOL HYDROCHLORIDE 10 MG: 5 INJECTION, SOLUTION INTRAVENOUS at 12:05

## 2024-05-30 RX ADMIN — DEXTROSE AND SODIUM CHLORIDE: 5; 450 INJECTION, SOLUTION INTRAVENOUS at 02:05

## 2024-05-30 RX ADMIN — AMLODIPINE BESYLATE 5 MG: 5 TABLET ORAL at 12:05

## 2024-05-30 RX ADMIN — PANTOPRAZOLE SODIUM 40 MG: 40 INJECTION, POWDER, FOR SOLUTION INTRAVENOUS at 09:05

## 2024-05-30 RX ADMIN — ACETAMINOPHEN 650 MG: 650 SUPPOSITORY RECTAL at 06:05

## 2024-05-30 NOTE — PROGRESS NOTES
Onur Wright - Neurosurgery ([facility])  Vascular Neurology  Comprehensive Stroke Center  Progress Note    Assessment/Plan:     * Cerebrovascular accident (CVA) due to embolism of right middle cerebral artery  87 y/o female with R MCA stroke due to R M2 occlusio. No thrombolytics as is on Xarelto. Went to IR but procedure aborted due to distal clot. So TICI 0. Etiology cardio embolic due to chronic afib.     No acute events overnight. Neuro exam stable. CT abdomen pending. Patient to be NPO at midnight/heparin gtt off at midnight in anticipation of PEG placement by GI on 5/27. Pt with some concerning finding of duodenal perf on CT abd/pelvis. Pt does not have peritonitis signs. General surgery did not feel this is of concern and patient vitals are stable, patient looks well. Gen surgery planning to place PEG tube on 5/29. Patient still remains NPO. IV labetalol 5mg scheduled q6 hours for BP control. Pt pending rehab placement after peg tube. Transitioning to oral medication on 5/30 after peg placement    Antithrombotics: ASA + minimal intensity heparin gtt. After patient is therapeutic on heparin gtt, ASA can be discontinued.    Statins: Lipitor 40 mg daily but unable to get as no NG tube    Aggressive risk factor modification: HTN, Diet, Exercise, Obesity, A-Fib     Rehab efforts: The patient has been evaluated by a stroke team provider and the therapy needs have been fully considered based off the presenting complaints and exam findings. The following therapy evaluations are needed:  recommendations include high intensity therapy    Diagnostics ordered/pending: Other: CT abd/pelvis    VTE prophylaxis: Mechanical prophylaxis: Place SCDs  None: Reason for No Pharmacological VTE Prophylaxis: Currently on anticoagulation    BP parameters: Infarct: SBP <180        Thrombophlebitis  Pt complaining of new R wrist and forearm pain, patient is tender to the touch on her R forearm and wrist. Tender with movement. No noted warmth  or redness, fingers warm to touch. Pt reports tylenol, voltaren and lidocaine patches are helping some. Concern for thrombophlebitis.    Plan  R wrist/forearm xray: some soft tissue swelling, no fracture  R UE ultrasound: consistent with thrombophlebitis   Scheduled tylenol suppository with no GI access, transition to 1000mg oral prn after peg tube placement  Hot packs and warm compresses to area  Concern for cellulitis or sepsis. Low threshold for initiation of antibiotics and sepsis workup    Obesity (BMI 30-39.9)  Stroke risk factor   on diet and exercise    Dysphagia due to recent stroke  Due to stroke  Aggressive therapy  Speech therapy consult  PEG placed on 5/29 with general surgery, beginning oral medications and attempting trickle feeds night on 5/30        Dysarthria due to acute stroke  Due to stroke  Aggressive therapy          Hemiplegia  Due to stroke  Aggressive therapy      Chronic a-fib  Stroke risk factor  Was on Xarelto, will need to resume once feeding route resolved  Will switch to eliquis due to less bleeding side effects    Hypertension, benign  Stroke risk factor  SBP <180 as 5 days out  IV labetalol 5mg schedule q6 hours for BP control 2^ patient being NPO  Starting transition to oral meds through G tube on 5/30 05/25/2024: No acute events overnight. Neuro exam stable. MRI Brain showed moderate sized R MCA infarct with scattered petechial hemorrhage and a suspected small hematoma. Patient's imaging discussed with staff, Dr. Rodriges, and patient's family. Heparin gtt initiated for AC in setting of atrial fibrillation while patient anticipates PEG placement on 5/27. Patient pending abdominal CT for eval of Pneumoperitoneum.  05/26/2024: No acute events overnight. Neuro exam stable. CT abdomen pending. Patient to be NPO at midnight/heparin gtt off at midnight in anticipation of PEG placement by GI tomorrow. Patient still remains NPO. IV labetalol 5mg scheduled q6 hours for BP  control.   05/27/2024: Neuro exam stable, PEG placement today.   05/28/2024: New R wrist pain, with hard spot in forearm, looks to be thrombophlebitis, R wrist xray and RUE US pending, hot packs to forearm and wrist, if concern for cellulitis will add antibiotic covering  05/29/2024: continued R arm pain, improving with meds, xray no fracture signs of soft tissue swelling, US pending, PEG tube placement today.  05/30/2024: PEG tube in place, transitioning to meds through G tube, starting trickle feeds tonight, can start back on eliquis later tonight aspirin/plavix      STROKE DOCUMENTATION        NIH Scale:  1a. Level of Consciousness: 0-->Alert, keenly responsive  1b. LOC Questions: 0-->Answers both questions correctly  1c. LOC Commands: 0-->Performs both tasks correctly  2. Best Gaze: 1-->Partial gaze palsy, gaze is abnormal in one or both eyes, but forced deviation or total gaze paresis is not present  3. Visual: 1-->Partial hemianopia  4. Facial Palsy: 2-->Partial paralysis (total or near-total paralysis of lower face)  5a. Motor Arm, Left: 4-->No movement  5b. Motor Arm, Right: 0-->No drift, limb holds 90 (or 45) degrees for full 10 secs  6a. Motor Leg, Left: 4-->No movement  6b. Motor Leg, Right: 1-->Drift, leg falls by the end of the 5-sec period but does not hit bed  7. Limb Ataxia: 0-->Absent  8. Sensory: 1-->Mild-to-moderate sensory loss, patient feels pinprick is less sharp or is dull on the affected side, or there is a loss of superficial pain with pinprick, but patient is aware of being touched  9. Best Language: 0-->No aphasia, normal  10. Dysarthria: 1-->Mild-to-moderate dysarthria, patient slurs at least some words and, at worst, can be understood with some difficulty  11. Extinction and Inattention (formerly Neglect): 0-->No abnormality  Total (NIH Stroke Scale): 15       Modified Grace Score: 2  [place] Coma Scale:    ABCD2 Score:    [id number] Score:7  HAS -BLED Score:   ICH Score:   Hunt &  Marie Classification:      Hemorrhagic change of an Ischemic Stroke: Does this patient have an ischemic stroke with hemorrhagic changes? No     Neurologic Chief Complaint: R MCA infarct    Subjective:     Interval History: Patient is seen for follow-up neurological assessment and treatment recommendations: No acute events overnight. Neuro exam stable. CT abdomen pending. Patient to be NPO at midnight/heparin gtt off at midnight in anticipation of PEG placement by General surgery on 5/29. Patient still remains NPO. IV labetalol 5mg scheduled q6 hours for BP control will restart her oral medications with peg tube. R wrist/forearm pain concern for thrombophlebitis. RUE US thrombophlebitis noted,  R wrist xray no fracture, some soft tissue swelling    HPI, Past Medical, Family, and Social History remains the same as documented in the initial encounter.     Review of Systems   HENT:  Positive for trouble swallowing.    Respiratory:  Positive for cough.    Musculoskeletal:  Positive for arthralgias and back pain.   Neurological:  Positive for facial asymmetry, weakness and numbness.   Hematological:  Bruises/bleeds easily.   All other systems reviewed and are negative.    Scheduled Meds:   amLODIPine  5 mg Per G Tube Daily    atorvastatin  40 mg Per G Tube Daily    labetalol  10 mg Intravenous Q6H    LIDOcaine  1 patch Transdermal Q24H    nystatin  500,000 Units Oral QID    pantoprazole  40 mg Intravenous Daily     Continuous Infusions:   dextrose 5 % and 0.45 % NaCl   Intravenous Continuous 50 mL/hr at 05/29/24 1706 New Bag at 05/29/24 1706    heparin (porcine) in D5W  0-40 Units/kg/hr (Adjusted) Intravenous Continuous 6.8 mL/hr at 05/29/24 2254 12 Units/kg/hr at 05/29/24 2254     PRN Meds:  Current Facility-Administered Medications:     acetaminophen, 1,000 mg, Per G Tube, Q8H PRN    bisacodyL, 10 mg, Rectal, Daily PRN    dextrose 10%, 12.5 g, Intravenous, PRN    dextrose 10%, 25 g, Intravenous, PRN    diclofenac sodium,  2 g, Topical (Top), TID PRN    glucagon (human recombinant), 1 mg, Intramuscular, PRN    insulin aspart U-100, 0-5 Units, Subcutaneous, Q12H PRN    iohexol, 15 mL, Oral, PRN    labetalol, 10 mg, Intravenous, Q6H PRN    ondansetron, 4 mg, Intravenous, Q12H PRN    sodium chloride 0.9%, 500 mL, Intravenous, PRN    sodium chloride 0.9%, 10 mL, Intravenous, PRN    Objective:     Vital Signs (Most Recent):  Temp: 97.9 °F (36.6 °C) (05/30/24 0809)  Pulse: 70 (05/30/24 0809)  Resp: 16 (05/30/24 0809)  BP: 138/64 (05/30/24 0809)  SpO2: 98 % (05/30/24 0809)  BP Location: Right arm    Vital Signs Range (Last 24H):  Temp:  [97.4 °F (36.3 °C)-98.1 °F (36.7 °C)]   Pulse:  [69-84]   Resp:  [12-20]   BP: (129-162)/(58-79)   SpO2:  [95 %-100 %]   BP Location: Right arm       Physical Exam  Vitals reviewed.   HENT:      Head: Normocephalic.      Mouth/Throat:      Mouth: Mucous membranes are dry.   Pulmonary:      Effort: Pulmonary effort is normal. No respiratory distress.   Neurological:      Mental Status: She is alert and oriented to person, place, and time.      Sensory: Sensory deficit present.      Motor: Weakness present.   Psychiatric:         Mood and Affect: Mood normal.         Behavior: Behavior normal.          Pain with movement, tender to touch, hard nodule on forearm. Lidocaine patch in place, no redness noted. Improving pain    Neurological Exam:   LOC: alert  Attention Span: Good   Language: decreased fluency  Articulation: Dysarthria  Orientation: Person, Place, Time   Facial Movement (CN VII): Lower facial weakness on the Left  Motor: Arm left  Plegia 0/5  Leg left  Plegia 0/5  Arm right  Paresis: 5/5  Leg right Paresis: 4/5    Laboratory:  CMP:   Recent Labs   Lab 05/30/24  0507   CALCIUM 8.5*   ALBUMIN 2.2*   PROT 5.8*      K 3.9   CO2 21*      BUN 10   CREATININE 0.9   ALKPHOS 51*   ALT 16   AST 17   BILITOT 0.4       BMP:   Recent Labs   Lab 05/30/24  0507      K 3.9      CO2 21*   BUN  "10   CREATININE 0.9   CALCIUM 8.5*     CBC:   Recent Labs   Lab 05/30/24  0507   WBC 8.16  8.16   RBC 3.67*  3.67*   HGB 9.2*  9.2*   HCT 30.2*  30.2*     265   MCV 82  82   MCH 25.1*  25.1*   MCHC 30.5*  30.5*     Lipid Panel:   Recent Labs   Lab 05/23/24  2328   CHOL 146   LDLCALC 83.2   HDL 43   TRIG 99     Coagulation:   Recent Labs   Lab 05/29/24  2202 05/30/24  0507   INR 1.1  --    APTT 33.0* 41.2*     Platelet Aggregation Study: No results for input(s): "PLTAGG", "PLTAGINTERP", "PLTAGREGLACO", "ADPPLTAGGREG" in the last 168 hours.  Hgb A1C:   No results for input(s): "HGBA1C" in the last 168 hours.    TSH:   Recent Labs   Lab 05/23/24  2328   TSH 1.475       Diagnostic Results     Brain Imaging:  MRI Brain w/o Contrast 5/24/24  Impression:  Moderate-sized right MCA territorial infarction with scattered petechial hemorrhage and a suspected small hematoma.  Clinical correlation and short-term follow-up with CT is recommended.  Hazy visualization of the right MCA flow void as compared to the contralateral side with suspected occlusion of the distal right MCA.  Further evaluation with CTA may be performed.  Generalized cerebral volume loss and moderate chronic microvascular ischemic change.  Small well-circumscribed T2 hyperintense focus of at the left superomedial extraconal space measuring 0.7 cm.  Findings are nonspecific, and may relate to a small vascular lesion.  Further evaluation with dedicated imaging of the orbits as clinically indicated.     CT Head w/o Contrast 5/21/24  Impression  Evolving subtotal right MCA infarct on a background of moderate to advanced chronic microvascular ischemic disease. No progressive mass effect. No hemorrhagic transformation.    Vessel Imaging:  CTA Head and Neck 5/18/24  Impression  1. Occlusion of one of the right M2 segments.  2. Less than 50 percent stenosis right M1 segment.  3. Findings discussed with stroke team neurology resident.    CT abd/pelvis: " 5/25/2024  Impression:     Tiny focus of free air in the retroperitoneum, as detailed above.  Recommend correlation with outside imaging studies for stability.  Etiology of the retroperitoneal air is not apparent.  No large volume intraperitoneal free air.     Small left pleural effusion with basilar atelectasis.     Small to moderate size hiatal hernia with questioned thickening of the lower esophagus, possibly suggestive of esophagitis.     Cholelithiasis.    R wrist xray: 5/28/24  Impression:     Abnormal study as above with soft tissue swelling and irregularity about the dorsum of the hand and wrist.  Correlation with physical findings would be helpful.     Advanced degenerative change.  Probable ligamentous disruption scapholunate.    RUE US: 5/29/2024  Impression:     No deep vein thrombosis.     Occlusive superficial thrombophlebitis of the right cephalic vein.    Nicholas Norwood DO  [facility]  Department of Vascular Neurology   [facility] - Neuro[facility] ([facility])

## 2024-05-30 NOTE — PLAN OF CARE
Problem: Adult Inpatient Plan of Care  Goal: Plan of Care Review  Outcome: Progressing  Goal: Patient-Specific Goal (Individualized)  Outcome: Progressing  Goal: Absence of Hospital-Acquired Illness or Injury  Outcome: Progressing  Goal: Optimal Comfort and Wellbeing  Outcome: Progressing  Goal: Readiness for Transition of Care  Outcome: Progressing     Problem: Stroke, Ischemic (Includes Transient Ischemic Attack)  Goal: Optimal Coping  Outcome: Progressing  Goal: Effective Bowel Elimination  Outcome: Progressing  Goal: Optimal Cerebral Tissue Perfusion  Outcome: Progressing  Goal: Optimal Cognitive Function  Outcome: Progressing  Goal: Improved Communication Skills  Outcome: Progressing  Goal: Optimal Functional Ability  Outcome: Progressing  Goal: Optimal Nutrition Intake  Outcome: Progressing  Goal: Effective Oxygenation and Ventilation  Outcome: Progressing  Goal: Improved Sensorimotor Function  Outcome: Progressing  Goal: Safe and Effective Swallow  Outcome: Progressing  Goal: Effective Urinary Elimination  Outcome: Progressing     Problem: Fall Injury Risk  Goal: Absence of Fall and Fall-Related Injury  Outcome: Progressing     Problem: Infection  Goal: Absence of Infection Signs and Symptoms  Outcome: Progressing     Problem: Skin Injury Risk Increased  Goal: Skin Health and Integrity  Outcome: Progressing         POC updated and reviewed with the patient/daughter at the bedside. Questions regarding POC were encouraged and addressed. VSS, see flowsheets. Patient is AOX 4 at this time. Tele maintained per order. Fall and safety precautions maintained, no signs of injury noted during shift. Patient repositioned independently/ with assistance in bed for comfort. Upon exiting room, patient's bed locked in low position, side rails up x 2, bed alarm on, with call light within reach. Instructed patient to call staff for mobility, verbalized understanding. Stroke book and education reviewed at the bedside, see  education flowsheets for details. No acute signs of distress noted at this time.

## 2024-05-30 NOTE — ASSESSMENT & PLAN NOTE
Pt complaining of new R wrist and forearm pain, patient is tender to the touch on her R forearm and wrist. Tender with movement. No noted warmth or redness, fingers warm to touch. Pt reports tylenol, voltaren and lidocaine patches are helping some. Concern for thrombophlebitis.    Plan  R wrist/forearm xray: some soft tissue swelling, no fracture  R UE ultrasound: consistent with thrombophlebitis   Scheduled tylenol suppository with no GI access, transition to 1000mg oral prn after peg tube placement  Hot packs and warm compresses to area  Concern for cellulitis or sepsis. Low threshold for initiation of antibiotics and sepsis workup

## 2024-05-30 NOTE — NURSING
Nurses Note -- 4 Eyes      5/29/2024   7:00 PM      Skin assessed during: Daily Assessment      [x] No Altered Skin Integrity Present    [x]Prevention Measures Documented      [] Yes- Altered Skin Integrity Present or Discovered   [] LDA Added if Not in Epic (Describe Wound)   [] New Altered Skin Integrity was Present on Admit and Documented in LDA   [] Wound Image Taken    Wound Care Consulted? No    Attending Nurse:  Yuli Beckham RN/Staff Member:  Ciro        See LDA flowsheets for details.

## 2024-05-30 NOTE — PT/OT/SLP PROGRESS
Occupational Therapy   Treatment    Name: Amirah Davey  MRN: [id number]  Admitting Diagnosis:  Cerebrovascular accident (CVA) due to embolism of right middle cerebral artery  1 Day Post-Op  Procedure(s):  EGD, WITH PEG TUBE INSERTION   Recommendations:     Discharge Recommendations: High Intensity Therapy  Discharge Equipment Recommendations:  hospital bed, lift device, wheelchair  Barriers to discharge:  None    Assessment:     Amirah Davey is a 88 y.o. female with a medical diagnosis of Cerebrovascular accident (CVA) due to embolism of right middle cerebral artery.  She presents with the following performance deficits affecting function are weakness, impaired endurance, impaired self care skills, impaired functional mobility, gait instability, impaired balance, pain, decreased safety awareness, decreased lower extremity function, decreased upper extremity function, impaired fine motor, edema, impaired coordination, decreased coordination, decreased ROM, abnormal tone.     Rehab Prognosis:  Good; patient would benefit from acute skilled OT services to address these deficits and reach maximum level of function.       Plan:     Patient to be seen 4 x/week to address the above listed problems via self-care/home management, therapeutic activities, therapeutic exercises, neuromuscular re-education, sensory integration  Plan of Care Expires: 06/24/24  Plan of Care Reviewed with: patient, family    Subjective     Chief Complaint: c/o pain at PEG site once EOB  Patient/Family Comments/goals: get better  Pain/Comfort:  Pain Rating 1: 0/10  Location - Orientation 1: generalized  Location 1: abdomen (PEG site with mobility)  Pain Addressed 1: Reposition, Distraction  Pain Rating Post-Intervention 1: 0/10    Objective:     Communicated with: nurse leyla and OTR prior to session.  Patient found HOB elevated with telemetry, PureWick, peripheral IV, bed alarm upon OT entry to room.  A client care conference was completed by the  OTR and the HOPPER prior to treatment by the HOPPER to discuss the patient's POC and current status.    General Precautions: Standard, aspiration, fall, NPO    Orthopedic Precautions:N/A  Braces: N/A  Respiratory Status: Room air     Occupational Performance:     Bed Mobility:    Patient completed Rolling/Turning to Left with  maximal assistance  Patient completed Rolling/Turning to Right with maximal assistance  Patient completed Scooting/Bridging with maximal assistance  Patient completed Supine to Sit with maximal assistance  Patient completed Sit to Supine with total assistance     Functional Mobility/Transfers:  Patient completed Sit <> Stand Transfer with total assistance  with  no assistive device   Static standing balance: Total(A) regressing to (D)  Functional Mobility: patient tolerated x15 min seated EOB with Mod(A) to CGA for static sitting balance  Postural corrections EOB: R UE pushing but redirectable with tactile cues. Patient able to self-right to midline with max tactile cues but fatigues quickly demonstrating L anterolateral lean     Activities of Daily Living:  Grooming: minimum assistance facial hygiene seated EOB      AMPA 6 Click ADL: 10    Treatment & Education:  Patient edu on goals, current progress, and importance of therapy. Patient completed gentle, progressive L UE PROM to end range to maintain joint integrity as severe flexor tone noted at shoulder and elbow joints. Patient with extensor tone in L LE during transitional movements. Positioning techniques to promote joint integrity. Addressed all patient questions/concerns within HOPPER scope of practice.     Patient left with bed in chair position with all lines intact, call button in reach, bed alarm on, and family present    GOALS:   Multidisciplinary Problems       Occupational Therapy Goals          Problem: Occupational Therapy    Goal Priority Disciplines Outcome Interventions   Occupational Therapy Goal     OT, PT/OT Progressing     Description: Goals to be met by: 6/24/24     Patient will increase functional independence with ADLs by performing:    UE Dressing with Moderate Assistance.  LE Dressing with Maximum Assistance.  Grooming while seated with Minimal Assistance.  Toileting from bedside commode with Maximum Assistance for hygiene and clothing management.   Sitting at edge of bed x15 minutes with Stand-by Assistance.  Rolling to Bilateral with Minimal Assistance.   Supine to sit with Minimal Assistance.  Stand pivot transfers with Minimal Assistance.                         Time Tracking:     OT Date of Treatment: 05/30/24  OT Start Time: 1457  OT Stop Time: 1530  OT Total Time (min): 33 min    Billable Minutes:Therapeutic Activity 20  Neuromuscular Re-education 13    OT/PHILIP: PHILIP     Number of PHILIP visits since last OT visit: 1 5/30/2024

## 2024-05-30 NOTE — CONSULTS
[facility] - Neurosurgery ([facility])  Adult Nutrition  Consult Note    SUMMARY     Recommendations    1. EN Recommendations: Glucerna 1.5 to goal rate @ 45 ml/hr x 24 hours to provide 1620 kcals, 89g of protein, and 820 ml of fluid   - Start at trickle feeds, slowly advancing to goal.   Additional FW 30 mL/hr while on TF.   - Bolus feeds when medically stable 4.5 cartons providing 1602 kcals, 88g PRO and 810 mL fluid; additional FW ~100mL BEFORE and AFTER feedingg.   2. Monitor for s/s of intolerance such as residuals >500ml, n/v/d, or abdominal distension.   3. Monitor for refeeding syndrome, if labs show signs replete phosphorus, magnesium and potassium quickly.   4. RD following    Goals: Meet % of EEN/EPN by RD f/u date  Nutrition Goal Status: goal not met  Communication of RD Recs: other (comment)    Assessment and Plan    Nutrition Problem  Inadequate energy intake     Related to (etiology):   Physiological demands     Signs and Symptoms (as evidenced by):   NPO status, PEG placed 5/29    Interventions (treatment strategy):  Collaboration of nutrition care w/ other providers   EN    Nutrition Diagnosis Status:   New          Reason for Assessment    Reason For Assessment: consult  Diagnosis: cardiac disease  Relevant Medical History: HTN, stroke  Interdisciplinary Rounds: did not attend  General Information Comments: RD consulted for TF. PEG placed 5/29 d/t pt w/ issues chewing/swallowing. Alternate recommendations provided in POC. Per chart review limited weight hx available. NFPE no appropriate at this time.  Nutrition Discharge Planning: Pt not appropriate for cardiac diet education at this time, RD will follow-up and see if education is appropriate at this time.    Nutrition Risk Screen    Nutrition Risk Screen: difficulty chewing/swallowing    Nutrition/Diet History    Spiritual, Cultural Beliefs, Congregational Practices, Values that Affect Care: no  Factors Affecting Nutritional Intake:  "NPO    Anthropometrics    Temp: 98.8 °F (37.1 °C)  Height Method: Stated  Height: 4' 11" (149.9 cm)  Height (inches): 59 in  Weight Method: Bed Scale  Weight: 67.8 kg (149 lb 7.6 oz)  Weight (lb): 149.47 lb  Ideal Body Weight (IBW), Female: 95 lb  % Ideal Body Weight, Female (lb): 157.34 %  BMI (Calculated): 30.2  BMI Grade: 30 - 34.9- obesity - grade I       Lab/Procedures/Meds    Pertinent Labs Reviewed: reviewed  Pertinent Labs Comments: H/H: 9.2/30.2, MCH 25.1, MCHC 30.5, Ca 8.5, cO2 21, ALP 51, TP 5.8  Pertinent Medications Reviewed: reviewed  Pertinent Medications Comments: atorvastatin, labetalol, nystatin, pantoprazole, dextrose and NaCl, heparin    Estimated/Assessed Needs    Weight Used For Calorie Calculations: 67.5 kg (148 lb 13 oz)  Energy Calorie Requirements (kcal): 1687  Energy Need Method: Kcal/kg (25 kcal/kg)  Protein Requirements: 81 g (1.2 g/kg)  Weight Used For Protein Calculations: 67.5 kg (148 lb 13 oz)        RDA Method (mL): 1687         Nutrition Prescription Ordered    Current Diet Order: NPO    Evaluation of Received Nutrient/Fluid Intake    I/O: -200 mL  Energy Calories Required: not meeting needs  Protein Required: not meeting needs  Fluid Required: not meeting needs  Total Fluid Intake (mL/kg): 1 ml or fluid per MD  Tolerance: tolerating  % Intake of Estimated Energy Needs: 75 - 100 %  % Meal Intake: NPO    Nutrition Risk    Level of Risk/Frequency of Follow-up: low ((1x/week))       Monitor and Evaluation    Food and Nutrient Intake: energy intake, food and beverage intake  Food and Nutrient Adminstration: diet order  Knowledge/Beliefs/Attitudes: food and nutrition knowledge/skill, beliefs and attitudes  Physical Activity and Function: nutrition-related ADLs and IADLs, factors affecting access to physical activity  Anthropometric Measurements: height/length, weight, weight change, body mass index, growth pattern indices/percentile ranks  Biochemical Data, Medical Tests and Procedures: " electrolyte and renal panel, glucose/endocrine profile, inflammatory profile, lipid profile, gastrointestinal profile  Nutrition-Focused Physical Findings: overall appearance, extremities, muscles and bones, head and eyes, skin       Nutrition Follow-Up    RD Follow-up?: Yes    Kj Freitas MS, RD, LDN

## 2024-05-30 NOTE — ASSESSMENT & PLAN NOTE
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 No thrombolytics as is on Xarelto. Went to IR but procedure aborted due to distal clot. So TICI 0. Etiology cardio embolic due to chronic afib.     No acute events overnight. Neuro exam stable. CT abdomen pending. Patient to be NPO at midnight/heparin gtt off at midnight in anticipation of PEG placement by GI on 5/27. Pt with some concerning finding of duodenal perf on CT abd/pelvis. Pt does not have peritonitis signs. General surgery did not feel this is of concern and patient vitals are stable, patient looks well. Gen surgery planning to place PEG tube on 5/29. Patient still remains NPO. IV labetalol 5mg scheduled q6 hours for BP control. Pt pending rehab placement after peg tube. Transitioning to oral medication on 5/30 after peg placement    Antithrombotics: ASA + minimal intensity heparin gtt. After patient is therapeutic on heparin gtt, ASA can be discontinued.    Statins: Lipitor 40 mg daily but unable to get as no NG tube    Aggressive risk factor modification: HTN, Diet, Exercise, Obesity, A-Fib     Rehab efforts: The patient has been evaluated by a stroke team provider and the therapy needs have been fully considered based off the presenting complaints and exam findings. The following therapy evaluations are needed:  recommendations include high intensity therapy    Diagnostics ordered/pending: Other: CT abd/pelvis    VTE prophylaxis: Mechanical prophylaxis: Place SCDs  None: Reason for No Pharmacological VTE Prophylaxis: Currently on anticoagulation    BP parameters: Infarct: SBP <180

## 2024-05-30 NOTE — PROGRESS NOTES
[facility] - Neurosurgery ([facility])  General Surgery  Progress Note    Subjective:     History of Present Illness:  88F with history of CVA, HTN, and Afib (on Heparin gtt) currently admitted to vascular neurology team. Patient has had dysphagia following her recent stroke and requires long term enteral access for feeds and medication. She has not been receiving any feeds as there has been no success in obtaining enteral access. Of note, prior to transfer to [facility] an NGT was attempted multiple times without success. A CT scan obtained prior to transfer showed a small foci of free air in the retroperitoneum with no other evidence of hollow viscus injury. She has been hemodynamically stable, no findings of abdominal pain or symptoms on examination by primary team. An interval CT scan was performed here, which showed a single small foci of air in the RP, seen on only one slice of imaging. Normal appearing duodenum, stomach, small bowel, and colon. GI was consulted for PEG placement. Per primary, GI is not comfortable performing PEG placement given the CT results. They have deferred placement of feeding tube to general surgery due to the now, nearly resolved, pneumoperitoneum.   History of , otherwise no intra-abdominal surgeries. She has a small hiatal hernia but does appear to have an acceptable window to attempt PEG placement.     Post-Op Info:  Procedure(s) (LRB):  EGD, WITH PEG TUBE INSERTION (N/A)   1 Day Post-Op     Interval History: She went to OR yesterday for PEG placement. No significant changes overnight.     Medications:  Continuous Infusions:   dextrose 5 % and 0.45 % NaCl   Intravenous Continuous 50 mL/hr at 24 1706 New Bag at 24 1706    heparin (porcine) in D5W  0-40 Units/kg/hr (Adjusted) Intravenous Continuous 6.8 mL/hr at 24 2254 12 Units/kg/hr at 24 2254     Scheduled Meds:   atorvastatin  40 mg Per G Tube Daily    labetalol  10 mg Intravenous Q6H    LIDOcaine  1  patch Transdermal Q24H    nystatin  500,000 Units Oral QID    pantoprazole  40 mg Intravenous Daily     PRN Meds:  Current Facility-Administered Medications:     acetaminophen, 1,000 mg, Per G Tube, Q8H PRN    bisacodyL, 10 mg, Rectal, Daily PRN    dextrose 10%, 12.5 g, Intravenous, PRN    dextrose 10%, 25 g, Intravenous, PRN    diclofenac sodium, 2 g, Topical (Top), TID PRN    glucagon (human recombinant), 1 mg, Intramuscular, PRN    insulin aspart U-100, 0-5 Units, Subcutaneous, Q12H PRN    iohexol, 15 mL, Oral, PRN    labetalol, 10 mg, Intravenous, Q6H PRN    ondansetron, 4 mg, Intravenous, Q12H PRN    sodium chloride 0.9%, 500 mL, Intravenous, PRN    sodium chloride 0.9%, 10 mL, Intravenous, PRN     Review of patient's allergies indicates:  No Known Allergies  Objective:     Vital Signs (Most Recent):  Temp: 97.9 °F (36.6 °C) (05/30/24 0809)  Pulse: 70 (05/30/24 0809)  Resp: 16 (05/30/24 0809)  BP: 138/64 (05/30/24 0809)  SpO2: 98 % (05/30/24 0809) Vital Signs (24h Range):  Temp:  [97.4 °F (36.3 °C)-98.1 °F (36.7 °C)] 97.9 °F (36.6 °C)  Pulse:  [69-84] 70  Resp:  [12-20] 16  SpO2:  [95 %-100 %] 98 %  BP: (129-162)/(58-79) 138/64     Weight: 67.8 kg (149 lb 7.6 oz)  Body mass index is 30.19 kg/m².    Intake/Output - Last 3 Shifts         05/28 0700 05/29 0659 05/29 0700 05/30 0659 05/30 0700 05/31 0659    I.V. (mL/kg)  200 (2.9)     Total Intake(mL/kg)  200 (2.9)     Urine (mL/kg/hr)  400 (0.2)     Stool  0     Total Output  400     Net  -200            Urine Occurrence  2 x     Stool Occurrence  1 x              Physical Exam  Constitutional:       General: She is not in acute distress.     Appearance: She is well-developed.   HENT:      Head: Normocephalic and atraumatic.   Eyes:      General:         Right eye: No discharge.         Left eye: No discharge.      Conjunctiva/sclera: Conjunctivae normal.   Cardiovascular:      Rate and Rhythm: Normal rate and regular rhythm.   Pulmonary:      Effort: Pulmonary  effort is normal. No respiratory distress.   Abdominal:      General: There is no distension.      Palpations: Abdomen is soft.      Tenderness: There is abdominal tenderness (appropriate post op).      Comments: PEG in LUQ with bumper at 3.5 cm   Musculoskeletal:         General: No deformity.      Cervical back: Normal range of motion.   Skin:     General: Skin is warm and dry.   Neurological:      Mental Status: She is alert. Mental status is at baseline.      Motor: Weakness present.   Psychiatric:         Behavior: Behavior normal.          Significant Labs:  I have reviewed all pertinent lab results within the past 24 hours.  CBC:   Recent Labs   Lab 05/30/24  0507   WBC 8.16  8.16   RBC 3.67*  3.67*   HGB 9.2*  9.2*   HCT 30.2*  30.2*     265   MCV 82  82   MCH 25.1*  25.1*   MCHC 30.5*  30.5*     CMP:   Recent Labs   Lab 05/30/24  0507      CALCIUM 8.5*   ALBUMIN 2.2*   PROT 5.8*      K 3.9   CO2 21*      BUN 10   CREATININE 0.9   ALKPHOS 51*   ALT 16   AST 17   BILITOT 0.4       Significant Diagnostics:  I have reviewed all pertinent imaging results/findings within the past 24 hours.  Assessment/Plan:     * Cerebrovascular accident (CVA) due to embolism of right middle cerebral artery  Patient is an 88 year old female with multiple medical co-morbidities who requires long term enteral access for dysphagia secondary to recent stroke. GI has deferred PEG placement to Surgery team due to small foci of air seen in the retroperitoneum on CT scan. Patient is entirely asymptomatic, she has no evidence to suggest an ongoing issue/leak. She has contrast through to her colon on CT without evidence of extravasation. If perforation occurred it was almost certainly a microperforation from the numerous attempts at NGT placement, and has since sealed itself off without causing any clinically significant leak or inflammatory reaction.  S/p PEG placement 5/29/24    - Can clamp G tube,  unclamp prn for nausea or vomiting.   - Okay to start trickle Tfs, advance as tolerated  - Can resume Heparin gtt  - Remainder of care per primary team  - We will sign off. Please contact general surgery with any questions, concerns, or clinical status changes        Oscar Rollins MD  General Surgery  [facility] - Neurosurgery ([facility])

## 2024-05-30 NOTE — SUBJECTIVE & OBJECTIVE
Interval History: She went to OR yesterday for PEG placement. No significant changes overnight.     Medications:  Continuous Infusions:   dextrose 5 % and 0.45 % NaCl   Intravenous Continuous 50 mL/hr at 05/29/24 1706 New Bag at 05/29/24 1706    heparin (porcine) in D5W  0-40 Units/kg/hr (Adjusted) Intravenous Continuous 6.8 mL/hr at 05/29/24 2254 12 Units/kg/hr at 05/29/24 2254     Scheduled Meds:   atorvastatin  40 mg Per G Tube Daily    labetalol  10 mg Intravenous Q6H    LIDOcaine  1 patch Transdermal Q24H    nystatin  500,000 Units Oral QID    pantoprazole  40 mg Intravenous Daily     PRN Meds:  Current Facility-Administered Medications:     acetaminophen, 1,000 mg, Per G Tube, Q8H PRN    bisacodyL, 10 mg, Rectal, Daily PRN    dextrose 10%, 12.5 g, Intravenous, PRN    dextrose 10%, 25 g, Intravenous, PRN    diclofenac sodium, 2 g, Topical (Top), TID PRN    glucagon (human recombinant), 1 mg, Intramuscular, PRN    insulin aspart U-100, 0-5 Units, Subcutaneous, Q12H PRN    iohexol, 15 mL, Oral, PRN    labetalol, 10 mg, Intravenous, Q6H PRN    ondansetron, 4 mg, Intravenous, Q12H PRN    sodium chloride 0.9%, 500 mL, Intravenous, PRN    sodium chloride 0.9%, 10 mL, Intravenous, PRN     Review of patient's allergies indicates:  No Known Allergies  Objective:     Vital Signs (Most Recent):  Temp: 97.9 °F (36.6 °C) (05/30/24 0809)  Pulse: 70 (05/30/24 0809)  Resp: 16 (05/30/24 0809)  BP: 138/64 (05/30/24 0809)  SpO2: 98 % (05/30/24 0809) Vital Signs (24h Range):  Temp:  [97.4 °F (36.3 °C)-98.1 °F (36.7 °C)] 97.9 °F (36.6 °C)  Pulse:  [69-84] 70  Resp:  [12-20] 16  SpO2:  [95 %-100 %] 98 %  BP: (129-162)/(58-79) 138/64     Weight: 67.8 kg (149 lb 7.6 oz)  Body mass index is 30.19 kg/m².    Intake/Output - Last 3 Shifts         05/28 0700 05/29 0659 05/29 0700 05/30 0659 05/30 0700 05/31 0659    I.V. (mL/kg)  200 (2.9)     Total Intake(mL/kg)  200 (2.9)     Urine (mL/kg/hr)  400 (0.2)     Stool  0     Total Output   400     Net  -200            Urine Occurrence  2 x     Stool Occurrence  1 x              Physical Exam  Constitutional:       General: She is not in acute distress.     Appearance: She is well-developed.   HENT:      Head: Normocephalic and atraumatic.   Eyes:      General:         Right eye: No discharge.         Left eye: No discharge.      Conjunctiva/sclera: Conjunctivae normal.   Cardiovascular:      Rate and Rhythm: Normal rate and regular rhythm.   Pulmonary:      Effort: Pulmonary effort is normal. No respiratory distress.   Abdominal:      General: There is no distension.      Palpations: Abdomen is soft.      Tenderness: There is abdominal tenderness (appropriate post op).      Comments: PEG in LUQ with bumper at 3.5 cm   Musculoskeletal:         General: No deformity.      Cervical back: Normal range of motion.   Skin:     General: Skin is warm and dry.   Neurological:      Mental Status: She is alert. Mental status is at baseline.      Motor: Weakness present.   Psychiatric:         Behavior: Behavior normal.          Significant Labs:  I have reviewed all pertinent lab results within the past 24 hours.  CBC:   Recent Labs   Lab 05/30/24  0507   WBC 8.16  8.16   RBC 3.67*  3.67*   HGB 9.2*  9.2*   HCT 30.2*  30.2*     265   MCV 82  82   MCH 25.1*  25.1*   MCHC 30.5*  30.5*     CMP:   Recent Labs   Lab 05/30/24  0507      CALCIUM 8.5*   ALBUMIN 2.2*   PROT 5.8*      K 3.9   CO2 21*      BUN 10   CREATININE 0.9   ALKPHOS 51*   ALT 16   AST 17   BILITOT 0.4       Significant Diagnostics:  I have reviewed all pertinent imaging results/findings within the past 24 hours.

## 2024-05-30 NOTE — NURSING
Patient transferred to NPU . Bedside handoff completed with Katherine GALLARDO. No acute signs of distress noted at this time.

## 2024-05-30 NOTE — ASSESSMENT & PLAN NOTE
Stroke risk factor  SBP <180 as 5 days out  IV labetalol 5mg schedule q6 hours for BP control 2^ patient being NPO  Starting transition to oral meds through G tube on 5/30

## 2024-05-30 NOTE — ASSESSMENT & PLAN NOTE
Stroke risk factor  Was on Xarelto, will need to resume once feeding route resolved  Will switch to eliquis due to less bleeding side effects

## 2024-05-30 NOTE — PT/OT/SLP PROGRESS
Physical Therapy      Patient Name:  Amirah Davey   MRN:  [id number]    Patient not seen today secondary to  (MD in room, pt unable to return for additional attempt). Will follow-up as appropriate.

## 2024-05-30 NOTE — SUBJECTIVE & OBJECTIVE
Neurologic Chief Complaint: R MCA infarct    Subjective:     Interval History: Patient is seen for follow-up neurological assessment and treatment recommendations: No acute events overnight. Neuro exam stable. CT abdomen pending. Patient to be NPO at midnight/heparin gtt off at midnight in anticipation of PEG placement by General surgery on 5/29. Patient still remains NPO. IV labetalol 5mg scheduled q6 hours for BP control will restart her oral medications with peg tube. R wrist/forearm pain concern for thrombophlebitis. RUE US thrombophlebitis noted,  R wrist xray no fracture, some soft tissue swelling    HPI, Past Medical, Family, and Social History remains the same as documented in the initial encounter.     Review of Systems   HENT:  Positive for trouble swallowing.    Respiratory:  Positive for cough.    Musculoskeletal:  Positive for arthralgias and back pain.   Neurological:  Positive for facial asymmetry, weakness and numbness.   Hematological:  Bruises/bleeds easily.   All other systems reviewed and are negative.    Scheduled Meds:   amLODIPine  5 mg Per G Tube Daily    atorvastatin  40 mg Per G Tube Daily    labetalol  10 mg Intravenous Q6H    LIDOcaine  1 patch Transdermal Q24H    nystatin  500,000 Units Oral QID    pantoprazole  40 mg Intravenous Daily     Continuous Infusions:   dextrose 5 % and 0.45 % NaCl   Intravenous Continuous 50 mL/hr at 05/29/24 1706 New Bag at 05/29/24 1706    heparin (porcine) in D5W  0-40 Units/kg/hr (Adjusted) Intravenous Continuous 6.8 mL/hr at 05/29/24 2254 12 Units/kg/hr at 05/29/24 2254     PRN Meds:  Current Facility-Administered Medications:     acetaminophen, 1,000 mg, Per G Tube, Q8H PRN    bisacodyL, 10 mg, Rectal, Daily PRN    dextrose 10%, 12.5 g, Intravenous, PRN    dextrose 10%, 25 g, Intravenous, PRN    diclofenac sodium, 2 g, Topical (Top), TID PRN    glucagon (human recombinant), 1 mg, Intramuscular, PRN    insulin aspart U-100, 0-5 Units, Subcutaneous, Q12H  PRN    iohexol, 15 mL, Oral, PRN    labetalol, 10 mg, Intravenous, Q6H PRN    ondansetron, 4 mg, Intravenous, Q12H PRN    sodium chloride 0.9%, 500 mL, Intravenous, PRN    sodium chloride 0.9%, 10 mL, Intravenous, PRN    Objective:     Vital Signs (Most Recent):  Temp: 97.9 °F (36.6 °C) (05/30/24 0809)  Pulse: 70 (05/30/24 0809)  Resp: 16 (05/30/24 0809)  BP: 138/64 (05/30/24 0809)  SpO2: 98 % (05/30/24 0809)  BP Location: Right arm    Vital Signs Range (Last 24H):  Temp:  [97.4 °F (36.3 °C)-98.1 °F (36.7 °C)]   Pulse:  [69-84]   Resp:  [12-20]   BP: (129-162)/(58-79)   SpO2:  [95 %-100 %]   BP Location: Right arm       Physical Exam  Vitals reviewed.   HENT:      Head: Normocephalic.      Mouth/Throat:      Mouth: Mucous membranes are dry.   Pulmonary:      Effort: Pulmonary effort is normal. No respiratory distress.   Neurological:      Mental Status: She is alert and oriented to person, place, and time.      Sensory: Sensory deficit present.      Motor: Weakness present.   Psychiatric:         Mood and Affect: Mood normal.         Behavior: Behavior normal.          Pain with movement, tender to touch, hard nodule on forearm. Lidocaine patch in place, no redness noted. Improving pain    Neurological Exam:   LOC: alert  Attention Span: Good   Language: decreased fluency  Articulation: Dysarthria  Orientation: Person, Place, Time   Facial Movement (CN VII): Lower facial weakness on the Left  Motor: Arm left  Plegia 0/5  Leg left  Plegia 0/5  Arm right  Paresis: 5/5  Leg right Paresis: 4/5    Laboratory:  CMP:   Recent Labs   Lab 05/30/24  0507   CALCIUM 8.5*   ALBUMIN 2.2*   PROT 5.8*      K 3.9   CO2 21*      BUN 10   CREATININE 0.9   ALKPHOS 51*   ALT 16   AST 17   BILITOT 0.4       BMP:   Recent Labs   Lab 05/30/24  0507      K 3.9      CO2 21*   BUN 10   CREATININE 0.9   CALCIUM 8.5*     CBC:   Recent Labs   Lab 05/30/24  0507   WBC 8.16  8.16   RBC 3.67*  3.67*   HGB 9.2*  9.2*   HCT  "30.2*  30.2*     265   MCV 82  82   MCH 25.1*  25.1*   MCHC 30.5*  30.5*     Lipid Panel:   Recent Labs   Lab 05/23/24  2328   CHOL 146   LDLCALC 83.2   HDL 43   TRIG 99     Coagulation:   Recent Labs   Lab 05/29/24  2202 05/30/24  0507   INR 1.1  --    APTT 33.0* 41.2*     Platelet Aggregation Study: No results for input(s): "PLTAGG", "PLTAGINTERP", "PLTAGREGLACO", "ADPPLTAGGREG" in the last 168 hours.  Hgb A1C:   No results for input(s): "HGBA1C" in the last 168 hours.    TSH:   Recent Labs   Lab 05/23/24 2328   TSH 1.475       Diagnostic Results     Brain Imaging:  MRI Brain w/o Contrast 5/24/24  Impression:  Moderate-sized right MCA territorial infarction with scattered petechial hemorrhage and a suspected small hematoma.  Clinical correlation and short-term follow-up with CT is recommended.  Hazy visualization of the right MCA flow void as compared to the contralateral side with suspected occlusion of the distal right MCA.  Further evaluation with CTA may be performed.  Generalized cerebral volume loss and moderate chronic microvascular ischemic change.  Small well-circumscribed T2 hyperintense focus of at the left superomedial extraconal space measuring 0.7 cm.  Findings are nonspecific, and may relate to a small vascular lesion.  Further evaluation with dedicated imaging of the orbits as clinically indicated.     CT Head w/o Contrast 5/21/24  Impression  Evolving subtotal right MCA infarct on a background of moderate to advanced chronic microvascular ischemic disease. No progressive mass effect. No hemorrhagic transformation.    Vessel Imaging:  CTA Head and Neck 5/18/24  Impression  1. Occlusion of one of the right M2 segments.  2. Less than 50 percent stenosis right M1 segment.  3. Findings discussed with stroke team neurology resident.    CT abd/pelvis: 5/25/2024  Impression:     Tiny focus of free air in the retroperitoneum, as detailed above.  Recommend correlation with outside imaging " studies for stability.  Etiology of the retroperitoneal air is not apparent.  No large volume intraperitoneal free air.     Small left pleural effusion with basilar atelectasis.     Small to moderate size hiatal hernia with questioned thickening of the lower esophagus, possibly suggestive of esophagitis.     Cholelithiasis.    R wrist xray: 5/28/24  Impression:     Abnormal study as above with soft tissue swelling and irregularity about the dorsum of the hand and wrist.  Correlation with physical findings would be helpful.     Advanced degenerative change.  Probable ligamentous disruption scapholunate.    RUE US: 5/29/2024  Impression:     No deep vein thrombosis.     Occlusive superficial thrombophlebitis of the right cephalic vein.

## 2024-05-30 NOTE — PT/OT/SLP PROGRESS
"Speech Language Pathology Treatment    Patient Name:  Amirah Davey   MRN:  [id number]  Admitting Diagnosis: Cerebrovascular accident (CVA) due to embolism of right middle cerebral artery    Recommendations:                 General Recommendations:  Dysphagia therapy, Speech/language therapy, and Cognitive-linguistic therapy  Diet recommendations:  NPO, Liquid Diet Level: NPO   Aspiration Precautions: Strict aspiration precautions   General Precautions: Standard, aspiration, fall, NPO  Communication strategies:  provide increased time to answer    Assessment:     Amirah Davey is a 88 y.o. female with an SLP diagnosis of Dysphagia and Dysarthria and cognitive impairment    Subjective     " I will do that"  Patient goals: to get better     Pain/Comfort:  Pain Rating 1: 0/10  Pain Rating Post-Intervention 1: 0/10    Respiratory Status: Room air    Objective:     Has the patient been evaluated by SLP for swallowing?   Yes  Keep patient NPO? Yes   Current Respiratory Status:        Pt seen bedside with her daughter present. Daughter reported peg placed last night and pt with pain overnight. She reported she has been able to sleep better today and recently received pain medication. Pt was easily roused and good participation in session. She did not recall any speech strategies and needed cues to open her mouth and overarticulate especially with longer words and sentences. Pt able to generate simple sentences using speech strategies with 85% acc. Cues needed to slow rate as well. She also completed oral motor exercises with decreased rate and lingual elevation. Pt sleepy towards end of session and with some complaints of being uncomfortable in the bed. Daughter removed wedged and session ended so pt could get rest. Ongoing education provided re: dysarthria.     Goals:   Multidisciplinary Problems       SLP Goals          Problem: SLP    Goal Priority Disciplines Outcome   SLP Goal     SLP Progressing   Description: Speech " Language Pathology Goals  Goals expected to be met by 5/31/24    1. Pt will participate in ongoing assessment of swallow function to determine safest, least restrictive means of nutrition/hydration  2. Pt will complete dysphagia exercises to improve pharyngeal strength/coordination x 10 ea with good effort 90% of attempts, MAX A  3. Pt will complete OMEs x10 ea to improve labial/lingual strength and coordination, with good effort 90% of attempts, MOD A  4. Pt will repeat short phrases with 90% intelligibility, MIN A  5. Pt will recall 2+ clear speech strategies, 90% of attempts, MIN A  6. Pt will attend to structured task up to 2 minutes in length at least 1x/session, Supervision  7. Pt will participate in ongoing assessment of reading, writing, visiospatial and delayed memory skills to determine ongoing POC needs  8. Educate Pt and family on aspiration precautions and SLP POC  9. Educate Pt and family on speech strategies and safety awareness                          Plan:     Patient to be seen:  5 x/week   Plan of Care expires:  06/23/24  Plan of Care reviewed with:  patient, daughter   SLP Follow-Up:  Yes       Discharge recommendations:  High Intensity Therapy   Barriers to Discharge:  None    Time Tracking:     SLP Treatment Date:   05/30/24  Speech Start Time:  1012  Speech Stop Time:  1028     Speech Total Time (min):  16 min    Billable Minutes: Speech Therapy Individual 8 and Self Care/Home Management Training 8    05/30/2024

## 2024-05-30 NOTE — ASSESSMENT & PLAN NOTE
Due to stroke  Aggressive therapy  Speech therapy consult  PEG placed on 5/29 with general surgery, beginning oral medications and attempting trickle feeds night on 5/30

## 2024-05-30 NOTE — NURSING
Nurses Note -- 4 Eyes      5/30/2024   1:14 PM      Skin assessed during: Q Shift Change      [x] No Altered Skin Integrity Present    [x]Prevention Measures Documented      [] Yes- Altered Skin Integrity Present or Discovered   [] LDA Added if Not in Epic (Describe Wound)   [] New Altered Skin Integrity was Present on Admit and Documented in LDA   [] Wound Image Taken    Wound Care Consulted? No    Attending Nurse:  Claudette Beckham RN/Staff Member:  Luciana

## 2024-05-30 NOTE — PLAN OF CARE
Recommendations    1. EN Recommendations: Glucerna 1.5 to goal rate @ 45 ml/hr x 24 hours to provide 1620 kcals, 89g of protein, and 820 ml of fluid   - Start at trickle feeds, slowly advancing to goal.   Additional FW 30 mL/hr while on TF.   - Bolus feeds when medically stable 4.5 cartons providing 1602 kcals, 88g PRO and 810 mL fluid; additional FW ~100mL BEFORE and AFTER feedingg.   2. Monitor for s/s of intolerance such as residuals >500ml, n/v/d, or abdominal distension.   3. Monitor for refeeding syndrome, if labs show signs replete phosphorus, magnesium and potassium quickly.   4. RD following    Goals: Meet % of EEN/EPN by RD f/u date  Nutrition Goal Status: goal not met  Communication of RD Recs: other (comment)

## 2024-05-30 NOTE — ASSESSMENT & PLAN NOTE
Patient is an 88 year old female with multiple medical co-morbidities who requires long term enteral access for dysphagia secondary to recent stroke. GI has deferred PEG placement to Surgery team due to small foci of air seen in the retroperitoneum on CT scan. Patient is entirely asymptomatic, she has no evidence to suggest an ongoing issue/leak. She has contrast through to her colon on CT without evidence of extravasation. If perforation occurred it was almost certainly a microperforation from the numerous attempts at NGT placement, and has since sealed itself off without causing any clinically significant leak or inflammatory reaction.  S/p PEG placement 5/29/24    - Can clamp G tube, unclamp prn for nausea or vomiting.   - Okay to start trickle Tfs, advance as tolerated  - Can resume Heparin gtt  - Remainder of care per primary team  - We will sign off. Please contact general surgery with any questions, concerns, or clinical status changes

## 2024-05-31 VITALS
HEART RATE: 70 BPM | OXYGEN SATURATION: 97 % | TEMPERATURE: 98 F | WEIGHT: 149.5 LBS | BODY MASS INDEX: 30.14 KG/M2 | HEIGHT: 59 IN | DIASTOLIC BLOOD PRESSURE: 67 MMHG | RESPIRATION RATE: 18 BRPM | SYSTOLIC BLOOD PRESSURE: 146 MMHG

## 2024-05-31 PROBLEM — L98.421 SACRAL ULCER, LIMITED TO BREAKDOWN OF SKIN: Status: ACTIVE | Noted: 2024-05-31

## 2024-05-31 LAB
ALBUMIN SERPL BCP-MCNC: 2.1 G/DL (ref 3.5–5.2)
ALP SERPL-CCNC: 59 U/L (ref 55–135)
ALT SERPL W/O P-5'-P-CCNC: 14 U/L (ref 10–44)
ANION GAP SERPL CALC-SCNC: 6 MMOL/L (ref 8–16)
APTT PPP: 35.7 SEC (ref 21–32)
APTT PPP: 70.9 SEC (ref 21–32)
AST SERPL-CCNC: 18 U/L (ref 10–40)
BASOPHILS # BLD AUTO: 0.02 K/UL (ref 0–0.2)
BASOPHILS NFR BLD: 0.2 % (ref 0–1.9)
BILIRUB SERPL-MCNC: 0.4 MG/DL (ref 0.1–1)
BUN SERPL-MCNC: 10 MG/DL (ref 8–23)
CALCIUM SERPL-MCNC: 8.5 MG/DL (ref 8.7–10.5)
CHLORIDE SERPL-SCNC: 109 MMOL/L (ref 95–110)
CO2 SERPL-SCNC: 20 MMOL/L (ref 23–29)
CREAT SERPL-MCNC: 0.9 MG/DL (ref 0.5–1.4)
DIFFERENTIAL METHOD BLD: ABNORMAL
EOSINOPHIL # BLD AUTO: 0.1 K/UL (ref 0–0.5)
EOSINOPHIL NFR BLD: 1.6 % (ref 0–8)
ERYTHROCYTE [DISTWIDTH] IN BLOOD BY AUTOMATED COUNT: 15.3 % (ref 11.5–14.5)
EST. GFR  (NO RACE VARIABLE): >60 ML/MIN/1.73 M^2
GLUCOSE SERPL-MCNC: 118 MG/DL (ref 70–110)
HCT VFR BLD AUTO: 29.8 % (ref 37–48.5)
HGB BLD-MCNC: 8.9 G/DL (ref 12–16)
IMM GRANULOCYTES # BLD AUTO: 0.05 K/UL (ref 0–0.04)
IMM GRANULOCYTES NFR BLD AUTO: 0.6 % (ref 0–0.5)
LYMPHOCYTES # BLD AUTO: 0.9 K/UL (ref 1–4.8)
LYMPHOCYTES NFR BLD: 11.3 % (ref 18–48)
MAGNESIUM SERPL-MCNC: 2.1 MG/DL (ref 1.6–2.6)
MCH RBC QN AUTO: 24.7 PG (ref 27–31)
MCHC RBC AUTO-ENTMCNC: 29.9 G/DL (ref 32–36)
MCV RBC AUTO: 83 FL (ref 82–98)
MONOCYTES # BLD AUTO: 0.6 K/UL (ref 0.3–1)
MONOCYTES NFR BLD: 7.4 % (ref 4–15)
NEUTROPHILS # BLD AUTO: 6.4 K/UL (ref 1.8–7.7)
NEUTROPHILS NFR BLD: 78.9 % (ref 38–73)
NRBC BLD-RTO: 0 /100 WBC
PHOSPHATE SERPL-MCNC: 2.9 MG/DL (ref 2.7–4.5)
PLATELET # BLD AUTO: 255 K/UL (ref 150–450)
PMV BLD AUTO: 11 FL (ref 9.2–12.9)
POCT GLUCOSE: 111 MG/DL (ref 70–110)
POTASSIUM SERPL-SCNC: 3.4 MMOL/L (ref 3.5–5.1)
PROT SERPL-MCNC: 5.6 G/DL (ref 6–8.4)
RBC # BLD AUTO: 3.6 M/UL (ref 4–5.4)
SODIUM SERPL-SCNC: 135 MMOL/L (ref 136–145)
WBC # BLD AUTO: 8.08 K/UL (ref 3.9–12.7)

## 2024-05-31 PROCEDURE — 25000003 PHARM REV CODE 250

## 2024-05-31 PROCEDURE — 36415 COLL VENOUS BLD VENIPUNCTURE: CPT | Performed by: PSYCHIATRY & NEUROLOGY

## 2024-05-31 PROCEDURE — 63600175 PHARM REV CODE 636 W HCPCS: Performed by: NURSE PRACTITIONER

## 2024-05-31 PROCEDURE — 85730 THROMBOPLASTIN TIME PARTIAL: CPT | Performed by: PSYCHIATRY & NEUROLOGY

## 2024-05-31 PROCEDURE — 85025 COMPLETE CBC W/AUTO DIFF WBC: CPT

## 2024-05-31 PROCEDURE — 97530 THERAPEUTIC ACTIVITIES: CPT

## 2024-05-31 PROCEDURE — 25000003 PHARM REV CODE 250: Performed by: NURSE PRACTITIONER

## 2024-05-31 PROCEDURE — 99238 HOSP IP/OBS DSCHRG MGMT 30/<: CPT | Mod: GC,,, | Performed by: PSYCHIATRY & NEUROLOGY

## 2024-05-31 PROCEDURE — 25000003 PHARM REV CODE 250: Performed by: PSYCHIATRY & NEUROLOGY

## 2024-05-31 PROCEDURE — 84100 ASSAY OF PHOSPHORUS: CPT

## 2024-05-31 PROCEDURE — 63600175 PHARM REV CODE 636 W HCPCS

## 2024-05-31 PROCEDURE — C9113 INJ PANTOPRAZOLE SODIUM, VIA: HCPCS

## 2024-05-31 PROCEDURE — 83735 ASSAY OF MAGNESIUM: CPT

## 2024-05-31 PROCEDURE — 80053 COMPREHEN METABOLIC PANEL: CPT

## 2024-05-31 RX ORDER — NAPROXEN SODIUM 220 MG/1
81 TABLET, FILM COATED ORAL DAILY
Status: DISCONTINUED | OUTPATIENT
Start: 2024-05-31 | End: 2024-05-31

## 2024-05-31 RX ORDER — AMLODIPINE BESYLATE 5 MG/1
5 TABLET ORAL DAILY
Qty: 90 TABLET | Refills: 3 | Status: SHIPPED | OUTPATIENT
Start: 2024-06-01 | End: 2025-06-01

## 2024-05-31 RX ORDER — FAMOTIDINE 40 MG/1
40 TABLET, FILM COATED ORAL DAILY
Qty: 90 TABLET | Refills: 3 | Status: SHIPPED | OUTPATIENT
Start: 2024-05-31 | End: 2025-05-31

## 2024-05-31 RX ORDER — LISINOPRIL 20 MG/1
20 TABLET ORAL DAILY
Qty: 1 TABLET | Refills: 0 | Status: SHIPPED | OUTPATIENT
Start: 2024-05-31

## 2024-05-31 RX ORDER — ACETAMINOPHEN 500 MG
1000 TABLET ORAL EVERY 8 HOURS PRN
Qty: 60 TABLET | Refills: 0 | Status: ON HOLD | OUTPATIENT
Start: 2024-05-31 | End: 2024-06-19

## 2024-05-31 RX ORDER — HYDROCHLOROTHIAZIDE 12.5 MG/1
25 CAPSULE ORAL DAILY
Qty: 1 CAPSULE | Refills: 0 | Status: ON HOLD | OUTPATIENT
Start: 2024-05-31 | End: 2024-06-19 | Stop reason: HOSPADM

## 2024-05-31 RX ORDER — LIDOCAINE 50 MG/G
1 PATCH TOPICAL DAILY
Qty: 10 PATCH | Refills: 0 | Status: SHIPPED | OUTPATIENT
Start: 2024-05-31

## 2024-05-31 RX ORDER — ATORVASTATIN CALCIUM 40 MG/1
40 TABLET, FILM COATED ORAL DAILY
Qty: 90 TABLET | Refills: 3 | Status: SHIPPED | OUTPATIENT
Start: 2024-06-01 | End: 2025-06-01

## 2024-05-31 RX ORDER — NYSTATIN 100000 [USP'U]/ML
500000 SUSPENSION ORAL 4 TIMES DAILY
Qty: 200 ML | Refills: 0 | Status: SHIPPED | OUTPATIENT
Start: 2024-05-31 | End: 2024-06-10

## 2024-05-31 RX ADMIN — ATORVASTATIN CALCIUM 40 MG: 40 TABLET, FILM COATED ORAL at 08:05

## 2024-05-31 RX ADMIN — AMLODIPINE BESYLATE 5 MG: 5 TABLET ORAL at 08:05

## 2024-05-31 RX ADMIN — HEPARIN SODIUM 9 UNITS/KG/HR: 10000 INJECTION, SOLUTION INTRAVENOUS at 06:05

## 2024-05-31 RX ADMIN — APIXABAN 5 MG: 5 TABLET, FILM COATED ORAL at 12:05

## 2024-05-31 RX ADMIN — POTASSIUM BICARBONATE 40 MEQ: 391 TABLET, EFFERVESCENT ORAL at 08:05

## 2024-05-31 RX ADMIN — ASPIRIN 81 MG CHEWABLE TABLET 81 MG: 81 TABLET CHEWABLE at 08:05

## 2024-05-31 RX ADMIN — NYSTATIN 500000 UNITS: 100000 SUSPENSION ORAL at 08:05

## 2024-05-31 RX ADMIN — LIDOCAINE 1 PATCH: 700 PATCH TOPICAL at 06:05

## 2024-05-31 RX ADMIN — PANTOPRAZOLE SODIUM 40 MG: 40 INJECTION, POWDER, FOR SOLUTION INTRAVENOUS at 08:05

## 2024-05-31 NOTE — NURSING
RN removed PIVx2 and Tele monitor prior to dc. Pt transferred to [facility] via ambulance. All pt belongings sent with pt daughter's. Upon dc pt AAOx4, VSS, NAD. All questions, comments, concerns addressed.    Claudette Lu

## 2024-05-31 NOTE — DISCHARGE SUMMARY
[facility] - Neurosurgery ([facility])  Vascular Neurology  Comprehensive Stroke Center  Discharge Summary     Summary:     Admit Date: 5/23/2024  8:42 PM    Discharge Date and Time:  05/31/2024 1:42 PM    Attending Physician: Hai Hooks MD     Discharge Provider: Nicholas Norwood DO    History of Present Illness: 89 y/o female who on 5-18-24 called out to her daughter and was banging on the cabinets for help. Her daughter had worked the night shift. Patient with left sided weakness, dysarthria. She was taken to [facility] ED for evaluation and was found to have a R MCA infarct with distal M2 occlusion. She was not a thrombolytic candidate as she is on Xarelto for her chronic afib.  She was taken to intervention for possible thrombectomy and procedure was aborted due to clot too far out and unsafe to try to get it.  Was placed on Zosyn 4.5 gm IV Q8H on 5-21-24 and stopped on 5-22-24 for pneumoperitoneum. This was found after unable to place NG tube multiple times. IR was consulted for PEG and CTA abdomen revealed this. GI was consulted and was planning on doing EGD with PEG placement in am. Dysphagia with inability to manage secretions. She is using Yanker suction frequently. She had a US of neck soft tissue  done on 5-21-24 and no masses seen.   Patient was transferred to [facility] as direct admit to NPU under vascular neurology.  Examination reveals left hemiplegia, decrease in sensation on left, dysarthria, follow commands on right, answers question appropriately.facial droop.     Hospital Course (synopsis of major diagnoses, care, treatment, and services provided during the course of the hospital stay): 05/25/2024: No acute events overnight. Neuro exam stable. MRI Brain showed moderate sized R MCA infarct with scattered petechial hemorrhage and a suspected small hematoma. Patient's imaging discussed with staff, Dr. Rodriges, and patient's family. Heparin gtt initiated for AC in setting of atrial fibrillation while  patient anticipates PEG placement on 5/27. Patient pending abdominal CT for eval of Pneumoperitoneum.  05/26/2024: No acute events overnight. Neuro exam stable. CT abdomen pending. Patient to be NPO at midnight/heparin gtt off at midnight in anticipation of PEG placement by GI tomorrow. Patient still remains NPO. IV labetalol 5mg scheduled q6 hours for BP control.   05/27/2024: Neuro exam stable, PEG placement today.   05/28/2024: New R wrist pain, with hard spot in forearm, looks to be thrombophlebitis, R wrist xray and RUE US pending, hot packs to forearm and wrist, if concern for cellulitis will add antibiotic covering  05/29/2024: continued R arm pain, improving with meds, xray no fracture signs of soft tissue swelling, US pending, PEG tube placement today.  05/30/2024: PEG tube in place, transitioning to meds through G tube, starting trickle feeds tonight  05/31/2024: tolerating tube feeds well, started on eliquis, does not need aspirin or plavix. Small sacral breakdown, able to be D/C to rehab today    Goals of Care Treatment Preferences:  Code Status: Full Code      Stroke Etiology: Ischemic Large Artery Atherosclerosis/Atheroembolic Intracranial    STROKE DOCUMENTATION         NIH Scale:  1a. Level of Consciousness: 0-->Alert, keenly responsive  1b. LOC Questions: 0-->Answers both questions correctly  1c. LOC Commands: 0-->Performs both tasks correctly  2. Best Gaze: 1-->Partial gaze palsy, gaze is abnormal in one or both eyes, but forced deviation or total gaze paresis is not present  3. Visual: 1-->Partial hemianopia  4. Facial Palsy: 2-->Partial paralysis (total or near-total paralysis of lower face)  5a. Motor Arm, Left: 4-->No movement  5b. Motor Arm, Right: 0-->No drift, limb holds 90 (or 45) degrees for full 10 secs  6a. Motor Leg, Left: 4-->No movement  6b. Motor Leg, Right: 1-->Drift, leg falls by the end of the 5-sec period but does not hit bed  7. Limb Ataxia: 0-->Absent  8. Sensory:  1-->Mild-to-moderate sensory loss, patient feels pinprick is less sharp or is dull on the affected side, or there is a loss of superficial pain with pinprick, but patient is aware of being touched  9. Best Language: 0-->No aphasia, normal  10. Dysarthria: 1-->Mild-to-moderate dysarthria, patient slurs at least some words and, at worst, can be understood with some difficulty  11. Extinction and Inattention (formerly Neglect): 0-->No abnormality  Total (NIH Stroke Scale): 15        Modified Hilham Score: 4  Alexander Coma Scale:    ABCD2 Score:    [id number] Score:7  HAS -BLED Score:   ICH Score:   Hunt & Marie Classification:       Assessment/Plan:     Diagnostic Results      Brain Imaging:  MRI Brain w/o Contrast 5/24/24  Impression:  Moderate-sized right MCA territorial infarction with scattered petechial hemorrhage and a suspected small hematoma.  Clinical correlation and short-term follow-up with CT is recommended.  Hazy visualization of the right MCA flow void as compared to the contralateral side with suspected occlusion of the distal right MCA.  Further evaluation with CTA may be performed.  Generalized cerebral volume loss and moderate chronic microvascular ischemic change.  Small well-circumscribed T2 hyperintense focus of at the left superomedial extraconal space measuring 0.7 cm.  Findings are nonspecific, and may relate to a small vascular lesion.  Further evaluation with dedicated imaging of the orbits as clinically indicated.     CT Head w/o Contrast 5/21/24  Impression  Evolving subtotal right MCA infarct on a background of moderate to advanced chronic microvascular ischemic disease. No progressive mass effect. No hemorrhagic transformation.     Vessel Imaging:  CTA Head and Neck 5/18/24  Impression  1. Occlusion of one of the right M2 segments.  2. Less than 50 percent stenosis right M1 segment.  3. Findings discussed with stroke team neurology resident.     CT abd/pelvis: 5/25/2024  Impression:      Tiny focus of free air in the retroperitoneum, as detailed above.  Recommend correlation with outside imaging studies for stability.  Etiology of the retroperitoneal air is not apparent.  No large volume intraperitoneal free air.     Small left pleural effusion with basilar atelectasis.     Small to moderate size hiatal hernia with questioned thickening of the lower esophagus, possibly suggestive of esophagitis.     Cholelithiasis.     R wrist xray: 5/28/24  Impression:     Abnormal study as above with soft tissue swelling and irregularity about the dorsum of the hand and wrist.  Correlation with physical findings would be helpful.     Advanced degenerative change.  Probable ligamentous disruption scapholunate.     RUE US: 5/29/2024  Impression:     No deep vein thrombosis.     Occlusive superficial thrombophlebitis of the right cephalic vein.  Interventions: Thrombectomy was attempted at [facility], clot to distal, unable to remove. Has Peg tube placeed    Complications: None    Disposition:     Final Active Diagnoses:    Diagnosis Date Noted POA    PRINCIPAL PROBLEM:  Cerebrovascular accident (CVA) due to embolism of right middle cerebral artery [I63.411] 05/23/2024 Yes    Sacral ulcer, limited to breakdown of skin [L98.421] 05/31/2024 Yes    Thrombophlebitis [I80.9] 05/28/2024 No    Chronic anticoagulation [Z79.01] 05/24/2024 Not Applicable    Reduced mobility [Z74.09] 05/24/2024 Yes    Hypokalemia [E87.6] 05/24/2024 Yes    Hypomagnesemia [E83.42] 05/24/2024 Yes    Hemiplegia [G81.90] 05/23/2024 Yes    Dysarthria due to acute stroke [I63.9, R47.1] 05/23/2024 Yes    Dysphagia due to recent stroke [I69.391] 05/23/2024 Not Applicable    Obesity (BMI 30-39.9) [E66.9] 05/23/2024 Yes    Hypertension, benign [I10]  Yes    Chronic a-fib [I48.20]  Yes      Problems Resolved During this Admission:     No new Assessment & Plan notes have been filed under this hospital service since the last note was generated.  Service: Vascular  Neurology      Recommendations:     Post-discharge complication risks: Falls, Pneumonia, Skin breakdown, Urinary tract infections    Stroke Education given to: patient and family    Follow-up in Stroke Clinic in 4-6 weeks.     Discharge Plan:  Anticoagulant: Apixaban 5mg  Aggresive risk factor modification:  Hypertension  High Cholesterol  Diet  Exercise  Atrial Fibrillation  Obesity    Follow Up:      Patient Instructions:   No discharge procedures on file.    Medications:  Reconciled Home Medications:      Medication List        START taking these medications      acetaminophen 500 MG tablet  Commonly known as: TYLENOL  2 tablets (1,000 mg total) by Per G Tube route every 8 (eight) hours as needed for Pain.     apixaban 5 mg Tab  Commonly known as: ELIQUIS  1 tablet (5 mg total) by Per G Tube route 2 (two) times daily.     atorvastatin 40 MG tablet  Commonly known as: LIPITOR  1 tablet (40 mg total) by Per G Tube route once daily.  Start taking on: June 1, 2024     famotidine 40 MG tablet  Commonly known as: PEPCID  1 tablet (40 mg total) by Per G Tube route once daily.     LIDOcaine 5 %  Commonly known as: LIDODERM  Place 1 patch onto the skin once daily. Remove & Discard patch within 12 hours or as directed by MD     nystatin 100,000 unit/mL suspension  Commonly known as: MYCOSTATIN  Take 5 mLs (500,000 Units total) by mouth 4 (four) times daily. for 10 days            CHANGE how you take these medications      amLODIPine 5 MG tablet  Commonly known as: NORVASC  1 tablet (5 mg total) by Per G Tube route once daily.  Start taking on: June 1, 2024  What changed:   how to take this  when to take this     hydroCHLOROthiazide 12.5 mg capsule  Commonly known as: MICROZIDE  2 capsules (25 mg total) by Per G Tube route once daily. DO NOT TAKE UNTIL FOLLOW UP WITH PRIMARY CARE. Rehab can add back slowly if BP is elevated. Would start with lisinopril first  What changed:   how to take this  when to take this  additional  instructions     lisinopriL 20 MG tablet  Commonly known as: PRINIVIL,ZESTRIL  1 tablet (20 mg total) by Per G Tube route once daily. DO NOT TAKE UNTIL YOU SEE YOUR PRIMARY DOCTOR. Rehab can add back if BP becomes elevated. Would start with this one over HCTZ  What changed:   how to take this  when to take this  additional instructions  Another medication with the same name was removed. Continue taking this medication, and follow the directions you see here.            STOP taking these medications      omeprazole 40 MG capsule  Commonly known as: PRILOSEC     pantoprazole 40 MG tablet  Commonly known as: PROTONIX     rivaroxaban 20 mg Tab  Commonly known as: XARELTO     XARELTO 20 mg Tab  Generic drug: rivaroxaban              Nicholas Norwood DO  [facility]  Department of Vascular Neurology   [facility] - Neurosurgery ([facility])

## 2024-05-31 NOTE — PLAN OF CARE
Patient was given resources to help with bills and food from www.findhelp.org.      [facility]  Case Management  [phone]

## 2024-05-31 NOTE — ASSESSMENT & PLAN NOTE
Stroke risk factor  SBP <180 as 5 days out  IV labetalol 5mg schedule q6 hours for BP control 2^ patient being NPO  Starting transition to oral meds through G tube on 5/30 with amlodipine 5mg

## 2024-05-31 NOTE — SUBJECTIVE & OBJECTIVE
Neurologic Chief Complaint: R MCA infarct    Subjective:     Interval History: Patient is seen for follow-up neurological assessment and treatment recommendations: No acute events overnight. Neuro exam stable. CT abdomen pending. Patient to be NPO at midnight/heparin gtt off at midnight in anticipation of PEG placement by General surgery on 5/29. Patient still remains NPO. IV labetalol 5mg scheduled q6 hours for BP control will restart her oral medications with peg tube. R wrist/forearm pain concern for thrombophlebitis. RUE US thrombophlebitis noted,  R wrist xray no fracture. Tolerating tube feeds, ready for placement    HPI, Past Medical, Family, and Social History remains the same as documented in the initial encounter.     Review of Systems   HENT:  Positive for trouble swallowing.    Respiratory:  Positive for cough.    Musculoskeletal:  Positive for arthralgias and back pain.   Neurological:  Positive for facial asymmetry, weakness and numbness.   Hematological:  Bruises/bleeds easily.   All other systems reviewed and are negative.    Scheduled Meds:   amLODIPine  5 mg Per G Tube Daily    apixaban  5 mg Per G Tube BID    atorvastatin  40 mg Per G Tube Daily    LIDOcaine  1 patch Transdermal Q24H    nystatin  500,000 Units Oral QID    pantoprazole  40 mg Intravenous Daily     Continuous Infusions:      PRN Meds:  Current Facility-Administered Medications:     acetaminophen, 1,000 mg, Per G Tube, Q8H PRN    bisacodyL, 10 mg, Rectal, Daily PRN    dextrose 10%, 12.5 g, Intravenous, PRN    dextrose 10%, 25 g, Intravenous, PRN    diclofenac sodium, 2 g, Topical (Top), TID PRN    glucagon (human recombinant), 1 mg, Intramuscular, PRN    insulin aspart U-100, 0-5 Units, Subcutaneous, Q12H PRN    iohexol, 15 mL, Oral, PRN    labetalol, 10 mg, Intravenous, Q6H PRN    ondansetron, 4 mg, Intravenous, Q12H PRN    sodium chloride 0.9%, 500 mL, Intravenous, PRN    sodium chloride 0.9%, 10 mL, Intravenous, PRN    Objective:  "    Vital Signs (Most Recent):  Temp: 98.4 °F (36.9 °C) (05/31/24 0744)  Pulse: 70 (05/31/24 0744)  Resp: 18 (05/31/24 0744)  BP: (!) 143/69 (05/31/24 0744)  SpO2: 98 % (05/31/24 0744)  BP Location: Right arm    Vital Signs Range (Last 24H):  Temp:  [97.8 °F (36.6 °C)-98.8 °F (37.1 °C)]   Pulse:  [62-89]   Resp:  [18]   BP: (130-151)/(63-71)   SpO2:  [96 %-99 %]   BP Location: Right arm       Physical Exam  Vitals reviewed.   HENT:      Head: Normocephalic.      Mouth/Throat:      Mouth: Mucous membranes are dry.   Pulmonary:      Effort: Pulmonary effort is normal. No respiratory distress.   Neurological:      Mental Status: She is alert and oriented to person, place, and time.      Sensory: Sensory deficit present.      Motor: Weakness present.   Psychiatric:         Mood and Affect: Mood normal.         Behavior: Behavior normal.          Pain with movement, tender to touch, hard nodule on forearm. Lidocaine patch in place, no redness noted. Improving pain    Neurological Exam:   LOC: alert  Attention Span: Good   Language: decreased fluency  Articulation: Dysarthria  Orientation: Person, Place, Time   Facial Movement (CN VII): Lower facial weakness on the Left  Motor: Arm left  Plegia 0/5  Leg left  Plegia 0/5  Arm right  Paresis: 5/5  Leg right Paresis: 4/5    Laboratory:  CMP:   Recent Labs   Lab 05/31/24 0325   CALCIUM 8.5*   ALBUMIN 2.1*   PROT 5.6*   *   K 3.4*   CO2 20*      BUN 10   CREATININE 0.9   ALKPHOS 59   ALT 14   AST 18   BILITOT 0.4       BMP:   Recent Labs   Lab 05/31/24 0325   *   K 3.4*      CO2 20*   BUN 10   CREATININE 0.9   CALCIUM 8.5*     CBC:   Recent Labs   Lab 05/31/24 0325   WBC 8.08   RBC 3.60*   HGB 8.9*   HCT 29.8*      MCV 83   MCH 24.7*   MCHC 29.9*     Lipid Panel:   No results for input(s): "CHOL", "LDLCALC", "HDL", "TRIG" in the last 168 hours.    Coagulation:   Recent Labs   Lab 05/29/24  2202 05/30/24  0507 05/31/24  0325   INR 1.1  --   --  " "  APTT 33.0*   < > 70.9*    < > = values in this interval not displayed.     Platelet Aggregation Study: No results for input(s): "PLTAGG", "PLTAGINTERP", "PLTAGREGLACO", "ADPPLTAGGREG" in the last 168 hours.  Hgb A1C:   No results for input(s): "HGBA1C" in the last 168 hours.    TSH:   No results for input(s): "TSH" in the last 168 hours.      Diagnostic Results     Brain Imaging:  MRI Brain w/o Contrast 5/24/24  Impression:  Moderate-sized right MCA territorial infarction with scattered petechial hemorrhage and a suspected small hematoma.  Clinical correlation and short-term follow-up with CT is recommended.  Hazy visualization of the right MCA flow void as compared to the contralateral side with suspected occlusion of the distal right MCA.  Further evaluation with CTA may be performed.  Generalized cerebral volume loss and moderate chronic microvascular ischemic change.  Small well-circumscribed T2 hyperintense focus of at the left superomedial extraconal space measuring 0.7 cm.  Findings are nonspecific, and may relate to a small vascular lesion.  Further evaluation with dedicated imaging of the orbits as clinically indicated.     CT Head w/o Contrast 5/21/24  Impression  Evolving subtotal right MCA infarct on a background of moderate to advanced chronic microvascular ischemic disease. No progressive mass effect. No hemorrhagic transformation.    Vessel Imaging:  CTA Head and Neck 5/18/24  Impression  1. Occlusion of one of the right M2 segments.  2. Less than 50 percent stenosis right M1 segment.  3. Findings discussed with stroke team neurology resident.    CT abd/pelvis: 5/25/2024  Impression:     Tiny focus of free air in the retroperitoneum, as detailed above.  Recommend correlation with outside imaging studies for stability.  Etiology of the retroperitoneal air is not apparent.  No large volume intraperitoneal free air.     Small left pleural effusion with basilar atelectasis.     Small to moderate size " hiatal hernia with questioned thickening of the lower esophagus, possibly suggestive of esophagitis.     Cholelithiasis.    R wrist xray: 5/28/24  Impression:     Abnormal study as above with soft tissue swelling and irregularity about the dorsum of the hand and wrist.  Correlation with physical findings would be helpful.     Advanced degenerative change.  Probable ligamentous disruption scapholunate.    RUE US: 5/29/2024  Impression:     No deep vein thrombosis.     Occlusive superficial thrombophlebitis of the right cephalic vein.

## 2024-05-31 NOTE — NURSING
Nurses Note -- 4 Eyes      5/31/2024   10:18 AM      Skin assessed during: Q Shift Change      [] No Altered Skin Integrity Present    []Prevention Measures Documented      [x] Yes- Altered Skin Integrity Present or Discovered   [x] LDA Added if Not in Epic (Describe Wound)   [x] New Altered Skin Integrity was Present on Admit and Documented in LDA   [x] Wound Image Taken    Wound Care Consulted? Yes    Attending Nurse:  Claudette Beckham RN/Staff Member:  Luciana

## 2024-05-31 NOTE — PLAN OF CARE
Problem: Adult Inpatient Plan of Care  Goal: Plan of Care Review  Outcome: Progressing  Goal: Patient-Specific Goal (Individualized)  Outcome: Progressing  Goal: Absence of Hospital-Acquired Illness or Injury  Outcome: Progressing  Goal: Optimal Comfort and Wellbeing  Outcome: Progressing  Goal: Readiness for Transition of Care  Outcome: Progressing     Problem: Stroke, Ischemic (Includes Transient Ischemic Attack)  Goal: Optimal Coping  Outcome: Progressing  Goal: Effective Bowel Elimination  Outcome: Progressing  Goal: Optimal Cerebral Tissue Perfusion  Outcome: Progressing  Goal: Optimal Cognitive Function  Outcome: Progressing  Goal: Improved Communication Skills  Outcome: Progressing  Goal: Optimal Functional Ability  Outcome: Progressing  Goal: Optimal Nutrition Intake  Outcome: Progressing  Goal: Effective Oxygenation and Ventilation  Outcome: Progressing  Goal: Improved Sensorimotor Function  Outcome: Progressing  Goal: Safe and Effective Swallow  Outcome: Progressing  Goal: Effective Urinary Elimination  Outcome: Progressing     Problem: Fall Injury Risk  Goal: Absence of Fall and Fall-Related Injury  Outcome: Progressing     Problem: Infection  Goal: Absence of Infection Signs and Symptoms  Outcome: Progressing     Problem: Skin Injury Risk Increased  Goal: Skin Health and Integrity  Outcome: Progressing        POC updated and reviewed with the patient/family at the bedside. Questions regarding POC were encouraged and addressed. VSS, see flowsheets. Patient is AOX 4 at this time. Tele maintained per order. Fall and safety precautions maintained, no signs of injury noted during shift. Patient repositioned  with assistance in bed for comfort. Upon exiting room, patient's bed locked in low position, side rails up x 3, bed alarm on, with call light within reach. Instructed patient to call staff for mobility, verbalized understanding. Stroke book and education reviewed at the bedside, see education flowsheets  for details. No acute signs of distress noted at this time.

## 2024-05-31 NOTE — PLAN OF CARE
[facility] - Neurosurgery ([facility])  Discharge Reassessment    Primary Care Provider: Ivana Templeton MD    Expected Discharge Date: 5/31/2024    Reassessment (most recent)       Discharge Reassessment - 05/31/24 0841          Discharge Reassessment    Assessment Type Discharge Planning Reassessment (P)      Did the patient's condition or plan change since previous assessment? No (P)      Discharge Plan discussed with: Adult children (P)      Communicated JOAN with patient/caregiver Yes (P)      Discharge Plan A Rehab (P)      Transition of Care Barriers None (P)         Post-Acute Status    Post-Acute Placement Status Pending medical clearance/testing (P)      Discharge Delays Diet Not Ready for Discharge (P)                  Patient not medically ready.  PEG placed on 5/29, but not at goal yet.  Patient will discharge to [facility] once medically cleared.      Discharge Plan A and Plan B have been determined by review of patient's clinical status, future medical and therapeutic needs, and coverage/benefits for post-acute care in coordination with multidisciplinary team members.    Harmony Box LMSW  [facility]  [phone]

## 2024-05-31 NOTE — PT/OT/SLP PROGRESS
Physical Therapy Treatment    Patient Name:  Amirah Davey   MRN:  [id number]    Recommendations:     Discharge Recommendations: High Intensity Therapy  Discharge Equipment Recommendations: hospital bed, lift device, wheelchair  Barriers to discharge: Inaccessible home and Decreased caregiver support    Assessment:     Amirah Davey is a 88 y.o. female admitted with a medical diagnosis of Cerebrovascular accident (CVA) due to embolism of right middle cerebral artery.  She presents with the following impairments/functional limitations: weakness, impaired endurance, impaired self care skills, impaired functional mobility, gait instability, impaired balance, visual deficits, decreased coordination, decreased upper extremity function, decreased lower extremity function, decreased safety awareness, impaired coordination, impaired fine motor. Pt would benefit from high intensity/frequency therapy for: Dynamic/static standing/sitting balance through skilled balance training, strengthening with the use of skilled therapeutic exercises interventions, mobility and safety training to ensure safe discharge home through skilled patient and caregiver education, and mobility through adaptive equipment training. Pt highly motivated to return to independent PLOF and can tolerate 3+hours of therapy. Pt continues to benefit from a collaborative multidisciplinary program to improve quality of life and focus on recovery of impairments      Rehab Prognosis: Good; patient would benefit from acute skilled PT services to address these deficits and reach maximum level of function.    Recent Surgery: Procedure(s) (LRB):  EGD, WITH PEG TUBE INSERTION (N/A) 2 Days Post-Op    Plan:     During this hospitalization, patient to be seen 4 x/week to address the identified rehab impairments via gait training, therapeutic activities, therapeutic exercises, neuromuscular re-education and progress toward the following goals:    Plan of Care Expires:   "06/25/24    Subjective     Chief Complaint: whole in stomach (has a PEG tube)  Patient/Family Comments/goals: "we are leaving today" - pt's daughter reports they are going to a rehab facility   Pain/Comfort:  Pain Rating 1: 0/10  Pain Rating Post-Intervention 1: 0/10      Objective:     Communicated with RN prior to session.  Patient found HOB elevated with telemetry, PureWick, peripheral IV, bed alarm upon PT entry to room.     General Precautions: Standard, aspiration, fall, NPO  Orthopedic Precautions: N/A  Braces: N/A  Respiratory Status: Room air     Functional Mobility:  Bed Mobility:     Rolling Left:  minimum assistance  Rolling Right: moderate assistance  Scooting: moderate assistance  Supine to Sit: maximal assistance  Sit to Supine: maximal assistance and of 2 persons  Transfers:     Sit to Stand:  maximal assistance and of 2 persons with no AD and hand-held assist, knee blocked  Gait: attempted, pt unable to take steps this date  Balance:    Static Sitting: modA, RUE pushing, L lean  Dynamic Sitting: maxA, RUE pushing, L lean  Static Standing: maxAx2, L knee blocked, manual facilitation for hip extension and knee extension      AM-PAC 6 CLICK MOBILITY  Turning over in bed (including adjusting bedclothes, sheets and blankets)?: 3  Sitting down on and standing up from a chair with arms (e.g., wheelchair, bedside commode, etc.): 2  Moving from lying on back to sitting on the side of the bed?: 2  Moving to and from a bed to a chair (including a wheelchair)?: 1  Need to walk in hospital room?: 1  Climbing 3-5 steps with a railing?: 1  Basic Mobility Total Score: 10       Treatment & Education:  Patient educated on role of therapy, goals of session, and benefits of mobilizing.   Discussed PT plan of care during hospitalization.   Patient educated on calling for assistance.   Patient educated on how their diagnosis impacts their mobility within PT scope of practice.   Communication board up to date.  All " questions answered within PT scope of practice.    Patient left HOB elevated with all lines intact, call button in reach, bed alarm on, RN notified, and family present.    GOALS:   Multidisciplinary Problems       Physical Therapy Goals          Problem: Physical Therapy    Goal Priority Disciplines Outcome Goal Variances Interventions   Physical Therapy Goal     PT, PT/OT Progressing     Description: Goals to be met by: 2024     Patient will increase functional independence with mobility by performin. Supine to sit with MInimal Assistance  2. Sit to supine with MInimal Assistance  3. Sit to stand transfer with Minimal Assistance  4. Bed to chair transfer with Moderate Assistance using LRAD  5. Gait  x 10 feet with Maximal Assistance using LRAD.   6. Sitting at edge of bed x5 minutes with Stand-by Assistance  7. Lower extremity exercise program x15 reps per handout, with assistance as needed                         Time Tracking:     PT Received On: 24  PT Start Time: 1434     PT Stop Time: 1451  PT Total Time (min): 17 min     Billable Minutes: Therapeutic Activity 17    Treatment Type: Treatment  PT/PTA: PT     Number of PTA visits since last PT visit: 0     2024

## 2024-05-31 NOTE — PT/OT/SLP PROGRESS
Speech Language Pathology      Amirah Davey  MRN: [id number]    SLP attempted to see Patient for therapy. Upon SLP attempts, Patient unavailable (PCT care.) Patient not seen today secondary to Patient unavailable upon SLP attempts this am. ST to continue to monitor and re-attempt per ST POC.    5/31/2024

## 2024-05-31 NOTE — PLAN OF CARE
[facility] ([facility])  Discharge Final Note    Primary Care Provider: Ivana Templeton MD    Expected Discharge Date: 5/31/2024    Final Discharge Note (most recent)       Final Note - 05/31/24 1348          Final Note    Assessment Type Final Discharge Note (P)      Anticipated Discharge Disposition Rehab Facility (P)         Post-Acute Status    Post-Acute Authorization Placement (P)      Post-Acute Placement Status Set-up Complete/Auth obtained (P)                  Patient discharging to [facility].  SW provided RN with number for report and set up ambo transport for 430pm.  Patient's dtr's at bedside and notified.      Harmony Box LMSW  [facility]  [phone]    No future appointments.      Important Message from Medicare  Important Message from Medicare regarding Discharge Appeal Rights: Given to patient/caregiver, Explained to patient/caregiver, Signed/date by patient/caregiver     Date IMM was signed: 05/31/24  Time IMM was signed: 0921

## 2024-05-31 NOTE — PROGRESS NOTES
[facility] - Neurosurgery ([facility])  Vascular Neurology  Comprehensive Stroke Center  Progress Note    Assessment/Plan:     * Cerebrovascular accident (CVA) due to embolism of right middle cerebral artery  87 y/o female with R MCA stroke due to R M2 occlusio. No thrombolytics as is on Xarelto. Went to IR but procedure aborted due to distal clot. So TICI 0. Etiology cardio embolic due to chronic afib.     No acute events overnight. Neuro exam stable. CT abdomen pending. Patient to be NPO at midnight/heparin gtt off at midnight in anticipation of PEG placement by GI on 5/27. Pt with some concerning finding of duodenal perf on CT abd/pelvis. Pt does not have peritonitis signs. General surgery did not feel this is of concern and patient vitals are stable, patient looks well. Gen surgery planning to place PEG tube on 5/29. Transitioning to oral medication on 5/30 after peg placement. Pt on amlodipine and tolerating well. Tolerating tube feeding. Ready for placement    Antithrombotics: ASA + minimal intensity heparin gtt. After patient is therapeutic on heparin gtt, ASA can be discontinued.    Statins: Lipitor 40 mg daily but unable to get as no NG tube    Aggressive risk factor modification: HTN, Diet, Exercise, Obesity, A-Fib     Rehab efforts: The patient has been evaluated by a stroke team provider and the therapy needs have been fully considered based off the presenting complaints and exam findings. The following therapy evaluations are needed:  recommendations include high intensity therapy    Diagnostics ordered/pending: Other: CT abd/pelvis    VTE prophylaxis: Mechanical prophylaxis: Place SCDs  None: Reason for No Pharmacological VTE Prophylaxis: Currently on anticoagulation    BP parameters: Infarct: SBP <180        Sacral ulcer, limited to breakdown of skin  Sacral skin breakdown, minimal, wound care consulted, padding placed. Picture in media tab    Thrombophlebitis  Pt complaining of new R wrist and forearm  pain, patient is tender to the touch on her R forearm and wrist. Tender with movement. No noted warmth or redness, fingers warm to touch. Pt reports tylenol, voltaren and lidocaine patches are helping some. Concern for thrombophlebitis.    Plan  R wrist/forearm xray: some soft tissue swelling, no fracture  R UE ultrasound: consistent with thrombophlebitis   Scheduled tylenol suppository with no GI access, transition to 1000mg oral prn after peg tube placement  Hot packs and warm compresses to area  Concern for cellulitis or sepsis. Low threshold for initiation of antibiotics and sepsis workup    Obesity (BMI 30-39.9)  Stroke risk factor   on diet and exercise    Dysphagia due to recent stroke  Due to stroke  Aggressive therapy  Speech therapy consult  PEG placed on 5/29 with general surgery, beginning oral medications and attempting trickle feeds night on 5/30        Dysarthria due to acute stroke  Due to stroke  Aggressive therapy          Hemiplegia  Due to stroke  Aggressive therapy      Chronic a-fib  Stroke risk factor  Was on Xarelto, will need to resume once feeding route resolved  Switched to eliquis due to less bleeding side effects    Hypertension, benign  Stroke risk factor  SBP <180 as 5 days out  IV labetalol 5mg schedule q6 hours for BP control 2^ patient being NPO  Starting transition to oral meds through G tube on 5/30 with amlodipine 5mg         05/25/2024: No acute events overnight. Neuro exam stable. MRI Brain showed moderate sized R MCA infarct with scattered petechial hemorrhage and a suspected small hematoma. Patient's imaging discussed with staff, Dr. Rodriges, and patient's family. Heparin gtt initiated for AC in setting of atrial fibrillation while patient anticipates PEG placement on 5/27. Patient pending abdominal CT for eval of Pneumoperitoneum.  05/26/2024: No acute events overnight. Neuro exam stable. CT abdomen pending. Patient to be NPO at midnight/heparin gtt off at midnight in  anticipation of PEG placement by GI tomorrow. Patient still remains NPO. IV labetalol 5mg scheduled q6 hours for BP control.   05/27/2024: Neuro exam stable, PEG placement today.   05/28/2024: New R wrist pain, with hard spot in forearm, looks to be thrombophlebitis, R wrist xray and RUE US pending, hot packs to forearm and wrist, if concern for cellulitis will add antibiotic covering  05/29/2024: continued R arm pain, improving with meds, xray no fracture signs of soft tissue swelling, US pending, PEG tube placement today.  05/30/2024: PEG tube in place, transitioning to meds through G tube, starting trickle feeds tonight, can start back on eliquis later tonight aspirin/plavix      STROKE DOCUMENTATION        NIH Scale:  1a. Level of Consciousness: 0-->Alert, keenly responsive  1b. LOC Questions: 0-->Answers both questions correctly  1c. LOC Commands: 0-->Performs both tasks correctly  2. Best Gaze: 1-->Partial gaze palsy, gaze is abnormal in one or both eyes, but forced deviation or total gaze paresis is not present  3. Visual: 1-->Partial hemianopia  4. Facial Palsy: 2-->Partial paralysis (total or near-total paralysis of lower face)  5a. Motor Arm, Left: 4-->No movement  5b. Motor Arm, Right: 0-->No drift, limb holds 90 (or 45) degrees for full 10 secs  6a. Motor Leg, Left: 4-->No movement  6b. Motor Leg, Right: 1-->Drift, leg falls by the end of the 5-sec period but does not hit bed  7. Limb Ataxia: 0-->Absent  8. Sensory: 1-->Mild-to-moderate sensory loss, patient feels pinprick is less sharp or is dull on the affected side, or there is a loss of superficial pain with pinprick, but patient is aware of being touched  9. Best Language: 0-->No aphasia, normal  10. Dysarthria: 1-->Mild-to-moderate dysarthria, patient slurs at least some words and, at worst, can be understood with some difficulty  11. Extinction and Inattention (formerly Neglect): 0-->No abnormality  Total (NIH Stroke Scale): 15       Modified  [place] Score: 2  [place] Coma Scale:    ABCD2 Score:    [id number] Score:7  HAS -BLED Score:   ICH Score:   Hunt & Marie Classification:      Hemorrhagic change of an Ischemic Stroke: Does this patient have an ischemic stroke with hemorrhagic changes? No     Neurologic Chief Complaint: R MCA infarct    Subjective:     Interval History: Patient is seen for follow-up neurological assessment and treatment recommendations: No acute events overnight. Neuro exam stable. CT abdomen pending. Patient to be NPO at midnight/heparin gtt off at midnight in anticipation of PEG placement by General surgery on 5/29. Patient still remains NPO. IV labetalol 5mg scheduled q6 hours for BP control will restart her oral medications with peg tube. R wrist/forearm pain concern for thrombophlebitis. RUE US thrombophlebitis noted,  R wrist xray no fracture. Tolerating tube feeds, ready for placement    HPI, Past Medical, Family, and Social History remains the same as documented in the initial encounter.     Review of Systems   HENT:  Positive for trouble swallowing.    Respiratory:  Positive for cough.    Musculoskeletal:  Positive for arthralgias and back pain.   Neurological:  Positive for facial asymmetry, weakness and numbness.   Hematological:  Bruises/bleeds easily.   All other systems reviewed and are negative.    Scheduled Meds:   amLODIPine  5 mg Per G Tube Daily    apixaban  5 mg Per G Tube BID    atorvastatin  40 mg Per G Tube Daily    LIDOcaine  1 patch Transdermal Q24H    nystatin  500,000 Units Oral QID    pantoprazole  40 mg Intravenous Daily     Continuous Infusions:      PRN Meds:  Current Facility-Administered Medications:     acetaminophen, 1,000 mg, Per G Tube, Q8H PRN    bisacodyL, 10 mg, Rectal, Daily PRN    dextrose 10%, 12.5 g, Intravenous, PRN    dextrose 10%, 25 g, Intravenous, PRN    diclofenac sodium, 2 g, Topical (Top), TID PRN    glucagon (human recombinant), 1 mg, Intramuscular, PRN    insulin aspart U-100,  0-5 Units, Subcutaneous, Q12H PRN    iohexol, 15 mL, Oral, PRN    labetalol, 10 mg, Intravenous, Q6H PRN    ondansetron, 4 mg, Intravenous, Q12H PRN    sodium chloride 0.9%, 500 mL, Intravenous, PRN    sodium chloride 0.9%, 10 mL, Intravenous, PRN    Objective:     Vital Signs (Most Recent):  Temp: 98.4 °F (36.9 °C) (05/31/24 0744)  Pulse: 70 (05/31/24 0744)  Resp: 18 (05/31/24 0744)  BP: (!) 143/69 (05/31/24 0744)  SpO2: 98 % (05/31/24 0744)  BP Location: Right arm    Vital Signs Range (Last 24H):  Temp:  [97.8 °F (36.6 °C)-98.8 °F (37.1 °C)]   Pulse:  [62-89]   Resp:  [18]   BP: (130-151)/(63-71)   SpO2:  [96 %-99 %]   BP Location: Right arm       Physical Exam  Vitals reviewed.   HENT:      Head: Normocephalic.      Mouth/Throat:      Mouth: Mucous membranes are dry.   Pulmonary:      Effort: Pulmonary effort is normal. No respiratory distress.   Neurological:      Mental Status: She is alert and oriented to person, place, and time.      Sensory: Sensory deficit present.      Motor: Weakness present.   Psychiatric:         Mood and Affect: Mood normal.         Behavior: Behavior normal.          Pain with movement, tender to touch, hard nodule on forearm. Lidocaine patch in place, no redness noted. Improving pain    Neurological Exam:   LOC: alert  Attention Span: Good   Language: decreased fluency  Articulation: Dysarthria  Orientation: Person, Place, Time   Facial Movement (CN VII): Lower facial weakness on the Left  Motor: Arm left  Plegia 0/5  Leg left  Plegia 0/5  Arm right  Paresis: 5/5  Leg right Paresis: 4/5    Laboratory:  CMP:   Recent Labs   Lab 05/31/24  0325   CALCIUM 8.5*   ALBUMIN 2.1*   PROT 5.6*   *   K 3.4*   CO2 20*      BUN 10   CREATININE 0.9   ALKPHOS 59   ALT 14   AST 18   BILITOT 0.4       BMP:   Recent Labs   Lab 05/31/24  0325   *   K 3.4*      CO2 20*   BUN 10   CREATININE 0.9   CALCIUM 8.5*     CBC:   Recent Labs   Lab 05/31/24  0325   WBC 8.08   RBC 3.60*   HGB  "8.9*   HCT 29.8*      MCV 83   MCH 24.7*   MCHC 29.9*     Lipid Panel:   No results for input(s): "CHOL", "LDLCALC", "HDL", "TRIG" in the last 168 hours.    Coagulation:   Recent Labs   Lab 05/29/24  2202 05/30/24  0507 05/31/24  0325   INR 1.1  --   --    APTT 33.0*   < > 70.9*    < > = values in this interval not displayed.     Platelet Aggregation Study: No results for input(s): "PLTAGG", "PLTAGINTERP", "PLTAGREGLACO", "ADPPLTAGGREG" in the last 168 hours.  Hgb A1C:   No results for input(s): "HGBA1C" in the last 168 hours.    TSH:   No results for input(s): "TSH" in the last 168 hours.      Diagnostic Results     Brain Imaging:  MRI Brain w/o Contrast 5/24/24  Impression:  Moderate-sized right MCA territorial infarction with scattered petechial hemorrhage and a suspected small hematoma.  Clinical correlation and short-term follow-up with CT is recommended.  Hazy visualization of the right MCA flow void as compared to the contralateral side with suspected occlusion of the distal right MCA.  Further evaluation with CTA may be performed.  Generalized cerebral volume loss and moderate chronic microvascular ischemic change.  Small well-circumscribed T2 hyperintense focus of at the left superomedial extraconal space measuring 0.7 cm.  Findings are nonspecific, and may relate to a small vascular lesion.  Further evaluation with dedicated imaging of the orbits as clinically indicated.     CT Head w/o Contrast 5/21/24  Impression  Evolving subtotal right MCA infarct on a background of moderate to advanced chronic microvascular ischemic disease. No progressive mass effect. No hemorrhagic transformation.    Vessel Imaging:  CTA Head and Neck 5/18/24  Impression  1. Occlusion of one of the right M2 segments.  2. Less than 50 percent stenosis right M1 segment.  3. Findings discussed with stroke team neurology resident.    CT abd/pelvis: 5/25/2024  Impression:     Tiny focus of free air in the retroperitoneum, as " detailed above.  Recommend correlation with outside imaging studies for stability.  Etiology of the retroperitoneal air is not apparent.  No large volume intraperitoneal free air.     Small left pleural effusion with basilar atelectasis.     Small to moderate size hiatal hernia with questioned thickening of the lower esophagus, possibly suggestive of esophagitis.     Cholelithiasis.    R wrist xray: 5/28/24  Impression:     Abnormal study as above with soft tissue swelling and irregularity about the dorsum of the hand and wrist.  Correlation with physical findings would be helpful.     Advanced degenerative change.  Probable ligamentous disruption scapholunate.    RUE US: 5/29/2024  Impression:     No deep vein thrombosis.     Occlusive superficial thrombophlebitis of the right cephalic vein.    Nicholas Norwood DO  [facility]  Department of Vascular Neurology   [facility] - Neurosurgery ([facility])

## 2024-05-31 NOTE — ASSESSMENT & PLAN NOTE
89 y/o female with R MCA stroke due to R M2 occlusio. No thrombolytics as is on Xarelto. Went to IR but procedure aborted due to distal clot. So TICI 0. Etiology cardio embolic due to chronic afib.     No acute events overnight. Neuro exam stable. CT abdomen pending. Patient to be NPO at midnight/heparin gtt off at midnight in anticipation of PEG placement by GI on 5/27. Pt with some concerning finding of duodenal perf on CT abd/pelvis. Pt does not have peritonitis signs. General surgery did not feel this is of concern and patient vitals are stable, patient looks well. Gen surgery planning to place PEG tube on 5/29. Transitioning to oral medication on 5/30 after peg placement. Pt on amlodipine and tolerating well. Tolerating tube feeding. Ready for placement    Antithrombotics: ASA + minimal intensity heparin gtt. After patient is therapeutic on heparin gtt, ASA can be discontinued.    Statins: Lipitor 40 mg daily but unable to get as no NG tube    Aggressive risk factor modification: HTN, Diet, Exercise, Obesity, A-Fib     Rehab efforts: The patient has been evaluated by a stroke team provider and the therapy needs have been fully considered based off the presenting complaints and exam findings. The following therapy evaluations are needed:  recommendations include high intensity therapy    Diagnostics ordered/pending: Other: CT abd/pelvis    VTE prophylaxis: Mechanical prophylaxis: Place SCDs  None: Reason for No Pharmacological VTE Prophylaxis: Currently on anticoagulation    BP parameters: Infarct: SBP <180

## 2024-05-31 NOTE — PLAN OF CARE
[facility]    FACILITY TRANSFER ORDERS      Patient Name: Amirah Davey  YOB: 1935    PCP: Ivana Templeton MD   PCP Address: [address] / [place]  PCP Phone Number: [phone]  PCP Fax: [phone]    Encounter Date: 05/31/2024    Admit to: Rehab    Vital Signs:  Routine    Diagnoses:   Active Hospital Problems    Diagnosis  POA    *Cerebrovascular accident (CVA) due to embolism of right middle cerebral artery [I63.411]  Yes    Thrombophlebitis [I80.9]  No    Chronic anticoagulation [Z79.01]  Not Applicable    Reduced mobility [Z74.09]  Yes    Hypokalemia [E87.6]  Yes     POA  Monitor with cmp  replaced      Hypomagnesemia [E83.42]  Yes     POA  Monitor with cmp  replaced      Hemiplegia [G81.90]  Yes    Dysarthria due to acute stroke [I63.9, R47.1]  Yes    Dysphagia due to recent stroke [I69.391]  Not Applicable    Obesity (BMI 30-39.9) [E66.9]  Yes    Hypertension, benign [I10]  Yes    Chronic a-fib [I48.20]  Yes      Resolved Hospital Problems   No resolved problems to display.       Allergies:Review of patient's allergies indicates:  No Known Allergies    Diet:  NPO, tube feeds with Glucerna 1.5 to goal rate @ 45 ml/hr x 24 hours to provide 1620 kcals, 89g of protein, and 820 ml of fluid,  Bolus feeds when medically stable 4.5 cartons providing 1602 kcals, 88g PRO and 810 mL fluid; additional FW ~100mL BEFORE and AFTER feedingg.      Activities: Activity as tolerated    Goals of Care Treatment Preferences:  Code Status: Full Code      Nursing: Watch for aspiration     Labs: CBC and CMP  per facility      CONSULTS:    Physical Therapy to evaluate and treat. , Occupational Therapy to evaluate and treat., and Speech Therapy to evaluate and treat for Language, Swallowing, and Cognition.    MISCELLANEOUS CARE:  PEG Care: Clean site every 24 hours.  and Routine Skin for Bedridden Patients: Apply moisture barrier cream to all skin folds and wet areas in  perineal area daily and after baths and all bowel movements.    WOUND CARE ORDERS  Yes: Pressure Ulcer(s) Stage I :   Location: Sacral                             If incontinent of stool or urine, apply thin layer Barrier cream                   twice daily and PRN to wound         Pressure relief measure:  for pressure redistribution and padded bandage over sore             Medications: Review discharge medications with patient and family and provide education.      Current Discharge Medication List        START taking these medications    Details   acetaminophen (TYLENOL) 500 MG tablet 2 tablets (1,000 mg total) by Per G Tube route every 8 (eight) hours as needed for Pain.  Qty: 60 tablet, Refills: 0      apixaban (ELIQUIS) 5 mg Tab 1 tablet (5 mg total) by Per G Tube route 2 (two) times daily.  Qty: 90 tablet, Refills: 2      atorvastatin (LIPITOR) 40 MG tablet 1 tablet (40 mg total) by Per G Tube route once daily.  Qty: 90 tablet, Refills: 3      famotidine (PEPCID) 40 MG tablet 1 tablet (40 mg total) by Per G Tube route once daily.  Qty: 90 tablet, Refills: 3      LIDOcaine (LIDODERM) 5 % Place 1 patch onto the skin once daily. Remove & Discard patch within 12 hours or as directed by MD  Qty: 10 patch, Refills: 0      nystatin (MYCOSTATIN) 100,000 unit/mL suspension Take 5 mLs (500,000 Units total) by mouth 4 (four) times daily. for 10 days  Qty: 200 mL, Refills: 0           CONTINUE these medications which have CHANGED    Details   amLODIPine (NORVASC) 5 MG tablet 1 tablet (5 mg total) by Per G Tube route once daily.  Qty: 90 tablet, Refills: 3    Comments: .      hydroCHLOROthiazide (MICROZIDE) 12.5 mg capsule 2 capsules (25 mg total) by Per G Tube route once daily. DO NOT TAKE UNTIL FOLLOW UP WITH PRIMARY CARE. Rehab can add back slowly if BP is elevated. Would start with lisinopril first  Qty: 1 capsule, Refills: 0    Comments: .DO NOT FILL, MODIFYING ORDER TO HOLD      lisinopriL (PRINIVIL,ZESTRIL)  20 MG tablet 1 tablet (20 mg total) by Per G Tube route once daily. DO NOT TAKE UNTIL YOU SEE YOUR PRIMARY DOCTOR. Rehab can add back if BP becomes elevated. Would start with this one over HCTZ  Qty: 1 tablet, Refills: 0    Comments: DO NOT FILL, MODIFYING ORDER TO HOLD           STOP taking these medications       omeprazole (PRILOSEC) 40 MG capsule Comments:   Reason for Stopping:         pantoprazole (PROTONIX) 40 MG tablet Comments:   Reason for Stopping:         rivaroxaban (XARELTO) 20 mg Tab Comments:   Reason for Stopping:         XARELTO 20 mg Tab Comments:   Reason for Stopping:                  Immunizations Administered as of 5/31/2024       No immunizations on file.              Some patients may experience side effects after vaccination.  These may include fever, headache, muscle or joint aches.  Most symptoms resolve with 24-48 hours and do not require urgent medical evaluation unless they persist for more than 72 hours or symptoms are concerning for an unrelated medical condition.          _________________________________  Nicholas Norwood, DO  05/31/2024

## 2024-05-31 NOTE — PLAN OF CARE
CHW met with patient/family at bedside. Patient experience rounding completed and reviewed the following.     Do you know your discharge plan? Yes or No,    If yes, what is the plan?  Yes Rehab     Have you discussed your needs and preferences with your SW/CM? Yes     If you are discharging home, do you have help at home? Yes  Patient has help at home.    Do you think you will need help additional at home at discharge? Yes    Patient needs additional assistance.    Do you currently have difficulty keeping up with bills, affording medicine or buying food? Yes Patient was given resources to help with bills and food from www.findhelp.org.    Assigned SW/CM notified of any patient/family needs or concerns. Appropriate resources provided to address patient's needs.    Harmony OWENS  Case Management  [phone]

## 2024-05-31 NOTE — ASSESSMENT & PLAN NOTE
Stroke risk factor  Was on Xarelto, will need to resume once feeding route resolved  Switched to eliquis due to less bleeding side effects

## 2024-06-01 NOTE — OP NOTE
DATE OF PROCEDURE: 05/29/2024.    PREOPERATIVE DIAGNOSES:   1. CVA.  2. Dysphagia.    POSTOPERATIVE DIAGNOSES:   1. CVA.  2. Dysphagia.     PROCEDURES PERFORMED:   1. Esophagogastroduodenoscopy.  2. Percutaneous endoscopic gastrostomy.    ATTENDING SURGEON: Imer Minaya MD.     RESIDENT: Chicho Gonzales MD.    ANESTHESIA: General.    INDICATION: Amirah Davey is a 88 y.o.female referred to [facility] for CVA. The patient is expected to have prolonged dysphagia as a result and General Surgery was consulted for placement of a percutaneous gastrostomy tube. We did obtain informed consent from the patient and family who expressed understanding of the risks and benefits and gave consent to proceed.     PROCEDURE IN DETAIL: The patient was taken to the operating room and placed supine. After induction of general endotracheal anesthesia, the abdomen was prepped and draped in the standard fashion. An upper endoscope was introduced into the oropharynx and guided down into the esophagus and stomach. The stomach was insufflated with air. We intubated the duodenum and examined the first and second portions with no abnormalities noted. The visualized antrum and fundus demonstrated no major abnormalities. We withdrew the endoscope into the stomach and identified an appropriate position for gastrostomy tube placement 2 finger-breadths below the left subcostal margin. Palpation of the anterior abdominal wall at this point was visualized endoscopically and transillumination from the endoscope was visualized through the anterior abdominal wall. We made a small skin incision and the stomach was cannulated with an angtiocath. A guidewire was placed through the catheter and was grasped with an endoscopic snare. The endoscope, snare, and guidewire were all withdrawn from the patient's mouth. A 20-Italian gastrostomy tube was loaded onto the guidewire and pulled through the anterior abdominal wall via Seldinger technique.  Repeat endoscopy was performed with the gastrostomy tube at the 3.5 cm oneal at the skin. There was no blanching of the gastric mucosa, and when the tube was twisted, the button did not grab the mucosa. The insufflation in the stomach was evacuated, and the endoscope was removed. Esophagus was visualized on withdrawal and demonstrated no major abnormalities. The gastrostomy tube was secured in place using the supplied devices and connected to a bag for gravity drainage. The patient was awakened from anesthesia and transferred to the PACU in good condition. All needle and sponge counts were correct at the conclusion of the case. I was for the procedure in its entirety.    ESTIMATED BLOOD LOSS: Less than 5 mL.     FINDINGS: 20-Indonesian percutaneous gastrostomy @ 3.5cm placed without apparent complication.

## 2024-06-06 ENCOUNTER — HOSPITAL ENCOUNTER (OUTPATIENT)
Facility: HOSPITAL | Age: 89
Discharge: NURSING, REHAB OR LONG-TERM CARE | End: 2024-06-07
Attending: EMERGENCY MEDICINE | Admitting: EMERGENCY MEDICINE
Payer: MEDICARE

## 2024-06-06 DIAGNOSIS — K94.23 PEG TUBE MALFUNCTION: Primary | ICD-10-CM

## 2024-06-06 DIAGNOSIS — R07.9 CHEST PAIN: ICD-10-CM

## 2024-06-06 LAB
ALBUMIN SERPL BCP-MCNC: 3 G/DL (ref 3.5–5.2)
ALP SERPL-CCNC: 83 U/L (ref 55–135)
ALT SERPL W/O P-5'-P-CCNC: 18 U/L (ref 10–44)
ANION GAP SERPL CALC-SCNC: 12 MMOL/L (ref 8–16)
AST SERPL-CCNC: 26 U/L (ref 10–40)
BASOPHILS # BLD AUTO: 0.04 K/UL (ref 0–0.2)
BASOPHILS NFR BLD: 0.4 % (ref 0–1.9)
BILIRUB SERPL-MCNC: 0.4 MG/DL (ref 0.1–1)
BILIRUB UR QL STRIP: NEGATIVE
BUN SERPL-MCNC: 34 MG/DL (ref 6–30)
BUN SERPL-MCNC: 34 MG/DL (ref 8–23)
CALCIUM SERPL-MCNC: 9.6 MG/DL (ref 8.7–10.5)
CHLORIDE SERPL-SCNC: 98 MMOL/L (ref 95–110)
CHLORIDE SERPL-SCNC: 98 MMOL/L (ref 95–110)
CLARITY UR REFRACT.AUTO: CLEAR
CO2 SERPL-SCNC: 26 MMOL/L (ref 23–29)
COLOR UR AUTO: YELLOW
CREAT SERPL-MCNC: 1 MG/DL (ref 0.5–1.4)
CREAT SERPL-MCNC: 1 MG/DL (ref 0.5–1.4)
DIFFERENTIAL METHOD BLD: ABNORMAL
EOSINOPHIL # BLD AUTO: 0.1 K/UL (ref 0–0.5)
EOSINOPHIL NFR BLD: 1.2 % (ref 0–8)
ERYTHROCYTE [DISTWIDTH] IN BLOOD BY AUTOMATED COUNT: 15.9 % (ref 11.5–14.5)
EST. GFR  (NO RACE VARIABLE): 54.2 ML/MIN/1.73 M^2
GLUCOSE SERPL-MCNC: 114 MG/DL (ref 70–110)
GLUCOSE SERPL-MCNC: 119 MG/DL (ref 70–110)
GLUCOSE UR QL STRIP: NEGATIVE
HCT VFR BLD AUTO: 34.8 % (ref 37–48.5)
HCT VFR BLD CALC: 50 %PCV (ref 36–54)
HCV AB SERPL QL IA: NORMAL
HGB BLD-MCNC: 10.7 G/DL (ref 12–16)
HGB UR QL STRIP: NEGATIVE
HIV 1+2 AB+HIV1 P24 AG SERPL QL IA: NORMAL
IMM GRANULOCYTES # BLD AUTO: 0.09 K/UL (ref 0–0.04)
IMM GRANULOCYTES NFR BLD AUTO: 0.9 % (ref 0–0.5)
INR PPP: 1.1 (ref 0.8–1.2)
KETONES UR QL STRIP: NEGATIVE
LACTATE SERPL-SCNC: 1 MMOL/L (ref 0.5–2.2)
LEUKOCYTE ESTERASE UR QL STRIP: NEGATIVE
LIPASE SERPL-CCNC: 36 U/L (ref 4–60)
LYMPHOCYTES # BLD AUTO: 1.1 K/UL (ref 1–4.8)
LYMPHOCYTES NFR BLD: 11.1 % (ref 18–48)
MCH RBC QN AUTO: 24.3 PG (ref 27–31)
MCHC RBC AUTO-ENTMCNC: 30.7 G/DL (ref 32–36)
MCV RBC AUTO: 79 FL (ref 82–98)
MONOCYTES # BLD AUTO: 1.2 K/UL (ref 0.3–1)
MONOCYTES NFR BLD: 11.9 % (ref 4–15)
NEUTROPHILS # BLD AUTO: 7.5 K/UL (ref 1.8–7.7)
NEUTROPHILS NFR BLD: 74.5 % (ref 38–73)
NITRITE UR QL STRIP: NEGATIVE
NRBC BLD-RTO: 0 /100 WBC
PH UR STRIP: 8 [PH] (ref 5–8)
PLATELET # BLD AUTO: 417 K/UL (ref 150–450)
PMV BLD AUTO: 10.9 FL (ref 9.2–12.9)
POC IONIZED CALCIUM: 1.19 MMOL/L (ref 1.06–1.42)
POC TCO2 (MEASURED): 31 MMOL/L (ref 23–29)
POTASSIUM BLD-SCNC: 4.1 MMOL/L (ref 3.5–5.1)
POTASSIUM SERPL-SCNC: 4.1 MMOL/L (ref 3.5–5.1)
PROT SERPL-MCNC: 7.1 G/DL (ref 6–8.4)
PROT UR QL STRIP: NEGATIVE
PROTHROMBIN TIME: 11.5 SEC (ref 9–12.5)
RBC # BLD AUTO: 4.4 M/UL (ref 4–5.4)
SAMPLE: ABNORMAL
SODIUM BLD-SCNC: 136 MMOL/L (ref 136–145)
SODIUM SERPL-SCNC: 136 MMOL/L (ref 136–145)
SP GR UR STRIP: 1.02 (ref 1–1.03)
URN SPEC COLLECT METH UR: NORMAL
WBC # BLD AUTO: 10.01 K/UL (ref 3.9–12.7)

## 2024-06-06 PROCEDURE — 96361 HYDRATE IV INFUSION ADD-ON: CPT

## 2024-06-06 PROCEDURE — 25500020 PHARM REV CODE 255: Performed by: EMERGENCY MEDICINE

## 2024-06-06 PROCEDURE — 80053 COMPREHEN METABOLIC PANEL: CPT | Performed by: EMERGENCY MEDICINE

## 2024-06-06 PROCEDURE — 96372 THER/PROPH/DIAG INJ SC/IM: CPT | Mod: 59 | Performed by: NURSE PRACTITIONER

## 2024-06-06 PROCEDURE — 63600175 PHARM REV CODE 636 W HCPCS: Performed by: PHYSICIAN ASSISTANT

## 2024-06-06 PROCEDURE — 81003 URINALYSIS AUTO W/O SCOPE: CPT | Performed by: EMERGENCY MEDICINE

## 2024-06-06 PROCEDURE — 83690 ASSAY OF LIPASE: CPT | Performed by: EMERGENCY MEDICINE

## 2024-06-06 PROCEDURE — A4216 STERILE WATER/SALINE, 10 ML: HCPCS | Performed by: PHYSICIAN ASSISTANT

## 2024-06-06 PROCEDURE — 83605 ASSAY OF LACTIC ACID: CPT | Performed by: EMERGENCY MEDICINE

## 2024-06-06 PROCEDURE — G0378 HOSPITAL OBSERVATION PER HR: HCPCS

## 2024-06-06 PROCEDURE — 85610 PROTHROMBIN TIME: CPT | Performed by: EMERGENCY MEDICINE

## 2024-06-06 PROCEDURE — 63600175 PHARM REV CODE 636 W HCPCS

## 2024-06-06 PROCEDURE — 25000003 PHARM REV CODE 250: Performed by: PHYSICIAN ASSISTANT

## 2024-06-06 PROCEDURE — 63600175 PHARM REV CODE 636 W HCPCS: Mod: JZ,JG | Performed by: NURSE PRACTITIONER

## 2024-06-06 PROCEDURE — 96372 THER/PROPH/DIAG INJ SC/IM: CPT | Performed by: PHYSICIAN ASSISTANT

## 2024-06-06 PROCEDURE — 85025 COMPLETE CBC W/AUTO DIFF WBC: CPT | Performed by: EMERGENCY MEDICINE

## 2024-06-06 PROCEDURE — 87389 HIV-1 AG W/HIV-1&-2 AB AG IA: CPT | Performed by: PHYSICIAN ASSISTANT

## 2024-06-06 PROCEDURE — 80047 BASIC METABLC PNL IONIZED CA: CPT

## 2024-06-06 PROCEDURE — 25000003 PHARM REV CODE 250: Performed by: HOSPITALIST

## 2024-06-06 PROCEDURE — 86803 HEPATITIS C AB TEST: CPT | Performed by: PHYSICIAN ASSISTANT

## 2024-06-06 PROCEDURE — 96374 THER/PROPH/DIAG INJ IV PUSH: CPT

## 2024-06-06 PROCEDURE — 25000003 PHARM REV CODE 250

## 2024-06-06 PROCEDURE — 25000003 PHARM REV CODE 250: Performed by: EMERGENCY MEDICINE

## 2024-06-06 PROCEDURE — 96375 TX/PRO/DX INJ NEW DRUG ADDON: CPT

## 2024-06-06 PROCEDURE — 87040 BLOOD CULTURE FOR BACTERIA: CPT | Performed by: EMERGENCY MEDICINE

## 2024-06-06 PROCEDURE — 99285 EMERGENCY DEPT VISIT HI MDM: CPT | Mod: 25

## 2024-06-06 RX ORDER — ONDANSETRON 8 MG/1
8 TABLET, ORALLY DISINTEGRATING ORAL EVERY 8 HOURS PRN
Status: DISCONTINUED | OUTPATIENT
Start: 2024-06-06 | End: 2024-06-07 | Stop reason: HOSPADM

## 2024-06-06 RX ORDER — HYDROMORPHONE HYDROCHLORIDE 1 MG/ML
0.2 INJECTION, SOLUTION INTRAMUSCULAR; INTRAVENOUS; SUBCUTANEOUS ONCE
Status: COMPLETED | OUTPATIENT
Start: 2024-06-06 | End: 2024-06-06

## 2024-06-06 RX ORDER — IPRATROPIUM BROMIDE AND ALBUTEROL SULFATE 2.5; .5 MG/3ML; MG/3ML
3 SOLUTION RESPIRATORY (INHALATION) EVERY 6 HOURS PRN
Status: DISCONTINUED | OUTPATIENT
Start: 2024-06-06 | End: 2024-06-07 | Stop reason: HOSPADM

## 2024-06-06 RX ORDER — SODIUM CHLORIDE 0.9 % (FLUSH) 0.9 %
10 SYRINGE (ML) INJECTION EVERY 8 HOURS
Status: DISCONTINUED | OUTPATIENT
Start: 2024-06-06 | End: 2024-06-07 | Stop reason: HOSPADM

## 2024-06-06 RX ORDER — AMLODIPINE BESYLATE 5 MG/1
5 TABLET ORAL DAILY
Status: DISCONTINUED | OUTPATIENT
Start: 2024-06-07 | End: 2024-06-07 | Stop reason: HOSPADM

## 2024-06-06 RX ORDER — SODIUM CHLORIDE 0.9 % (FLUSH) 0.9 %
10 SYRINGE (ML) INJECTION
Status: DISCONTINUED | OUTPATIENT
Start: 2024-06-06 | End: 2024-06-07 | Stop reason: HOSPADM

## 2024-06-06 RX ORDER — NALOXONE HCL 0.4 MG/ML
0.02 VIAL (ML) INJECTION
Status: DISCONTINUED | OUTPATIENT
Start: 2024-06-06 | End: 2024-06-07 | Stop reason: HOSPADM

## 2024-06-06 RX ORDER — GLUCAGON 1 MG
1 KIT INJECTION
Status: DISCONTINUED | OUTPATIENT
Start: 2024-06-06 | End: 2024-06-07 | Stop reason: HOSPADM

## 2024-06-06 RX ORDER — ACETAMINOPHEN 325 MG/1
650 TABLET ORAL EVERY 4 HOURS PRN
Status: DISCONTINUED | OUTPATIENT
Start: 2024-06-06 | End: 2024-06-07 | Stop reason: HOSPADM

## 2024-06-06 RX ORDER — OLANZAPINE 10 MG/2ML
5 INJECTION, POWDER, FOR SOLUTION INTRAMUSCULAR 2 TIMES DAILY PRN
Status: DISCONTINUED | OUTPATIENT
Start: 2024-06-06 | End: 2024-06-07 | Stop reason: HOSPADM

## 2024-06-06 RX ORDER — LIDOCAINE 50 MG/G
1 PATCH TOPICAL
Status: DISCONTINUED | OUTPATIENT
Start: 2024-06-06 | End: 2024-06-07 | Stop reason: HOSPADM

## 2024-06-06 RX ORDER — FAMOTIDINE 20 MG/1
40 TABLET, FILM COATED ORAL DAILY
Status: DISCONTINUED | OUTPATIENT
Start: 2024-06-06 | End: 2024-06-06

## 2024-06-06 RX ORDER — HYDROCHLOROTHIAZIDE 12.5 MG/1
12.5 TABLET ORAL DAILY
Status: DISCONTINUED | OUTPATIENT
Start: 2024-06-07 | End: 2024-06-07 | Stop reason: HOSPADM

## 2024-06-06 RX ORDER — IBUPROFEN 200 MG
16 TABLET ORAL
Status: DISCONTINUED | OUTPATIENT
Start: 2024-06-06 | End: 2024-06-07 | Stop reason: HOSPADM

## 2024-06-06 RX ORDER — IBUPROFEN 200 MG
24 TABLET ORAL
Status: DISCONTINUED | OUTPATIENT
Start: 2024-06-06 | End: 2024-06-07 | Stop reason: HOSPADM

## 2024-06-06 RX ORDER — FAMOTIDINE 10 MG/ML
20 INJECTION INTRAVENOUS DAILY
Status: DISCONTINUED | OUTPATIENT
Start: 2024-06-06 | End: 2024-06-07 | Stop reason: HOSPADM

## 2024-06-06 RX ORDER — SIMETHICONE 80 MG
1 TABLET,CHEWABLE ORAL 4 TIMES DAILY PRN
Status: DISCONTINUED | OUTPATIENT
Start: 2024-06-06 | End: 2024-06-07 | Stop reason: HOSPADM

## 2024-06-06 RX ORDER — TALC
6 POWDER (GRAM) TOPICAL NIGHTLY PRN
Status: DISCONTINUED | OUTPATIENT
Start: 2024-06-06 | End: 2024-06-07 | Stop reason: HOSPADM

## 2024-06-06 RX ORDER — LISINOPRIL 20 MG/1
20 TABLET ORAL DAILY
Status: DISCONTINUED | OUTPATIENT
Start: 2024-06-07 | End: 2024-06-07 | Stop reason: HOSPADM

## 2024-06-06 RX ORDER — OXYCODONE HYDROCHLORIDE 5 MG/1
5 TABLET ORAL EVERY 6 HOURS PRN
Status: DISCONTINUED | OUTPATIENT
Start: 2024-06-06 | End: 2024-06-07 | Stop reason: HOSPADM

## 2024-06-06 RX ORDER — ACETAMINOPHEN 500 MG
1000 TABLET ORAL EVERY 6 HOURS PRN
Status: DISCONTINUED | OUTPATIENT
Start: 2024-06-06 | End: 2024-06-07 | Stop reason: HOSPADM

## 2024-06-06 RX ORDER — PROCHLORPERAZINE EDISYLATE 5 MG/ML
5 INJECTION INTRAMUSCULAR; INTRAVENOUS EVERY 6 HOURS PRN
Status: DISCONTINUED | OUTPATIENT
Start: 2024-06-06 | End: 2024-06-07 | Stop reason: HOSPADM

## 2024-06-06 RX ORDER — CHLORHEXIDINE GLUCONATE ORAL RINSE 1.2 MG/ML
SOLUTION DENTAL
Status: ON HOLD | COMMUNITY
End: 2024-06-19 | Stop reason: HOSPADM

## 2024-06-06 RX ORDER — FAMOTIDINE 10 MG/ML
40 INJECTION INTRAVENOUS DAILY
Status: DISCONTINUED | OUTPATIENT
Start: 2024-06-06 | End: 2024-06-06

## 2024-06-06 RX ORDER — HEPARIN SODIUM 5000 [USP'U]/ML
5000 INJECTION, SOLUTION INTRAVENOUS; SUBCUTANEOUS EVERY 8 HOURS
Status: DISCONTINUED | OUTPATIENT
Start: 2024-06-06 | End: 2024-06-07 | Stop reason: HOSPADM

## 2024-06-06 RX ORDER — ATORVASTATIN CALCIUM 40 MG/1
40 TABLET, FILM COATED ORAL DAILY
Status: DISCONTINUED | OUTPATIENT
Start: 2024-06-07 | End: 2024-06-07 | Stop reason: HOSPADM

## 2024-06-06 RX ORDER — MORPHINE SULFATE 4 MG/ML
2 INJECTION, SOLUTION INTRAMUSCULAR; INTRAVENOUS EVERY 4 HOURS PRN
Status: DISCONTINUED | OUTPATIENT
Start: 2024-06-06 | End: 2024-06-07 | Stop reason: HOSPADM

## 2024-06-06 RX ORDER — ALUMINUM HYDROXIDE, MAGNESIUM HYDROXIDE, AND SIMETHICONE 1200; 120; 1200 MG/30ML; MG/30ML; MG/30ML
30 SUSPENSION ORAL 4 TIMES DAILY PRN
Status: DISCONTINUED | OUTPATIENT
Start: 2024-06-06 | End: 2024-06-07 | Stop reason: HOSPADM

## 2024-06-06 RX ORDER — ACETAMINOPHEN 650 MG/1
650 SUPPOSITORY RECTAL EVERY 6 HOURS PRN
Status: DISCONTINUED | OUTPATIENT
Start: 2024-06-06 | End: 2024-06-07 | Stop reason: HOSPADM

## 2024-06-06 RX ORDER — POLYETHYLENE GLYCOL 3350 17 G/17G
17 POWDER, FOR SOLUTION ORAL DAILY PRN
Status: DISCONTINUED | OUTPATIENT
Start: 2024-06-06 | End: 2024-06-07 | Stop reason: HOSPADM

## 2024-06-06 RX ADMIN — FAMOTIDINE 20 MG: 10 INJECTION, SOLUTION INTRAVENOUS at 02:06

## 2024-06-06 RX ADMIN — Medication 10 ML: at 10:06

## 2024-06-06 RX ADMIN — HEPARIN SODIUM 5000 UNITS: 5000 INJECTION INTRAVENOUS; SUBCUTANEOUS at 02:06

## 2024-06-06 RX ADMIN — HYDROMORPHONE HYDROCHLORIDE 0.2 MG: 1 INJECTION, SOLUTION INTRAMUSCULAR; INTRAVENOUS; SUBCUTANEOUS at 01:06

## 2024-06-06 RX ADMIN — Medication 10 ML: at 01:06

## 2024-06-06 RX ADMIN — DIATRIZOATE MEGLUMINE AND DIATRIZOATE SODIUM 30 ML: 660; 100 LIQUID ORAL; RECTAL at 03:06

## 2024-06-06 RX ADMIN — SODIUM CHLORIDE 1000 ML: 9 INJECTION, SOLUTION INTRAVENOUS at 10:06

## 2024-06-06 RX ADMIN — HEPARIN SODIUM 5000 UNITS: 5000 INJECTION INTRAVENOUS; SUBCUTANEOUS at 09:06

## 2024-06-06 RX ADMIN — OLANZAPINE 5 MG: 10 INJECTION, POWDER, FOR SOLUTION INTRAMUSCULAR at 08:06

## 2024-06-06 RX ADMIN — IOHEXOL 75 ML: 350 INJECTION, SOLUTION INTRAVENOUS at 10:06

## 2024-06-06 RX ADMIN — LIDOCAINE 5% 1 PATCH: 700 PATCH TOPICAL at 02:06

## 2024-06-06 NOTE — SUBJECTIVE & OBJECTIVE
Past Medical History:   Diagnosis Date    A-fib     Cerebrovascular accident (CVA) due to embolism of right middle cerebral artery 05/23/2024    Current use of long term anticoagulation     on Xarelto    Dysphagia due to recent stroke 05/23/2024    Hypertension     Stroke 03/20/2013       Past Surgical History:   Procedure Laterality Date    CARDIAC PACEMAKER PLACEMENT  09/25/2019    [employer] Model number [id number]  Serial number [id number] device MRI conditional for 1.5 Estela magnets    CATARACT EXTRACTION W/  INTRAOCULAR LENS IMPLANT Right 05/01/2017    Dr. Case    CATARACT EXTRACTION W/  INTRAOCULAR LENS IMPLANT Left 05/29/2017    Dr. Case    ESOPHAGOGASTRODUODENOSCOPY W/ PEG N/A 5/29/2024    Procedure: EGD, WITH PEG TUBE INSERTION;  Surgeon: Imer Minaya MD;  Location: [facility] OR [address] [place] [address];  Service: General;  Laterality: N/A;    EYE SURGERY      HYSTERECTOMY      INSERTION, PEG TUBE N/A 5/27/2024    Procedure: INSERTION, PEG TUBE;  Surgeon: Ivette Love MD;  Location: [facility] ([address] [place] [address]);  Service: Endoscopy;  Laterality: N/A;       Review of patient's allergies indicates:  No Known Allergies    No current facility-administered medications on file prior to encounter.     Current Outpatient Medications on File Prior to Encounter   Medication Sig    acetaminophen (TYLENOL) 500 MG tablet 2 tablets (1,000 mg total) by Per G Tube route every 8 (eight) hours as needed for Pain.    amLODIPine (NORVASC) 5 MG tablet 1 tablet (5 mg total) by Per G Tube route once daily.    apixaban (ELIQUIS) 5 mg Tab 1 tablet (5 mg total) by Per G Tube route 2 (two) times daily.    atorvastatin (LIPITOR) 40 MG tablet 1 tablet (40 mg total) by Per G Tube route once daily.    famotidine (PEPCID) 40 MG tablet 1 tablet (40 mg total) by Per G Tube route once daily.    hydroCHLOROthiazide (MICROZIDE) 12.5 mg capsule 2 capsules (25 mg total) by Per G Tube route once daily. DO NOT TAKE UNTIL FOLLOW UP WITH PRIMARY CARE. Rehab can add  back slowly if BP is elevated. Would start with lisinopril first    LIDOcaine (LIDODERM) 5 % Place 1 patch onto the skin once daily. Remove & Discard patch within 12 hours or as directed by MD    lisinopriL (PRINIVIL,ZESTRIL) 20 MG tablet 1 tablet (20 mg total) by Per G Tube route once daily. DO NOT TAKE UNTIL YOU SEE YOUR PRIMARY DOCTOR. Rehab can add back if BP becomes elevated. Would start with this one over HCTZ    nystatin (MYCOSTATIN) 100,000 unit/mL suspension Take 5 mLs (500,000 Units total) by mouth 4 (four) times daily. for 10 days     Family History    None       Tobacco Use    Smoking status: Never    Smokeless tobacco: Not on file   Substance and Sexual Activity    Alcohol use: No    Drug use: Not on file    Sexual activity: Not on file     Review of Systems   Constitutional:  Positive for fatigue. Negative for activity change, chills, diaphoresis and fever.   HENT:  Negative for congestion, rhinorrhea and sore throat.    Respiratory:  Negative for cough, chest tightness, shortness of breath and wheezing.    Cardiovascular:  Negative for chest pain, palpitations and leg swelling.   Gastrointestinal:  Negative for abdominal distention, abdominal pain, blood in stool, constipation, diarrhea, nausea and vomiting.   Genitourinary:  Negative for difficulty urinating, dysuria, frequency, hematuria and urgency.   Musculoskeletal:  Negative for arthralgias, back pain and neck stiffness.   Neurological:  Negative for dizziness, tremors, seizures, syncope, light-headedness, numbness and headaches.   Psychiatric/Behavioral:  Negative for agitation, confusion and hallucinations.      Objective:     Vital Signs (Most Recent):  Temp: 98.5 °F (36.9 °C) (06/06/24 0849)  Pulse: 71 (06/06/24 1100)  Resp: 16 (06/06/24 1100)  BP: (!) 174/76 (06/06/24 1100)  SpO2: 99 % (06/06/24 1100) Vital Signs (24h Range):  Temp:  [98.5 °F (36.9 °C)-98.6 °F (37 °C)] 98.5 °F (36.9 °C)  Pulse:  [71-94] 71  Resp:  [16-17] 16  SpO2:  [96  %-100 %] 99 %  BP: (130-174)/(62-76) 174/76     Weight: 68 kg (150 lb)  Body mass index is 30.3 kg/m².     Physical Exam  Vitals and nursing note reviewed.   Constitutional:       General: She is not in acute distress.     Appearance: She is well-developed. She is not diaphoretic.   HENT:      Head: Normocephalic and atraumatic.      Right Ear: External ear normal.      Left Ear: External ear normal.      Nose: Nose normal. No congestion.      Mouth/Throat:      Pharynx: Oropharynx is clear.   Eyes:      General: No scleral icterus.     Extraocular Movements: Extraocular movements intact.   Cardiovascular:      Rate and Rhythm: Normal rate and regular rhythm.      Pulses: Normal pulses.      Heart sounds: Normal heart sounds. No murmur heard.  Pulmonary:      Effort: Pulmonary effort is normal. No respiratory distress.      Breath sounds: Normal breath sounds. No wheezing or rales.   Abdominal:      General: Bowel sounds are normal. There is no distension.      Palpations: Abdomen is soft.      Tenderness: There is abdominal tenderness (generalized). There is no guarding or rebound.      Comments: Gastrostomy site is visible   Musculoskeletal:      Cervical back: Normal range of motion.      Right lower leg: No edema.      Left lower leg: No edema.   Skin:     General: Skin is warm and dry.      Capillary Refill: Capillary refill takes less than 2 seconds.   Neurological:      General: No focal deficit present.      Mental Status: She is alert and oriented to person, place, and time. Mental status is at baseline.      Comments: L sided hemiplegia, w/ now baseline dysarthria   Psychiatric:         Mood and Affect: Mood normal.         Behavior: Behavior normal.         Thought Content: Thought content normal.                Significant Labs: All pertinent labs within the past 24 hours have been reviewed.  CBC:   Recent Labs   Lab 06/06/24  0929 06/06/24  0947   WBC 10.01  --    HGB 10.7*  --    HCT 34.8* 50      --      CMP:   Recent Labs   Lab 06/06/24  0929      K 4.1   CL 98   CO2 26   *   BUN 34*   CREATININE 1.0   CALCIUM 9.6   PROT 7.1   ALBUMIN 3.0*   BILITOT 0.4   ALKPHOS 83   AST 26   ALT 18   ANIONGAP 12     Urine Studies:   Recent Labs   Lab 06/06/24  0919   COLORU Yellow   APPEARANCEUA Clear   PHUR 8.0   SPECGRAV 1.020   PROTEINUA Negative   GLUCUA Negative   KETONESU Negative   BILIRUBINUA Negative   OCCULTUA Negative   NITRITE Negative   LEUKOCYTESUR Negative       Significant Imaging: I have reviewed all pertinent imaging results/findings within the past 24 hours.  Imaging Results              CT Abdomen Pelvis With IV Contrast NO Oral Contrast (Final result)  Result time 06/06/24 10:31:25      Final result by Mushtaq Blue MD (06/06/24 10:31:25)                   Impression:      Nonspecific air/fluid collection in the anterior abdominal wall which may communicate with the skin surface and anterior wall of the stomach.  This could represent abscess or other fluid collection within a prior gastrostomy tube tract, though correlation with additional clinical history will be needed in this patient with nonlocalized abdominal pain.  Probable adjacent cellulitis in the anterior abdominal wall.    Hiatal hernia.    Cholelithiasis.    Colonic diverticulosis.    Other findings above.    Artifact and position limited study.      Electronically signed by: Mushtaq Blue MD  Date:    06/06/2024  Time:    10:31               Narrative:    EXAMINATION:  CT ABDOMEN PELVIS WITH IV CONTRAST    CLINICAL HISTORY:  Abdominal pain, acute, nonlocalized;    TECHNIQUE:  Low dose axial images, sagittal and coronal reformations were obtained from the lung bases to the pubic symphysis following the IV administration of 75 mL of Omnipaque 350 .  Oral contrast was not given.    COMPARISON:  CT abdomen pelvis, 05/26/2024.    FINDINGS:  Lower Chest:    Heart is enlarged.  Bibasilar subsegmental atelectasis.  Leadless  pacemaker noted.  No pericardial effusion.  Moderate-sized hiatal hernia.  Trace left pleural effusion.    Abdomen:    Evaluation is limited by extensive streak artifact due to the patient's arms overlying the field of view.    Liver is stable without obvious focal mass on this artifact limited study.  There are multiple prominent gallstones present in the gallbladder.  No overt pericholecystic inflammatory fat stranding.  No intrahepatic biliary ductal dilatation.    Spleen, adrenals, and pancreas are stable.    The kidneys are symmetric.  No hydronephrosis. No asymmetric perinephric fat stranding.    Hiatal hernia.  Air/fluid collection in the anterior abdominal wall which is inseparable from the anterior wall of the stomach (axial image 59).  The patient's arm is immediately overlapping this area which limits evaluation.  No small bowel obstruction.  Colonic diverticulosis.  Appendix is normal.  High-density stool in the colon.    No pneumoperitoneum or organized fluid collection.    No bulky retroperitoneal lymphadenopathy.    Abdominal aorta is normal in caliber with moderate calcific atherosclerosis.    Portal vein is grossly patent.  No portal venous gas.    Pelvis:    Urinary bladder is decompressed and poorly evaluated.  Rectum is unremarkable.  No significant free fluid.    Bones and soft tissues:    No aggressive osseous lesions.  Degenerative changes in the spine.  Mild anterolisthesis of L4 with respect to L5 and L5 with respect to S1, likely related to facet arthropathy.  Air/fluid collection measuring approximately 3 cm in size (axial image 64 and sagittal image 64).  This collection may communicate with the skin surface and anterior wall of the stomach (axial images 59-64).  Adjacent soft tissue edema which is new when compared with the prior exam.

## 2024-06-06 NOTE — ASSESSMENT & PLAN NOTE
- AFVSS on RA  - No leukocytosis  - No significant electrolyte abnormalities  - LA wnl  - UA negative  - CT a/p: Nonspecific air/fluid collection in the anterior abdominal wall which may communicate with the skin surface and anterior wall of the stomach. This could represent abscess or other fluid collection within a prior gastrostomy tube tract, though correlation with additional clinical history will be needed in this patient with nonlocalized abdominal pain. Probable adjacent cellulitis in the anterior abdominal wall.   - s/p 1 L NS bolus   - NPO  - general surgery consulted  - daily CBC, BMP  - continue to monitor

## 2024-06-06 NOTE — HPI
Ms. Amirah Davey is a 88-year-old female, history of AFib on anticoagulation, hypertension, status post right MCA stroke about a month ago, who presents for peg tube malfunction. Pt reports peg tube dislodgement this am from being jostled around during transfers at NH. She has mild generalized associated abd pain but denies n/v/d, f/c. Otherwise Pt reports otherwise being in her typical state of health s/p MCA. Of note, it seems that is post stroke care was complicated by difficulty placing NG tube that resulted in possible perforation and subsequent pneumoperitoneum. Patient ultimately had a PEG tube placed on May 29th, about a week ago, then discharged to a rehab facility on May 31st. Denies any chest pain, SOB, lightheadedness, syncope.    In ED, Pt AFVSS on RA. No leukocytosis. No significant electrolyte abnormalities. LA wnl. UA negative. CT a/p: Nonspecific air/fluid collection in the anterior abdominal wall which may communicate with the skin surface and anterior wall of the stomach. This could represent abscess or other fluid collection within a prior gastrostomy tube tract, though correlation with additional clinical history will be needed in this patient with nonlocalized abdominal pain. Probable adjacent cellulitis in the anterior abdominal wall. Pt given 1 L NS bolus and general surgery consulted.

## 2024-06-06 NOTE — ED NOTES
I-STAT Chem-8+ Results:   Value Reference Range   Sodium 136 136-145 mmol/L   Potassium  4.1 3.5-5.1 mmol/L   Chloride 98  mmol/L   Ionized Calcium 1.19 1.06-1.42 mmol/L   CO2 (measured) 31 23-29 mmol/L   Glucose 119  mg/dL   BUN 34 6-30 mg/dL   Creatinine 1.0 0.5-1.4 mg/dL   Hematocrit 50 36-54%

## 2024-06-06 NOTE — ASSESSMENT & PLAN NOTE
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 Physical therapy services has not been scheduled. Continue all standard measures for pressure injury prevention and consult wound care for any wounds (chronic or acute).

## 2024-06-06 NOTE — H&P
[facility] - Emergency Dept  [facility] Medicine  History & Physical    Patient Name: Amirah Davey  MRN: [id number]  Patient Class: OP- Observation  Admission Date: 6/6/2024  Attending Physician: Mary Paul MD   Primary Care Provider: Ivana Templeton MD         Patient information was obtained from patient, past medical records, and ER records.     Subjective:     Principal Problem:PEG tube malfunction    Chief Complaint:   Chief Complaint   Patient presents with    PEG Tube Displacement     [facility] [place]. Has complained of abd pain for several days, unknown time of last BM. Arrives A+O x 2, normal baseline secondary CVA.        HPI: Ms. Amirah Davey is a 88-year-old female, history of AFib on anticoagulation, hypertension, status post right MCA stroke about a month ago, who presents for peg tube malfunction. Pt reports peg tube dislodgement this am from being jostled around during transfers at NH. She has mild generalized associated abd pain but denies n/v/d, f/c. Otherwise Pt reports otherwise being in her typical state of health s/p [facility]. Of note, it seems that is post stroke care was complicated by difficulty placing NG tube that resulted in possible perforation and subsequent pneumoperitoneum. Patient ultimately had a PEG tube placed on May 29th, about a week ago, then discharged to a rehab facility on May 31st. Denies any chest pain, SOB, lightheadedness, syncope.    In ED, Pt AFVSS on RA. No leukocytosis. No significant electrolyte abnormalities. LA wnl. UA negative. CT a/p: Nonspecific air/fluid collection in the anterior abdominal wall which may communicate with the skin surface and anterior wall of the stomach. This could represent abscess or other fluid collection within a prior gastrostomy tube tract, though correlation with additional clinical history will be needed in this patient with nonlocalized abdominal pain. Probable adjacent cellulitis in the anterior abdominal wall.  Pt given 1 L NS bolus and general surgery consulted.    Past Medical History:   Diagnosis Date    A-fib     Cerebrovascular accident (CVA) due to embolism of right middle cerebral artery 05/23/2024    Current use of long term anticoagulation     on Xarelto    Dysphagia due to recent stroke 05/23/2024    Hypertension     Stroke 03/20/2013       Past Surgical History:   Procedure Laterality Date    CARDIAC PACEMAKER PLACEMENT  09/25/2019    [employer] Model number [id number]  Serial number [id number] device MRI conditional for 1.5 Estela magnets    CATARACT EXTRACTION W/  INTRAOCULAR LENS IMPLANT Right 05/01/2017    Dr. Case    CATARACT EXTRACTION W/  INTRAOCULAR LENS IMPLANT Left 05/29/2017    Dr. Case    ESOPHAGOGASTRODUODENOSCOPY W/ PEG N/A 5/29/2024    Procedure: EGD, WITH PEG TUBE INSERTION;  Surgeon: Imer Minaya MD;  [facility];  Service: General;  Laterality: N/A;    EYE SURGERY      HYSTERECTOMY      INSERTION, PEG TUBE N/A 5/27/2024    Procedure: INSERTION, PEG TUBE;  Surgeon: Ivette Love MD;  [facility] ([facility]  Service: Endoscopy;  Laterality: N/A;       Review of patient's allergies indicates:  No Known Allergies    No current facility-administered medications on file prior to encounter.     Current Outpatient Medications on File Prior to Encounter   Medication Sig    acetaminophen (TYLENOL) 500 MG tablet 2 tablets (1,000 mg total) by Per G Tube route every 8 (eight) hours as needed for Pain.    amLODIPine (NORVASC) 5 MG tablet 1 tablet (5 mg total) by Per G Tube route once daily.    apixaban (ELIQUIS) 5 mg Tab 1 tablet (5 mg total) by Per G Tube route 2 (two) times daily.    atorvastatin (LIPITOR) 40 MG tablet 1 tablet (40 mg total) by Per G Tube route once daily.    famotidine (PEPCID) 40 MG tablet 1 tablet (40 mg total) by Per G Tube route once daily.    hydroCHLOROthiazide (MICROZIDE) 12.5 mg capsule 2 capsules (25 mg total) by Per G Tube route once daily. DO NOT  TAKE UNTIL FOLLOW UP WITH PRIMARY CARE. Rehab can add back slowly if BP is elevated. Would start with lisinopril first    LIDOcaine (LIDODERM) 5 % Place 1 patch onto the skin once daily. Remove & Discard patch within 12 hours or as directed by MD    lisinopriL (PRINIVIL,ZESTRIL) 20 MG tablet 1 tablet (20 mg total) by Per G Tube route once daily. DO NOT TAKE UNTIL YOU SEE YOUR PRIMARY DOCTOR. Rehab can add back if BP becomes elevated. Would start with this one over HCTZ    nystatin (MYCOSTATIN) 100,000 unit/mL suspension Take 5 mLs (500,000 Units total) by mouth 4 (four) times daily. for 10 days     Family History    None       Tobacco Use    Smoking status: Never    Smokeless tobacco: Not on file   Substance and Sexual Activity    Alcohol use: No    Drug use: Not on file    Sexual activity: Not on file     Review of Systems   Constitutional:  Positive for fatigue. Negative for activity change, chills, diaphoresis and fever.   HENT:  Negative for congestion, rhinorrhea and sore throat.    Respiratory:  Negative for cough, chest tightness, shortness of breath and wheezing.    Cardiovascular:  Negative for chest pain, palpitations and leg swelling.   Gastrointestinal:  Negative for abdominal distention, abdominal pain, blood in stool, constipation, diarrhea, nausea and vomiting.   Genitourinary:  Negative for difficulty urinating, dysuria, frequency, hematuria and urgency.   Musculoskeletal:  Negative for arthralgias, back pain and neck stiffness.   Neurological:  Negative for dizziness, tremors, seizures, syncope, light-headedness, numbness and headaches.   Psychiatric/Behavioral:  Negative for agitation, confusion and hallucinations.      Objective:     Vital Signs (Most Recent):  Temp: 98.5 °F (36.9 °C) (06/06/24 0849)  Pulse: 71 (06/06/24 1100)  Resp: 16 (06/06/24 1100)  BP: (!) 174/76 (06/06/24 1100)  SpO2: 99 % (06/06/24 1100) Vital Signs (24h Range):  Temp:  [98.5 °F (36.9 °C)-98.6 °F (37 °C)] 98.5 °F (36.9  °C)  Pulse:  [71-94] 71  Resp:  [16-17] 16  SpO2:  [96 %-100 %] 99 %  BP: (130-174)/(62-76) 174/76     Weight: 68 kg (150 lb)  Body mass index is 30.3 kg/m².     Physical Exam  Vitals and nursing note reviewed.   Constitutional:       General: She is not in acute distress.     Appearance: She is well-developed. She is not diaphoretic.   HENT:      Head: Normocephalic and atraumatic.      Right Ear: External ear normal.      Left Ear: External ear normal.      Nose: Nose normal. No congestion.      Mouth/Throat:      Pharynx: Oropharynx is clear.   Eyes:      General: No scleral icterus.     Extraocular Movements: Extraocular movements intact.   Cardiovascular:      Rate and Rhythm: Normal rate and regular rhythm.      Pulses: Normal pulses.      Heart sounds: Normal heart sounds. No murmur heard.  Pulmonary:      Effort: Pulmonary effort is normal. No respiratory distress.      Breath sounds: Normal breath sounds. No wheezing or rales.   Abdominal:      General: Bowel sounds are normal. There is no distension.      Palpations: Abdomen is soft.      Tenderness: There is abdominal tenderness (generalized). There is no guarding or rebound.      Comments: Gastrostomy site is visible   Musculoskeletal:      Cervical back: Normal range of motion.      Right lower leg: No edema.      Left lower leg: No edema.   Skin:     General: Skin is warm and dry.      Capillary Refill: Capillary refill takes less than 2 seconds.   Neurological:      General: No focal deficit present.      Mental Status: She is alert and oriented to person, place, and time. Mental status is at baseline.      Comments: L sided hemiplegia, w/ now baseline dysarthria   Psychiatric:         Mood and Affect: Mood normal.         Behavior: Behavior normal.         Thought Content: Thought content normal.                Significant Labs: All pertinent labs within the past 24 hours have been reviewed.  CBC:   Recent Labs   Lab 06/06/24  0929 06/06/24  0947    WBC 10.01  --    HGB 10.7*  --    HCT 34.8* 50     --      CMP:   Recent Labs   Lab 06/06/24  0929      K 4.1   CL 98   CO2 26   *   BUN 34*   CREATININE 1.0   CALCIUM 9.6   PROT 7.1   ALBUMIN 3.0*   BILITOT 0.4   ALKPHOS 83   AST 26   ALT 18   ANIONGAP 12     Urine Studies:   Recent Labs   Lab 06/06/24  0919   COLORU Yellow   APPEARANCEUA Clear   PHUR 8.0   SPECGRAV 1.020   PROTEINUA Negative   GLUCUA Negative   KETONESU Negative   BILIRUBINUA Negative   OCCULTUA Negative   NITRITE Negative   LEUKOCYTESUR Negative       Significant Imaging: I have reviewed all pertinent imaging results/findings within the past 24 hours.  Imaging Results              CT Abdomen Pelvis With IV Contrast NO Oral Contrast (Final result)  Result time 06/06/24 10:31:25      Final result by Mushtaq Blue MD (06/06/24 10:31:25)                   Impression:      Nonspecific air/fluid collection in the anterior abdominal wall which may communicate with the skin surface and anterior wall of the stomach.  This could represent abscess or other fluid collection within a prior gastrostomy tube tract, though correlation with additional clinical history will be needed in this patient with nonlocalized abdominal pain.  Probable adjacent cellulitis in the anterior abdominal wall.    Hiatal hernia.    Cholelithiasis.    Colonic diverticulosis.    Other findings above.    Artifact and position limited study.      Electronically signed by: Mushtaq Blue MD  Date:    06/06/2024  Time:    10:31               Narrative:    EXAMINATION:  CT ABDOMEN PELVIS WITH IV CONTRAST    CLINICAL HISTORY:  Abdominal pain, acute, nonlocalized;    TECHNIQUE:  Low dose axial images, sagittal and coronal reformations were obtained from the lung bases to the pubic symphysis following the IV administration of 75 mL of Omnipaque 350 .  Oral contrast was not given.    COMPARISON:  CT abdomen pelvis, 05/26/2024.    FINDINGS:  Lower Chest:    Heart  is enlarged.  Bibasilar subsegmental atelectasis.  Leadless pacemaker noted.  No pericardial effusion.  Moderate-sized hiatal hernia.  Trace left pleural effusion.    Abdomen:    Evaluation is limited by extensive streak artifact due to the patient's arms overlying the field of view.    Liver is stable without obvious focal mass on this artifact limited study.  There are multiple prominent gallstones present in the gallbladder.  No overt pericholecystic inflammatory fat stranding.  No intrahepatic biliary ductal dilatation.    Spleen, adrenals, and pancreas are stable.    The kidneys are symmetric.  No hydronephrosis. No asymmetric perinephric fat stranding.    Hiatal hernia.  Air/fluid collection in the anterior abdominal wall which is inseparable from the anterior wall of the stomach (axial image 59).  The patient's arm is immediately overlapping this area which limits evaluation.  No small bowel obstruction.  Colonic diverticulosis.  Appendix is normal.  High-density stool in the colon.    No pneumoperitoneum or organized fluid collection.    No bulky retroperitoneal lymphadenopathy.    Abdominal aorta is normal in caliber with moderate calcific atherosclerosis.    Portal vein is grossly patent.  No portal venous gas.    Pelvis:    Urinary bladder is decompressed and poorly evaluated.  Rectum is unremarkable.  No significant free fluid.    Bones and soft tissues:    No aggressive osseous lesions.  Degenerative changes in the spine.  Mild anterolisthesis of L4 with respect to L5 and L5 with respect to S1, likely related to facet arthropathy.  Air/fluid collection measuring approximately 3 cm in size (axial image 64 and sagittal image 64).  This collection may communicate with the skin surface and anterior wall of the stomach (axial images 59-64).  Adjacent soft tissue edema which is new when compared with the prior exam.                                     Assessment/Plan:     * PEG tube malfunction  - AFVSS on  RA  - No leukocytosis  - No significant electrolyte abnormalities  - LA wnl  - UA negative  - CT a/p: Nonspecific air/fluid collection in the anterior abdominal wall which may communicate with the skin surface and anterior wall of the stomach. This could represent abscess or other fluid collection within a prior gastrostomy tube tract, though correlation with additional clinical history will be needed in this patient with nonlocalized abdominal pain. Probable adjacent cellulitis in the anterior abdominal wall.   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 Physical therapy services has not been scheduled. Continue all standard measures for pressure injury prevention and consult wound care for any wounds (chronic or acute).    Chronic a-fib  Patient with Long standing persistent (>12 months) atrial fibrillation which is controlled currently with Calcium Channel Blocker. Patient is currently in atrial fibrillation.[id number] Score: 4. Anticoagulation indicated. Anticoagulation done with eliquis 5 mg bid .    - continue eliquis and amlodipine    Hypertension, benign  Chronic, uncontrolled. Latest blood pressure and vitals reviewed-     Temp:  [98.5 °F (36.9 °C)-98.6 °F (37 °C)]   Pulse:  [71-98]   Resp:  [16-18]   BP: (130-186)/(62-80)   SpO2:  [96 %-100 %] .   Home meds for hypertension were reviewed and noted below.   Hypertension Medications               amLODIPine (NORVASC) 5 MG tablet 1 tablet (5 mg total) by Per G Tube route once daily.    hydroCHLOROthiazide (MICROZIDE) 12.5 mg capsule 2 capsules (25 mg total) by Per G Tube route once daily. DO NOT TAKE UNTIL FOLLOW UP WITH PRIMARY CARE. Rehab can add back slowly if BP is elevated. Would start with lisinopril first    lisinopriL (PRINIVIL,ZESTRIL) 20 MG tablet 1 tablet (20 mg  total) by Per G Tube route once daily. DO NOT TAKE UNTIL YOU SEE YOUR PRIMARY DOCTOR. Rehab can add back if BP becomes elevated. Would start with this one over HCTZ     While in the hospital, will manage blood pressure as follows; Continue home antihypertensive regimen    Will utilize p.r.n. blood pressure medication only if patient's blood pressure greater than 180/110 and she develops symptoms such as worsening chest pain or shortness of breath.      VTE Risk Mitigation (From admission, onward)           Ordered     apixaban tablet 5 mg  2 times daily         06/06/24 1126     heparin (porcine) injection 5,000 Units  Every 8 hours         06/06/24 1126     IP VTE HIGH RISK PATIENT  Once         06/06/24 1126     Place sequential compression device  Until discontinued         06/06/24 1126                         On 06/06/2024, patient should be placed in hospital observation services under my care in collaboration with Dr. Paul.           Albert Davis PA-C  Department of Hospital Medicine  [facility] - Emergency Dept

## 2024-06-06 NOTE — ASSESSMENT & PLAN NOTE
Patient with Long standing persistent (>12 months) atrial fibrillation which is controlled currently with Calcium Channel Blocker. Patient is currently in atrial fibrillation.[id number] Score: 4. Anticoagulation indicated. Anticoagulation done with eliquis 5 mg bid .    - continue eliquis and amlodipine

## 2024-06-06 NOTE — ED NOTES
Patient identifiers for Amirah Davey 88 y.o. female checked and correct.  Chief Complaint   Patient presents with    PEG Tube Displacement     [facility] [place] Has complained of abd pain for several days, unknown time of last BM. Arrives A+O x 2, normal baseline secondary CVA.     Past Medical History:   Diagnosis Date    A-fib     Cerebrovascular accident (CVA) due to embolism of right middle cerebral artery 05/23/2024    Current use of long term anticoagulation     on Xarelto    Dysphagia due to recent stroke 05/23/2024    Hypertension     Stroke 03/20/2013     Allergies reported: Review of patient's allergies indicates:  No Known Allergies      LOC: Patient is awake and aware of environment with an appropriate affect. Pt endorses lethargy. Patient is oriented to place and situation and speaking appropriately.  APPEARANCE: Patient resting comfortably and in no acute distress. Patient is clean and well groomed, patient's clothing is properly fastened.  HEENT: WDL  SKIN: The skin is warm and dry. Patient has normal skin turgor and moist mucus membranes.   MUSCULOSKELETAL: Patient is bed bound since stroke, no obvious deformities noted. Pulses intact.   RESPIRATORY: Airway is open and patent. Respirations are spontaneous and non-labored with normal effort and rate.  CARDIAC: Patient has a normal rate and rhythm. 88 on cardiac monitor. No peripheral edema noted. Denies chest pain and SOB at at time of assessment.   ABDOMEN: No distention noted. Soft and non-tender upon palpation. PEG tube not in place.   NEUROLOGICAL: pupils 3mm, PERRL. Facial expression is symmetrical. Hand grasps are equal bilaterally. Normal sensation in all extremities when touched with finger.

## 2024-06-06 NOTE — ASSESSMENT & PLAN NOTE
Chronic, uncontrolled. Latest blood pressure and vitals reviewed-     Temp:  [98.5 °F (36.9 °C)-98.6 °F (37 °C)]   Pulse:  [71-98]   Resp:  [16-18]   BP: (130-186)/(62-80)   SpO2:  [96 %-100 %] .   Home meds for hypertension were reviewed and noted below.   Hypertension Medications               amLODIPine (NORVASC) 5 MG tablet 1 tablet (5 mg total) by Per G Tube route once daily.    hydroCHLOROthiazide (MICROZIDE) 12.5 mg capsule 2 capsules (25 mg total) by Per G Tube route once daily. DO NOT TAKE UNTIL FOLLOW UP WITH PRIMARY CARE. Rehab can add back slowly if BP is elevated. Would start with lisinopril first    lisinopriL (PRINIVIL,ZESTRIL) 20 MG tablet 1 tablet (20 mg total) by Per G Tube route once daily. DO NOT TAKE UNTIL YOU SEE YOUR PRIMARY DOCTOR. Rehab can add back if BP becomes elevated. Would start with this one over HCTZ     While in the hospital, will manage blood pressure as follows; Continue home antihypertensive regimen    Will utilize p.r.n. blood pressure medication only if patient's blood pressure greater than 180/110 and she develops symptoms such as worsening chest pain or shortness of breath.

## 2024-06-06 NOTE — PHARMACY MED REC
"Admission Medication History     The home medication history was taken by Seema Trevino.    You may go to "Admission" then "Reconcile Home Medications" tabs to review and/or act upon these items.     The home medication list has been updated by the Pharmacy department.   Please read ALL comments highlighted in yellow.   Please address this information as you see fit.    Feel free to contact us if you have any questions or require assistance.      Medications listed below were obtained from: SNF/Rehab/[facility]   Current Outpatient Medications on File Prior to Encounter   Medication Sig    acetaminophen (TYLENOL) 500 MG tablet 2 tablets (1,000 mg total) Per G Tube route every 6 (six) hours as needed for Pain.      amLODIPine (NORVASC) 5 MG tablet   1 tablet (5 mg total) by Per G Tube route once daily.    apixaban (ELIQUIS) 5 mg Tab 1 tablet (5 mg total) by Per G Tube route 2 (two) times daily.      atorvastatin (LIPITOR) 40 MG tablet   1 tablet (40 mg total) by Per G Tube route once daily.    chlorhexidine (PERIDEX) 0.12 % solution   Swish & spit: 15 ml by mouth once daily.    famotidine (PEPCID) 40 MG tablet   1 tablet (40 mg total) by Per G Tube route once daily.    hydroCHLOROthiazide (MICROZIDE) 12.5 mg capsule 2 capsules (25 mg total) by Per G Tube route once daily. DO NOT TAKE UNTIL FOLLOW UP WITH PRIMARY CARE. Rehab can add back slowly if BP is elevated. Would start with lisinopril first      LIDOcaine (LIDODERM) 5 % Place 1 patch onto the skin once daily. Remove & Discard patch within 12 hours or as directed by MD      lisinopriL (PRINIVIL,ZESTRIL) 20 MG tablet 1 tablet (20 mg total) by Per G Tube route once   daily. DO NOT TAKE UNTIL YOU SEE YOUR PRIMARY DOCTOR. Rehab can add back if BP becomes elevated. Would start with this one over HCTZ      nut.tx.gluc intol,lf,soy/fiber (GLUCERNA 1.5 SHELLEY ORAL)   Feed per tube at 55 ml/hr continuously    nystatin (MYCOSTATIN) 100,000 unit/mL suspension Swish & spit 15 mLs by " mouth once daily.     Potential issues to be addressed PRIOR TO DISCHARGE  The listed medications were obtained from another facility [facility] [place] [place] The patient may not have been able to fill these prescriptions prior to this admission and may require new scripts upon discharge.             Seema Trevino  EXT [id number]                  .

## 2024-06-06 NOTE — SUBJECTIVE & OBJECTIVE
No current facility-administered medications on file prior to encounter.     Current Outpatient Medications on File Prior to Encounter   Medication Sig    acetaminophen (TYLENOL) 500 MG tablet 2 tablets (1,000 mg total) by Per G Tube route every 8 (eight) hours as needed for Pain. (Patient taking differently: 1,000 mg by Per G Tube route every 6 (six) hours as needed for Pain.)    amLODIPine (NORVASC) 5 MG tablet 1 tablet (5 mg total) by Per G Tube route once daily.    apixaban (ELIQUIS) 5 mg Tab 1 tablet (5 mg total) by Per G Tube route 2 (two) times daily.    atorvastatin (LIPITOR) 40 MG tablet 1 tablet (40 mg total) by Per G Tube route once daily.    chlorhexidine (PERIDEX) 0.12 % solution Swish & spit: 15 ml by mouth once daily.    famotidine (PEPCID) 40 MG tablet 1 tablet (40 mg total) by Per G Tube route once daily.    hydroCHLOROthiazide (MICROZIDE) 12.5 mg capsule 2 capsules (25 mg total) by Per G Tube route once daily. DO NOT TAKE UNTIL FOLLOW UP WITH PRIMARY CARE. Rehab can add back slowly if BP is elevated. Would start with lisinopril first    LIDOcaine (LIDODERM) 5 % Place 1 patch onto the skin once daily. Remove & Discard patch within 12 hours or as directed by MD    lisinopriL (PRINIVIL,ZESTRIL) 20 MG tablet 1 tablet (20 mg total) by Per G Tube route once daily. DO NOT TAKE UNTIL YOU SEE YOUR PRIMARY DOCTOR. Rehab can add back if BP becomes elevated. Would start with this one over HCTZ    nut.tx.gluc intol,lf,soy/fiber (GLUCERNA 1.5 SHELLEY ORAL) Feed per tube at 55 ml/hr continuously    nystatin (MYCOSTATIN) 100,000 unit/mL suspension Take 5 mLs (500,000 Units total) by mouth 4 (four) times daily. for 10 days (Patient taking differently: Swish & spit 15 ml by mouth once daily.)       Review of patient's allergies indicates:  No Known Allergies    Past Medical History:   Diagnosis Date    A-fib     Cerebrovascular accident (CVA) due to embolism of right middle cerebral artery 05/23/2024    Current use of  long term anticoagulation     on Xarelto    Dysphagia due to recent stroke 05/23/2024    Hypertension     Stroke 03/20/2013     Past Surgical History:   Procedure Laterality Date    CARDIAC PACEMAKER PLACEMENT  09/25/2019    Medtronic Model number [id number]  Serial number [id number] device MRI conditional for 1.5 Estela magnets    CATARACT EXTRACTION W/  INTRAOCULAR LENS IMPLANT Right 05/01/2017    Dr. Case    CATARACT EXTRACTION W/  INTRAOCULAR LENS IMPLANT Left 05/29/2017    Dr. Case    ESOPHAGOGASTRODUODENOSCOPY W/ PEG N/A 5/29/2024    Procedure: EGD, WITH PEG TUBE INSERTION;  Surgeon: Imer Minaya MD;  Location: [facility] OR [facility] FLR;  Service: General;  Laterality: N/A;    EYE SURGERY      HYSTERECTOMY      INSERTION, PEG TUBE N/A 5/27/2024    Procedure: INSERTION, PEG TUBE;  Surgeon: Ivette Love MD;  Location: [facility] (2ND FLR);  Service: Endoscopy;  Laterality: N/A;     Family History    None       Tobacco Use    Smoking status: Never    Smokeless tobacco: Not on file   Substance and Sexual Activity    Alcohol use: No    Drug use: Not on file    Sexual activity: Not on file     Review of Systems: Pertinent per HPI  Objective:     Vital Signs (Most Recent):  Temp: 97.8 °F (36.6 °C) (06/06/24 1515)  Pulse: 83 (06/06/24 1515)  Resp: 16 (06/06/24 1515)  BP: (!) 144/64 (06/06/24 1515)  SpO2: 98 % (06/06/24 1515) Vital Signs (24h Range):  Temp:  [97.8 °F (36.6 °C)-98.6 °F (37 °C)] 97.8 °F (36.6 °C)  Pulse:  [71-98] 83  Resp:  [16-18] 16  SpO2:  [95 %-100 %] 98 %  BP: (126-186)/(60-80) 144/64     Weight: 68 kg (150 lb)  Body mass index is 30.3 kg/m².     Physical Exam  Vitals reviewed.   Eyes:      Extraocular Movements: Extraocular movements intact.      Conjunctiva/sclera: Conjunctivae normal.   Cardiovascular:      Rate and Rhythm: Normal rate.      Pulses: Normal pulses.   Pulmonary:      Effort: Pulmonary effort is normal. No respiratory distress.   Abdominal:      General: There is no distension.       Tenderness: There is no abdominal tenderness.      Comments: PEG tube site open, no purulent drainage or surrounding erythema.   Musculoskeletal:         General: No swelling. Normal range of motion.   Skin:     General: Skin is warm and dry.   Neurological:      Mental Status: She is alert. Mental status is at baseline.            I have reviewed all pertinent lab results within the past 24 hours.  CBC:   Recent Labs   Lab 06/06/24  0929 06/06/24  0947   WBC 10.01  --    RBC 4.40  --    HGB 10.7*  --    HCT 34.8* 50     --    MCV 79*  --    MCH 24.3*  --    MCHC 30.7*  --      CMP:   Recent Labs   Lab 06/06/24  0929   *   CALCIUM 9.6   ALBUMIN 3.0*   PROT 7.1      K 4.1   CO2 26   CL 98   BUN 34*   CREATININE 1.0   ALKPHOS 83   ALT 18   AST 26   BILITOT 0.4       Significant Diagnostics:  I have reviewed all pertinent imaging results/findings within the past 24 hours.

## 2024-06-06 NOTE — ED NOTES
Assumed care of the patient. Report received from SANTO [place]. Pt on continuous pulse oximetry, and automatic BP cuff cycling Q30 min. Pt in hospital gown, side rails up X2, bed low and locked, and call light is placed within reach. No family/visitors at bedside at this time. Pt denies any complaints or needs. Whiteboard updated with patient's plan of care.

## 2024-06-06 NOTE — CONSULTS
[facility]  General Surgery  Consult Note    Patient Name: Amirah Davey  MRN: [id number]  Code Status: Full Code  Admission Date: 6/6/2024  Hospital Length of Stay: 0 days  Attending Physician: Mary Paul MD  Primary Care Provider: Ivana Templeton MD    Patient information was obtained from patient, past medical records, and ER records.     Inpatient consult to General Surgery  Consult performed by: Debbie Hicks MD  Consult ordered by: Sierra Loya MD  Reason for consult: PEG dislodged        Subjective:     Principal Problem: PEG tube malfunction    History of Present Illness: 88F with history of CVA, Afib on Eliquis, and dysphagia who is s/p PEG tube placement on 5/29/24. She was discharged from [facility] to care facility on 5/31/24. At some time overnight, patient's PEG tube was dislodged. She was subsequently taken to the ED today for evaluation after tube dislodgement. CT scan obtained which showed PEG tube tract with some air and edema in the subcutaneous tissue. Stomach appeared still adherent to abdominal wall. General Surgery was consulted to evaluate.        No current facility-administered medications on file prior to encounter.     Current Outpatient Medications on File Prior to Encounter   Medication Sig    acetaminophen (TYLENOL) 500 MG tablet 2 tablets (1,000 mg total) by Per G Tube route every 8 (eight) hours as needed for Pain. (Patient taking differently: 1,000 mg by Per G Tube route every 6 (six) hours as needed for Pain.)    amLODIPine (NORVASC) 5 MG tablet 1 tablet (5 mg total) by Per G Tube route once daily.    apixaban (ELIQUIS) 5 mg Tab 1 tablet (5 mg total) by Per G Tube route 2 (two) times daily.    atorvastatin (LIPITOR) 40 MG tablet 1 tablet (40 mg total) by Per G Tube route once daily.    chlorhexidine (PERIDEX) 0.12 % solution Swish & spit: 15 ml by mouth once daily.    famotidine (PEPCID) 40 MG tablet 1 tablet (40 mg total) by Per G Tube route once daily.     hydroCHLOROthiazide (MICROZIDE) 12.5 mg capsule 2 capsules (25 mg total) by Per G Tube route once daily. DO NOT TAKE UNTIL FOLLOW UP WITH PRIMARY CARE. Rehab can add back slowly if BP is elevated. Would start with lisinopril first    LIDOcaine (LIDODERM) 5 % Place 1 patch onto the skin once daily. Remove & Discard patch within 12 hours or as directed by MD    lisinopriL (PRINIVIL,ZESTRIL) 20 MG tablet 1 tablet (20 mg total) by Per G Tube route once daily. DO NOT TAKE UNTIL YOU SEE YOUR PRIMARY DOCTOR. Rehab can add back if BP becomes elevated. Would start with this one over HCTZ    nut.tx.gluc intol,lf,soy/fiber (GLUCERNA 1.5 SHELLEY ORAL) Feed per tube at 55 ml/hr continuously    nystatin (MYCOSTATIN) 100,000 unit/mL suspension Take 5 mLs (500,000 Units total) by mouth 4 (four) times daily. for 10 days (Patient taking differently: Swish & spit 15 ml by mouth once daily.)       Review of patient's allergies indicates:  No Known Allergies    Past Medical History:   Diagnosis Date    A-fib     Cerebrovascular accident (CVA) due to embolism of right middle cerebral artery 05/23/2024    Current use of long term anticoagulation     on Xarelto    Dysphagia due to recent stroke 05/23/2024    Hypertension     Stroke 03/20/2013     Past Surgical History:   Procedure Laterality Date    CARDIAC PACEMAKER PLACEMENT  09/25/2019    Medtronic Model number [id number]  Serial number [id number] device MRI conditional for 1.5 Estela magnets    CATARACT EXTRACTION W/  INTRAOCULAR LENS IMPLANT Right 05/01/2017    Dr. Case    CATARACT EXTRACTION W/  INTRAOCULAR LENS IMPLANT Left 05/29/2017    Dr. Case    ESOPHAGOGASTRODUODENOSCOPY W/ PEG N/A 5/29/2024    Procedure: EGD, WITH PEG TUBE INSERTION;  Surgeon: Imer Minaya MD;  Location: [facility] OR [address] [place] [address];  Service: General;  Laterality: N/A;    EYE SURGERY      HYSTERECTOMY      INSERTION, PEG TUBE N/A 5/27/2024    Procedure: INSERTION, PEG TUBE;  Surgeon: Ivette Love MD;  [facility] (2ND FLR);  Service: Endoscopy;  Laterality: N/A;     Family History    None       Tobacco Use    Smoking status: Never    Smokeless tobacco: Not on file   Substance and Sexual Activity    Alcohol use: No    Drug use: Not on file    Sexual activity: Not on file     Review of Systems: Pertinent per HPI  Objective:     Vital Signs (Most Recent):  Temp: 97.8 °F (36.6 °C) (06/06/24 1515)  Pulse: 83 (06/06/24 1515)  Resp: 16 (06/06/24 1515)  BP: (!) 144/64 (06/06/24 1515)  SpO2: 98 % (06/06/24 1515) Vital Signs (24h Range):  Temp:  [97.8 °F (36.6 °C)-98.6 °F (37 °C)] 97.8 °F (36.6 °C)  Pulse:  [71-98] 83  Resp:  [16-18] 16  SpO2:  [95 %-100 %] 98 %  BP: (126-186)/(60-80) 144/64     Weight: 68 kg (150 lb)  Body mass index is 30.3 kg/m².     Physical Exam  Vitals reviewed.   Eyes:      Extraocular Movements: Extraocular movements intact.      Conjunctiva/sclera: Conjunctivae normal.   Cardiovascular:      Rate and Rhythm: Normal rate.      Pulses: Normal pulses.   Pulmonary:      Effort: Pulmonary effort is normal. No respiratory distress.   Abdominal:      General: There is no distension.      Tenderness: There is no abdominal tenderness.      Comments: PEG tube site open, no purulent drainage or surrounding erythema.   Musculoskeletal:         General: No swelling. Normal range of motion.   Skin:     General: Skin is warm and dry.   Neurological:      Mental Status: She is alert. Mental status is at baseline.            I have reviewed all pertinent lab results within the past 24 hours.  CBC:   Recent Labs   Lab 06/06/24  0929 06/06/24  0947   WBC 10.01  --    RBC 4.40  --    HGB 10.7*  --    HCT 34.8* 50     --    MCV 79*  --    MCH 24.3*  --    MCHC 30.7*  --      CMP:   Recent Labs   Lab 06/06/24  0929   *   CALCIUM 9.6   ALBUMIN 3.0*   PROT 7.1      K 4.1   CO2 26   CL 98   BUN 34*   CREATININE 1.0   ALKPHOS 83   ALT 18   AST 26   BILITOT 0.4       Significant Diagnostics:  I have  reviewed all pertinent imaging results/findings within the past 24 hours.    Assessment/Plan:     * PEG tube malfunction  88F with history of stroke and dysphagia requiring PEG tube placement. Presents to the ED after PEG was dislodged at care facility. CT imaging shows air in the subcutaneous tissue which is expected following dislodgement. No purulence or drainage to suggest infection.     - Able to replace mccarthy catheter into tract at bedside. Balloon inflated and secured in place. Bedside tube study confirmed tube in proper position.   - Keep NPO for now  - Please keep Mccarthy G tube secured in place and leave it capped. Do not use tube at this time.  - Will exchange for ballooned Gastrostomy tube at bedside on rounds.   - Please call General Surgery Consult pager if tube becomes dislodged at any point overnight        VTE Risk Mitigation (From admission, onward)           Ordered     apixaban tablet 5 mg  2 times daily         06/06/24 1126     heparin (porcine) injection 5,000 Units  Every 8 hours         06/06/24 1126     IP VTE HIGH RISK PATIENT  Once         06/06/24 1126     Place sequential compression device  Until discontinued         06/06/24 1126                    Thank you for your consult. I will follow-up with patient. Please contact us if you have any additional questions.    Debbie Hicks MD  General Surgery  [facility]

## 2024-06-06 NOTE — ASSESSMENT & PLAN NOTE
88F with history of stroke and dysphagia requiring PEG tube placement. Presents to the ED after PEG was dislodged at care facility. CT imaging shows air in the subcutaneous tissue which is expected following dislodgement. No purulence or drainage to suggest infection.     - Able to replace mccarthy catheter into tract at bedside. Balloon inflated and secured in place. Bedside tube study confirmed tube in proper position.   - Keep NPO for now  - Please keep Mccarthy G tube secured in place and leave it capped. Do not use tube at this time.  - Will exchange for ballooned Gastrostomy tube at bedside on rounds.   - Please call General Surgery Consult pager if tube becomes dislodged at any point overnight

## 2024-06-06 NOTE — ED PROVIDER NOTES
Encounter Date: 6/6/2024       History     Chief Complaint   Patient presents with    PEG Tube Displacement     [facility] [place]. Has complained of abd pain for several days, unknown time of last BM. Arrives A+O x 2, normal baseline secondary CVA.     88-year-old female, history of AFib on anticoagulation, hypertension, status post right MCA stroke about a month ago, initially evaluated in the [facility] [place] but then [place] [facility] [place], it seems that is post stroke care was complicated by difficulty placing NG tube that resulted in possible perforation and subsequent pneumoperitoneum, patient and transferred here around May 23rd, ultimately had a PEG tube placed on May 29th, about a week ago, then discharged to a rehab facility on May 31st, patient now transferred back here from nursing home facility given concerns for PEG tube dislodgement and abdominal pain and distention.  Patient states that she was told that she removed her PEG tube but she said she has no memory of pulling it out.  She is complaining of diffuse abdominal pain and feeling hot.    The history is provided by the patient.     Review of patient's allergies indicates:  No Known Allergies  Past Medical History:   Diagnosis Date    A-fib     Cerebrovascular accident (CVA) due to embolism of right middle cerebral artery 05/23/2024    Current use of long term anticoagulation     on Xarelto    Dysphagia due to recent stroke 05/23/2024    Hypertension     Stroke 03/20/2013     Past Surgical History:   Procedure Laterality Date    CARDIAC PACEMAKER PLACEMENT  09/25/2019    [employer] Model number [id number]  Serial number [id number] device MRI conditional for 1.5 Estela magnets    CATARACT EXTRACTION W/  INTRAOCULAR LENS IMPLANT Right 05/01/2017    Dr. Case    CATARACT EXTRACTION W/  INTRAOCULAR LENS IMPLANT Left 05/29/2017    Dr. Case    ESOPHAGOGASTRODUODENOSCOPY W/ PEG N/A 5/29/2024    Procedure: EGD, WITH  PEG TUBE INSERTION;  Surgeon: Imer Minaya MD;  Location: [facility] OR 2ND FLR;  Service: General;  Laterality: N/A;    EYE SURGERY      HYSTERECTOMY      INSERTION, PEG TUBE N/A 5/27/2024    Procedure: INSERTION, PEG TUBE;  Surgeon: Ivette Love MD;  Location: [facility] (2ND FLR);  Service: Endoscopy;  Laterality: N/A;     No family history on file.  Social History     Tobacco Use    Smoking status: Never   Substance Use Topics    Alcohol use: No     Review of Systems    Physical Exam     Initial Vitals [06/06/24 0833]   BP Pulse Resp Temp SpO2   (!) 146/76 94 17 98.6 °F (37 °C) 98 %      MAP       --         Physical Exam    Nursing note and vitals reviewed.  Constitutional: Vital signs are normal. She appears well-developed and well-nourished. She is not diaphoretic.  Non-toxic appearance. She does not appear ill. No distress.   HENT:   Head: Normocephalic and atraumatic.   Mouth/Throat: Mucous membranes are normal. Mucous membranes are not dry.   Eyes: Conjunctivae and lids are normal.   Neck: Neck supple.   Normal range of motion.  Cardiovascular:  Normal rate.           Pulmonary/Chest: No respiratory distress.   Abdominal: She exhibits distension. There is abdominal tenderness.   Gastrostomy site is visible, small amount of blood at the orifice There is no rebound and no guarding.   Musculoskeletal:         General: No edema.      Cervical back: Normal range of motion and neck supple.     Neurological: She is alert.   Patient able to answer most of my questions appropriately.  She has dense hemiplegia on the left which seems to be baseline   Skin: Skin is warm, dry and intact. No pallor.   Psychiatric: She has a normal mood and affect. Her speech is normal and behavior is normal.         ED Course   Procedures  Labs Reviewed   CBC W/ AUTO DIFFERENTIAL - Abnormal; Notable for the following components:       Result Value    Hemoglobin 10.7 (*)     Hematocrit 34.8 (*)     MCV 79 (*)     MCH 24.3 (*)      MCHC 30.7 (*)     RDW 15.9 (*)     Immature Granulocytes 0.9 (*)     Immature Grans (Abs) 0.09 (*)     Mono # 1.2 (*)     Gran % 74.5 (*)     Lymph % 11.1 (*)     All other components within normal limits    Narrative:     Release to patient->Immediate   COMPREHENSIVE METABOLIC PANEL - Abnormal; Notable for the following components:    Glucose 114 (*)     BUN 34 (*)     Albumin 3.0 (*)     eGFR 54.2 (*)     All other components within normal limits    Narrative:     Release to patient->Immediate   ISTAT PROCEDURE - Abnormal; Notable for the following components:    POC Glucose 119 (*)     POC BUN 34 (*)     POC TCO2 (MEASURED) 31 (*)     All other components within normal limits   HIV 1 / 2 ANTIBODY    Narrative:     Release to patient->Immediate   HEPATITIS C ANTIBODY    Narrative:     Release to patient->Immediate   LACTIC ACID, PLASMA   LIPASE    Narrative:     Release to patient->Immediate   PROTIME-INR    Narrative:     Release to patient->Immediate   URINALYSIS, REFLEX TO URINE CULTURE    Narrative:     Specimen Source->Urine          Imaging Results              XR Gastric tube check, non-radiologist performed (Final result)  Result time 06/06/24 16:48:02   Procedure changed from XR NG/OG tube placement check, non-radiologist performed     Final result by Amilcar Calderon MD (06/06/24 16:48:02)                   Impression:      Intraluminal location of percutaneous enteric tube as above.      Electronically signed by: Amilcar Calderon MD  Date:    06/06/2024  Time:    16:48               Narrative:    EXAMINATION:  XR GASTRIC TUBE CHECK, NON-RADIOLOGIST PERFORMED    CLINICAL HISTORY:  PEG tube replaced;    TECHNIQUE:  Single frontal abdomen x-ray was performed before and after the insertion of a percutaneous jejunostomy tube.  Enteric contrast was administered through the patient's percutaneous enteric tube.    COMPARISON:  05/24/2024.    06/06/2024.    FINDINGS:   radiograph demonstrates residual contrast  material within the large bowel.  Subsequent radiograph obtained with the percutaneous of enteric tube in place demonstrates opacification of the proximal small bowel loops.  No extraluminal contrast identified.                                       CT Abdomen Pelvis With IV Contrast NO Oral Contrast (Final result)  Result time 06/06/24 10:31:25      Final result by Mushtaq Blue MD (06/06/24 10:31:25)                   Impression:      Nonspecific air/fluid collection in the anterior abdominal wall which may communicate with the skin surface and anterior wall of the stomach.  This could represent abscess or other fluid collection within a prior gastrostomy tube tract, though correlation with additional clinical history will be needed in this patient with nonlocalized abdominal pain.  Probable adjacent cellulitis in the anterior abdominal wall.    Hiatal hernia.    Cholelithiasis.    Colonic diverticulosis.    Other findings above.    Artifact and position limited study.      Electronically signed by: Mushtaq Blue MD  Date:    06/06/2024  Time:    10:31               Narrative:    EXAMINATION:  CT ABDOMEN PELVIS WITH IV CONTRAST    CLINICAL HISTORY:  Abdominal pain, acute, nonlocalized;    TECHNIQUE:  Low dose axial images, sagittal and coronal reformations were obtained from the lung bases to the pubic symphysis following the IV administration of 75 mL of Omnipaque 350 .  Oral contrast was not given.    COMPARISON:  CT abdomen pelvis, 05/26/2024.    FINDINGS:  Lower Chest:    Heart is enlarged.  Bibasilar subsegmental atelectasis.  Leadless pacemaker noted.  No pericardial effusion.  Moderate-sized hiatal hernia.  Trace left pleural effusion.    Abdomen:    Evaluation is limited by extensive streak artifact due to the patient's arms overlying the field of view.    Liver is stable without obvious focal mass on this artifact limited study.  There are multiple prominent gallstones present in the gallbladder.   No overt pericholecystic inflammatory fat stranding.  No intrahepatic biliary ductal dilatation.    Spleen, adrenals, and pancreas are stable.    The kidneys are symmetric.  No hydronephrosis. No asymmetric perinephric fat stranding.    Hiatal hernia.  Air/fluid collection in the anterior abdominal wall which is inseparable from the anterior wall of the stomach (axial image 59).  The patient's arm is immediately overlapping this area which limits evaluation.  No small bowel obstruction.  Colonic diverticulosis.  Appendix is normal.  High-density stool in the colon.    No pneumoperitoneum or organized fluid collection.    No bulky retroperitoneal lymphadenopathy.    Abdominal aorta is normal in caliber with moderate calcific atherosclerosis.    Portal vein is grossly patent.  No portal venous gas.    Pelvis:    Urinary bladder is decompressed and poorly evaluated.  Rectum is unremarkable.  No significant free fluid.    Bones and soft tissues:    No aggressive osseous lesions.  Degenerative changes in the spine.  Mild anterolisthesis of L4 with respect to L5 and L5 with respect to S1, likely related to facet arthropathy.  Air/fluid collection measuring approximately 3 cm in size (axial image 64 and sagittal image 64).  This collection may communicate with the skin surface and anterior wall of the stomach (axial images 59-64).  Adjacent soft tissue edema which is new when compared with the prior exam.                                       Medications   sodium chloride 0.9% bolus 1,000 mL 1,000 mL (0 mLs Intravenous Stopped 6/6/24 1158)   iohexoL (OMNIPAQUE 350) injection 75 mL (75 mLs Intravenous Given 6/6/24 1003)   diatrizoate meglumineand-diatrizoate sodium (GASTROGRAFFIN) solution 30 mL (30 mLs Per G Tube Given 6/6/24 1530)   HYDROmorphone injection 0.2 mg (0.2 mg Intravenous Given 6/6/24 1316)   LIDOcaine HCL 10 mg/ml (1%) injection 10 mL (10 mLs Other Given 6/7/24 1136)     Medical Decision Making  Patient is  here with abdominal pain and accidental dislodgement of recently placed PEG tube.  Unfortunately, this PEG tube was placed a little over a week ago and so I can not safely replace the tube blindly at the bedside.  In addition, patient with abdominal pain and distention I am worried about a new intra-abdominal complication.    Plan  -labs, IV fluids  -CT abdomen pelvis  -will need to be readmitted for new PEG tube placement    Amount and/or Complexity of Data Reviewed  External Data Reviewed: notes.  Labs: ordered. Decision-making details documented in ED Course.  Radiology: ordered and independent interpretation performed. Decision-making details documented in ED Course.    Risk  Prescription drug management.  Decision regarding hospitalization.               ED Course as of 06/16/24 0808   Thu Jun 06, 2024   1013 INR: 1.1 [AS]   1052 CT scan findings reviewed and I have personally reviewed these images as well.  I have discussed the findings with the general surgery resident on-call.  She suspect that findings in the anterior abdominal wall are likely normal postop changes from recent PEG tube placement.  Much less likely to be an abscess or infection.  She does not recommend antibiotic treatment.  I will admit to Hospital Medicine and they will re-evaluate patient for a new PEG tube placement. [AS]      ED Course User Index  [AS] Sierra Loya MD                           Clinical Impression:  Final diagnoses:  [K94.23] PEG tube malfunction (Primary)          ED Disposition Condition    Observation Stable                Sierra Loya MD  06/16/24 0808

## 2024-06-06 NOTE — HPI
88F with history of CVA, Afib on Eliquis, and dysphagia who is s/p PEG tube placement on 5/29/24. She was discharged from [facility] to care facility on 5/31/24. At some time overnight, patient's PEG tube was dislodged. She was subsequently taken to the ED today for evaluation after tube dislodgement. CT scan obtained which showed PEG tube tract with some air and edema in the subcutaneous tissue. Stomach appeared still adherent to abdominal wall. General Surgery was consulted to evaluate.

## 2024-06-06 NOTE — NURSING
Nurses Note -- 4 Eyes      6/6/2024   5:45 PM      Skin assessed during: Admit      [] No Altered Skin Integrity Present    []Prevention Measures Documented      [x] Yes- Altered Skin Integrity Present or Discovered   [x] LDA Added if Not in Epic (Describe Wound)   [x] New Altered Skin Integrity was Present on Admit and Documented in LDA   [x] Wound Image Taken    Wound Care Consulted? No    Attending Nurse:  Chelsie Beckham RN/Staff Member:   Suzie

## 2024-06-07 VITALS
HEART RATE: 70 BPM | BODY MASS INDEX: 30.24 KG/M2 | OXYGEN SATURATION: 95 % | RESPIRATION RATE: 16 BRPM | HEIGHT: 59 IN | TEMPERATURE: 98 F | WEIGHT: 150 LBS | DIASTOLIC BLOOD PRESSURE: 69 MMHG | SYSTOLIC BLOOD PRESSURE: 151 MMHG

## 2024-06-07 LAB
ANION GAP SERPL CALC-SCNC: 9 MMOL/L (ref 8–16)
BASOPHILS # BLD AUTO: 0.02 K/UL (ref 0–0.2)
BASOPHILS NFR BLD: 0.3 % (ref 0–1.9)
BUN SERPL-MCNC: 21 MG/DL (ref 8–23)
CALCIUM SERPL-MCNC: 9 MG/DL (ref 8.7–10.5)
CHLORIDE SERPL-SCNC: 104 MMOL/L (ref 95–110)
CO2 SERPL-SCNC: 24 MMOL/L (ref 23–29)
CREAT SERPL-MCNC: 0.9 MG/DL (ref 0.5–1.4)
DIFFERENTIAL METHOD BLD: ABNORMAL
EOSINOPHIL # BLD AUTO: 0.1 K/UL (ref 0–0.5)
EOSINOPHIL NFR BLD: 2 % (ref 0–8)
ERYTHROCYTE [DISTWIDTH] IN BLOOD BY AUTOMATED COUNT: 16.1 % (ref 11.5–14.5)
EST. GFR  (NO RACE VARIABLE): >60 ML/MIN/1.73 M^2
GLUCOSE SERPL-MCNC: 73 MG/DL (ref 70–110)
HCT VFR BLD AUTO: 32.7 % (ref 37–48.5)
HGB BLD-MCNC: 10.1 G/DL (ref 12–16)
IMM GRANULOCYTES # BLD AUTO: 0.06 K/UL (ref 0–0.04)
IMM GRANULOCYTES NFR BLD AUTO: 0.9 % (ref 0–0.5)
LYMPHOCYTES # BLD AUTO: 1 K/UL (ref 1–4.8)
LYMPHOCYTES NFR BLD: 14.7 % (ref 18–48)
MAGNESIUM SERPL-MCNC: 2.2 MG/DL (ref 1.6–2.6)
MCH RBC QN AUTO: 25.1 PG (ref 27–31)
MCHC RBC AUTO-ENTMCNC: 30.9 G/DL (ref 32–36)
MCV RBC AUTO: 81 FL (ref 82–98)
MONOCYTES # BLD AUTO: 1 K/UL (ref 0.3–1)
MONOCYTES NFR BLD: 13.7 % (ref 4–15)
NEUTROPHILS # BLD AUTO: 4.8 K/UL (ref 1.8–7.7)
NEUTROPHILS NFR BLD: 68.4 % (ref 38–73)
NRBC BLD-RTO: 0 /100 WBC
PLATELET # BLD AUTO: 326 K/UL (ref 150–450)
PMV BLD AUTO: 11.5 FL (ref 9.2–12.9)
POTASSIUM SERPL-SCNC: 4 MMOL/L (ref 3.5–5.1)
RBC # BLD AUTO: 4.03 M/UL (ref 4–5.4)
SODIUM SERPL-SCNC: 137 MMOL/L (ref 136–145)
WBC # BLD AUTO: 6.95 K/UL (ref 3.9–12.7)

## 2024-06-07 PROCEDURE — 83735 ASSAY OF MAGNESIUM: CPT | Performed by: PHYSICIAN ASSISTANT

## 2024-06-07 PROCEDURE — G0378 HOSPITAL OBSERVATION PER HR: HCPCS

## 2024-06-07 PROCEDURE — 25000003 PHARM REV CODE 250: Performed by: PHYSICIAN ASSISTANT

## 2024-06-07 PROCEDURE — 25000003 PHARM REV CODE 250: Performed by: HOSPITALIST

## 2024-06-07 PROCEDURE — 96376 TX/PRO/DX INJ SAME DRUG ADON: CPT

## 2024-06-07 PROCEDURE — 36415 COLL VENOUS BLD VENIPUNCTURE: CPT | Performed by: PHYSICIAN ASSISTANT

## 2024-06-07 PROCEDURE — 96372 THER/PROPH/DIAG INJ SC/IM: CPT | Performed by: PHYSICIAN ASSISTANT

## 2024-06-07 PROCEDURE — 25000003 PHARM REV CODE 250

## 2024-06-07 PROCEDURE — 80048 BASIC METABOLIC PNL TOTAL CA: CPT | Performed by: PHYSICIAN ASSISTANT

## 2024-06-07 PROCEDURE — 43762 RPLC GTUBE NO REVJ TRC: CPT

## 2024-06-07 PROCEDURE — A4216 STERILE WATER/SALINE, 10 ML: HCPCS | Performed by: PHYSICIAN ASSISTANT

## 2024-06-07 PROCEDURE — 63600175 PHARM REV CODE 636 W HCPCS: Performed by: PHYSICIAN ASSISTANT

## 2024-06-07 PROCEDURE — 85025 COMPLETE CBC W/AUTO DIFF WBC: CPT | Performed by: PHYSICIAN ASSISTANT

## 2024-06-07 RX ORDER — LIDOCAINE HYDROCHLORIDE 10 MG/ML
10 INJECTION INFILTRATION; PERINEURAL ONCE
Status: COMPLETED | OUTPATIENT
Start: 2024-06-07 | End: 2024-06-07

## 2024-06-07 RX ADMIN — FAMOTIDINE 20 MG: 10 INJECTION, SOLUTION INTRAVENOUS at 09:06

## 2024-06-07 RX ADMIN — HEPARIN SODIUM 5000 UNITS: 5000 INJECTION INTRAVENOUS; SUBCUTANEOUS at 02:06

## 2024-06-07 RX ADMIN — LIDOCAINE HYDROCHLORIDE 10 ML: 10 INJECTION, SOLUTION INFILTRATION; PERINEURAL at 11:06

## 2024-06-07 RX ADMIN — HEPARIN SODIUM 5000 UNITS: 5000 INJECTION INTRAVENOUS; SUBCUTANEOUS at 05:06

## 2024-06-07 RX ADMIN — Medication 10 ML: at 05:06

## 2024-06-07 RX ADMIN — Medication 10 ML: at 02:06

## 2024-06-07 RX ADMIN — LIDOCAINE 5% 1 PATCH: 700 PATCH TOPICAL at 02:06

## 2024-06-07 NOTE — PLAN OF CARE
Problem: Adult Inpatient Plan of Care  Goal: Plan of Care Review  Outcome: Met  Goal: Patient-Specific Goal (Individualized)  Outcome: Met  Goal: Absence of Hospital-Acquired Illness or Injury  Outcome: Met  Goal: Optimal Comfort and Wellbeing  Outcome: Met  Goal: Readiness for Transition of Care  Outcome: Met     Problem: Infection  Goal: Absence of Infection Signs and Symptoms  Outcome: Met     Problem: Wound  Goal: Optimal Coping  Outcome: Met  Goal: Optimal Functional Ability  Outcome: Met  Goal: Absence of Infection Signs and Symptoms  Outcome: Met  Goal: Improved Oral Intake  Outcome: Met  Goal: Optimal Pain Control and Function  Outcome: Met  Goal: Skin Health and Integrity  Outcome: Met  Goal: Optimal Wound Healing  Outcome: Met     Problem: Fall Injury Risk  Goal: Absence of Fall and Fall-Related Injury  Outcome: Met     Problem: Skin Injury Risk Increased  Goal: Skin Health and Integrity  Outcome: Met     Problem: Hypertension Acute  Goal: Blood Pressure Within Desired Range  Outcome: Met     Problem: Dysrhythmia  Goal: Normalized Cardiac Rhythm  Outcome: Met     Problem: Enteral Nutrition  Goal: Absence of Aspiration Signs and Symptoms  Outcome: Met  Goal: Safe, Effective Therapy Delivery  Outcome: Met  Goal: Feeding Tolerance  Outcome: Met

## 2024-06-07 NOTE — PLAN OF CARE
06/07/24 1051   Post-Acute Status   Post-Acute Authorization Placement   Post-Acute Placement Status Referrals Sent     Pt is currently at [facility] for rehab and is expected to return once she is medically stable. SW sent referral via [facility] for review.    SW will continue to follow up.      Gail Landeros LMSW  [facility]  Ext. [phone]

## 2024-06-07 NOTE — PLAN OF CARE
06/07/24 1544   Post-Acute Status   Post-Acute Authorization Placement   Post-Acute Placement Status Set-up Complete/Auth obtained     Pt is discharging to [facility] located at [address] [place] [address]. SANTO Valenzuela can call report to [phone] option 3.    KWADWO arranged stretcher transport via [facility]. Requested  time is 5:00PM. Requested  time does not guarantee arrival time.      Gail Landeros LMSW  [facility]  Ext. [phone]

## 2024-06-07 NOTE — PLAN OF CARE
Problem: Adult Inpatient Plan of Care  Goal: Plan of Care Review  Reactivated  Goal: Patient-Specific Goal (Individualized)  Reactivated  Goal: Absence of Hospital-Acquired Illness or Injury  Reactivated  Goal: Optimal Comfort and Wellbeing  Reactivated  Goal: Readiness for Transition of Care  Reactivated     Problem: Infection  Goal: Absence of Infection Signs and Symptoms  Reactivated     Problem: Wound  Goal: Optimal Coping  Reactivated  Goal: Optimal Functional Ability  Reactivated  Goal: Absence of Infection Signs and Symptoms  Reactivated  Goal: Improved Oral Intake  Reactivated  Goal: Optimal Pain Control and Function  Reactivated  Goal: Skin Health and Integrity  Reactivated  Goal: Optimal Wound Healing  Reactivated     Problem: Fall Injury Risk  Goal: Absence of Fall and Fall-Related Injury  Reactivated     Problem: Skin Injury Risk Increased  Goal: Skin Health and Integrity  Reactivated     Problem: Hypertension Acute  Goal: Blood Pressure Within Desired Range  Reactivated     Problem: Dysrhythmia  Goal: Normalized Cardiac Rhythm  Reactivated     Problem: Enteral Nutrition  Goal: Absence of Aspiration Signs and Symptoms  Reactivated  Goal: Safe, Effective Therapy Delivery  Reactivated  Goal: Feeding Tolerance  Reactivated

## 2024-06-07 NOTE — PLAN OF CARE
[facility]  Initial Discharge Assessment       Primary Care Provider: Ivana Templeton MD    Admission Diagnosis: Chest pain [R07.9]  PEG tube malfunction [K94.23]    Admission Date: 6/6/2024  Expected Discharge Date: 6/7/2024         Payor: MEDICARE / Plan: MEDICARE PART A & B / Product Type: Government /     Extended Emergency Contact Information  Primary Emergency Contact: Sonya Sylvester  Address: [address] [place] [address] [facility]  Home Phone: [phone]  Mobile Phone: [phone]  Relation: Daughter    Discharge Plan A: (P) Rehab  Discharge Plan B: (P) Rehab      [id number] - [place] - [address]  [address]  [facility] [address]  Phone: [phone] Fax: [phone]             SW completed Discharge Planning Assessment with patient's daughter, Sonya via bedside. Discharge planning booklet given to patient/family and whiteboard updated with JOAN and phone #. All questions answered.    Patient will need assistance with transportation upon discharge.     Sonya reported that she live with patient, and prior to patient's recent stroke she was independent with her ADL's. Sonya reported that patient is not on dialysis and does not go to a Coumadin clinic.     Prior to admission patient was at [facility] and is expected to return once she is medically stable.     Patient lives in a [facility] with one step to enter.     Discharge Plan A and Plan B have been determined by review of patient's clinical status, future medical and therapeutic needs, and coverage/benefits for post-acute care in coordination with multidisciplinary team members.      Gail Landeros LMSW  [facility]  Ext. [phone]

## 2024-06-07 NOTE — CONSULTS
[facility]  Wound Care    Patient Name:  Amirah Davey   MRN:  [id number]  Date: 6/7/2024  Diagnosis: PEG tube malfunction    History:     Past Medical History:   Diagnosis Date    A-fib     Cerebrovascular accident (CVA) due to embolism of right middle cerebral artery 05/23/2024    Current use of long term anticoagulation     on Xarelto    Dysphagia due to recent stroke 05/23/2024    Hypertension     Stroke 03/20/2013       Social History     Socioeconomic History    Marital status:    Tobacco Use    Smoking status: Never   Substance and Sexual Activity    Alcohol use: No     Social Determinants of Health     Financial Resource Strain: Patient Declined (5/24/2024)    Overall Financial Resource Strain (CARDIA)     Difficulty of Paying Living Expenses: Patient declined   Food Insecurity: Patient Declined (5/24/2024)    Hunger Vital Sign     Worried About Running Out of Food in the Last Year: Patient declined     Ran Out of Food in the Last Year: Patient declined   Transportation Needs: Patient Declined (5/24/2024)    TRANSPORTATION NEEDS     Transportation : Patient declined   Physical Activity: Patient Declined (5/24/2024)    Exercise Vital Sign     Days of Exercise per Week: Patient declined     Minutes of Exercise per Session: Patient declined   Stress: Patient Declined (5/24/2024)    [employer] - Occupational Stress Questionnaire     Feeling of Stress : Patient declined   Housing Stability: Patient Declined (5/24/2024)    Housing Stability Vital Sign     Unable to Pay for Housing in the Last Year: Patient declined     Homeless in the Last Year: Patient declined       Precautions:     Allergies as of 06/06/2024    (No Known Allergies)       [facility] Assessment Details/Treatment     Patient seen for wound care consultation.   Reviewed chart for this encounter.   See Flow Sheet for findings.      RECOMMENDATIONS: Patient's family at bedside and agreeable to inpatient  wound care. RN consult for sacrum. Sacrum has blanchable area of erythema indicative of moisture association. Large bowel incontinence cleaned during assessment. No open wound or active drainage present. Wedge pillow in use during assessment. Cleanse sacrum with sterile normal saline and pat dry. Apply triad BID and PRN if soiled for moisture barrier. No other issues or concerns at this time. Will follow up 6/14/2024 or sooner if needed. Waffle mattress ordered for pressure redistribution.    Discussed POC with patient and primary nurse.   See EMR for orders & patient education.    Discussed nutrition and the role of protein in wound healing with the patient. Instructed patient to optimize protein for wound healing.    Bedside nursing to continue care & monitoring.  Bedside nursing to maintain pressure injury prevention interventions.            06/07/24 1000   WOCN Assessment   WOCN Total Time (mins) 30   Visit Date 06/07/24   Visit Time 1000   Consult Type New   WOCN Speciality Wound   Intervention assessed;changed;applied;chart review;coordination of care;orders   Teaching on-going        Wound 05/31/24 0951 Skin Tear posterior Sacral spine #1   Date First Assessed/Time First Assessed: 05/31/24 0951   Present on Original Admission: No  Primary Wound Type: Skin Tear  Orientation: posterior  Location: Sacral spine  Wound Number: #1  Is this injury device related?: No   Wound Image    Dressing Appearance Open to air;Dry;Intact;Clean   Drainage Amount None   Appearance Intact;Pink   Care Cleansed with:;Sterile normal saline;Applied:;Skin Barrier       Orders placed.   Michael SPRAGUE, RN  06/07/2024     [Follow-Up] : a follow-up visit [Abnormal CXR/ Chest CT] : an abnormal CXR/ chest CT [Sleep Apnea] : sleep apnea [COPD] : COPD [Shortness of Breath] : shortness of breath [Pulmonary Nodules] : pulmonary nodules [Nicotine Dependence] : nicotine dependence

## 2024-06-07 NOTE — CARE UPDATE
General Surgery Update    Rubalcava catheter exchange for 18F Gastrostomy tube at the bedside. Balloon inflated to 8 cc. Tolerated well. Can place G tube to gravity drainage. Waiting for lidocaine and will place additional sutures to hold the tube as securely as possible to prevent any additional future dislodgement. Once sutures in place can use for meds and feeds as tolerated. Please call with questions or concerns.     Debbie Hicks MD  General Surgery, PGY-V  Pager: [phone]  6/7/2024 10:36 AM

## 2024-06-07 NOTE — NURSING
Nurses Note -- 4 Eyes      6/6/2024   9:27 PM      Skin assessed during: Q Shift Change      [] No Altered Skin Integrity Present    []Prevention Measures Documented      [x] Yes- Altered Skin Integrity Present or Discovered   [x] LDA Added if Not in Epic (Describe Wound)   [] New Altered Skin Integrity was Present on Admit and Documented in LDA   [] Wound Image Taken    Wound Care Consulted? Yes    Attending Nurse:  Kasia Beckham RN/Staff Member:   Debbie

## 2024-06-08 NOTE — HOSPITAL COURSE
Amirah Davey is a 88 y.o. F who was admitted to  for further evaluation of displaced PEG tube. Seen by general surgery in ED who placed mccarthy tube temporarily and planned to perform exchange the following day. Exchange performed without issues by surgery team. Patient's abdominal pain on admission, now resolved.  Patient medically ready for discharge. Plan to follow up with PCP, general surgery. Return precautions provided. Patient was seen and assessed on day of discharge. Plan of care discussed with patient, patient agreeable with plan, and all questions answered.    Physical Exam  Gen: in NAD, appears stated age  Neuro: mental status at baseline, motor, sensory, and strength grossly intact BL  HEENT: EOMI, PERRLA; no JVD appreciated  CVS: RRR, no m/r/g  Resp: lungs CTAB, no w/r/r; no belabored breathing or accessory muscle use appreciated   Abd: NTND, soft to palpation - PEG tube in place  Extrem: no UE or LE edema BL

## 2024-06-08 NOTE — DISCHARGE SUMMARY
[facility] [address] [place] [address] Medicine  Discharge Summary      Patient Name: Amirah Davey  MRN: [id number]  BHANU: [id number]  Patient Class: OP- Observation  Admission Date: 6/6/2024  Hospital Length of Stay: 0 days  Discharge Date and Time: 6/7/2024  6:17 PM  Attending Physician: Jessica att. providers found   Discharging Provider: Maranda Diamond PA-C  Primary Care Provider: Ivana Templeton MD  [facility] Medicine Team: [facility] Y Maranda Diamond PA-C  Primary Care Team: [facility] MED Y    HPI:   Ms. Amirah Davey is a 88-year-old female, history of AFib on anticoagulation, hypertension, status post right MCA stroke about a month ago, who presents for peg tube malfunction. Pt reports peg tube dislodgement this am from being jostled around during transfers at NH. She has mild generalized associated abd pain but denies n/v/d, f/c. Otherwise Pt reports otherwise being in her typical state of health s/p MCA. Of note, it seems that is post stroke care was complicated by difficulty placing NG tube that resulted in possible perforation and subsequent pneumoperitoneum. Patient ultimately had a PEG tube placed on May 29th, about a week ago, then discharged to a rehab facility on May 31st. Denies any chest pain, SOB, lightheadedness, syncope.    In ED, Pt AFVSS on RA. No leukocytosis. No significant electrolyte abnormalities. LA wnl. UA negative. CT a/p: Nonspecific air/fluid collection in the anterior abdominal wall which may communicate with the skin surface and anterior wall of the stomach. This could represent abscess or other fluid collection within a prior gastrostomy tube tract, though correlation with additional clinical history will be needed in this patient with nonlocalized abdominal pain. Probable adjacent cellulitis in the anterior abdominal wall. Pt given 1 L NS bolus and general surgery consulted.    * No surgery found *      Hospital Course:   Amirah Davey is a 88 y.o. F who was admitted to  for  further evaluation of displaced PEG tube. Seen by general surgery in ED who placed mccarthy tube temporarily and planned to perform exchange the following day. Exchange performed without issues by surgery team. Patient's abdominal pain on admission, now resolved.  Patient medically ready for discharge. Plan to follow up with PCP, general surgery. Return precautions provided. Patient was seen and assessed on day of discharge. Plan of care discussed with patient, patient agreeable with plan, and all questions answered.    Physical Exam  Gen: in NAD, appears stated age  Neuro: mental status at baseline, motor, sensory, and strength grossly intact BL  HEENT: EOMI, PERRLA; no JVD appreciated  CVS: RRR, no m/r/g  Resp: lungs CTAB, no w/r/r; no belabored breathing or accessory muscle use appreciated   Abd: NTND, soft to palpation - PEG tube in place  Extrem: no UE or LE edema BL         Goals of Care Treatment Preferences:  Code Status: Full Code      Consults:   Consults (From admission, onward)          Status Ordering Provider     Inpatient consult to General Surgery  Once        Provider:  (Not yet assigned)    [facility] new Assessment & Plan notes have been filed under this hospital service since the last note was generated.  Service: Hospital Medicine    Final Active Diagnoses:    Diagnosis Date Noted POA    PRINCIPAL PROBLEM:  PEG tube malfunction [K94.23] 06/06/2024 Yes    Chronic anticoagulation [Z79.01] 05/24/2024 Not Applicable    Cerebrovascular accident (CVA) due to embolism of right middle cerebral artery [I63.411] 05/23/2024 Yes    Hemiplegia [G81.90] 05/23/2024 Yes    Dysphagia due to recent stroke [I69.391] 05/23/2024 Not Applicable    Dysarthria due to acute stroke [I63.9, R47.1] 05/23/2024 Yes    Chronic a-fib [I48.20]  Yes    Hypertension, benign [I10]  Yes      Problems Resolved During this Admission:       Discharged Condition: good    Disposition: Rehab  Facility    Follow Up:   Follow-up Information       Ivana Templeton MD. Schedule an appointment as soon as possible for a visit in 1 week(s).    Specialty: Family Medicine  Contact information:  [address] [facility] [address]  [address]  [phone]                           Patient Instructions:      Ambulatory referral/consult to General Surgery   Standing Status: Future   Referral Priority: Routine Referral Type: Consultation   Referral Reason: Specialty Services Required   Requested Specialty: General Surgery   Number of Visits Requested: 1     Notify your health care provider if you experience any of the following:  temperature >100.4     Notify your health care provider if you experience any of the following:  severe uncontrolled pain     Notify your health care provider if you experience any of the following:  redness, tenderness, or signs of infection (pain, swelling, redness, odor or green/yellow discharge around incision site)     Activity as tolerated       Significant Diagnostic Studies: N/A    Pending Diagnostic Studies:       None           Medications:  Reconciled Home Medications:      Medication List        CONTINUE taking these medications      acetaminophen 500 MG tablet  Commonly known as: TYLENOL  2 tablets (1,000 mg total) by Per G Tube route every 8 (eight) hours as needed for Pain.     amLODIPine 5 MG tablet  Commonly known as: NORVASC  1 tablet (5 mg total) by Per G Tube route once daily.     apixaban 5 mg Tab  Commonly known as: ELIQUIS  1 tablet (5 mg total) by Per G Tube route 2 (two) times daily.     atorvastatin 40 MG tablet  Commonly known as: LIPITOR  1 tablet (40 mg total) by Per G Tube route once daily.     chlorhexidine 0.12 % solution  Commonly known as: PERIDEX  Swish & spit: 15 ml by mouth once daily.     famotidine 40 MG tablet  Commonly known as: PEPCID  1 tablet (40 mg total) by Per G Tube route once daily.     GLUCERNA 1.5 SHELLEY ORAL  Feed per tube at 55 ml/hr  continuously     hydroCHLOROthiazide 12.5 mg capsule  Commonly known as: MICROZIDE  2 capsules (25 mg total) by Per G Tube route once daily. DO NOT TAKE UNTIL FOLLOW UP WITH PRIMARY CARE. Rehab can add back slowly if BP is elevated. Would start with lisinopril first     LIDOcaine 5 %  Commonly known as: LIDODERM  Place 1 patch onto the skin once daily. Remove & Discard patch within 12 hours or as directed by MD     lisinopriL 20 MG tablet  Commonly known as: PRINIVIL,ZESTRIL  1 tablet (20 mg total) by Per G Tube route once daily. DO NOT TAKE UNTIL YOU SEE YOUR PRIMARY DOCTOR. Rehab can add back if BP becomes elevated. Would start with this one over HCTZ     nystatin 100,000 unit/mL suspension  Commonly known as: MYCOSTATIN  Take 5 mLs (500,000 Units total) by mouth 4 (four) times daily. for 10 days              Indwelling Lines/Drains at time of discharge:   Lines/Drains/Airways       Drain  Duration                  Gastrostomy/Enterostomy 05/29/24 1440 Percutaneous endoscopic gastrostomy (PEG) LUQ 9 days                    Time spent on the discharge of patient: 36 minutes         Maranda Diamond PA-C  Department of Hospital Medicine  [facility]

## 2024-06-10 ENCOUNTER — TELEPHONE (OUTPATIENT)
Dept: SURGERY | Facility: CLINIC | Age: 89
End: 2024-06-10
Payer: MEDICARE

## 2024-06-10 NOTE — TELEPHONE ENCOUNTER
Left message on patient's voicemail that call was returned.  Phone number given to call for any questions or concerns.

## 2024-06-10 NOTE — PLAN OF CARE
[facility]  Discharge Final Note    Primary Care Provider: Ivana Templeton MD    Expected Discharge Date: 6/7/2024    Patient discharged to [facility] via ambulance transportation.     Patient's bedside nurse and patient/family notified of the above.    Discharge Plan A and Plan B have been determined by review of patient's clinical status, future medical and therapeutic needs, and coverage/benefits for post-acute care in coordination with multidisciplinary team members.        Final Discharge Note (most recent)       Final Note - 06/10/24 1022          Final Note    Assessment Type Final Discharge Note (P)      Anticipated Discharge Disposition Rehab Facility (P)         Post-Acute Status    Post-Acute Authorization Placement (P)      Post-Acute Placement Status Set-up Complete/Auth obtained (P)                      Important Message from Medicare             Contact Info       Ivana Templeton MD   Specialty: Family Medicine   Relationship: PCP - General    [address] [place] [address]  [address] [place], [place] [id number]   Phone: [phone]       Next Steps: Schedule an appointment as soon as possible for a visit in 1 week(s)            No future appointments.    SW scheduled post-discharge follow-up appointment and information added to AVS.     Gail Landeros LMSW  [facility]  Ext. [phone]

## 2024-06-11 LAB
BACTERIA BLD CULT: NORMAL
BACTERIA BLD CULT: NORMAL

## 2024-06-12 ENCOUNTER — HOSPITAL ENCOUNTER (INPATIENT)
Facility: HOSPITAL | Age: 89
LOS: 7 days | Discharge: NURSING, REHAB OR LONG-TERM CARE | DRG: 394 | End: 2024-06-19
Attending: STUDENT IN AN ORGANIZED HEALTH CARE EDUCATION/TRAINING PROGRAM | Admitting: HOSPITALIST
Payer: MEDICARE

## 2024-06-12 DIAGNOSIS — K94.23 PEG TUBE MALFUNCTION: ICD-10-CM

## 2024-06-12 DIAGNOSIS — R07.9 CHEST PAIN: ICD-10-CM

## 2024-06-12 DIAGNOSIS — L02.211 ABDOMINAL WALL ABSCESS: Primary | ICD-10-CM

## 2024-06-12 PROBLEM — N17.9 AKI (ACUTE KIDNEY INJURY): Status: ACTIVE | Noted: 2024-06-12

## 2024-06-12 LAB
ALBUMIN SERPL BCP-MCNC: 3.1 G/DL (ref 3.5–5.2)
ALP SERPL-CCNC: 67 U/L (ref 55–135)
ALT SERPL W/O P-5'-P-CCNC: 17 U/L (ref 10–44)
ANION GAP SERPL CALC-SCNC: 12 MMOL/L (ref 8–16)
AST SERPL-CCNC: 23 U/L (ref 10–40)
BASOPHILS # BLD AUTO: 0.04 K/UL (ref 0–0.2)
BASOPHILS NFR BLD: 0.6 % (ref 0–1.9)
BILIRUB SERPL-MCNC: 0.5 MG/DL (ref 0.1–1)
BUN SERPL-MCNC: 39 MG/DL (ref 8–23)
CALCIUM SERPL-MCNC: 9.9 MG/DL (ref 8.7–10.5)
CHLORIDE SERPL-SCNC: 102 MMOL/L (ref 95–110)
CO2 SERPL-SCNC: 24 MMOL/L (ref 23–29)
CREAT SERPL-MCNC: 1.3 MG/DL (ref 0.5–1.4)
CRP SERPL-MCNC: 14.5 MG/L (ref 0–8.2)
DIFFERENTIAL METHOD BLD: ABNORMAL
EOSINOPHIL # BLD AUTO: 0.1 K/UL (ref 0–0.5)
EOSINOPHIL NFR BLD: 0.9 % (ref 0–8)
ERYTHROCYTE [DISTWIDTH] IN BLOOD BY AUTOMATED COUNT: 15.5 % (ref 11.5–14.5)
EST. GFR  (NO RACE VARIABLE): 39.6 ML/MIN/1.73 M^2
GLUCOSE SERPL-MCNC: 95 MG/DL (ref 70–110)
HCT VFR BLD AUTO: 38.4 % (ref 37–48.5)
HGB BLD-MCNC: 11.8 G/DL (ref 12–16)
IMM GRANULOCYTES # BLD AUTO: 0.05 K/UL (ref 0–0.04)
IMM GRANULOCYTES NFR BLD AUTO: 0.8 % (ref 0–0.5)
LYMPHOCYTES # BLD AUTO: 1.3 K/UL (ref 1–4.8)
LYMPHOCYTES NFR BLD: 19.2 % (ref 18–48)
MCH RBC QN AUTO: 24.5 PG (ref 27–31)
MCHC RBC AUTO-ENTMCNC: 30.7 G/DL (ref 32–36)
MCV RBC AUTO: 80 FL (ref 82–98)
MONOCYTES # BLD AUTO: 0.6 K/UL (ref 0.3–1)
MONOCYTES NFR BLD: 9 % (ref 4–15)
NEUTROPHILS # BLD AUTO: 4.6 K/UL (ref 1.8–7.7)
NEUTROPHILS NFR BLD: 69.5 % (ref 38–73)
NRBC BLD-RTO: 0 /100 WBC
PLATELET # BLD AUTO: 308 K/UL (ref 150–450)
PMV BLD AUTO: 11.8 FL (ref 9.2–12.9)
POCT GLUCOSE: 120 MG/DL (ref 70–110)
POTASSIUM SERPL-SCNC: 3.9 MMOL/L (ref 3.5–5.1)
PROT SERPL-MCNC: 7.7 G/DL (ref 6–8.4)
RBC # BLD AUTO: 4.81 M/UL (ref 4–5.4)
SODIUM SERPL-SCNC: 138 MMOL/L (ref 136–145)
WBC # BLD AUTO: 6.56 K/UL (ref 3.9–12.7)

## 2024-06-12 PROCEDURE — 63600175 PHARM REV CODE 636 W HCPCS: Performed by: HOSPITALIST

## 2024-06-12 PROCEDURE — 86140 C-REACTIVE PROTEIN: CPT | Performed by: PHYSICIAN ASSISTANT

## 2024-06-12 PROCEDURE — 25500020 PHARM REV CODE 255: Performed by: STUDENT IN AN ORGANIZED HEALTH CARE EDUCATION/TRAINING PROGRAM

## 2024-06-12 PROCEDURE — 12000002 HC ACUTE/MED SURGE SEMI-PRIVATE ROOM

## 2024-06-12 PROCEDURE — 63600175 PHARM REV CODE 636 W HCPCS: Performed by: PHYSICIAN ASSISTANT

## 2024-06-12 PROCEDURE — 99285 EMERGENCY DEPT VISIT HI MDM: CPT | Mod: 25

## 2024-06-12 PROCEDURE — 80053 COMPREHEN METABOLIC PANEL: CPT | Performed by: PHYSICIAN ASSISTANT

## 2024-06-12 PROCEDURE — 96374 THER/PROPH/DIAG INJ IV PUSH: CPT

## 2024-06-12 PROCEDURE — 25000003 PHARM REV CODE 250: Performed by: HOSPITALIST

## 2024-06-12 PROCEDURE — 85025 COMPLETE CBC W/AUTO DIFF WBC: CPT | Performed by: PHYSICIAN ASSISTANT

## 2024-06-12 RX ORDER — IBUPROFEN 200 MG
24 TABLET ORAL
Status: DISCONTINUED | OUTPATIENT
Start: 2024-06-12 | End: 2024-06-18

## 2024-06-12 RX ORDER — ONDANSETRON HYDROCHLORIDE 2 MG/ML
4 INJECTION, SOLUTION INTRAVENOUS
Status: COMPLETED | OUTPATIENT
Start: 2024-06-12 | End: 2024-06-12

## 2024-06-12 RX ORDER — BISACODYL 10 MG/1
10 SUPPOSITORY RECTAL DAILY PRN
Status: DISCONTINUED | OUTPATIENT
Start: 2024-06-12 | End: 2024-06-19 | Stop reason: HOSPADM

## 2024-06-12 RX ORDER — ACETAMINOPHEN 325 MG/1
650 TABLET ORAL EVERY 4 HOURS PRN
Status: DISCONTINUED | OUTPATIENT
Start: 2024-06-12 | End: 2024-06-16

## 2024-06-12 RX ORDER — SULFAMETHOXAZOLE AND TRIMETHOPRIM 800; 160 MG/1; MG/1
1 TABLET ORAL
Status: DISCONTINUED | OUTPATIENT
Start: 2024-06-12 | End: 2024-06-12

## 2024-06-12 RX ORDER — SODIUM CHLORIDE 9 MG/ML
INJECTION, SOLUTION INTRAVENOUS CONTINUOUS
Status: DISCONTINUED | OUTPATIENT
Start: 2024-06-13 | End: 2024-06-13

## 2024-06-12 RX ORDER — IPRATROPIUM BROMIDE AND ALBUTEROL SULFATE 2.5; .5 MG/3ML; MG/3ML
3 SOLUTION RESPIRATORY (INHALATION) EVERY 4 HOURS PRN
Status: DISCONTINUED | OUTPATIENT
Start: 2024-06-12 | End: 2024-06-19 | Stop reason: HOSPADM

## 2024-06-12 RX ORDER — GLUCAGON 1 MG
1 KIT INJECTION
Status: DISCONTINUED | OUTPATIENT
Start: 2024-06-12 | End: 2024-06-19 | Stop reason: HOSPADM

## 2024-06-12 RX ORDER — TALC
6 POWDER (GRAM) TOPICAL NIGHTLY PRN
Status: DISCONTINUED | OUTPATIENT
Start: 2024-06-12 | End: 2024-06-18

## 2024-06-12 RX ORDER — POLYETHYLENE GLYCOL 3350 17 G/17G
17 POWDER, FOR SOLUTION ORAL DAILY PRN
Status: DISCONTINUED | OUTPATIENT
Start: 2024-06-12 | End: 2024-06-18

## 2024-06-12 RX ORDER — IBUPROFEN 200 MG
16 TABLET ORAL
Status: DISCONTINUED | OUTPATIENT
Start: 2024-06-12 | End: 2024-06-18

## 2024-06-12 RX ADMIN — ONDANSETRON 4 MG: 2 INJECTION INTRAMUSCULAR; INTRAVENOUS at 06:06

## 2024-06-12 RX ADMIN — VANCOMYCIN HYDROCHLORIDE 1250 MG: 1.25 INJECTION, POWDER, LYOPHILIZED, FOR SOLUTION INTRAVENOUS at 09:06

## 2024-06-12 RX ADMIN — IOHEXOL 75 ML: 350 INJECTION, SOLUTION INTRAVENOUS at 06:06

## 2024-06-12 NOTE — ED NOTES
I-STAT Chem-8+ Results:   Value Reference Range   Sodium 137 136-145 mmol/L   Potassium  3.7 3.5-5.1 mmol/L   Chloride 101  mmol/L   Ionized Calcium 1.11 1.06-1.42 mmol/L   CO2 (measured) 24 23-29 mmol/L   Glucose 99  mg/dL   BUN 35 6-30 mg/dL   Creatinine 1.3 0.5-1.4 mg/dL   Hematocrit 38 36-54%

## 2024-06-12 NOTE — ED PROVIDER NOTES
Encounter Date: 6/12/2024       History     Chief Complaint   Patient presents with    PEG tube problem     Peg tube leaking       88-year-old female with past medical history of atrial fibrillation, CVA ( left-sided hemiparesis), hypertension who presents emergency department for PEG tube issue.  Reports drainage from around the PEG tube.  Patient complains of pain around the PEG tube site due to nursing home staff manipulating it this morning.   Also reports mild nausea.  Denies any other complaints.  Recently admitted for PEG tube exchange by General surgery.    The history is provided by the patient.     Review of patient's allergies indicates:  No Known Allergies  Past Medical History:   Diagnosis Date    A-fib     Cerebrovascular accident (CVA) due to embolism of right middle cerebral artery 05/23/2024    Current use of long term anticoagulation     on Xarelto    Dysphagia due to recent stroke 05/23/2024    Hypertension     Stroke 03/20/2013     Past Surgical History:   Procedure Laterality Date    CARDIAC PACEMAKER PLACEMENT  09/25/2019    [employer] Model number [id number]  Serial number [id number] device MRI conditional for 1.5 Estela magnets    CATARACT EXTRACTION W/  INTRAOCULAR LENS IMPLANT Right 05/01/2017    Dr. Case    CATARACT EXTRACTION W/  INTRAOCULAR LENS IMPLANT Left 05/29/2017    Dr. Case    ESOPHAGOGASTRODUODENOSCOPY W/ PEG N/A 5/29/2024    Procedure: EGD, WITH PEG TUBE INSERTION;  Surgeon: Imer Minaya MD;  Location: [facility] OR [address] [place] [address];  Service: General;  Laterality: N/A;    EYE SURGERY      HYSTERECTOMY      INSERTION, PEG TUBE N/A 5/27/2024    Procedure: INSERTION, PEG TUBE;  Surgeon: Ivette Love MD;  Location: [facility] ([address] [place] [address]);  Service: Endoscopy;  Laterality: N/A;     No family history on file.  Social History     Tobacco Use    Smoking status: Never   Substance Use Topics    Alcohol use: No     Review of Systems   Constitutional:  Negative for chills and fever.   HENT:   Negative for sore throat.    Respiratory:  Negative for cough and shortness of breath.    Cardiovascular:  Negative for chest pain.   Gastrointestinal:  Positive for abdominal pain.   Genitourinary:  Negative for difficulty urinating and dysuria.       Physical Exam     Initial Vitals [06/12/24 1529]   BP Pulse Resp Temp SpO2   122/67 68 18 97.8 °F (36.6 °C) 98 %      MAP       --         Physical Exam    Nursing note and vitals reviewed.  Constitutional: She appears well-developed and well-nourished.     Chronically ill-appearing   HENT:   Head: Normocephalic and atraumatic.   Eyes: Conjunctivae are normal. Pupils are equal, round, and reactive to light.   Neck: Neck supple.   Normal range of motion.  Cardiovascular:  Normal rate.           Pulmonary/Chest: Breath sounds normal.   Abdominal: Abdomen is soft. There is no abdominal tenderness.    There is small amount of light brown drainage around the PEG tube site.  No erythema.  No fluctuance or drainable lesions noted.  Sutures are in place.   Musculoskeletal:      Cervical back: Normal range of motion and neck supple.     Neurological: She is alert. GCS score is 15. GCS eye subscore is 4. GCS verbal subscore is 5. GCS motor subscore is 6.   Skin: Skin is warm and dry. Capillary refill takes less than 2 seconds.         ED Course   Procedures    Labs Reviewed   COMPREHENSIVE METABOLIC PANEL - Abnormal; Notable for the following components:       Result Value    BUN 39 (*)     Albumin 3.1 (*)     eGFR 39.6 (*)     All other components within normal limits   CBC W/ AUTO DIFFERENTIAL - Abnormal; Notable for the following components:    Hemoglobin 11.8 (*)     MCV 80 (*)     MCH 24.5 (*)     MCHC 30.7 (*)     RDW 15.5 (*)     Immature Granulocytes 0.8 (*)     Immature Grans (Abs) 0.05 (*)     All other components within normal limits   C-REACTIVE PROTEIN - Abnormal; Notable for the following components:    CRP 14.5 (*)     All other components within normal limits    ISTAT CHEM8          Imaging Results               CT Abdomen Pelvis With IV Contrast NO Oral Contrast (Final result)  Result time 06/12/24 19:25:17      Final result by Ady Poole MD (06/12/24 19:25:17)                   Impression:      1. Small fluid collection in the subcutaneous tissues adjacent to the anterior abdominal wall in the upper abdomen measuring 1.3 x 1.7 cm suspicious for a small superficial abscess, likely along the tract of a prior gastrostomy tube.  Mild wall thickening, enhancement and mild adjacent stranding. There is percutaneous tubing present.  The tip appears to be superficial to the collection near the skin surface. Recommend clinical correlation and follow-up.  2. Cholelithiasis.  3. Hepatic steatosis.  4. Diverticulosis of the colon.  5. Cardiomegaly.  See above comments.  6. Stable small 3 mm pulmonary nodule right middle lobe.  7. This report was flagged in Epic as abnormal.      Electronically signed by: Ady Poole  Date:    06/12/2024  Time:    19:25               Narrative:    EXAMINATION:  CT ABDOMEN PELVIS WITH IV CONTRAST    CLINICAL HISTORY:  Abdominal abscess/infection suspected;    TECHNIQUE:  Low dose axial images, sagittal and coronal reformations were obtained from the lung bases to the pubic symphysis following the IV administration of 75 mL of Omnipaque 350 .  Oral contrast was not administered.    COMPARISON:  06/06/2024    FINDINGS:  Abdomen:    - Lower thorax:The heart is enlarged, particularly the atria right greater than left..    - Lung bases: Small 3 mm pulmonary nodule in the right middle lobe on axial 8 of series 2.  No significant change.    For a solid nodule <6 mm, Fleischner Society 2017 guidelines recommend no routine follow up for a low risk patient, or follow-up with non-contrast chest CT at 12 months in a high risk patient.    Small hiatal hernia.    - Liver: No focal mass.  Mild diffuse fatty infiltration.    - Gallbladder: Multiple  gallstones.  No evidence of acute cholecystitis.    - Bile Ducts: No evidence of intra or extra hepatic biliary ductal dilation.    - Spleen: Negative.    - Kidneys: No stone, mass or hydronephrosis.    - Adrenals: Unremarkable.    - Pancreas: No mass or peripancreatic fat stranding.    - Retroperitoneum:  No significant adenopathy.    - Vascular: No abdominal aortic aneurysm.    - Abdominal wall:  Small fluid collection in the subcutaneous tissues adjacent to the anterior abdominal wall in the upper abdomen measuring 1.3 x 1.7 cm suspicious for a small superficial abscess, likely along the tract of a prior gastrostomy tube.  Mild wall thickening, enhancement and mild adjacent stranding.  There is percutaneous tubing present.  The tip appears to be superficial to the collection near the skin surface.  Recommend clinical correlation and follow-up.    Pelvis:    Urinary bladder is within normal limits incompletely distended.    The appendix is within normal limits.    Status post hysterectomy.    Diverticulosis throughout the colon.  No evidence of acute diverticulitis.    Bowel/Mesentery:    No evidence of bowel obstruction or inflammation.    Bones:  No acute osseous abnormality and no suspicious lytic or blastic lesion.                                       Medications   ondansetron injection 4 mg (4 mg Intravenous Given 6/12/24 1824)   iohexoL (OMNIPAQUE 350) injection 75 mL (75 mLs Intravenous Given 6/12/24 1847)     Medical Decision Making  88-year-old female presents ED for drainage around her PEG tube site.      Differential includes but not limited to PEG tube malfunction, displaced PEG tube, abdominal abscess    On exam there is small amount of drainage.  PEG tube appeared to be in place however CT scan showed that it was displaced and there was also a small abdominal abscess.  This was newly placed 2 weeks ago and recently replaced a few days ago and given the fresh tract surgery was consulted.  They  removed the PEG tube and abdominal abscess was packed.  Will start on antibiotics and admit to medicine please schedule PEG tube replacement.    Amount and/or Complexity of Data Reviewed  Labs: ordered.  Radiology: ordered.    Risk  Prescription drug management.  Decision regarding hospitalization.                                      Clinical Impression:  Final diagnoses:  [L02.211] Abdominal wall abscess (Primary)  [K94.23] PEG tube malfunction          ED Disposition Condition    Admit Stable                Alfred Soares PA-C  06/12/24 2055       Alfred Soares PA-C  06/12/24 2105

## 2024-06-12 NOTE — Clinical Note
Diagnosis: Abdominal wall abscess [242991]   Future Attending Provider: MARTY PAUL [[id number]]   Reason for IP Medical Treatment  (Clinical interventions that can only be accomplished in the IP setting? ) :: Displaced PEG tube, abdominal wall abscess   I certify that Inpatient services for greater than or equal to 2 midnights are medically necessary:: No   Plans for Post-Acute care--if anticipated (pick the single best option):: A. No post acute care anticipated at this time

## 2024-06-13 LAB
ANION GAP SERPL CALC-SCNC: 13 MMOL/L (ref 8–16)
APTT PPP: 32.5 SEC (ref 21–32)
APTT PPP: 62.7 SEC (ref 21–32)
APTT PPP: 66.9 SEC (ref 21–32)
APTT PPP: 70.4 SEC (ref 21–32)
BASOPHILS # BLD AUTO: 0.03 K/UL (ref 0–0.2)
BASOPHILS NFR BLD: 0.5 % (ref 0–1.9)
BILIRUB UR QL STRIP: NEGATIVE
BUN SERPL-MCNC: 34 MG/DL (ref 8–23)
BUN SERPL-MCNC: 35 MG/DL (ref 6–30)
CALCIUM SERPL-MCNC: 9.4 MG/DL (ref 8.7–10.5)
CHLORIDE SERPL-SCNC: 101 MMOL/L (ref 95–110)
CHLORIDE SERPL-SCNC: 101 MMOL/L (ref 95–110)
CLARITY UR REFRACT.AUTO: CLEAR
CO2 SERPL-SCNC: 21 MMOL/L (ref 23–29)
COLOR UR AUTO: YELLOW
CREAT SERPL-MCNC: 1.2 MG/DL (ref 0.5–1.4)
CREAT SERPL-MCNC: 1.3 MG/DL (ref 0.5–1.4)
DIFFERENTIAL METHOD BLD: ABNORMAL
EOSINOPHIL # BLD AUTO: 0.1 K/UL (ref 0–0.5)
EOSINOPHIL NFR BLD: 1.2 % (ref 0–8)
ERYTHROCYTE [DISTWIDTH] IN BLOOD BY AUTOMATED COUNT: 15.5 % (ref 11.5–14.5)
EST. GFR  (NO RACE VARIABLE): 43.5 ML/MIN/1.73 M^2
GLUCOSE SERPL-MCNC: 100 MG/DL (ref 70–110)
GLUCOSE SERPL-MCNC: 99 MG/DL (ref 70–110)
GLUCOSE UR QL STRIP: NEGATIVE
HCT VFR BLD AUTO: 35.7 % (ref 37–48.5)
HCT VFR BLD CALC: 38 %PCV (ref 36–54)
HGB BLD-MCNC: 10.9 G/DL (ref 12–16)
HGB UR QL STRIP: NEGATIVE
IMM GRANULOCYTES # BLD AUTO: 0.04 K/UL (ref 0–0.04)
IMM GRANULOCYTES NFR BLD AUTO: 0.6 % (ref 0–0.5)
INR PPP: 1.1 (ref 0.8–1.2)
KETONES UR QL STRIP: ABNORMAL
LEUKOCYTE ESTERASE UR QL STRIP: NEGATIVE
LYMPHOCYTES # BLD AUTO: 0.6 K/UL (ref 1–4.8)
LYMPHOCYTES NFR BLD: 9.4 % (ref 18–48)
MAGNESIUM SERPL-MCNC: 2.2 MG/DL (ref 1.6–2.6)
MCH RBC QN AUTO: 24.7 PG (ref 27–31)
MCHC RBC AUTO-ENTMCNC: 30.5 G/DL (ref 32–36)
MCV RBC AUTO: 81 FL (ref 82–98)
MONOCYTES # BLD AUTO: 0.3 K/UL (ref 0.3–1)
MONOCYTES NFR BLD: 4 % (ref 4–15)
NEUTROPHILS # BLD AUTO: 5.5 K/UL (ref 1.8–7.7)
NEUTROPHILS NFR BLD: 84.3 % (ref 38–73)
NITRITE UR QL STRIP: NEGATIVE
NRBC BLD-RTO: 0 /100 WBC
PH UR STRIP: 7 [PH] (ref 5–8)
PHOSPHATE SERPL-MCNC: 4.2 MG/DL (ref 2.7–4.5)
PLATELET # BLD AUTO: 261 K/UL (ref 150–450)
PMV BLD AUTO: 11.3 FL (ref 9.2–12.9)
POC IONIZED CALCIUM: 1.11 MMOL/L (ref 1.06–1.42)
POC TCO2 (MEASURED): 24 MMOL/L (ref 23–29)
POCT GLUCOSE: 103 MG/DL (ref 70–110)
POCT GLUCOSE: 110 MG/DL (ref 70–110)
POCT GLUCOSE: 112 MG/DL (ref 70–110)
POTASSIUM BLD-SCNC: 3.7 MMOL/L (ref 3.5–5.1)
POTASSIUM SERPL-SCNC: 4 MMOL/L (ref 3.5–5.1)
PROT UR QL STRIP: NEGATIVE
PROTHROMBIN TIME: 12.3 SEC (ref 9–12.5)
RBC # BLD AUTO: 4.41 M/UL (ref 4–5.4)
SAMPLE: ABNORMAL
SODIUM BLD-SCNC: 137 MMOL/L (ref 136–145)
SODIUM SERPL-SCNC: 135 MMOL/L (ref 136–145)
SP GR UR STRIP: 1.02 (ref 1–1.03)
URN SPEC COLLECT METH UR: ABNORMAL
VANCOMYCIN SERPL-MCNC: 11.3 UG/ML
WBC # BLD AUTO: 6.47 K/UL (ref 3.9–12.7)

## 2024-06-13 PROCEDURE — 84100 ASSAY OF PHOSPHORUS: CPT | Performed by: PHYSICIAN ASSISTANT

## 2024-06-13 PROCEDURE — 25000003 PHARM REV CODE 250: Performed by: INTERNAL MEDICINE

## 2024-06-13 PROCEDURE — 80048 BASIC METABOLIC PNL TOTAL CA: CPT | Performed by: PHYSICIAN ASSISTANT

## 2024-06-13 PROCEDURE — 85730 THROMBOPLASTIN TIME PARTIAL: CPT | Performed by: PHYSICIAN ASSISTANT

## 2024-06-13 PROCEDURE — 36415 COLL VENOUS BLD VENIPUNCTURE: CPT | Mod: XB | Performed by: INTERNAL MEDICINE

## 2024-06-13 PROCEDURE — 85730 THROMBOPLASTIN TIME PARTIAL: CPT | Mod: 91 | Performed by: INTERNAL MEDICINE

## 2024-06-13 PROCEDURE — 36415 COLL VENOUS BLD VENIPUNCTURE: CPT | Mod: XB | Performed by: HOSPITALIST

## 2024-06-13 PROCEDURE — 85610 PROTHROMBIN TIME: CPT | Performed by: PHYSICIAN ASSISTANT

## 2024-06-13 PROCEDURE — 25000003 PHARM REV CODE 250: Performed by: PHYSICIAN ASSISTANT

## 2024-06-13 PROCEDURE — 85025 COMPLETE CBC W/AUTO DIFF WBC: CPT | Performed by: PHYSICIAN ASSISTANT

## 2024-06-13 PROCEDURE — 83735 ASSAY OF MAGNESIUM: CPT | Performed by: PHYSICIAN ASSISTANT

## 2024-06-13 PROCEDURE — 21400001 HC TELEMETRY ROOM

## 2024-06-13 PROCEDURE — 36415 COLL VENOUS BLD VENIPUNCTURE: CPT | Performed by: PHYSICIAN ASSISTANT

## 2024-06-13 PROCEDURE — 63600175 PHARM REV CODE 636 W HCPCS: Performed by: PHYSICIAN ASSISTANT

## 2024-06-13 PROCEDURE — 80202 ASSAY OF VANCOMYCIN: CPT | Performed by: HOSPITALIST

## 2024-06-13 PROCEDURE — 63600175 PHARM REV CODE 636 W HCPCS: Performed by: INTERNAL MEDICINE

## 2024-06-13 PROCEDURE — 81003 URINALYSIS AUTO W/O SCOPE: CPT | Performed by: PHYSICIAN ASSISTANT

## 2024-06-13 RX ORDER — ONDANSETRON HYDROCHLORIDE 2 MG/ML
4 INJECTION, SOLUTION INTRAVENOUS EVERY 6 HOURS PRN
Status: DISCONTINUED | OUTPATIENT
Start: 2024-06-13 | End: 2024-06-19 | Stop reason: HOSPADM

## 2024-06-13 RX ORDER — HEPARIN SODIUM,PORCINE/D5W 25000/250
0-40 INTRAVENOUS SOLUTION INTRAVENOUS CONTINUOUS
Status: DISPENSED | OUTPATIENT
Start: 2024-06-13 | End: 2024-06-14

## 2024-06-13 RX ORDER — BACLOFEN 5 MG/1
5 TABLET ORAL 3 TIMES DAILY
Status: DISCONTINUED | OUTPATIENT
Start: 2024-06-13 | End: 2024-06-18

## 2024-06-13 RX ORDER — SODIUM CHLORIDE 9 MG/ML
INJECTION, SOLUTION INTRAVENOUS CONTINUOUS
Status: ACTIVE | OUTPATIENT
Start: 2024-06-13 | End: 2024-06-13

## 2024-06-13 RX ADMIN — SODIUM CHLORIDE: 9 INJECTION, SOLUTION INTRAVENOUS at 05:06

## 2024-06-13 RX ADMIN — VANCOMYCIN HYDROCHLORIDE 1000 MG: 1 INJECTION, POWDER, LYOPHILIZED, FOR SOLUTION INTRAVENOUS at 08:06

## 2024-06-13 RX ADMIN — ACETAMINOPHEN 650 MG: 325 TABLET ORAL at 08:06

## 2024-06-13 RX ADMIN — BACLOFEN 5 MG: 5 TABLET ORAL at 08:06

## 2024-06-13 RX ADMIN — HEPARIN SODIUM AND DEXTROSE 12 UNITS/KG/HR: 10000; 5 INJECTION INTRAVENOUS at 01:06

## 2024-06-13 RX ADMIN — HEPARIN SODIUM AND DEXTROSE 10 UNITS/KG/HR: 10000; 5 INJECTION INTRAVENOUS at 10:06

## 2024-06-13 RX ADMIN — ONDANSETRON 4 MG: 2 INJECTION INTRAMUSCULAR; INTRAVENOUS at 12:06

## 2024-06-13 RX ADMIN — Medication 6 MG: at 08:06

## 2024-06-13 RX ADMIN — SODIUM CHLORIDE: 9 INJECTION, SOLUTION INTRAVENOUS at 11:06

## 2024-06-13 RX ADMIN — BACLOFEN 5 MG: 5 TABLET ORAL at 02:06

## 2024-06-13 RX ADMIN — SODIUM CHLORIDE: 9 INJECTION, SOLUTION INTRAVENOUS at 01:06

## 2024-06-13 NOTE — CONSULTS
[facility] Emergency Dept  General Surgery  Consult Note    Patient Name: Amirah Davey  MRN: [id number]  Code Status: Prior  Admission Date: 6/12/2024  Hospital Length of Stay: 0 days  Attending Physician: Isac Cortez MD  Primary Care Provider: Ivana Templeton MD    Patient information was obtained from past medical records and ER records.     Inpatient consult to General surgery  Consult performed by: Escobar Davis MD  Consult ordered by: Alfred Soares PA-C  Reason for consult: Peg tube malfunction        Subjective:     Principal Problem: <principal problem not specified>    History of Present Illness: 88-year-old female with a past medical history of hypertension, obesity, AFib, and recent MCA stroke with subsequent hemiparesis and dysphagia requiring PEG tube placement who presents to the ED with worsening discharge from around her PEG tube.  Patient has a limited understanding of her medical condition so history was difficult to obtain. In the ED her labwork was fairly unremarkable. A CT scan of the abdomen and pelvis was performed revealing only the tip of the gastrostomy tube within the subcutaneous tissues of the abdominal wall.  There was associated small fluid collection in the surrounding space and the stomach no longer appeared to be adjacent abdominal wall.  General surgery was consulted for evaluation.    No current facility-administered medications on file prior to encounter.     Current Outpatient Medications on File Prior to Encounter   Medication Sig    acetaminophen (TYLENOL) 500 MG tablet 2 tablets (1,000 mg total) by Per G Tube route every 8 (eight) hours as needed for Pain. (Patient taking differently: 1,000 mg by Per G Tube route every 6 (six) hours as needed for Pain.)    amLODIPine (NORVASC) 5 MG tablet 1 tablet (5 mg total) by Per G Tube route once daily.    apixaban (ELIQUIS) 5 mg Tab 1 tablet (5 mg total) by Per G Tube route 2 (two) times daily.    atorvastatin (LIPITOR) 40 MG  tablet 1 tablet (40 mg total) by Per G Tube route once daily.    chlorhexidine (PERIDEX) 0.12 % solution Swish & spit: 15 ml by mouth once daily.    famotidine (PEPCID) 40 MG tablet 1 tablet (40 mg total) by Per G Tube route once daily.    hydroCHLOROthiazide (MICROZIDE) 12.5 mg capsule 2 capsules (25 mg total) by Per G Tube route once daily. DO NOT TAKE UNTIL FOLLOW UP WITH PRIMARY CARE. Rehab can add back slowly if BP is elevated. Would start with lisinopril first    LIDOcaine (LIDODERM) 5 % Place 1 patch onto the skin once daily. Remove & Discard patch within 12 hours or as directed by MD    lisinopriL (PRINIVIL,ZESTRIL) 20 MG tablet 1 tablet (20 mg total) by Per G Tube route once daily. DO NOT TAKE UNTIL YOU SEE YOUR PRIMARY DOCTOR. Rehab can add back if BP becomes elevated. Would start with this one over HCTZ    nut.tx.gluc intol,lf,soy/fiber (GLUCERNA 1.5 SHELLEY ORAL) Feed per tube at 55 ml/hr continuously       Review of patient's allergies indicates:  No Known Allergies    Past Medical History:   Diagnosis Date    A-fib     Cerebrovascular accident (CVA) due to embolism of right middle cerebral artery 05/23/2024    Current use of long term anticoagulation     on Xarelto    Dysphagia due to recent stroke 05/23/2024    Hypertension     Stroke 03/20/2013     Past Surgical History:   Procedure Laterality Date    CARDIAC PACEMAKER PLACEMENT  09/25/2019    Medtronic Model number [id number]  Serial number [id number] device MRI conditional for 1.5 Estela magnets    CATARACT EXTRACTION W/  INTRAOCULAR LENS IMPLANT Right 05/01/2017    Dr. Case    CATARACT EXTRACTION W/  INTRAOCULAR LENS IMPLANT Left 05/29/2017    Dr. Case    ESOPHAGOGASTRODUODENOSCOPY W/ PEG N/A 5/29/2024    Procedure: EGD, WITH PEG TUBE INSERTION;  Surgeon: Imer Minaya MD;  Location: [facility] OR [address] [place] [address];  Service: General;  Laterality: N/A;    EYE SURGERY      HYSTERECTOMY      INSERTION, PEG TUBE N/A 5/27/2024    Procedure: INSERTION, PEG TUBE;   Surgeon: Ivette Love MD;  Location: [facility] ([address] [place] [address]);  Service: Endoscopy;  Laterality: N/A;     Family History    None       Tobacco Use    Smoking status: Never    Smokeless tobacco: Not on file   Substance and Sexual Activity    Alcohol use: No    Drug use: Not on file    Sexual activity: Not on file     Review of Systems   Unable to perform ROS: Age     Objective:     Vital Signs (Most Recent):  Temp: 97.8 °F (36.6 °C) (06/12/24 1529)  Pulse: 68 (06/12/24 1529)  Resp: 18 (06/12/24 1529)  BP: 122/67 (06/12/24 1529)  SpO2: 98 % (06/12/24 1529) Vital Signs (24h Range):  Temp:  [97.8 °F (36.6 °C)] 97.8 °F (36.6 °C)  Pulse:  [68] 68  Resp:  [18] 18  SpO2:  [98 %] 98 %  BP: (122)/(67) 122/67     Weight: 68 kg (150 lb)  Body mass index is 30.3 kg/m².     Physical Exam  Vitals and nursing note reviewed.   Constitutional:       Appearance: She is obese.   HENT:      Head: Normocephalic.      Mouth/Throat:      Mouth: Mucous membranes are moist.      Pharynx: Oropharynx is clear.   Eyes:      General: No scleral icterus.     Extraocular Movements: Extraocular movements intact.      Conjunctiva/sclera: Conjunctivae normal.   Cardiovascular:      Rate and Rhythm: Normal rate.      Pulses: Normal pulses.   Pulmonary:      Effort: Pulmonary effort is normal. No respiratory distress.      Breath sounds: No wheezing.   Abdominal:      General: Abdomen is flat. Bowel sounds are normal. There is no distension.      Palpations: Abdomen is soft.      Tenderness: There is abdominal tenderness.      Comments: Nondistended.  Gastrostomy tube was sutures in place.  Once these were removed the tube was removed in whole.  There was approximately 1 cm of tubing within the subcutaneous tissues of the anterior abdominal wall.  There was associated surrounding abscess with mild purulent drainage.   Musculoskeletal:         General: No swelling or tenderness. Normal range of motion.      Cervical back: Normal range of motion.    Skin:     General: Skin is warm and dry.      Coloration: Skin is not jaundiced or pale.   Neurological:      General: No focal deficit present.      Mental Status: She is oriented to person, place, and time.   Psychiatric:         Mood and Affect: Mood normal.            I have reviewed all pertinent lab results within the past 24 hours.  CBC:   Recent Labs   Lab 06/12/24  1801   WBC 6.56   RBC 4.81   HGB 11.8*   HCT 38.4      MCV 80*   MCH 24.5*   MCHC 30.7*     CMP:   Recent Labs   Lab 06/12/24  1801   GLU 95   CALCIUM 9.9   ALBUMIN 3.1*   PROT 7.7      K 3.9   CO2 24      BUN 39*   CREATININE 1.3   ALKPHOS 67   ALT 17   AST 23   BILITOT 0.5       Significant Diagnostics:  I have reviewed all pertinent imaging results/findings within the past 24 hours.    Assessment/Plan:     PEG tube malfunction  88-year-old female with a past medical history of hypertension, obesity, AFib, and recent MCA stroke with subsequent hemiparesis and dysphagia requiring PEG tube placement who presents to the ED with worsening discharge from around her PEG tube.  Found to have dislodgement of her PEG tube now for the 2nd time and now with an associated abdominal wall abscess.    - tube was removed at bedside in the abscess cavity was probed.  There was mild amount of purulent drainage.  This was packed with a wet-to-dry dressing.  - patient would benefit from placement of a gastrostomy tube and possible formal abscess drainage in the operating room.  - we will discuss timing with staff  - recommend hospital medicine admission due to significant comorbidities  - general surgery will continue to follow along  - may benefit from broad-spectrum antibiotic coverage due to mild amount of surrounding cellulitis      VTE Risk Mitigation (From admission, onward)      None            Thank you for your consult. I will follow-up with patient. Please contact us if you have any additional questions.    Escobar Davis,  MD  General Surgery  [facility] - Emergency Dept

## 2024-06-13 NOTE — PLAN OF CARE
[facility] Surg  Discharge Assessment    Patient was d/c to [facility] on 6/7/24 and presented to [facility] ED on 6/12/24 for PEG tube issues. Patient lives w/her daughter in a SSH with 1 DEANA. Patient requires some assistance with ADLs and uses no DME for ambulation. Patient will have assistance from her daughter and/or EMS/facility staff upon return to [facility]. Will continue to follow for needs.    Primary Care Provider: Ivana Templeton MD     Discharge Assessment (most recent)       BRIEF DISCHARGE ASSESSMENT - 06/13/24 1410          Discharge Planning    Assessment Type Discharge Planning Brief Assessment     Resource/Environmental Concerns none     Support Systems Children     Equipment Currently Used at Home none     Current Living Arrangements home     Patient/Family Anticipates Transition to inpatient rehabilitation facility     Patient/Family Anticipated Services at Transition ;rehabilitation services     DME Needed Upon Discharge  other (see comments)   TBD    Discharge Plan A Rehab     Discharge Plan B Skilled Nursing Facility                      06/13/24 1415   Readmission   Was this a planned readmission? No   Why were you hospitalized in the last 30 days? PEG tube malfunction   Why were you readmitted? Related to previous admission   When you left the hospital where did you go? [facility]   Did patient/caregiver refused recommended DC plan? No   Tell me about what happened between when you left the hospital and the day you returned. Patient was d/c to [facility] on 6/7/24. She returned to [facility] ED after facility staff noted that there was leaking around the PEG tube site.   When did you start not feeling well? 6/12/24     Discharge Plan A and Plan B have been determined by review of patient's clinical status, future medical and therapeutic needs, and coverage/benefits for post-acute care in coordination with multidisciplinary team members.    JULIETTE Esparza,  LMSW    Case Management Department  [facility]

## 2024-06-13 NOTE — PLAN OF CARE
Problem: Adult Inpatient Plan of Care  Goal: Plan of Care Review  Outcome: Progressing  Goal: Patient-Specific Goal (Individualized)  Outcome: Progressing  Goal: Absence of Hospital-Acquired Illness or Injury  Outcome: Progressing  Goal: Optimal Comfort and Wellbeing  Outcome: Progressing  Goal: Readiness for Transition of Care  Outcome: Progressing     Problem: Infection  Goal: Absence of Infection Signs and Symptoms  Outcome: Progressing     Problem: Acute Kidney Injury/Impairment  Goal: Fluid and Electrolyte Balance  Outcome: Progressing  Goal: Improved Oral Intake  Outcome: Progressing  Goal: Effective Renal Function  Outcome: Progressing     Problem: Fall Injury Risk  Goal: Absence of Fall and Fall-Related Injury  Outcome: Progressing     Problem: Skin Injury Risk Increased  Goal: Skin Health and Integrity  Outcome: Progressing

## 2024-06-13 NOTE — SUBJECTIVE & OBJECTIVE
Past Medical History:   Diagnosis Date    A-fib     Cerebrovascular accident (CVA) due to embolism of right middle cerebral artery 05/23/2024    Current use of long term anticoagulation     on Xarelto    Dysphagia due to recent stroke 05/23/2024    Hypertension     Stroke 03/20/2013       Past Surgical History:   Procedure Laterality Date    CARDIAC PACEMAKER PLACEMENT  09/25/2019    Medtr[employer] Model number [id number]  Serial number [id number] device MRI conditional for 1.5 Estela magnets    CATARACT EXTRACTION W/  INTRAOCULAR LENS IMPLANT Right 05/01/2017    Dr. Case    CATARACT EXTRACTION W/  INTRAOCULAR LENS IMPLANT Left 05/29/2017    Dr. Case    ESOPHAGOGASTRODUODENOSCOPY W/ PEG N/A 5/29/2024    Procedure: EGD, WITH PEG TUBE INSERTION;  Surgeon: Imer Minaya MD;  Location: [facility] OR [address] [place] [address];  Service: General;  Laterality: N/A;    EYE SURGERY      HYSTERECTOMY      INSERTION, PEG TUBE N/A 5/27/2024    Procedure: INSERTION, PEG TUBE;  Surgeon: Ivette Love MD;  Location: [facility] ([address] [place] [address]);  Service: Endoscopy;  Laterality: N/A;       Review of patient's allergies indicates:  No Known Allergies    No current facility-administered medications on file prior to encounter.     Current Outpatient Medications on File Prior to Encounter   Medication Sig    acetaminophen (TYLENOL) 500 MG tablet 2 tablets (1,000 mg total) by Per G Tube route every 8 (eight) hours as needed for Pain. (Patient taking differently: 1,000 mg by Per G Tube route every 6 (six) hours as needed for Pain.)    amLODIPine (NORVASC) 5 MG tablet 1 tablet (5 mg total) by Per G Tube route once daily.    apixaban (ELIQUIS) 5 mg Tab 1 tablet (5 mg total) by Per G Tube route 2 (two) times daily.    atorvastatin (LIPITOR) 40 MG tablet 1 tablet (40 mg total) by Per G Tube route once daily.    chlorhexidine (PERIDEX) 0.12 % solution Swish & spit: 15 ml by mouth once daily.    famotidine (PEPCID) 40 MG tablet 1 tablet (40 mg total) by Per G Tube route  once daily.    hydroCHLOROthiazide (MICROZIDE) 12.5 mg capsule 2 capsules (25 mg total) by Per G Tube route once daily. DO NOT TAKE UNTIL FOLLOW UP WITH PRIMARY CARE. Rehab can add back slowly if BP is elevated. Would start with lisinopril first    LIDOcaine (LIDODERM) 5 % Place 1 patch onto the skin once daily. Remove & Discard patch within 12 hours or as directed by MD    lisinopriL (PRINIVIL,ZESTRIL) 20 MG tablet 1 tablet (20 mg total) by Per G Tube route once daily. DO NOT TAKE UNTIL YOU SEE YOUR PRIMARY DOCTOR. Rehab can add back if BP becomes elevated. Would start with this one over HCTZ    nut.tx.gluc intol,lf,soy/fiber (GLUCERNA 1.5 SHELLEY ORAL) Feed per tube at 55 ml/hr continuously     Family History    None       Tobacco Use    Smoking status: Never    Smokeless tobacco: Not on file   Substance and Sexual Activity    Alcohol use: No    Drug use: Not on file    Sexual activity: Not on file     Review of Systems   Constitutional:  Negative for chills, fatigue and fever.   HENT:  Positive for trouble swallowing (due to recent stroke). Negative for congestion and voice change.    Eyes:  Negative for photophobia and visual disturbance.   Respiratory:  Negative for cough, chest tightness, shortness of breath and wheezing.    Cardiovascular:  Negative for chest pain, palpitations and leg swelling.   Gastrointestinal:  Negative for abdominal distention, abdominal pain, constipation, diarrhea, nausea and vomiting.   Genitourinary:  Negative for dysuria, flank pain and hematuria.   Musculoskeletal:  Negative for arthralgias, back pain and gait problem.   Skin:  Negative for color change and wound.   Neurological:  Positive for weakness (L sided from recent stroke). Negative for dizziness and light-headedness.   Psychiatric/Behavioral:  Negative for confusion, decreased concentration and dysphoric mood.      Objective:     Vital Signs (Most Recent):  Temp: 98 °F (36.7 °C) (06/12/24 2345)  Pulse: 82 (06/12/24  2345)  Resp: 20 (06/12/24 2345)  BP: (!) 153/67 (06/12/24 2345)  SpO2: 98 % (06/12/24 2345) Vital Signs (24h Range):  Temp:  [97.8 °F (36.6 °C)-98 °F (36.7 °C)] 98 °F (36.7 °C)  Pulse:  [68-82] 82  Resp:  [18-20] 20  SpO2:  [98 %] 98 %  BP: (122-153)/(67) 153/67     Weight: 68 kg (150 lb)  Body mass index is 30.3 kg/m².     Physical Exam  Vitals and nursing note reviewed.   Constitutional:       General: She is not in acute distress.     Appearance: She is well-developed.   HENT:      Head: Normocephalic and atraumatic.      Mouth/Throat:      Pharynx: No oropharyngeal exudate.   Eyes:      General: No scleral icterus.     Conjunctiva/sclera: Conjunctivae normal.   Cardiovascular:      Rate and Rhythm: Normal rate and regular rhythm.      Heart sounds: Normal heart sounds.   Pulmonary:      Effort: Pulmonary effort is normal. No respiratory distress.      Breath sounds: Normal breath sounds. No wheezing.   Abdominal:      General: Bowel sounds are normal. There is no distension.      Palpations: Abdomen is soft.      Tenderness: There is no abdominal tenderness.   Musculoskeletal:         General: No tenderness. Normal range of motion.      Cervical back: Normal range of motion and neck supple.   Lymphadenopathy:      Cervical: No cervical adenopathy.   Skin:     General: Skin is warm and dry.      Capillary Refill: Capillary refill takes less than 2 seconds.      Findings: No rash.   Neurological:      Mental Status: She is alert and oriented to person, place, and time.      Cranial Nerves: No cranial nerve deficit.      Sensory: No sensory deficit.      Motor: Weakness (L sided weakness from recent stroke, stable) present.      Coordination: Coordination normal.   Psychiatric:         Behavior: Behavior normal.         Thought Content: Thought content normal.         Judgment: Judgment normal.                Significant Labs: All pertinent labs within the past 24 hours have been reviewed.  CBC:   Recent Labs   Lab  06/12/24  1801   WBC 6.56   HGB 11.8*   HCT 38.4        CMP:   Recent Labs   Lab 06/12/24  1801      K 3.9      CO2 24   GLU 95   BUN 39*   CREATININE 1.3   CALCIUM 9.9   PROT 7.7   ALBUMIN 3.1*   BILITOT 0.5   ALKPHOS 67   AST 23   ALT 17   ANIONGAP 12       Significant Imaging: I have reviewed all pertinent imaging results/findings within the past 24 hours.  CT Abdomen Pelvis With IV Contrast NO Oral Contrast  Narrative: EXAMINATION:  CT ABDOMEN PELVIS WITH IV CONTRAST    CLINICAL HISTORY:  Abdominal abscess/infection suspected;    TECHNIQUE:  Low dose axial images, sagittal and coronal reformations were obtained from the lung bases to the pubic symphysis following the IV administration of 75 mL of Omnipaque 350 .  Oral contrast was not administered.    COMPARISON:  06/06/2024    FINDINGS:  Abdomen:    - Lower thorax:The heart is enlarged, particularly the atria right greater than left..    - Lung bases: Small 3 mm pulmonary nodule in the right middle lobe on axial 8 of series 2.  No significant change.    For a solid nodule <6 mm, Fleischner Society 2017 guidelines recommend no routine follow up for a low risk patient, or follow-up with non-contrast chest CT at 12 months in a high risk patient.    Small hiatal hernia.    - Liver: No focal mass.  Mild diffuse fatty infiltration.    - Gallbladder: Multiple gallstones.  No evidence of acute cholecystitis.    - Bile Ducts: No evidence of intra or extra hepatic biliary ductal dilation.    - Spleen: Negative.    - Kidneys: No stone, mass or hydronephrosis.    - Adrenals: Unremarkable.    - Pancreas: No mass or peripancreatic fat stranding.    - Retroperitoneum:  No significant adenopathy.    - Vascular: No abdominal aortic aneurysm.    - Abdominal wall:  Small fluid collection in the subcutaneous tissues adjacent to the anterior abdominal wall in the upper abdomen measuring 1.3 x 1.7 cm suspicious for a small superficial abscess, likely along the  tract of a prior gastrostomy tube.  Mild wall thickening, enhancement and mild adjacent stranding.  There is percutaneous tubing present.  The tip appears to be superficial to the collection near the skin surface.  Recommend clinical correlation and follow-up.    Pelvis:    Urinary bladder is within normal limits incompletely distended.    The appendix is within normal limits.    Status post hysterectomy.    Diverticulosis throughout the colon.  No evidence of acute diverticulitis.    Bowel/Mesentery:    No evidence of bowel obstruction or inflammation.    Bones:  No acute osseous abnormality and no suspicious lytic or blastic lesion.  Impression: 1. Small fluid collection in the subcutaneous tissues adjacent to the anterior abdominal wall in the upper abdomen measuring 1.3 x 1.7 cm suspicious for a small superficial abscess, likely along the tract of a prior gastrostomy tube.  Mild wall thickening, enhancement and mild adjacent stranding. There is percutaneous tubing present.  The tip appears to be superficial to the collection near the skin surface. Recommend clinical correlation and follow-up.  2. Cholelithiasis.  3. Hepatic steatosis.  4. Diverticulosis of the colon.  5. Cardiomegaly.  See above comments.  6. Stable small 3 mm pulmonary nodule right middle lobe.  7. This report was flagged in Epic as abnormal.    Electronically signed by: dAy Linares  Date:    06/12/2024  Time:    19:25

## 2024-06-13 NOTE — ASSESSMENT & PLAN NOTE
- Cr 1.3, baseline ~0.9  - suspect prerenal from dehydration  - place on cIVFs overnight  - avoid nephrotoxins, renally dose meds

## 2024-06-13 NOTE — H&P
[facility] - Emergency Dept  [facility]  History & Physical    Patient Name: Amirah Davey  MRN: [id number]  Patient Class: IP- Inpatient  Admission Date: 6/12/2024  Attending Physician: Isac Cortez MD   Primary Care Provider: Ivana Templeton MD         Patient information was obtained from patient, past medical records, and ER records.     Subjective:     Principal Problem:Abdominal wall abscess    Chief Complaint:   Chief Complaint   Patient presents with    PEG tube problem     Peg tube leaking        HPI: Amirah Davey is a 88 y.o. female with PMHx of recent CVA in May with residual L sided weakness, dysphagia and dysarthria, s/p PEG tube placement, HTN, Afib on eliquis who [place] [facility] [place] tube malfunction. Patient with recent admit for PEG dislogement on 6/6 and required replacement with general surgery. She presents today after her nursing home noted that there was leaking around the PEG tube site. Per ED note, patient complained of discomfort surrounding PEG site. However, patient denies any abdominal pain at the time of my exam. She had an episode of nausea yesterday without vomiting. She says her tongue/ lips are green colored because the nursing home made her drink something green earlier today. Denies fever, chills, chest pain, SOB, HA or vision changes.    ED: AFVSS. No leukocytosis or electrolyte abnormalities. Cr 1.3 from baseline ~0.9. CT abd/pelvis concerning for small superficial abscess adjacent to the anterior abdominal wall in the upper abdomen. General surgery consulted; ube was removed at bedside in the abscess cavity was probed. Given IV vanc.     Past Medical History:   Diagnosis Date    A-fib     Cerebrovascular accident (CVA) due to embolism of right middle cerebral artery 05/23/2024    Current use of long term anticoagulation     on Xarelto    Dysphagia due to recent stroke 05/23/2024    Hypertension     Stroke 03/20/2013       Past Surgical History:    Procedure Laterality Date    CARDIAC PACEMAKER PLACEMENT  09/25/2019    Truli Model number [id number]  Serial number [id number] device MRI conditional for 1.5 Estela magnets    CATARACT EXTRACTION W/  INTRAOCULAR LENS IMPLANT Right 05/01/2017    Dr. Case    CATARACT EXTRACTION W/  INTRAOCULAR LENS IMPLANT Left 05/29/2017    Dr. Case    ESOPHAGOGASTRODUODENOSCOPY W/ PEG N/A 5/29/2024    Procedure: EGD, WITH PEG TUBE INSERTION;  Surgeon: Imer Minaya MD;  [facility];  Service: General;  Laterality: N/A;    EYE SURGERY      HYSTERECTOMY      INSERTION, PEG TUBE N/A 5/27/2024    Procedure: INSERTION, PEG TUBE;  Surgeon: Ivette Love MD;  Location[facility] ([facility]);  Service: Endoscopy;  Laterality: N/A;       Review of patient's allergies indicates:  No Known Allergies    No current facility-administered medications on file prior to encounter.     Current Outpatient Medications on File Prior to Encounter   Medication Sig    acetaminophen (TYLENOL) 500 MG tablet 2 tablets (1,000 mg total) by Per G Tube route every 8 (eight) hours as needed for Pain. (Patient taking differently: 1,000 mg by Per G Tube route every 6 (six) hours as needed for Pain.)    amLODIPine (NORVASC) 5 MG tablet 1 tablet (5 mg total) by Per G Tube route once daily.    apixaban (ELIQUIS) 5 mg Tab 1 tablet (5 mg total) by Per G Tube route 2 (two) times daily.    atorvastatin (LIPITOR) 40 MG tablet 1 tablet (40 mg total) by Per G Tube route once daily.    chlorhexidine (PERIDEX) 0.12 % solution Swish & spit: 15 ml by mouth once daily.    famotidine (PEPCID) 40 MG tablet 1 tablet (40 mg total) by Per G Tube route once daily.    hydroCHLOROthiazide (MICROZIDE) 12.5 mg capsule 2 capsules (25 mg total) by Per G Tube route once daily. DO NOT TAKE UNTIL FOLLOW UP WITH PRIMARY CARE. Rehab can add back slowly if BP is elevated. Would start with lisinopril first    LIDOcaine (LIDODERM) 5 % Place 1 patch onto the skin once  daily. Remove & Discard patch within 12 hours or as directed by MD    lisinopriL (PRINIVIL,ZESTRIL) 20 MG tablet 1 tablet (20 mg total) by Per G Tube route once daily. DO NOT TAKE UNTIL YOU SEE YOUR PRIMARY DOCTOR. Rehab can add back if BP becomes elevated. Would start with this one over HCTZ    nut.tx.gluc intol,lf,soy/fiber (GLUCERNA 1.5 SHELLEY ORAL) Feed per tube at 55 ml/hr continuously     Family History    None       Tobacco Use    Smoking status: Never    Smokeless tobacco: Not on file   Substance and Sexual Activity    Alcohol use: No    Drug use: Not on file    Sexual activity: Not on file     Review of Systems   Constitutional:  Negative for chills, fatigue and fever.   HENT:  Positive for trouble swallowing (due to recent stroke). Negative for congestion and voice change.    Eyes:  Negative for photophobia and visual disturbance.   Respiratory:  Negative for cough, chest tightness, shortness of breath and wheezing.    Cardiovascular:  Negative for chest pain, palpitations and leg swelling.   Gastrointestinal:  Negative for abdominal distention, abdominal pain, constipation, diarrhea, nausea and vomiting.   Genitourinary:  Negative for dysuria, flank pain and hematuria.   Musculoskeletal:  Negative for arthralgias, back pain and gait problem.   Skin:  Negative for color change and wound.   Neurological:  Positive for weakness (L sided from recent stroke). Negative for dizziness and light-headedness.   Psychiatric/Behavioral:  Negative for confusion, decreased concentration and dysphoric mood.      Objective:     Vital Signs (Most Recent):  Temp: 98 °F (36.7 °C) (06/12/24 2345)  Pulse: 82 (06/12/24 2345)  Resp: 20 (06/12/24 2345)  BP: (!) 153/67 (06/12/24 2345)  SpO2: 98 % (06/12/24 2345) Vital Signs (24h Range):  Temp:  [97.8 °F (36.6 °C)-98 °F (36.7 °C)] 98 °F (36.7 °C)  Pulse:  [68-82] 82  Resp:  [18-20] 20  SpO2:  [98 %] 98 %  BP: (122-153)/(67) 153/67     Weight: 68 kg (150 lb)  Body mass index is 30.3  kg/m².     Physical Exam  Vitals and nursing note reviewed.   Constitutional:       General: She is not in acute distress.     Appearance: She is well-developed.   HENT:      Head: Normocephalic and atraumatic.      Mouth/Throat:      Pharynx: No oropharyngeal exudate.   Eyes:      General: No scleral icterus.     Conjunctiva/sclera: Conjunctivae normal.   Cardiovascular:      Rate and Rhythm: Normal rate and regular rhythm.      Heart sounds: Normal heart sounds.   Pulmonary:      Effort: Pulmonary effort is normal. No respiratory distress.      Breath sounds: Normal breath sounds. No wheezing.   Abdominal:      General: Bowel sounds are normal. There is no distension.      Palpations: Abdomen is soft.      Tenderness: There is no abdominal tenderness.   Musculoskeletal:         General: No tenderness. Normal range of motion.      Cervical back: Normal range of motion and neck supple.   Lymphadenopathy:      Cervical: No cervical adenopathy.   Skin:     General: Skin is warm and dry.      Capillary Refill: Capillary refill takes less than 2 seconds.      Findings: No rash.   Neurological:      Mental Status: She is alert and oriented to person, place, and time.      Cranial Nerves: No cranial nerve deficit.      Sensory: No sensory deficit.      Motor: Weakness (L sided weakness from recent stroke, stable) present.      Coordination: Coordination normal.   Psychiatric:         Behavior: Behavior normal.         Thought Content: Thought content normal.         Judgment: Judgment normal.                Significant Labs: All pertinent labs within the past 24 hours have been reviewed.  CBC:   Recent Labs   Lab 06/12/24  1801   WBC 6.56   HGB 11.8*   HCT 38.4        CMP:   Recent Labs   Lab 06/12/24  1801      K 3.9      CO2 24   GLU 95   BUN 39*   CREATININE 1.3   CALCIUM 9.9   PROT 7.7   ALBUMIN 3.1*   BILITOT 0.5   ALKPHOS 67   AST 23   ALT 17   ANIONGAP 12       Significant Imaging: I have  reviewed all pertinent imaging results/findings within the past 24 hours.  CT Abdomen Pelvis With IV Contrast NO Oral Contrast  Narrative: EXAMINATION:  CT ABDOMEN PELVIS WITH IV CONTRAST    CLINICAL HISTORY:  Abdominal abscess/infection suspected;    TECHNIQUE:  Low dose axial images, sagittal and coronal reformations were obtained from the lung bases to the pubic symphysis following the IV administration of 75 mL of Omnipaque 350 .  Oral contrast was not administered.    COMPARISON:  06/06/2024    FINDINGS:  Abdomen:    - Lower thorax:The heart is enlarged, particularly the atria right greater than left..    - Lung bases: Small 3 mm pulmonary nodule in the right middle lobe on axial 8 of series 2.  No significant change.    For a solid nodule <6 mm, Fleischner Society 2017 guidelines recommend no routine follow up for a low risk patient, or follow-up with non-contrast chest CT at 12 months in a high risk patient.    Small hiatal hernia.    - Liver: No focal mass.  Mild diffuse fatty infiltration.    - Gallbladder: Multiple gallstones.  No evidence of acute cholecystitis.    - Bile Ducts: No evidence of intra or extra hepatic biliary ductal dilation.    - Spleen: Negative.    - Kidneys: No stone, mass or hydronephrosis.    - Adrenals: Unremarkable.    - Pancreas: No mass or peripancreatic fat stranding.    - Retroperitoneum:  No significant adenopathy.    - Vascular: No abdominal aortic aneurysm.    - Abdominal wall:  Small fluid collection in the subcutaneous tissues adjacent to the anterior abdominal wall in the upper abdomen measuring 1.3 x 1.7 cm suspicious for a small superficial abscess, likely along the tract of a prior gastrostomy tube.  Mild wall thickening, enhancement and mild adjacent stranding.  There is percutaneous tubing present.  The tip appears to be superficial to the collection near the skin surface.  Recommend clinical correlation and follow-up.    Pelvis:    Urinary bladder is within normal  limits incompletely distended.    The appendix is within normal limits.    Status post hysterectomy.    Diverticulosis throughout the colon.  No evidence of acute diverticulitis.    Bowel/Mesentery:    No evidence of bowel obstruction or inflammation.    Bones:  No acute osseous abnormality and no suspicious lytic or blastic lesion.  Impression: 1. Small fluid collection in the subcutaneous tissues adjacent to the anterior abdominal wall in the upper abdomen measuring 1.3 x 1.7 cm suspicious for a small superficial abscess, likely along the tract of a prior gastrostomy tube.  Mild wall thickening, enhancement and mild adjacent stranding. There is percutaneous tubing present.  The tip appears to be superficial to the collection near the skin surface. Recommend clinical correlation and follow-up.  2. Cholelithiasis.  3. Hepatic steatosis.  4. Diverticulosis of the colon.  5. Cardiomegaly.  See above comments.  6. Stable small 3 mm pulmonary nodule right middle lobe.  7. This report was flagged in Epic as abnormal.    Electronically signed by: Ady Linares  Date:    06/12/2024  Time:    19:25      Assessment/Plan:     * Abdominal wall abscess  PEG tube malfunction   - presents with dislodged PEG and was found to have abdominal wall abscess on CT  - afebrile without leukocytosis  - general surgery consulted; plan to take to OR for G tube replacement/ abscess drainage, timing TBD  - keep NPO  - continue IV vanc  - place on continuous IVFs  - consider ID consult in AM    ELIAS (acute kidney injury)  - Cr 1.3, baseline ~0.9  - suspect prerenal from dehydration  - place on cIVFs overnight  - avoid nephrotoxins, renally dose meds    Chronic a-fib  Chronic anticoagulation   - hold eliquis for upcoming surgery  - spoke to general surgery-- no plans for surgery tomorrow AM so ok to start heparin gtt for Afib     Sacral ulcer, limited to breakdown of skin  - appears to be healing well  - turn q2h    Dysphagia due to recent  stroke  - ongoing issue since recent stroke in May  - strict NPO  - meds via PEG once replaced    Cerebrovascular accident (CVA) due to embolism of right middle cerebral artery  - recent CVA on 5/24 with residual deficits, currently at baseline  - resume statin once PEG replaced    Hypertension, benign  - normotensive   - resume amlodipine once PEG replaced       VTE Risk Mitigation (From admission, onward)           Ordered     heparin 25,000 units in dextrose 5% (100 units/ml) IV bolus from bag LOW INTENSITY nomogram - [facility]  As needed (PRN)        Question:  Heparin Infusion Adjustment (DO NOT MODIFY ANSWER)  Answer:  \\[email or web address]\epic\Images\Pharmacy\HeparinInfusions\heparin LOW INTENSITY nomogram for [facility] [id number]    06/13/24 0011     heparin 25,000 units in dextrose 5% (100 units/ml) IV bolus from bag LOW INTENSITY nomogram - [facility]  As needed (PRN)        Question:  Heparin Infusion Adjustment (DO NOT MODIFY ANSWER)  Answer:  \[email or web address]\epic\Images\Pharmacy\HeparinInfusions\heparin LOW INTENSITY nomogram for [facility] [id number]    06/13/24 0011     heparin 25,000 units in dextrose 5% 250 mL (100 units/mL) infusion LOW INTENSITY nomogram - [facility]  Continuous        Question:  Begin at (units/kg/hr)  Answer:  12    06/13/24 0011     Reason for No Pharmacological VTE Prophylaxis  Once        Question:  Reasons:  Answer:  Already adequately anticoagulated on oral Anticoagulants    06/12/24 2156     IP VTE HIGH RISK PATIENT  Once         06/12/24 2156                               Pharmacokinetic Initial Assessment: IV Vancomycin    Assessment/Plan:    Initiate intravenous vancomycin with loading dose of 1250 mg once, done in ED.  Since SCr increased from baseline, check random vancomycin level 12-24 hours after initial dose to determine if scheduling subsequent doses appropriate or if intermittent dosing indicated.  Desired empiric serum trough concentration is 10 to 20 mcg/mL.  Draw vancomycin random level on 06/13/2024  "at 1600.  Pharmacy will continue to follow and monitor vancomycin.      Please contact pharmacy at extension [phone] with any questions regarding this assessment.     Thank you for the consult,   Gurpreet Hernández       Patient brief summary:  Amirah Davey is a 88 y.o. female initiated on antimicrobial therapy with IV Vancomycin for treatment of suspected skin & soft tissue infection.    Drug Allergies:   Review of patient's allergies indicates:  No Known Allergies    Actual Body Weight:   68 kg    Renal Function:   Estimated Creatinine Clearance: 26.7 mL/min (based on SCr of 1.3 mg/dL).    CBC (last 72 hours):  Recent Labs   Lab Result Units 06/12/24  1801   WBC K/uL 6.56   Hemoglobin g/dL 11.8*   Hematocrit % 38.4   Platelets K/uL 308   Gran % % 69.5   Lymph % % 19.2   Mono % % 9.0   Eosinophil % % 0.9   Basophil % % 0.6   Differential Method  Automated       Metabolic Panel (last 72 hours):  Recent Labs   Lab Result Units 06/12/24  1801   Sodium mmol/L 138   Potassium mmol/L 3.9   Chloride mmol/L 102   CO2 mmol/L 24   Glucose mg/dL 95   BUN mg/dL 39*   Creatinine mg/dL 1.3   Albumin g/dL 3.1*   Total Bilirubin mg/dL 0.5   Alkaline Phosphatase U/L 67   AST U/L 23   ALT U/L 17       Drug levels (last 3 results):  No results for input(s): "VANCOMYCINRA", "VANCORANDOM", "VANCOMYCINPE", "VANCOPEAK", "VANCOMYCINTR", "VANCOTROUGH" in the last 72 hours.    Microbiologic Results:  Microbiology Results (last 7 days)       Procedure Component Value Units Date/Time    Blood culture (site 1) [[id number]]     Order Status: No result Specimen: Blood     Blood culture (site 2) [[id number]]     Order Status: No result Specimen: Blood               Nicole Conte PA-C  Department of Hospital Medicine  [facility] - Emergency Dept          "

## 2024-06-13 NOTE — NURSING
Nurses Note -- 4 Eyes      6/13/2024   2:13 AM      Skin assessed during: Admit      [x] No Altered Skin Integrity Present    [x]Prevention Measures Documented      [] Yes- Altered Skin Integrity Present or Discovered   [] LDA Added if Not in Epic (Describe Wound)   [] New Altered Skin Integrity was Present on Admit and Documented in LDA   [] Wound Image Taken    Wound Care Consulted? No    Attending Nurse:  SAMI Spain RN/Staff Member:   SANTO Sethi

## 2024-06-13 NOTE — ED NOTES
Telemetry Verification   Patient placed on Telemetry Box  Verified with War Room  Box # [id number]   Monitor Tech Max   Rate 80   Rhythm NSR

## 2024-06-13 NOTE — ASSESSMENT & PLAN NOTE
88-year-old female with a past medical history of hypertension, obesity, AFib, and recent MCA stroke with subsequent hemiparesis and dysphagia requiring PEG tube placement who presents to the ED with worsening discharge from around her PEG tube.  Found to have dislodgement of her PEG tube now for the 2nd time and now with an associated abdominal wall abscess.    - tube was removed at bedside in the abscess cavity was probed.  There was mild amount of purulent drainage.  This was packed with a wet-to-dry dressing.  - patient would benefit from placement of a gastrostomy tube and possible formal abscess drainage in the operating room.  - we will discuss timing with staff  - recommend hospital medicine admission due to significant comorbidities  - general surgery will continue to follow along  - may benefit from broad-spectrum antibiotic coverage due to mild amount of surrounding cellulitis

## 2024-06-13 NOTE — ASSESSMENT & PLAN NOTE
- recent CVA on 5/24 with residual deficits, currently at baseline  - resume statin once PEG replaced

## 2024-06-13 NOTE — HPI
Amirah Davey is a 88 y.o. female with PMHx of recent CVA in May with residual L sided weakness, dysphagia and dysarthria, s/p PEG tube placement, HTN, Afib on eliquis who [place] [facility] [place] tube malfunction. Patient with recent admit for PEG dislogement on 6/6 and required replacement with general surgery. She presents today after her nursing home noted that there was leaking around the PEG tube site. Per ED note, patient complained of discomfort surrounding PEG site. However, patient denies any abdominal pain at the time of my exam. She had an episode of nausea yesterday without vomiting. She says her tongue/ lips are green colored because the nursing home made her drink something green earlier today. Denies fever, chills, chest pain, SOB, HA or vision changes.    ED: AFVSS. No leukocytosis or electrolyte abnormalities. Cr 1.3 from baseline ~0.9. CT abd/pelvis concerning for small superficial abscess adjacent to the anterior abdominal wall in the upper abdomen. General surgery consulted; ube was removed at bedside in the abscess cavity was probed. Given IV vanc.

## 2024-06-13 NOTE — PROGRESS NOTES
[facility] Medicine  Progress Note    Patient Name: Amirah Davey  MRN: [id number]  Patient Class: IP- Inpatient   Admission Date: 6/12/2024  Length of Stay: 1 days  Attending Physician: David Mayorga MD  Primary Care Provider: Ivana Templeton MD        Subjective:     Principal Problem:Abdominal wall abscess        HPI:  Amirah Davey is a 88 y.o. female with PMHx of recent CVA in May with residual L sided weakness, dysphagia and dysarthria, s/p PEG tube placement, HTN, Afib on eliquis [place] [facility] [place]. Patient with recent admit for PEG dislogement on 6/6 and required replacement with general surgery. She presents today after her nursing home noted that there was leaking around the PEG tube site. Per ED note, patient complained of discomfort surrounding PEG site. However, patient denies any abdominal pain at the time of my exam. She had an episode of nausea yesterday without vomiting. She says her tongue/ lips are green colored because the nursing home made her drink something green earlier today. Denies fever, chills, chest pain, SOB, HA or vision changes.    ED: AFVSS. No leukocytosis or electrolyte abnormalities. Cr 1.3 from baseline ~0.9. CT abd/pelvis concerning for small superficial abscess adjacent to the anterior abdominal wall in the upper abdomen. General surgery consulted; ube was removed at bedside in the abscess cavity was probed. Given IV vanc.     Overview/Hospital Course:  Patient is an 88 year old female with a Hx of recent CVA in May with residual L sided weakness, dysphagia and dysarthria, s/p PEG tube placement, HTN, Afib on eliquis who p/w dislodged PEG tube c/b abwall abscess seen on CT. Started on vancomycin. General surgery consulted for g tube replacement and abscess drainage.    Interval History: Pt seen and examined this morning on rounds. EVA. Patient admitted for dislodged PEG tube c/b abwall abscess seen on CT. Started  on vancomycin. General surgery consulted for g tube replacement and abscess drainage. Remains afebrile, VSS. Continuing mIVF. Care plan reviewed. Otherwise, doing well and with no further complaints at this time.        Objective:     Vital Signs (Most Recent):  Temp: 98.2 °F (36.8 °C) (06/13/24 0756)  Pulse: 71 (06/13/24 0756)  Resp: 16 (06/13/24 0756)  BP: 125/60 (06/13/24 0756)  SpO2: 96 % (06/13/24 0756) Vital Signs (24h Range):  Temp:  [97.6 °F (36.4 °C)-98.2 °F (36.8 °C)] 98.2 °F (36.8 °C)  Pulse:  [] 71  Resp:  [16-20] 16  SpO2:  [96 %-98 %] 96 %  BP: (112-153)/(60-67) 125/60     Weight: 68 kg (150 lb)  Body mass index is 30.3 kg/m².    Intake/Output Summary (Last 24 hours) at 6/13/2024 1008  Last data filed at 6/12/2024 2321  Gross per 24 hour   Intake 246.16 ml   Output --   Net 246.16 ml         Physical Exam  Gen: in NAD, appears stated age  Neuro: Left sided weakness (at baseline). AAOx4, CN2-12 grossly intact BL; motor, sensory, and strength grossly intact BL  HEENT: NTNC, EOMI, PERRLA, MMM; no thyromegaly or lymphadenopathy; no JVD appreciated  CVS: RRR, no m/r/g; S1/S2 auscultated with no S3 or S4; capillary refill < 2 sec  Resp: lungs CTAB, no w/r/r; no belabored breathing or accessory muscle use appreciated   Abd: Mild abdominal tendernessBS+ in all 4 quadrants; NTND, soft to palpation; no organomegaly appreciated   Extrem: pulses full, equal, and regular over all 4 extremities; no UE or LE edema BL          Significant Labs: All pertinent labs within the past 24 hours have been reviewed.    Significant Imaging: I have reviewed all pertinent imaging results/findings within the past 24 hours.    Assessment/Plan:      * Abdominal wall abscess  PEG tube malfunction   - presents with dislodged PEG and was found to have abdominal wall abscess on CT  - afebrile without leukocytosis  - general surgery consulted; plan to take to OR for G tube replacement/ abscess drainage, timing TBD  - keep NPO  -  continue IV vanc  - place on continuous IVFs    ELIAS (acute kidney injury)  - Cr 1.3, baseline ~1.0  - suspect prerenal from dehydration  - place on cIVFs overnight  - avoid nephrotoxins, renally dose meds    - Improving    PEG tube malfunction  Plan for PEG tube replacement with general surgery.      Sacral ulcer, limited to breakdown of skin  - appears to be healing well  - turn q2h    Dysphagia due to recent stroke  - ongoing issue since recent stroke in May  - strict NPO  - meds via PEG once replaced    Cerebrovascular accident (CVA) due to embolism of right middle cerebral artery  - recent CVA on 5/24 with residual deficits, currently at baseline  - resume statin once PEG replaced    Chronic a-fib  Chronic anticoagulation   - hold eliquis for upcoming surgery  - spoke to general surgery-- no plans for surgery tomorrow AM so ok to start heparin gtt for Afib     Hypertension, benign  - normotensive   - resume amlodipine once PEG replaced       VTE Risk Mitigation (From admission, onward)           Ordered     heparin 25,000 units in dextrose 5% (100 units/ml) IV bolus from bag LOW INTENSITY nomogram - [facility]  As needed (PRN)        Question:  Heparin Infusion Adjustment (DO NOT MODIFY ANSWER)  Answer:  [email or web address]\[email or web address] LOW INTENSITY nomogram for [facility] [id number]    06/13/24 0011     heparin 25,000 units in dextrose 5% (100 units/ml) IV bolus from bag LOW INTENSITY nomogram - [facility]  As needed (PRN)        Question:  Heparin Infusion Adjustment (DO NOT MODIFY ANSWER)  Answer:  [email or web address]\heparin LOW INTENSITY nomogram for [facility] [id number]    06/13/24 0011     heparin 25,000 units in dextrose 5% 250 mL (100 units/mL) infusion LOW INTENSITY nomogram - [facility]  Continuous        Question:  Begin at (units/kg/hr)  Answer:  12    06/13/24 0011     Reason for No Pharmacological VTE Prophylaxis  Once        Question:  Reasons:  Answer:  Already  adequately anticoagulated on oral Anticoagulants    06/12/24 2156     IP VTE HIGH RISK PATIENT  Once         06/12/24 2156                    Discharge Planning   JOAN:      Code Status: Full Code   Is the patient medically ready for discharge?:     Reason for patient still in hospital (select all that apply): Treatment                     David Mayorga MD  Department of Hospital Medicine   [facility] S[facility]

## 2024-06-13 NOTE — ASSESSMENT & PLAN NOTE
PEG tube malfunction   - presents with dislodged PEG and was found to have abdominal wall abscess on CT  - afebrile without leukocytosis  - general surgery consulted; plan to take to OR for G tube replacement/ abscess drainage, timing TBD  - keep NPO  - continue IV vanc  - place on continuous IVFs

## 2024-06-13 NOTE — SUBJECTIVE & OBJECTIVE
Interval History: Pt seen and examined this morning on rounds. EVA. Patient admitted for dislodged PEG tube c/b abwall abscess seen on CT. Started on vancomycin. General surgery consulted for g tube replacement and abscess drainage. Remains afebrile, VSS. Continuing mIVF. Care plan reviewed. Otherwise, doing well and with no further complaints at this time.        Objective:     Vital Signs (Most Recent):  Temp: 98.2 °F (36.8 °C) (06/13/24 0756)  Pulse: 71 (06/13/24 0756)  Resp: 16 (06/13/24 0756)  BP: 125/60 (06/13/24 0756)  SpO2: 96 % (06/13/24 0756) Vital Signs (24h Range):  Temp:  [97.6 °F (36.4 °C)-98.2 °F (36.8 °C)] 98.2 °F (36.8 °C)  Pulse:  [] 71  Resp:  [16-20] 16  SpO2:  [96 %-98 %] 96 %  BP: (112-153)/(60-67) 125/60     Weight: 68 kg (150 lb)  Body mass index is 30.3 kg/m².    Intake/Output Summary (Last 24 hours) at 6/13/2024 1008  Last data filed at 6/12/2024 2321  Gross per 24 hour   Intake 246.16 ml   Output --   Net 246.16 ml         Physical Exam  Gen: in NAD, appears stated age  Neuro: Left sided weakness (at baseline). AAOx4, CN2-12 grossly intact BL; motor, sensory, and strength grossly intact BL  HEENT: NTNC, EOMI, PERRLA, MMM; no thyromegaly or lymphadenopathy; no JVD appreciated  CVS: RRR, no m/r/g; S1/S2 auscultated with no S3 or S4; capillary refill < 2 sec  Resp: lungs CTAB, no w/r/r; no belabored breathing or accessory muscle use appreciated   Abd: Mild abdominal tendernessBS+ in all 4 quadrants; NTND, soft to palpation; no organomegaly appreciated   Extrem: pulses full, equal, and regular over all 4 extremities; no UE or LE edema BL          Significant Labs: All pertinent labs within the past 24 hours have been reviewed.    Significant Imaging: I have reviewed all pertinent imaging results/findings within the past 24 hours.

## 2024-06-13 NOTE — HOSPITAL COURSE
Patient is an 88 year old female with a Hx of recent CVA in May with residual L sided weakness, dysphagia and dysarthria, s/p PEG tube placement, HTN, Afib on eliquis who p/w dislodged PEG tube c/b abwall abscess seen on CT. Started on vancomycin. General surgery consulted for g tube replacement and abscess drainage. PEG tube replaced and no abscess or fluid collection noted. Vancomycin discontinued. Patient started on tubefeeds without issues. SLP consulted and recommending pureed diet and continued dysphagia therapy outpatient. Patient seen and examined on day of discharge. Pt deemed appropriate for discharge to inpatient rehab. Plan discussed with pt, who was agreeable and amenable; medications were discussed and reviewed, outpatient follow-up scheduled, ER precautions were given, all questions were answered to the pt's satisfaction, and patient was subsequently discharged.

## 2024-06-13 NOTE — HPI
88-year-old female with a past medical history of hypertension, obesity, AFib, and recent MCA stroke with subsequent hemiparesis and dysphagia requiring PEG tube placement who presents to the ED with worsening discharge from around her PEG tube.  Patient has a limited understanding of her medical condition so history was difficult to obtain. In the ED her labwork was fairly unremarkable. A CT scan of the abdomen and pelvis was performed revealing only the tip of the gastrostomy tube within the subcutaneous tissues of the abdominal wall.  There was associated small fluid collection in the surrounding space and the stomach no longer appeared to be adjacent abdominal wall.  General surgery was consulted for evaluation.

## 2024-06-13 NOTE — PROGRESS NOTES
"Pharmacokinetic Initial Assessment: IV Vancomycin    Assessment/Plan:    Initiate intravenous vancomycin with loading dose of 1250 mg once, done in ED.  Since SCr increased from baseline, check random vancomycin level 12-24 hours after initial dose to determine if scheduling subsequent doses appropriate or if intermittent dosing indicated.  Desired empiric serum trough concentration is 10 to 20 mcg/mL.  Draw vancomycin random level on 06/13/2024 at 1600.  Pharmacy will continue to follow and monitor vancomycin.      Please contact pharmacy at extension [phone] with any questions regarding this assessment.     Thank you for the consult,   Gurpreet Hernández       Patient brief summary:  Amirah Davey is a 88 y.o. female initiated on antimicrobial therapy with IV Vancomycin for treatment of suspected skin & soft tissue infection.    Drug Allergies:   Review of patient's allergies indicates:  No Known Allergies    Actual Body Weight:   68 kg    Renal Function:   Estimated Creatinine Clearance: 26.7 mL/min (based on SCr of 1.3 mg/dL).    CBC (last 72 hours):  Recent Labs   Lab Result Units 06/12/24  1801   WBC K/uL 6.56   Hemoglobin g/dL 11.8*   Hematocrit % 38.4   Platelets K/uL 308   Gran % % 69.5   Lymph % % 19.2   Mono % % 9.0   Eosinophil % % 0.9   Basophil % % 0.6   Differential Method  Automated       Metabolic Panel (last 72 hours):  Recent Labs   Lab Result Units 06/12/24  1801   Sodium mmol/L 138   Potassium mmol/L 3.9   Chloride mmol/L 102   CO2 mmol/L 24   Glucose mg/dL 95   BUN mg/dL 39*   Creatinine mg/dL 1.3   Albumin g/dL 3.1*   Total Bilirubin mg/dL 0.5   Alkaline Phosphatase U/L 67   AST U/L 23   ALT U/L 17       Drug levels (last 3 results):  No results for input(s): "VANCOMYCINRA", "VANCORANDOM", "VANCOMYCINPE", "VANCOPEAK", "VANCOMYCINTR", "VANCOTROUGH" in the last 72 hours.    Microbiologic Results:  Microbiology Results (last 7 days)       Procedure Component Value Units Date/Time    Blood culture " (site 1) [[id number]]     Order Status: No result Specimen: Blood     Blood culture (site 2) [[id number]]     Order Status: No result Specimen: Blood

## 2024-06-13 NOTE — ASSESSMENT & PLAN NOTE
- Cr 1.3, baseline ~1.0  - suspect prerenal from dehydration  - place on cIVFs overnight  - avoid nephrotoxins, renally dose meds    - Improving

## 2024-06-13 NOTE — ASSESSMENT & PLAN NOTE
PEG tube malfunction   - presents with dislodged PEG and was found to have abdominal wall abscess on CT  - afebrile without leukocytosis  - general surgery consulted; plan to take to OR for G tube replacement/ abscess drainage, timing TBD  - keep NPO  - continue IV vanc  - place on continuous IVFs  - consider ID consult in AM

## 2024-06-13 NOTE — ASSESSMENT & PLAN NOTE
Chronic anticoagulation   - hold eliquis for upcoming surgery  - spoke to general surgery-- no plans for surgery tomorrow AM so ok to start heparin gtt for Afib

## 2024-06-14 LAB
ANION GAP SERPL CALC-SCNC: 9 MMOL/L (ref 8–16)
APTT PPP: 62.1 SEC (ref 21–32)
BASOPHILS # BLD AUTO: 0.02 K/UL (ref 0–0.2)
BASOPHILS NFR BLD: 0.5 % (ref 0–1.9)
BUN SERPL-MCNC: 22 MG/DL (ref 8–23)
CALCIUM SERPL-MCNC: 8.6 MG/DL (ref 8.7–10.5)
CHLORIDE SERPL-SCNC: 108 MMOL/L (ref 95–110)
CO2 SERPL-SCNC: 20 MMOL/L (ref 23–29)
CREAT SERPL-MCNC: 0.9 MG/DL (ref 0.5–1.4)
DIFFERENTIAL METHOD BLD: ABNORMAL
EOSINOPHIL # BLD AUTO: 0.2 K/UL (ref 0–0.5)
EOSINOPHIL NFR BLD: 3.9 % (ref 0–8)
ERYTHROCYTE [DISTWIDTH] IN BLOOD BY AUTOMATED COUNT: 15.5 % (ref 11.5–14.5)
EST. GFR  (NO RACE VARIABLE): >60 ML/MIN/1.73 M^2
GLUCOSE SERPL-MCNC: 87 MG/DL (ref 70–110)
HCT VFR BLD AUTO: 31.6 % (ref 37–48.5)
HGB BLD-MCNC: 9.9 G/DL (ref 12–16)
IMM GRANULOCYTES # BLD AUTO: 0.02 K/UL (ref 0–0.04)
IMM GRANULOCYTES NFR BLD AUTO: 0.5 % (ref 0–0.5)
LYMPHOCYTES # BLD AUTO: 0.7 K/UL (ref 1–4.8)
LYMPHOCYTES NFR BLD: 16.5 % (ref 18–48)
MAGNESIUM SERPL-MCNC: 2.1 MG/DL (ref 1.6–2.6)
MCH RBC QN AUTO: 25 PG (ref 27–31)
MCHC RBC AUTO-ENTMCNC: 31.3 G/DL (ref 32–36)
MCV RBC AUTO: 80 FL (ref 82–98)
MONOCYTES # BLD AUTO: 0.6 K/UL (ref 0.3–1)
MONOCYTES NFR BLD: 14.3 % (ref 4–15)
NEUTROPHILS # BLD AUTO: 2.7 K/UL (ref 1.8–7.7)
NEUTROPHILS NFR BLD: 64.3 % (ref 38–73)
NRBC BLD-RTO: 0 /100 WBC
PHOSPHATE SERPL-MCNC: 3.6 MG/DL (ref 2.7–4.5)
PLATELET # BLD AUTO: 217 K/UL (ref 150–450)
PMV BLD AUTO: 11.2 FL (ref 9.2–12.9)
POCT GLUCOSE: 110 MG/DL (ref 70–110)
POCT GLUCOSE: 111 MG/DL (ref 70–110)
POCT GLUCOSE: 114 MG/DL (ref 70–110)
POCT GLUCOSE: 89 MG/DL (ref 70–110)
POTASSIUM SERPL-SCNC: 3.7 MMOL/L (ref 3.5–5.1)
RBC # BLD AUTO: 3.96 M/UL (ref 4–5.4)
SODIUM SERPL-SCNC: 137 MMOL/L (ref 136–145)
VANCOMYCIN SERPL-MCNC: 10.9 UG/ML
WBC # BLD AUTO: 4.13 K/UL (ref 3.9–12.7)

## 2024-06-14 PROCEDURE — 63600175 PHARM REV CODE 636 W HCPCS: Performed by: INTERNAL MEDICINE

## 2024-06-14 PROCEDURE — 92610 EVALUATE SWALLOWING FUNCTION: CPT

## 2024-06-14 PROCEDURE — 36415 COLL VENOUS BLD VENIPUNCTURE: CPT | Performed by: INTERNAL MEDICINE

## 2024-06-14 PROCEDURE — 25000003 PHARM REV CODE 250: Performed by: INTERNAL MEDICINE

## 2024-06-14 PROCEDURE — 97535 SELF CARE MNGMENT TRAINING: CPT

## 2024-06-14 PROCEDURE — 36415 COLL VENOUS BLD VENIPUNCTURE: CPT | Performed by: PHYSICIAN ASSISTANT

## 2024-06-14 PROCEDURE — 83735 ASSAY OF MAGNESIUM: CPT | Performed by: PHYSICIAN ASSISTANT

## 2024-06-14 PROCEDURE — 80048 BASIC METABOLIC PNL TOTAL CA: CPT | Performed by: PHYSICIAN ASSISTANT

## 2024-06-14 PROCEDURE — 84100 ASSAY OF PHOSPHORUS: CPT | Performed by: PHYSICIAN ASSISTANT

## 2024-06-14 PROCEDURE — 97530 THERAPEUTIC ACTIVITIES: CPT

## 2024-06-14 PROCEDURE — 21400001 HC TELEMETRY ROOM

## 2024-06-14 PROCEDURE — 85025 COMPLETE CBC W/AUTO DIFF WBC: CPT | Performed by: PHYSICIAN ASSISTANT

## 2024-06-14 PROCEDURE — 80202 ASSAY OF VANCOMYCIN: CPT | Performed by: INTERNAL MEDICINE

## 2024-06-14 PROCEDURE — 97166 OT EVAL MOD COMPLEX 45 MIN: CPT

## 2024-06-14 PROCEDURE — 85730 THROMBOPLASTIN TIME PARTIAL: CPT | Performed by: PHYSICIAN ASSISTANT

## 2024-06-14 RX ORDER — DEXTROSE MONOHYDRATE AND SODIUM CHLORIDE 5; .9 G/100ML; G/100ML
INJECTION, SOLUTION INTRAVENOUS CONTINUOUS
Status: DISCONTINUED | OUTPATIENT
Start: 2024-06-14 | End: 2024-06-16

## 2024-06-14 RX ADMIN — DEXTROSE AND SODIUM CHLORIDE: 5; 900 INJECTION, SOLUTION INTRAVENOUS at 09:06

## 2024-06-14 RX ADMIN — VANCOMYCIN HYDROCHLORIDE 1000 MG: 1 INJECTION, POWDER, LYOPHILIZED, FOR SOLUTION INTRAVENOUS at 10:06

## 2024-06-14 RX ADMIN — BACLOFEN 5 MG: 5 TABLET ORAL at 09:06

## 2024-06-14 RX ADMIN — BACLOFEN 5 MG: 5 TABLET ORAL at 04:06

## 2024-06-14 RX ADMIN — BACLOFEN 5 MG: 5 TABLET ORAL at 08:06

## 2024-06-14 RX ADMIN — HEPARIN SODIUM AND DEXTROSE 10 UNITS/KG/HR: 10000; 5 INJECTION INTRAVENOUS at 08:06

## 2024-06-14 NOTE — PT/OT/SLP EVAL
Speech Language Pathology Evaluation  Bedside Swallow    Patient Name:  Amirah Davey   MRN:  [id number]  Admitting Diagnosis: Abdominal wall abscess    Recommendations:                 General Recommendations:  Dysphagia therapy  Diet recommendations:  NPO, NPO   Aspiration Precautions:   Continue with long term means of alternative hydration and nutrition      Pleasure feedings of ice chips and/or 1/2 tsp bites of pudding/applesauce   General Precautions: Standard, aspiration, fall  Communication strategies:  none    Assessment:     Amirah Davey is a 88 y.o. female with an SLP diagnosis of Dysphagia. She presents with decreased tolerance of low level PO trials and is not safe for initiation of an oral diet at this time. SLP to continue to follow.     History:     Past Medical History:   Diagnosis Date    A-fib     Cerebrovascular accident (CVA) due to embolism of right middle cerebral artery 05/23/2024    Current use of long term anticoagulation     on Xarelto    Dysphagia due to recent stroke 05/23/2024    Hypertension     Stroke 03/20/2013       Past Surgical History:   Procedure Laterality Date    CARDIAC PACEMAKER PLACEMENT  09/25/2019    [employer] Model number [id number]  Serial number [id number] device MRI conditional for 1.5 [employer]ela magnets    CATARACT EXTRACTION W/  INTRAOCULAR LENS IMPLANT Right 05/01/2017    Dr. Case    CATARACT EXTRACTION W/  INTRAOCULAR LENS IMPLANT Left 05/29/2017    Dr. Case    ESOPHAGOGASTRODUODENOSCOPY W/ PEG N/A 5/29/2024    Procedure: EGD, WITH PEG TUBE INSERTION;  Surgeon: Imer Minaya MD;  Location: [facility] OR [address] [place] [address];  Service: General;  Laterality: N/A;    EYE SURGERY      HYSTERECTOMY      INSERTION, PEG TUBE N/A 5/27/2024    Procedure: INSERTION, PEG TUBE;  Surgeon: Ivette Love MD;  Location: [facility] ([address] [place] [address]);  Service: Endoscopy;  Laterality: N/A;       HPI: 6/12/24  Amirah Davey is a 88 y.o. female with PMHx of recent CVA in May with residual L sided weakness,  dysphagia and dysarthria, s/p PEG tube placement, HTN, Afib on eliquis who presents to [facility] [place] for evaluation of PEG tube malfunction. Patient with recent admit for PEG dislogement on 6/6 and required replacement with general surgery. She presents today after her nursing home noted that there was leaking around the PEG tube site. Per ED note, patient complained of discomfort surrounding PEG site. However, patient denies any abdominal pain at the time of my exam. She had an episode of nausea yesterday without vomiting. She says her tongue/ lips are green colored because the nursing home made her drink something green earlier today. Denies fever, chills, chest pain, SOB, HA or vision changes.     ED: AFVSS. No leukocytosis or electrolyte abnormalities. Cr 1.3 from baseline ~0.9. CT abd/pelvis concerning for small superficial abscess adjacent to the anterior abdominal wall in the upper abdomen. General surgery consulted; ube was removed at bedside in the abscess cavity was probed. Given IV vanc.     Social History: Patient arrived from inpatient rehab facility     Prior Intubation HX: none this admit    Modified Barium Swallow: 5/22/24  IMPRESSIONS: Pt demonstrates severe oropharyngeal dysphagia c/b dcr'd labial seal with anterior spillage, dcr'd lingual strength with uncoordinated bolus control/formation and AP transit with premature spillage, and dcr'd oral clearance noted across all consistencies. Delayed swallow with dcr'd laryngeal elevation and excursion, reduced epiglottic inversion, and dcr'd laryngeal closure across all consistencies -- pt with frequent episodes of laryngeal penetration and/or aspiration during the swallow (occasionally silent aspiration), despite swallowing maneuvers. However, SLP unsure if pt performed supraglottic swallow consistently/appropriately given inconsistent results noted. Dcr'd tongue base retraction, dcr'd pharyngeal constriction, and reduced PES opening (all of which could be in  "part 2/2 posterior pharyngeal wall edema noted) with mild-moderate residue spread throughout pharyngeal cavity, though mostly in vallecula. Episodes of retrograde movement and backflow observed with x2 episodes of backflow from UES into pharynx for mildly thick liquids and puree textures.     RECOMMENDATIONS: SLP will continue to follow and treat pt to address dysphagia, as well as cognitive deficits and dysarthria. Continue NPO with consideration for PEG placement for primary means of nutrition, hydration and medication. Pt may have x2-3 ice chips/hr for comfort/pleasure when given by RN and/or family. Must be sitting upright and perform several swallows per ice chip. Discontinue if overt s/s of aspiration noted. Continue frequent oral care.     FEES: Per discussion with pt and family, pt underwent FEES on 6/12/24 and was cleared for puree/thin textures per family report. Full FEES assessment not present within EMR or Care Everywhere at this time     Chest X-Rays: none this admit    Prior diet: Per family, pt recently underwent FEES at rehab facility x2 days ago and was cleared for purees and thins though had no began PO intake as the same day the study was conducted, facility noted leakage of PEG tube and pt was taken to the hospital    Occupation/hobbies/homemaking: none stated    Subjective     Spoke with nursing prior to session. Pt found resting in bed with daughters at bedside upon SLP entry into room. Pt agreeable to participate in all aspects of session.      Patient goals: "Lord help me"     Pain/Comfort:  Pain Rating 1: 0/10    Respiratory Status: Room air    Objective:     Oral Musculature Evaluation  Oral Musculature: general weakness  Dentition: scattered dentition  Secretion Management: adequate  Mucosal Quality: dry  Oral Labial Strength and Mobility: functional seal  Lingual Strength and Mobility: functional protrusion, functional anterior elevation  Volitional Cough: decreased productivity  Voice " "Prior to PO Intake: adequate volume and clarity    Bedside Swallow Eval:   Consistencies Assessed:  Ice chips x4  Thin liquids: clinician-fed by the tsp x1  Puree: clinician-fed by the 1/2 tsp of applesauce x4    Oral Phase:   Slow oral transit time with puree trials though adequate oral clearance achieved across all trials    Pharyngeal Phase:   Immediate wet coughing following 1/1 trials of thin liquids  Double swallow across all puree trials     Compensatory Strategies  None    Treatment: Daughters at bedside reported that pt has continued to utilize PEG tube as primary source of nutrition/hydration for several weeks though was making "good progress" at rehab facility. Pt displayed decreased interest in oral intake this date, requiring consistent verbal encouragement from family and clinician to participate in minimal PO trials this date. SLP provided education regarding overall impressions, pleasure feedings of ice chips and/or 1/2 tsp bites of pudding/applesauce, continuation of NPO status, and ongoing SLP POC. Family appearing disappointed that pt would not be allowed to resume puree/thin diet though SLP reviewed factors potentially associated with decreased performance with PO trials this date including decreased access to PEG feedings and decreased swallow stimulation 2/2 transfer. Family ultimately verbalized understanding and had no additional questions or concerns upon SLP exit. Spoke with nursing regarding impressions and recommendations and understanding verbalized.      Goals:   Multidisciplinary Problems       SLP Goals          Problem: SLP    Goal Priority Disciplines Outcome   SLP Goal     SLP Progressing   Description: Speech Pathology Goals  To be met by 6/28/24    1. Pt will participate in ongoing diagnostic dysphagia therapy                              Plan:     Patient to be seen:  4 x/week   Plan of Care expires:  07/14/24  Plan of Care reviewed with:  patient, family   SLP Follow-Up:  Yes  "      Discharge recommendations:  High Intensity Therapy   Barriers to Discharge:  Level of Skilled Assistance Needed      Time Tracking:     SLP Treatment Date:   06/14/24  Speech Start Time:  1024  Speech Stop Time:  1048     Speech Total Time (min):  24 min    Billable Minutes: Eval Swallow and Oral Function 10 and Self Care/Home Management Training 14      06/14/2024

## 2024-06-14 NOTE — SUBJECTIVE & OBJECTIVE
Interval History: Pt seen and examined this morning on rounds. EVA. Patient doing very well this morning and in good spirits. Daughters at bedside and thankful for care. Patient without complaints, LE spasms improved with baclofen. General surgery to plan for OR tomorrow for PEG tube replacement      Objective:     Vital Signs (Most Recent):  Temp: 97.9 °F (36.6 °C) (06/14/24 0818)  Pulse: 70 (06/14/24 0846)  Resp: 18 (06/14/24 0818)  BP: (!) 149/84 (06/14/24 0818)  SpO2: (!) 93 % (06/14/24 0818) Vital Signs (24h Range):  Temp:  [97.3 °F (36.3 °C)-98.2 °F (36.8 °C)] 97.9 °F (36.6 °C)  Pulse:  [69-82] 70  Resp:  [18] 18  SpO2:  [93 %-99 %] 93 %  BP: (134-164)/(68-85) 149/84     Weight: 68 kg (150 lb)  Body mass index is 30.3 kg/m².    Intake/Output Summary (Last 24 hours) at 6/14/2024 0937  Last data filed at 6/13/2024 1700  Gross per 24 hour   Intake --   Output 425 ml   Net -425 ml         Physical Exam  Gen: in NAD, appears stated age  Neuro: Left sided weakness (at baseline). AAOx4, CN2-12 grossly intact BL; motor, sensory, and strength grossly intact BL  HEENT: NTNC, EOMI, PERRLA, MMM; no thyromegaly or lymphadenopathy; no JVD appreciated  CVS: RRR, no m/r/g; S1/S2 auscultated with no S3 or S4; capillary refill < 2 sec  Resp: lungs CTAB, no w/r/r; no belabored breathing or accessory muscle use appreciated   Abd: Mild abdominal tendernessBS+ in all 4 quadrants; NTND, soft to palpation; no organomegaly appreciated   Extrem: pulses full, equal, and regular over all 4 extremities; no UE or LE edema BL          Significant Labs: All pertinent labs within the past 24 hours have been reviewed.    Significant Imaging: I have reviewed all pertinent imaging results/findings within the past 24 hours.

## 2024-06-14 NOTE — PROGRESS NOTES
Pharmacokinetic Assessment Follow Up: IV Vancomycin    Vancomycin serum concentration assessment(s):    The random level was drawn correctly and can be used to guide therapy at this time. The measurement is within the desired definitive target range of 10 to 20 mcg/mL.    Vancomycin Regimen Plan:    Patient with improving ELIAS. Scr was up to 1.3 > 1.2 from baseline of ~0.8-0.9. Will continue to pulse dose out of an abundance of caution given patients advanced age.     Will give vancomycin 1000 mg x1 now.   Re-dose when the random level is less than 20 mcg/mL, next level to be drawn at 2000 on 6/14    Drug levels (last 3 results):  Recent Labs   Lab Result Units 06/13/24  1723   Vancomycin, Random ug/mL 11.3       Pharmacy will continue to follow and monitor vancomycin.    Please contact pharmacy at extension [phone] for questions regarding this assessment.    Thank you for the consult,   Alicia Oliveira       Patient brief summary:  Amirah Davey is a 88 y.o. female initiated on antimicrobial therapy with IV Vancomycin for treatment of skin & soft tissue infection    The patient's current regimen is pulse dosing    Drug Allergies:   Review of patient's allergies indicates:  No Known Allergies    Actual Body Weight:   68 kg    Renal Function:   Estimated Creatinine Clearance: 28.9 mL/min (based on SCr of 1.2 mg/dL).,     Dialysis Method (if applicable):  N/A    CBC (last 72 hours):  Recent Labs   Lab Result Units 06/12/24 1801 06/13/24 0217   WBC K/uL 6.56 6.47   Hemoglobin g/dL 11.8* 10.9*   Hematocrit % 38.4 35.7*   Platelets K/uL 308 261   Gran % % 69.5 84.3*   Lymph % % 19.2 9.4*   Mono % % 9.0 4.0   Eosinophil % % 0.9 1.2   Basophil % % 0.6 0.5   Differential Method  Automated Automated       Metabolic Panel (last 72 hours):  Recent Labs   Lab Result Units 06/12/24  1801 06/13/24 0217 06/13/24  1800   Sodium mmol/L 138 135*  --    Potassium mmol/L 3.9 4.0  --    Chloride mmol/L 102 101  --    CO2 mmol/L 24 21*   --    Glucose mg/dL 95 100  --    Glucose, UA   --   --  Negative   BUN mg/dL 39* 34*  --    Creatinine mg/dL 1.3 1.2  --    Albumin g/dL 3.1*  --   --    Total Bilirubin mg/dL 0.5  --   --    Alkaline Phosphatase U/L 67  --   --    AST U/L 23  --   --    ALT U/L 17  --   --    Magnesium mg/dL  --  2.2  --    Phosphorus mg/dL  --  4.2  --        Vancomycin Administrations:  vancomycin given in the last 96 hours                     vancomycin 1,250 mg in dextrose 5 % (D5W) 250 mL IVPB (Vial-Mate) (mg) 1,250 mg New Bag 06/12/24 8347                    Microbiologic Results:  Microbiology Results (last 7 days)       Procedure Component Value Units Date/Time    Blood culture (site 1) [[id number]]     Order Status: Canceled Specimen: Blood     Blood culture (site 2) [[id number]]     Order Status: Canceled Specimen: Blood

## 2024-06-14 NOTE — ASSESSMENT & PLAN NOTE
88-year-old female with a past medical history of hypertension, obesity, AFib, and recent MCA stroke with subsequent hemiparesis and dysphagia requiring PEG tube placement who presents to the ED with worsening discharge from around her PEG tube.  Found to have dislodgement of her PEG tube now for the 2nd time and now with an associated abdominal wall abscess.    - Plan for peg tube replacement tomorrow in OR  - Please stop heparin gtt at midnight  - NPO at MN  - Consent to be obtained

## 2024-06-14 NOTE — PROGRESS NOTES
[facility] Medicine  Progress Note    Patient Name: Amirah Davey  MRN: [id number]  Patient Class: IP- Inpatient   Admission Date: 6/12/2024  Length of Stay: 2 days  Attending Physician: David Mayorga MD  Primary Care Provider: Ivana Templeton MD        Subjective:     Principal Problem:Abdominal wall abscess        HPI:  Amirah Davey is a 88 y.o. female with PMHx of recent CVA in May with residual L sided weakness, dysphagia and dysarthria, s/p PEG tube placement, HTN, Afib on eliquis who [place] [facility] [place]. Patient with recent admit for PEG dislogement on 6/6 and required replacement with general surgery. She presents today after her nursing home noted that there was leaking around the PEG tube site. Per ED note, patient complained of discomfort surrounding PEG site. However, patient denies any abdominal pain at the time of my exam. She had an episode of nausea yesterday without vomiting. She says her tongue/ lips are green colored because the nursing home made her drink something green earlier today. Denies fever, chills, chest pain, SOB, HA or vision changes.    ED: AFVSS. No leukocytosis or electrolyte abnormalities. Cr 1.3 from baseline ~0.9. CT abd/pelvis concerning for small superficial abscess adjacent to the anterior abdominal wall in the upper abdomen. General surgery consulted; ube was removed at bedside in the abscess cavity was probed. Given IV vanc.     Overview/Hospital Course:  Patient is an 88 year old female with a Hx of recent CVA in May with residual L sided weakness, dysphagia and dysarthria, s/p PEG tube placement, HTN, Afib on eliquis who p/w dislodged PEG tube c/b abwall abscess seen on CT. Started on vancomycin. General surgery consulted for g tube replacement and abscess drainage.    Interval History: Pt seen and examined this morning on snehal VELASQUEZ. Patient doing very well this morning and in good spirits. Daughters at bedside  and thankful for care. Patient without complaints, LE spasms improved with baclofen. General surgery to plan for OR tomorrow for PEG tube replacement      Objective:     Vital Signs (Most Recent):  Temp: 97.9 °F (36.6 °C) (06/14/24 0818)  Pulse: 70 (06/14/24 0846)  Resp: 18 (06/14/24 0818)  BP: (!) 149/84 (06/14/24 0818)  SpO2: (!) 93 % (06/14/24 0818) Vital Signs (24h Range):  Temp:  [97.3 °F (36.3 °C)-98.2 °F (36.8 °C)] 97.9 °F (36.6 °C)  Pulse:  [69-82] 70  Resp:  [18] 18  SpO2:  [93 %-99 %] 93 %  BP: (134-164)/(68-85) 149/84     Weight: 68 kg (150 lb)  Body mass index is 30.3 kg/m².    Intake/Output Summary (Last 24 hours) at 6/14/2024 0937  Last data filed at 6/13/2024 1700  Gross per 24 hour   Intake --   Output 425 ml   Net -425 ml         Physical Exam  Gen: in NAD, appears stated age  Neuro: Left sided weakness (at baseline). AAOx4, CN2-12 grossly intact BL; motor, sensory, and strength grossly intact BL  HEENT: NTNC, EOMI, PERRLA, MMM; no thyromegaly or lymphadenopathy; no JVD appreciated  CVS: RRR, no m/r/g; S1/S2 auscultated with no S3 or S4; capillary refill < 2 sec  Resp: lungs CTAB, no w/r/r; no belabored breathing or accessory muscle use appreciated   Abd: Mild abdominal tendernessBS+ in all 4 quadrants; NTND, soft to palpation; no organomegaly appreciated   Extrem: pulses full, equal, and regular over all 4 extremities; no UE or LE edema BL          Significant Labs: All pertinent labs within the past 24 hours have been reviewed.    Significant Imaging: I have reviewed all pertinent imaging results/findings within the past 24 hours.    Assessment/Plan:      * Abdominal wall abscess  PEG tube malfunction   - presents with dislodged PEG and was found to have abdominal wall abscess on CT  - afebrile without leukocytosis  - general surgery consulted; plan to take to OR for G tube replacement/ abscess drainage on 6/15  - keep NPO  - Holding hep gtt @ midnight  - continue IV vanc  - continue mIVF    ELIAS  (acute kidney injury)  - Cr 1.3, baseline ~1.0  - suspect prerenal from dehydration  - place on cIVFs overnight  - avoid nephrotoxins, renally dose meds    - Resolved    PEG tube malfunction  Plan for PEG tube replacement with general surgery.      Sacral ulcer, limited to breakdown of skin  - appears to be healing well  - turn q2h    Dysphagia due to recent stroke  - ongoing issue since recent stroke in May  - strict NPO  - meds via PEG once replaced    Cerebrovascular accident (CVA) due to embolism of right middle cerebral artery  - recent CVA on 5/24 with residual deficits, currently at baseline  - resume statin once PEG replaced    Chronic a-fib  Chronic anticoagulation   - hold eliquis for upcoming surgery    Hypertension, benign  - normotensive   - resume amlodipine once PEG replaced       VTE Risk Mitigation (From admission, onward)           Ordered     heparin 25,000 units in dextrose 5% (100 units/ml) IV bolus from bag LOW INTENSITY nomogram - [facility]  As needed (PRN)        Question:  Heparin Infusion Adjustment (DO NOT MODIFY ANSWER)  Answer:  \\[email or web address]\epic\Images\Pharmacy\HeparinInfusions\heparin LOW INTENSITY nomogram for [facility] [id number]    06/13/24 0011     heparin 25,000 units in dextrose 5% (100 units/ml) IV bolus from bag LOW INTENSITY nomogram - [facility]  As needed (PRN)        Question:  Heparin Infusion Adjustment (DO NOT MODIFY ANSWER)  Answer:  \\[email or web address]\epic\Images\Pharmacy\HeparinInfusions\heparin LOW INTENSITY nomogram for [facility] [id number].pdf    06/13/24 0011     heparin 25,000 units in dextrose 5% 250 mL (100 units/mL) infusion LOW INTENSITY nomogram - [facility]  Continuous        Question:  Begin at (units/kg/hr)  Answer:  12    06/13/24 0011     Reason for No Pharmacological VTE Prophylaxis  Once        Question:  Reasons:  Answer:  Already adequately anticoagulated on oral Anticoagulants    06/12/24 2156     IP VTE HIGH RISK PATIENT  Once         06/12/24 2156                    Discharge Planning    JOAN: 6/17/2024     Code Status: Full Code   Is the patient medically ready for discharge?:     Reason for patient still in hospital (select all that apply): Treatment  Discharge Plan A: Rehab                  David Mayorga MD  Department of [facility] Medicine   [facility]

## 2024-06-14 NOTE — PROGRESS NOTES
[facility] Surg  General Surgery  Progress Note    Subjective:     History of Present Illness:  88-year-old female with a past medical history of hypertension, obesity, AFib, and recent MCA stroke with subsequent hemiparesis and dysphagia requiring PEG tube placement who presents to the ED with worsening discharge from around her PEG tube.  Patient has a limited understanding of her medical condition so history was difficult to obtain. In the ED her labwork was fairly unremarkable. A CT scan of the abdomen and pelvis was performed revealing only the tip of the gastrostomy tube within the subcutaneous tissues of the abdominal wall.  There was associated small fluid collection in the surrounding space and the stomach no longer appeared to be adjacent abdominal wall.  General surgery was consulted for evaluation.    Post-Op Info:  * No surgery found *         Interval History: NAEON. Remains HDS. Labs WNL. On heparin Gtt. Plan for PEG tube replacement tomorrow.     Medications:  Continuous Infusions:   dextrose 5 % and 0.9 % NaCl   Intravenous Continuous        heparin (porcine) in D5W  0-40 Units/kg/hr (Adjusted) Intravenous Continuous 5.7 mL/hr at 06/13/24 1002 10 Units/kg/hr at 06/13/24 1002     Scheduled Meds:   baclofen  5 mg Oral TID     PRN Meds:  Current Facility-Administered Medications:     acetaminophen, 650 mg, Oral, Q4H PRN    albuterol-ipratropium, 3 mL, Nebulization, Q4H PRN    bisacodyL, 10 mg, Rectal, Daily PRN    dextrose 10%, 12.5 g, Intravenous, PRN    dextrose 10%, 25 g, Intravenous, PRN    glucagon (human recombinant), 1 mg, Intramuscular, PRN    glucose, 16 g, Oral, PRN    glucose, 24 g, Oral, PRN    heparin (PORCINE), 60 Units/kg (Adjusted), Intravenous, PRN    heparin (PORCINE), 30 Units/kg (Adjusted), Intravenous, PRN    melatonin, 6 mg, Oral, Nightly PRN    ondansetron, 4 mg, Intravenous, Q6H PRN    polyethylene glycol, 17 g, Oral, Daily PRN    Pharmacy to dose Vancomycin consult, , ,  Once **AND** vancomycin - pharmacy to dose, , Intravenous, pharmacy to manage frequency     Review of patient's allergies indicates:  No Known Allergies  Objective:     Vital Signs (Most Recent):  Temp: 97.5 °F (36.4 °C) (06/14/24 0455)  Pulse: 79 (06/14/24 0455)  Resp: 18 (06/14/24 0455)  BP: 134/73 (06/14/24 0455)  SpO2: 97 % (06/14/24 0455) Vital Signs (24h Range):  Temp:  [97.3 °F (36.3 °C)-98.2 °F (36.8 °C)] 97.5 °F (36.4 °C)  Pulse:  [69-82] 79  Resp:  [16-18] 18  SpO2:  [96 %-99 %] 97 %  BP: (125-164)/(60-85) 134/73     Weight: 68 kg (150 lb)  Body mass index is 30.3 kg/m².    Intake/Output - Last 3 Shifts         06/12 0700  06/13 0659 06/13 0700  06/14 0659 06/14 0700  06/15 0659    IV Piggyback 246.2      Total Intake(mL/kg) 246.2 (3.6)      Urine (mL/kg/hr)  425 (0.3)     Total Output  425     Net +246.2 -425                     Physical Exam  Gen: in NAD, appears stated age  Neuro: Left sided weakness (at baseline). AAOx4, CN2-12 grossly intact BL; motor, sensory, and strength grossly intact BL  HEENT: NTNC, EOMI, PERRLA, MMM; no thyromegaly or lymphadenopathy; no JVD appreciated  CVS: RRR, no m/r/g; S1/S2 auscultated with no S3 or S4; capillary refill < 2 sec  Resp: lungs CTAB, no w/r/r; no belabored breathing or accessory muscle use appreciated   Abd: Mild abdominal tendernessBS+ in all 4 quadrants; NTND, soft to palpation; no organomegaly appreciated   Extrem: pulses full, equal, and regular over all 4 extremities; no UE or LE edema BL     Significant Labs:  I have reviewed all pertinent lab results within the past 24 hours.  CBC:   Recent Labs   Lab 06/14/24  0503   WBC 4.13   RBC 3.96*   HGB 9.9*   HCT 31.6*      MCV 80*   MCH 25.0*   MCHC 31.3*     CMP:   Recent Labs   Lab 06/12/24  1801 06/13/24  0217 06/14/24  0503   GLU 95   < > 87   CALCIUM 9.9   < > 8.6*   ALBUMIN 3.1*  --   --    PROT 7.7  --   --       < > 137   K 3.9   < > 3.7   CO2 24   < > 20*      < > 108   BUN 39*   <  > 22   CREATININE 1.3   < > 0.9   ALKPHOS 67  --   --    ALT 17  --   --    AST 23  --   --    BILITOT 0.5  --   --     < > = values in this interval not displayed.       Significant Diagnostics:  I have reviewed all pertinent imaging results/findings within the past 24 hours.  Assessment/Plan:     PEG tube malfunction  88-year-old female with a past medical history of hypertension, obesity, AFib, and recent MCA stroke with subsequent hemiparesis and dysphagia requiring PEG tube placement who presents to the ED with worsening discharge from around her PEG tube.  Found to have dislodgement of her PEG tube now for the 2nd time and now with an associated abdominal wall abscess.    - Plan for peg tube replacement tomorrow in OR  - Please stop heparin gtt at midnight  - NPO at MN  - Consent to be obtained         Emma Oshea MD  General Surgery  [facility] - Med Surg

## 2024-06-14 NOTE — PT/OT/SLP EVAL
Occupational Therapy   Evaluation & Treatment    Name: Amirah Davey  MRN: [id number]  Admitting Diagnosis: Abdominal wall abscess  Recent Surgery: Procedure(s) (LRB):  INSERTION, PEG TUBE (N/A)      Recommendations:     Discharge Recommendations: High Intensity Therapy  Discharge Equipment Recommendations:  grab bar  Barriers to discharge:  Decreased caregiver support    Assessment:     Amirah Davey is a 88 y.o. female with a medical diagnosis of Abdominal wall abscess.  She presents with the following performance deficits affecting function: weakness, impaired endurance, impaired sensation, impaired self care skills, impaired functional mobility, gait instability, impaired balance, pain, decreased safety awareness, decreased lower extremity function, decreased upper extremity function, impaired fine motor, impaired coordination, decreased ROM. Pt agreeable to session. Pt demonstrating limitations in participation secondary to anxiety with mobility, self-limiting behaviors, and pain. Pt's daughters present in the room for treatment and providing lots of encouragement for pt to participate - pt still requesting to return to supine after ~5 minutes of sitting EOB secondary to c/o nausea and dizziness. Pt would benefit from skilled OT services in the acute care setting to promote involvement of LUE in functional tasks, increase activity tolerance in order to facilitate participation in self-care routines, and promote functional independence needed to return to PLOF and least restrictive home environment.     Rehab Prognosis: Fair; patient would benefit from acute skilled OT services to address these deficits and reach maximum level of function.       Plan:     Patient to be seen 4 x/week to address the above listed problems via self-care/home management, therapeutic activities, therapeutic exercises, neuromuscular re-education  Plan of Care Expires: 07/12/24  Plan of Care Reviewed with: patient, family    Subjective  "    Chief Complaint: pain, dizziness, nausea  Patient/Family Comments/goals: "I can't move."    Occupational Profile:  Living Environment: Pt lives with her daughter in a H with a small threshold to enter. Pt has a tub/shower combo with a shower chair.   Previous level of function: IND with all ADLs  Roles and Routines: Pt was driving prior to her stroke.  Equipment Used at Home: shower chair  Assistance upon Discharge: Pt's daughter to assist as needed; however, daughter works during the day.     Pain/Comfort:  Pain Rating 1: other (see comments) (pt did not rate pain however c/o LLE pain during bed mobility)  Location - Side 1: Left  Location - Orientation 1: generalized  Location 1: leg  Pain Addressed 1: Reposition, Distraction, Cessation of Activity    Patients cultural, spiritual, Synagogue conflicts given the current situation: no    Objective:     Communicated with: Nursing prior to session.  Patient found supine with peripheral IV, PEG Tube upon OT entry to room.    General Precautions: Standard, aspiration, fall, NPO  Orthopedic Precautions: N/A  Braces: N/A  Respiratory Status: Room air    Occupational Performance:    Bed Mobility:    Patient completed Scooting/Bridging with maximal assistance  Patient completed Supine to Sit with maximal assistance  Patient completed Sit to Supine with maximal assistance    Functional Mobility/Transfers:  Functional transfers deferred secondary to impaired balance in sitting and to promote safety. Pt required maximal assistance to sit EOB (~5 minutes) before requesting to return to supine secondary to c/o dizziness and nausea. Pt displaying slumped posturing, decreased postural control, posterior lean, and leftward lean with static sitting. Verbal cues provided to promote upright posturing at midline - pt able to attempt but did not sustain upright posturing.     Activities of Daily Living:  Grooming: moderate assistance for wiping face with wash cloth  Lower Body " Dressing: total assistance for donning hospital socks    Cognitive/Visual Perceptual:  Cognitive/Psychosocial Skills:     -       Follows Commands/attention:Follows one-step commands  -       Communication: clear/fluent  -       Memory: Poor immediate recall  -       Safety awareness/insight to disability: impaired     Physical Exam:  Postural examination/scapula alignment: -       Rounded shoulders  -       Forward head  -       Lateral weight shift of hips  -       Abnormal trunk flexion  Sensation:    -       Impaired  light/touch LUE  Upper Extremity Range of Motion:     -       Left Upper Extremity: Deficits: no AROM - able to achieve ~140 degrees of forward shoulder flexion, elbow extension to ~-20 degrees, full wrist flexion/extension, and full digit flexion/extension    AMPAC 6 Click ADL:  AMPAC Total Score: 12    Treatment & Education:  Provided education on the role of OT, POC, and therapy goals while in the acute care setting. Provided education on the importance of continued mobilization and participation in OOB activities to increase functional endurance and activity tolerance for increased participation in ADL routines. Provided education on the benefits of neuromuscular re-education and strengthening the brain/body connection through therapeutic activities. Provided education on the benefits of PROM for LUE, positioning techniques to prevent contractures, and importance of mobilization/OOB therapeutic activities to prevent development of pressure sores. All questions within the scope of OT answered, and pt and family verbalized understanding.     Patient left HOB elevated with all lines intact, call button in reach, nursing notified, and daughters present    GOALS:   Multidisciplinary Problems       Occupational Therapy Goals          Problem: Occupational Therapy    Goal Priority Disciplines Outcome Interventions   Occupational Therapy Goal     OT, PT/OT Progressing    Description: Goals to be met  by: 7/12/24.     Patient will increase functional independence with ADLs by performing:    UE Dressing with Minimal Assistance.  LE Dressing with Moderate Assistance.  Grooming while seated with Stand-by Assistance.  Toileting from bedside commode with Minimal Assistance for hygiene and clothing management.   Sitting at edge of bed x10 minutes with Minimal Assistance.  Toilet transfer to bedside commode with Minimal Assistance.                         History:     Past Medical History:   Diagnosis Date    A-fib     Cerebrovascular accident (CVA) due to embolism of right middle cerebral artery 05/23/2024    Current use of long term anticoagulation     on Xarelto    Dysphagia due to recent stroke 05/23/2024    Hypertension     Stroke 03/20/2013         Past Surgical History:   Procedure Laterality Date    CARDIAC PACEMAKER PLACEMENT  09/25/2019    [employer] Model number [id number]  Serial number [id number] device MRI conditional for 1.5 Estela magnets    CATARACT EXTRACTION W/  INTRAOCULAR LENS IMPLANT Right 05/01/2017    Dr. Case    CATARACT EXTRACTION W/  INTRAOCULAR LENS IMPLANT Left 05/29/2017    Dr. Case    ESOPHAGOGASTRODUODENOSCOPY W/ PEG N/A 5/29/2024    Procedure: EGD, WITH PEG TUBE INSERTION;  Surgeon: Imer Minaya MD;  Location: [address];  Service: General;  Laterality: N/A;    EYE SURGERY      HYSTERECTOMY      INSERTION, PEG TUBE N/A 5/27/2024    Procedure: INSERTION, PEG TUBE;  Surgeon: Ivette Love MD;  Location: [facility] ([address] [place] [address]);  Service: Endoscopy;  Laterality: N/A;       Time Tracking:     OT Date of Treatment: 06/14/24  OT Start Time: 1104  OT Stop Time: 1128  OT Total Time (min): 24 min    Billable Minutes:Evaluation 10 minutes  Therapeutic Activity 14 minutes    6/14/2024

## 2024-06-14 NOTE — SUBJECTIVE & OBJECTIVE
Interval History: NAEON. Remains HDS. Labs WNL. On heparin Gtt. Plan for PEG tube replacement tomorrow.     Medications:  Continuous Infusions:   dextrose 5 % and 0.9 % NaCl   Intravenous Continuous        heparin (porcine) in D5W  0-40 Units/kg/hr (Adjusted) Intravenous Continuous 5.7 mL/hr at 06/13/24 1002 10 Units/kg/hr at 06/13/24 1002     Scheduled Meds:   baclofen  5 mg Oral TID     PRN Meds:  Current Facility-Administered Medications:     acetaminophen, 650 mg, Oral, Q4H PRN    albuterol-ipratropium, 3 mL, Nebulization, Q4H PRN    bisacodyL, 10 mg, Rectal, Daily PRN    dextrose 10%, 12.5 g, Intravenous, PRN    dextrose 10%, 25 g, Intravenous, PRN    glucagon (human recombinant), 1 mg, Intramuscular, PRN    glucose, 16 g, Oral, PRN    glucose, 24 g, Oral, PRN    heparin (PORCINE), 60 Units/kg (Adjusted), Intravenous, PRN    heparin (PORCINE), 30 Units/kg (Adjusted), Intravenous, PRN    melatonin, 6 mg, Oral, Nightly PRN    ondansetron, 4 mg, Intravenous, Q6H PRN    polyethylene glycol, 17 g, Oral, Daily PRN    Pharmacy to dose Vancomycin consult, , , Once **AND** vancomycin - pharmacy to dose, , Intravenous, pharmacy to manage frequency     Review of patient's allergies indicates:  No Known Allergies  Objective:     Vital Signs (Most Recent):  Temp: 97.5 °F (36.4 °C) (06/14/24 0455)  Pulse: 79 (06/14/24 0455)  Resp: 18 (06/14/24 0455)  BP: 134/73 (06/14/24 0455)  SpO2: 97 % (06/14/24 0455) Vital Signs (24h Range):  Temp:  [97.3 °F (36.3 °C)-98.2 °F (36.8 °C)] 97.5 °F (36.4 °C)  Pulse:  [69-82] 79  Resp:  [16-18] 18  SpO2:  [96 %-99 %] 97 %  BP: (125-164)/(60-85) 134/73     Weight: 68 kg (150 lb)  Body mass index is 30.3 kg/m².    Intake/Output - Last 3 Shifts         06/12 0700 06/13 0659 06/13 0700  06/14 0659 06/14 0700  06/15 0659    IV Piggyback 246.2      Total Intake(mL/kg) 246.2 (3.6)      Urine (mL/kg/hr)  425 (0.3)     Total Output  425     Net +246.2 -425                     Physical Exam  Gen: in  NAD, appears stated age  Neuro: Left sided weakness (at baseline). AAOx4, CN2-12 grossly intact BL; motor, sensory, and strength grossly intact BL  HEENT: NTNC, EOMI, PERRLA, MMM; no thyromegaly or lymphadenopathy; no JVD appreciated  CVS: RRR, no m/r/g; S1/S2 auscultated with no S3 or S4; capillary refill < 2 sec  Resp: lungs CTAB, no w/r/r; no belabored breathing or accessory muscle use appreciated   Abd: Mild abdominal tendernessBS+ in all 4 quadrants; NTND, soft to palpation; no organomegaly appreciated   Extrem: pulses full, equal, and regular over all 4 extremities; no UE or LE edema BL     Significant Labs:  I have reviewed all pertinent lab results within the past 24 hours.  CBC:   Recent Labs   Lab 06/14/24  0503   WBC 4.13   RBC 3.96*   HGB 9.9*   HCT 31.6*      MCV 80*   MCH 25.0*   MCHC 31.3*     CMP:   Recent Labs   Lab 06/12/24  1801 06/13/24  0217 06/14/24  0503   GLU 95   < > 87   CALCIUM 9.9   < > 8.6*   ALBUMIN 3.1*  --   --    PROT 7.7  --   --       < > 137   K 3.9   < > 3.7   CO2 24   < > 20*      < > 108   BUN 39*   < > 22   CREATININE 1.3   < > 0.9   ALKPHOS 67  --   --    ALT 17  --   --    AST 23  --   --    BILITOT 0.5  --   --     < > = values in this interval not displayed.       Significant Diagnostics:  I have reviewed all pertinent imaging results/findings within the past 24 hours.

## 2024-06-14 NOTE — PLAN OF CARE
Problem: SLP  Goal: SLP Goal  Description: Speech Pathology Goals  To be met by 6/28/24    1. Pt will participate in ongoing diagnostic dysphagia therapy         Outcome: Progressing     Clinical swallow evaluation completed. Recommend NPO status with meds crushed as well as pleasure feedings of ice chips and/or 1/2 tsp bites of pudding/applesauce for swallow stim. SLP to continue to follow.

## 2024-06-14 NOTE — PLAN OF CARE
Problem: Occupational Therapy  Goal: Occupational Therapy Goal  Description: Goals to be met by: 7/12/24.     Patient will increase functional independence with ADLs by performing:    UE Dressing with Minimal Assistance.  LE Dressing with Moderate Assistance.  Grooming while seated with Stand-by Assistance.  Toileting from bedside commode with Minimal Assistance for hygiene and clothing management.   Sitting at edge of bed x10 minutes with Minimal Assistance.  Toilet transfer to bedside commode with Minimal Assistance.    Outcome: Progressing     OT eval completed.

## 2024-06-14 NOTE — ASSESSMENT & PLAN NOTE
- Cr 1.3, baseline ~1.0  - suspect prerenal from dehydration  - place on cIVFs overnight  - avoid nephrotoxins, renally dose meds    - Resolved

## 2024-06-14 NOTE — ASSESSMENT & PLAN NOTE
PEG tube malfunction   - presents with dislodged PEG and was found to have abdominal wall abscess on CT  - afebrile without leukocytosis  - general surgery consulted; plan to take to OR for G tube replacement/ abscess drainage on 6/15  - keep NPO  - Holding hep gtt @ midnight  - continue IV vanc  - continue mIVF

## 2024-06-15 LAB
ANION GAP SERPL CALC-SCNC: 8 MMOL/L (ref 8–16)
APTT PPP: 103.1 SEC (ref 21–32)
APTT PPP: 105.2 SEC (ref 21–32)
APTT PPP: 28.8 SEC (ref 21–32)
APTT PPP: 31.5 SEC (ref 21–32)
APTT PPP: 31.5 SEC (ref 21–32)
APTT PPP: 33.3 SEC (ref 21–32)
BASOPHILS # BLD AUTO: 0.01 K/UL (ref 0–0.2)
BASOPHILS # BLD AUTO: 0.02 K/UL (ref 0–0.2)
BASOPHILS NFR BLD: 0.3 % (ref 0–1.9)
BASOPHILS NFR BLD: 0.3 % (ref 0–1.9)
BUN SERPL-MCNC: 12 MG/DL (ref 8–23)
CALCIUM SERPL-MCNC: 7.9 MG/DL (ref 8.7–10.5)
CHLORIDE SERPL-SCNC: 114 MMOL/L (ref 95–110)
CO2 SERPL-SCNC: 20 MMOL/L (ref 23–29)
CREAT SERPL-MCNC: 0.9 MG/DL (ref 0.5–1.4)
DIFFERENTIAL METHOD BLD: ABNORMAL
DIFFERENTIAL METHOD BLD: ABNORMAL
EOSINOPHIL # BLD AUTO: 0.1 K/UL (ref 0–0.5)
EOSINOPHIL # BLD AUTO: 0.1 K/UL (ref 0–0.5)
EOSINOPHIL NFR BLD: 0.9 % (ref 0–8)
EOSINOPHIL NFR BLD: 3.1 % (ref 0–8)
ERYTHROCYTE [DISTWIDTH] IN BLOOD BY AUTOMATED COUNT: 15.2 % (ref 11.5–14.5)
ERYTHROCYTE [DISTWIDTH] IN BLOOD BY AUTOMATED COUNT: 15.4 % (ref 11.5–14.5)
EST. GFR  (NO RACE VARIABLE): >60 ML/MIN/1.73 M^2
GLUCOSE SERPL-MCNC: 362 MG/DL (ref 70–110)
HCT VFR BLD AUTO: 31.6 % (ref 37–48.5)
HCT VFR BLD AUTO: 38 % (ref 37–48.5)
HGB BLD-MCNC: 11.2 G/DL (ref 12–16)
HGB BLD-MCNC: 9.5 G/DL (ref 12–16)
IMM GRANULOCYTES # BLD AUTO: 0.02 K/UL (ref 0–0.04)
IMM GRANULOCYTES # BLD AUTO: 0.03 K/UL (ref 0–0.04)
IMM GRANULOCYTES NFR BLD AUTO: 0.3 % (ref 0–0.5)
IMM GRANULOCYTES NFR BLD AUTO: 0.8 % (ref 0–0.5)
INR PPP: 1.1 (ref 0.8–1.2)
LYMPHOCYTES # BLD AUTO: 0.7 K/UL (ref 1–4.8)
LYMPHOCYTES # BLD AUTO: 0.7 K/UL (ref 1–4.8)
LYMPHOCYTES NFR BLD: 11.7 % (ref 18–48)
LYMPHOCYTES NFR BLD: 17.3 % (ref 18–48)
MAGNESIUM SERPL-MCNC: 1.7 MG/DL (ref 1.6–2.6)
MCH RBC QN AUTO: 24.4 PG (ref 27–31)
MCH RBC QN AUTO: 24.9 PG (ref 27–31)
MCHC RBC AUTO-ENTMCNC: 29.5 G/DL (ref 32–36)
MCHC RBC AUTO-ENTMCNC: 30.1 G/DL (ref 32–36)
MCV RBC AUTO: 83 FL (ref 82–98)
MCV RBC AUTO: 83 FL (ref 82–98)
MONOCYTES # BLD AUTO: 0.6 K/UL (ref 0.3–1)
MONOCYTES # BLD AUTO: 0.8 K/UL (ref 0.3–1)
MONOCYTES NFR BLD: 13.1 % (ref 4–15)
MONOCYTES NFR BLD: 14.7 % (ref 4–15)
NEUTROPHILS # BLD AUTO: 2.5 K/UL (ref 1.8–7.7)
NEUTROPHILS # BLD AUTO: 4.3 K/UL (ref 1.8–7.7)
NEUTROPHILS NFR BLD: 63.8 % (ref 38–73)
NEUTROPHILS NFR BLD: 73.7 % (ref 38–73)
NRBC BLD-RTO: 0 /100 WBC
NRBC BLD-RTO: 1 /100 WBC
PHOSPHATE SERPL-MCNC: 2.4 MG/DL (ref 2.7–4.5)
PLATELET # BLD AUTO: 224 K/UL (ref 150–450)
PLATELET # BLD AUTO: 228 K/UL (ref 150–450)
PMV BLD AUTO: 11.8 FL (ref 9.2–12.9)
PMV BLD AUTO: 12.1 FL (ref 9.2–12.9)
POCT GLUCOSE: 117 MG/DL (ref 70–110)
POCT GLUCOSE: 138 MG/DL (ref 70–110)
POCT GLUCOSE: 138 MG/DL (ref 70–110)
POCT GLUCOSE: 168 MG/DL (ref 70–110)
POTASSIUM SERPL-SCNC: 3.1 MMOL/L (ref 3.5–5.1)
PROTHROMBIN TIME: 12.1 SEC (ref 9–12.5)
RBC # BLD AUTO: 3.82 M/UL (ref 4–5.4)
RBC # BLD AUTO: 4.59 M/UL (ref 4–5.4)
SODIUM SERPL-SCNC: 142 MMOL/L (ref 136–145)
WBC # BLD AUTO: 3.87 K/UL (ref 3.9–12.7)
WBC # BLD AUTO: 5.8 K/UL (ref 3.9–12.7)

## 2024-06-15 PROCEDURE — 36415 COLL VENOUS BLD VENIPUNCTURE: CPT | Mod: XB | Performed by: INTERNAL MEDICINE

## 2024-06-15 PROCEDURE — 80048 BASIC METABOLIC PNL TOTAL CA: CPT | Performed by: PHYSICIAN ASSISTANT

## 2024-06-15 PROCEDURE — 85730 THROMBOPLASTIN TIME PARTIAL: CPT | Performed by: PHYSICIAN ASSISTANT

## 2024-06-15 PROCEDURE — 97530 THERAPEUTIC ACTIVITIES: CPT

## 2024-06-15 PROCEDURE — 85610 PROTHROMBIN TIME: CPT | Performed by: INTERNAL MEDICINE

## 2024-06-15 PROCEDURE — 97163 PT EVAL HIGH COMPLEX 45 MIN: CPT

## 2024-06-15 PROCEDURE — 85025 COMPLETE CBC W/AUTO DIFF WBC: CPT | Performed by: PHYSICIAN ASSISTANT

## 2024-06-15 PROCEDURE — 85730 THROMBOPLASTIN TIME PARTIAL: CPT | Mod: 91 | Performed by: INTERNAL MEDICINE

## 2024-06-15 PROCEDURE — 84100 ASSAY OF PHOSPHORUS: CPT | Performed by: PHYSICIAN ASSISTANT

## 2024-06-15 PROCEDURE — 36415 COLL VENOUS BLD VENIPUNCTURE: CPT | Performed by: PHYSICIAN ASSISTANT

## 2024-06-15 PROCEDURE — 85025 COMPLETE CBC W/AUTO DIFF WBC: CPT | Mod: 91 | Performed by: INTERNAL MEDICINE

## 2024-06-15 PROCEDURE — 25000003 PHARM REV CODE 250: Performed by: INTERNAL MEDICINE

## 2024-06-15 PROCEDURE — 63600175 PHARM REV CODE 636 W HCPCS: Performed by: INTERNAL MEDICINE

## 2024-06-15 PROCEDURE — 21400001 HC TELEMETRY ROOM

## 2024-06-15 PROCEDURE — 83735 ASSAY OF MAGNESIUM: CPT | Performed by: PHYSICIAN ASSISTANT

## 2024-06-15 RX ORDER — HYDROMORPHONE HYDROCHLORIDE 1 MG/ML
0.5 INJECTION, SOLUTION INTRAMUSCULAR; INTRAVENOUS; SUBCUTANEOUS ONCE
Status: COMPLETED | OUTPATIENT
Start: 2024-06-15 | End: 2024-06-15

## 2024-06-15 RX ORDER — HEPARIN SODIUM,PORCINE/D5W 25000/250
0-40 INTRAVENOUS SOLUTION INTRAVENOUS CONTINUOUS
Status: DISPENSED | OUTPATIENT
Start: 2024-06-15 | End: 2024-06-16

## 2024-06-15 RX ADMIN — VANCOMYCIN HYDROCHLORIDE 1000 MG: 1 INJECTION, POWDER, LYOPHILIZED, FOR SOLUTION INTRAVENOUS at 08:06

## 2024-06-15 RX ADMIN — HEPARIN SODIUM AND DEXTROSE 12 UNITS/KG/HR: 10000; 5 INJECTION INTRAVENOUS at 01:06

## 2024-06-15 RX ADMIN — HYDROMORPHONE HYDROCHLORIDE 0.5 MG: 0.5 INJECTION, SOLUTION INTRAMUSCULAR; INTRAVENOUS; SUBCUTANEOUS at 09:06

## 2024-06-15 RX ADMIN — ONDANSETRON 4 MG: 2 INJECTION INTRAMUSCULAR; INTRAVENOUS at 12:06

## 2024-06-15 RX ADMIN — BACLOFEN 5 MG: 5 TABLET ORAL at 08:06

## 2024-06-15 NOTE — PROGRESS NOTES
Pharmacokinetic Assessment Follow Up: IV Vancomycin    Vancomycin serum concentration assessment(s):    The random level was drawn correctly and can be used to guide therapy at this time. The measurement is within the desired definitive target range of 10 to 20 mcg/mL.    Vancomycin Regimen Plan:    Patient's scr has returned to baseline, therefore will schedule regimen. Initiate Vancomycin 1000 mg IV every 24 hours with next serum trough concentration measured at 2000 prior to 3rd dose on 6/16.    Drug levels (last 3 results):  Recent Labs   Lab Result Units 06/13/24  1723 06/14/24  1923   Vancomycin, Random ug/mL 11.3 10.9       Pharmacy will continue to follow and monitor vancomycin.    Please contact pharmacy at extension [phone] for questions regarding this assessment.    Thank you for the consult,   Alicia Oliveira       Patient brief summary:  Amirah Davey is a 88 y.o. female initiated on antimicrobial therapy with IV Vancomycin for treatment of skin & soft tissue infection    The patient's current regimen is 1000 mg q24h    Drug Allergies:   Review of patient's allergies indicates:  No Known Allergies    Actual Body Weight:   68 kg    Renal Function:   Estimated Creatinine Clearance: 38.5 mL/min (based on SCr of 0.9 mg/dL).,     Dialysis Method (if applicable):  NA    CBC (last 72 hours):  Recent Labs   Lab Result Units 06/12/24 1801 06/13/24 0217 06/14/24  0503   WBC K/uL 6.56 6.47 4.13   Hemoglobin g/dL 11.8* 10.9* 9.9*   Hematocrit % 38.4 35.7* 31.6*   Platelets K/uL 308 261 217   Gran % % 69.5 84.3* 64.3   Lymph % % 19.2 9.4* 16.5*   Mono % % 9.0 4.0 14.3   Eosinophil % % 0.9 1.2 3.9   Basophil % % 0.6 0.5 0.5   Differential Method  Automated Automated Automated       Metabolic Panel (last 72 hours):  Recent Labs   Lab Result Units 06/12/24  1801 06/13/24 0217 06/13/24  1800 06/14/24  0503   Sodium mmol/L 138 135*  --  137   Potassium mmol/L 3.9 4.0  --  3.7   Chloride mmol/L 102 101  --  108   CO2  mmol/L 24 21*  --  20*   Glucose mg/dL 95 100  --  87   Glucose, UA   --   --  Negative  --    BUN mg/dL 39* 34*  --  22   Creatinine mg/dL 1.3 1.2  --  0.9   Albumin g/dL 3.1*  --   --   --    Total Bilirubin mg/dL 0.5  --   --   --    Alkaline Phosphatase U/L 67  --   --   --    AST U/L 23  --   --   --    ALT U/L 17  --   --   --    Magnesium mg/dL  --  2.2  --  2.1   Phosphorus mg/dL  --  4.2  --  3.6       Vancomycin Administrations:  vancomycin given in the last 96 hours                     vancomycin (VANCOCIN) 1,000 mg in dextrose 5 % (D5W) 250 mL IVPB (Vial-Mate) (mg) 1,000 mg New Bag 06/13/24 2044    vancomycin 1,250 mg in dextrose 5 % (D5W) 250 mL IVPB (Vial-Mate) (mg) 1,250 mg New Bag 06/12/24 2151                    Microbiologic Results:  Microbiology Results (last 7 days)       Procedure Component Value Units Date/Time    Blood culture (site 1) [[id number]]     Order Status: Canceled Specimen: Blood     Blood culture (site 2) [[id number]]     Order Status: Canceled Specimen: Blood

## 2024-06-15 NOTE — PROGRESS NOTES
[facility] Medicine  Progress Note    Patient Name: Amirah Davey  MRN: [id number]  Patient Class: IP- Inpatient   Admission Date: 6/12/2024  Length of Stay: 3 days  Attending Physician: David Mayorga MD  Primary Care Provider: Ivana Templeton MD        Subjective:     Principal Problem:Abdominal wall abscess        HPI:  Amirah Davey is a 88 y.o. female with PMHx of recent CVA in May with residual L sided weakness, dysphagia and dysarthria, s/p PEG tube placement, HTN, Afib on eliquis who presents to [facility] for evaluation of PEG tube malfunction. Patient with recent admit for PEG dislogement on 6/6 and required replacement with general surgery. She presents today after her nursing home noted that there was leaking around the PEG tube site. Per ED note, patient complained of discomfort surrounding PEG site. However, patient denies any abdominal pain at the time of my exam. She had an episode of nausea yesterday without vomiting. She says her tongue/ lips are green colored because the nursing home made her drink something green earlier today. Denies fever, chills, chest pain, SOB, HA or vision changes.    ED: AFVSS. No leukocytosis or electrolyte abnormalities. Cr 1.3 from baseline ~0.9. CT abd/pelvis concerning for small superficial abscess adjacent to the anterior abdominal wall in the upper abdomen. General surgery consulted; ube was removed at bedside in the abscess cavity was probed. Given IV vanc.     Overview/Hospital Course:  Patient is an 88 year old female with a Hx of recent CVA in May with residual L sided weakness, dysphagia and dysarthria, s/p PEG tube placement, HTN, Afib on eliquis who p/w dislodged PEG tube c/b abwall abscess seen on CT. Started on vancomycin. General surgery consulted for g tube replacement and abscess drainage.    Interval History: Pt seen and examined this morning on snehal VELASQUEZ. Patient doing very well this morning and in good spirits. Appear that daughter  overnight did not feel comfortable signing consent form for PEG tube replacement. Hep gtt held since midnight. This morning they want to go forward with PEG tube replacement.     Objective:     Vital Signs (Most Recent):  Temp: 98.3 °F (36.8 °C) (06/15/24 0749)  Pulse: 80 (06/15/24 0749)  Resp: 18 (06/15/24 0930)  BP: (!) 160/88 (06/15/24 0749)  SpO2: 99 % (06/15/24 0749) Vital Signs (24h Range):  Temp:  [97.1 °F (36.2 °C)-98.3 °F (36.8 °C)] 98.3 °F (36.8 °C)  Pulse:  [67-80] 80  Resp:  [16-19] 18  SpO2:  [97 %-100 %] 99 %  BP: (137-167)/(74-96) 160/88     Weight: 68 kg (150 lb)  Body mass index is 30.3 kg/m².  No intake or output data in the 24 hours ending 06/15/24 0942        Physical Exam  Gen: in NAD, appears stated age  Neuro: Left sided weakness (at baseline). AAOx4, CN2-12 grossly intact BL; motor, sensory, and strength grossly intact BL  HEENT: NTNC, EOMI, PERRLA, MMM; no thyromegaly or lymphadenopathy; no JVD appreciated  CVS: RRR, no m/r/g; S1/S2 auscultated with no S3 or S4; capillary refill < 2 sec  Resp: lungs CTAB, no w/r/r; no belabored breathing or accessory muscle use appreciated   Abd: Mild abdominal tendernessBS+ in all 4 quadrants; NTND, soft to palpation; no organomegaly appreciated   Extrem: pulses full, equal, and regular over all 4 extremities; no UE or LE edema BL          Significant Labs: All pertinent labs within the past 24 hours have been reviewed.    Significant Imaging: I have reviewed all pertinent imaging results/findings within the past 24 hours.    Assessment/Plan:      * Abdominal wall abscess  PEG tube malfunction   - presents with dislodged PEG and was found to have abdominal wall abscess on CT  - afebrile without leukocytosis  - general surgery consulted; plan to take to OR for G tube replacement/ abscess drainage on 6/15  - keep NPO  - Hep gtt held since midnight; will restart heparin gtt if no surgery today   - continue IV vanc  - continue mIVF    ELIAS (acute kidney  injury)  - Cr 1.3, baseline ~1.0  - suspect prerenal from dehydration  - place on cIVFs overnight  - avoid nephrotoxins, renally dose meds    - Resolved    PEG tube malfunction  Plan for PEG tube replacement with general surgery.      Sacral ulcer, limited to breakdown of skin  - appears to be healing well  - turn q2h    Dysphagia due to recent stroke  - ongoing issue since recent stroke in May  - strict NPO  - meds via PEG once replaced    Cerebrovascular accident (CVA) due to embolism of right middle cerebral artery  - recent CVA on 5/24 with residual deficits, currently at baseline  - resume statin once PEG replaced    Chronic a-fib  Chronic anticoagulation   - hold eliquis for upcoming surgery    Hypertension, benign  - normotensive   - resume amlodipine once PEG replaced       VTE Risk Mitigation (From admission, onward)           Ordered     heparin 25,000 units in dextrose 5% (100 units/ml) IV bolus from bag LOW INTENSITY nomogram - [facility]  As needed (PRN)        Question:  Heparin Infusion Adjustment (DO NOT MODIFY ANSWER)  Answer:  [email or web address]\heparin LOW INTENSITY nomogram for [facility] [id number]    06/13/24 0011     heparin 25,000 units in dextrose 5% (100 units/ml) IV bolus from bag LOW INTENSITY nomogram - [facility]  As needed (PRN)        Question:  Heparin Infusion Adjustment (DO NOT MODIFY ANSWER)  Answer:  \\[email or web address]\Images\[email or web address]\HeparinInfusions\heparin LOW INTENSITY nomogram for [facility] [id number]    06/13/24 0011     heparin 25,000 units in dextrose 5% 250 mL (100 units/mL) infusion LOW INTENSITY nomogram - [facility]  Continuous        Question:  Begin at (units/kg/hr)  Answer:  12    06/13/24 0011     Reason for No Pharmacological VTE Prophylaxis  Once        Question:  Reasons:  Answer:  Already adequately anticoagulated on oral Anticoagulants    06/12/24 2156     IP VTE HIGH RISK PATIENT  Once         06/12/24 2156                    Discharge Planning   JOAN:  6/18/2024     Code Status: Full Code   Is the patient medically ready for discharge?:     Reason for patient still in hospital (select all that apply): Treatment  Discharge Plan A: Rehab                  David Mayorga MD  Department of Hospital Medicine   [facility]

## 2024-06-15 NOTE — ASSESSMENT & PLAN NOTE
PEG tube malfunction   - presents with dislodged PEG and was found to have abdominal wall abscess on CT  - afebrile without leukocytosis  - general surgery consulted; plan to take to OR for G tube replacement/ abscess drainage on 6/15  - keep NPO  - Hep gtt held since midnight; will restart heparin gtt if no surgery today   - continue IV vanc  - continue mIVF

## 2024-06-15 NOTE — PLAN OF CARE
Problem: Physical Therapy  Goal: Physical Therapy Goal  Description: Goals to be met by: 7/15/2024     Patient will increase functional independence with mobility by performin. Supine to sit with Minimal Assistance. -In progress  2. Sit to supine with Minimal Assistance. -In progress  3. Rolling to Left and Right with Minimal Assistance. -In progress  4. Sit to stand transfer with Maximum Assistance. -In progress  5. Gait x 5 feet with Maximum Assistance using LRAD. -In progress  6. Lower extremity exercise program x10 reps per handout, with assistance as needed. -In progress  Outcome: Progressing

## 2024-06-15 NOTE — CARE UPDATE
General Surgery Update:    Went to the bedside to discuss with patient's family about planned procedure for PEG tube reinsertion and consent for the procedure tomorrow. Daughter did not feel comfortable signing consent at this time and would like to discuss further with sister and family prior to proceeding with signing the consent. Discussed with her that patient does not have any enteral access and would need some sort of access in order to obtain nutrition. Answered all of her questions. She would like to discuss with family first and does not wish to sign consent at this time. I told her our acute care surgery team would not be here over night but that [facility] the [facility] or we would be available tomorrow morning to discuss further. Please contact general surgery team with any concerns or questions.     Rashmi Mckeon MD  [facility]  General Surgery PGY-1

## 2024-06-15 NOTE — SUBJECTIVE & OBJECTIVE
Interval History: Pt seen and examined this morning on snehal VELASQUEZ. Patient doing very well this morning and in good spirits. Appear that daughter overnight did not feel comfortable signing consent form for PEG tube replacement. Hep gtt held since midnight. This morning they want to go forward with PEG tube replacement.     Objective:     Vital Signs (Most Recent):  Temp: 98.3 °F (36.8 °C) (06/15/24 0749)  Pulse: 80 (06/15/24 0749)  Resp: 18 (06/15/24 0930)  BP: (!) 160/88 (06/15/24 0749)  SpO2: 99 % (06/15/24 0749) Vital Signs (24h Range):  Temp:  [97.1 °F (36.2 °C)-98.3 °F (36.8 °C)] 98.3 °F (36.8 °C)  Pulse:  [67-80] 80  Resp:  [16-19] 18  SpO2:  [97 %-100 %] 99 %  BP: (137-167)/(74-96) 160/88     Weight: 68 kg (150 lb)  Body mass index is 30.3 kg/m².  No intake or output data in the 24 hours ending 06/15/24 0942        Physical Exam  Gen: in NAD, appears stated age  Neuro: Left sided weakness (at baseline). AAOx4, CN2-12 grossly intact BL; motor, sensory, and strength grossly intact BL  HEENT: NTNC, EOMI, PERRLA, MMM; no thyromegaly or lymphadenopathy; no JVD appreciated  CVS: RRR, no m/r/g; S1/S2 auscultated with no S3 or S4; capillary refill < 2 sec  Resp: lungs CTAB, no w/r/r; no belabored breathing or accessory muscle use appreciated   Abd: Mild abdominal tendernessBS+ in all 4 quadrants; NTND, soft to palpation; no organomegaly appreciated   Extrem: pulses full, equal, and regular over all 4 extremities; no UE or LE edema BL          Significant Labs: All pertinent labs within the past 24 hours have been reviewed.    Significant Imaging: I have reviewed all pertinent imaging results/findings within the past 24 hours.

## 2024-06-15 NOTE — PT/OT/SLP EVAL
Physical Therapy Evaluation and Treatment    Patient Name: Amirah Davey   MRN: [id number]  Recent Surgery: Procedure(s) (LRB):  INSERTION, PEG TUBE (N/A)      Recommendations:     Discharge Recommendations: High Intensity Therapy   Discharge Equipment Recommendations: grab bar, wheelchair   Barriers to discharge: Increased level of assist, Decreased caregiver support, and Ongoing medical treatment    Assessment:     Amirah Davey is a 88 y.o. female admitted with a medical diagnosis of Abdominal wall abscess. She presents with the following impairments/functional limitations: weakness, impaired endurance, impaired sensation, impaired self care skills, impaired functional mobility, gait instability, impaired balance, decreased upper extremity function, decreased coordination, decreased lower extremity function, decreased safety awareness, pain, abnormal tone, decreased ROM, impaired coordination, impaired fine motor, impaired skin, impaired cardiopulmonary response to activity. Patient has a mobility limitation that significantly impairs the ability to participate in one or more mobility related activities of daily living (MRADL's) such as toileting, feeding, dressing, grooming, and bathing in customary locations in the home. The mobility limitation cannot be sufficiently resolved by the use of a cane or walker. The use of a manual wheelchair will significantly improve the patient's ability to participate in MRADL's and the patient will use it on a regular basis in the home. Patient expressed their willingness to use a manual wheelchair in the home. Patient also has a caregiver who is available, willing, and able to provide assistance with the wheelchair when needed. Patient requires high intensity therapy upon discharge from hospital.     Rehab Prognosis: Fair; patient would benefit from acute PT services to address these deficits and reach maximum level of function.    Plan:     During this hospitalization, patient  to be seen 4 x/week to address the above listed problems via gait training, therapeutic activities, therapeutic exercises, neuromuscular re-education, wheelchair management/training    Plan of Care Expires: 07/15/24    Subjective     Chief Complaint: Patient reports having pain in bilateral legs being a 7/10 but is agreeable to participate in physical therapy.   Patient Comments/Goals: To stay still because her legs hurt  Pain/Comfort:  Pain Rating 1: 7/10  Location - Side 1: Bilateral  Location - Orientation 1: generalized  Location 1: leg  Pain Addressed 1: Reposition, Distraction, Nurse notified  Pain Rating Post-Intervention 1: 7/10    Social History:  Living Environment: Patient's daughter lives with her in a single story home with a 4 inch threshold step to enter. Patient has a tub/shower combo with shower chair.   Prior Level of Function: Prior to admission, patient was independent and driving and retired  Equipment Used at Home: shower chair  DME owned (not currently used): none  Assistance Upon Discharge: family    Objective:     Communicated with SAMI Navarrete prior to session. Patient found left sidelying with HOB elevated with peripheral IV, telemetry, PEG Tube upon PT entry to room.    General Precautions: Standard, aspiration, fall, NPO   Orthopedic Precautions: N/A   Braces: N/A    Respiratory Status: Room air    Exams:  Cognition: Patient is oriented to Person, Place, Time, Situation  Right lower extremity range of motion: WFL  Right lower extremity strength:   Area MMT Grade   Hip Flexion 3/5   Knee Extension 3/5   Plantarflexion 3/5   Dorsiflexion 3/5   Left lower extremity range of motion: Deficits: impaired mobility and patient was unwilling to attempt due to increased pain in left leg.  Left lower extremity strength: PT unable to assess  Gross Motor Coordination: WFL on right lower extremity; no active movements with left lower extremity   Postural Exam: Patient presented with the following  abnormalities:  -       Rounded shoulders  -       Forward head  -       Posterior pelvic tilt  -       Abnormal trunk flexion  -       Kyphosis  Sensation:    -       Intact: light/touch   Skin Integrity/Edema:     Skin integrity: Visible skin intact, Thin, and Dry  Edema: Mild    Functional Mobility:  Bed Mobility  Rolling Left: maximal assistance using right upper extremity on bed rails  Rolling Right: maximal assistance  Boosting towards head of bed: dependence  and of 2 persons with bed in flat position.   Supine to Sit: maximal assistance for LE management and trunk management, with an extended amount of time to perform and with PT and family attempting to motivate patient to perform activity  Sit to Supine: maximal assistance for LE management and trunk management, with an extended amount of time to perform and with PT and family attempting to motivate patient to perform activity  Transfers  Sit to Stand:     Neuromuscular Exercises:  Balance:  Sitting: Performed sitting edge of bed for ~5 minutes with right upper extremity as needed and maximal assistance for safety.     Therapeutic Activities:  See bed mobility and transfers above.    Patient Education:  Patient educated on role of acute care PT and PT POC, safety while in hospital including calling nurse for mobility, and call light usage.  Patient educated about importance of out of bed mobility and remaining up in chair most of the day.  Patient educated about pursed lip breathing technique and cued for use with mobility.     AM-PAC 6 CLICK MOBILITY  Total Score:8    Patient left left sidelying with HOB elevated with all lines intact, call button in reach, and family present.    GOALS:   Multidisciplinary Problems       Physical Therapy Goals          Problem: Physical Therapy    Goal Priority Disciplines Outcome Goal Variances Interventions   Physical Therapy Goal     PT, PT/OT Progressing     Description: Goals to be met by: 7/15/2024     Patient will  increase functional independence with mobility by performin. Supine to sit with Minimal Assistance. -In progress  2. Sit to supine with Minimal Assistance. -In progress  3. Rolling to Left and Right with Minimal Assistance. -In progress  4. Sit to stand transfer with Maximum Assistance. -In progress  5. Gait x 5 feet with Maximum Assistance using LRAD. -In progress  6. Lower extremity exercise program x10 reps per handout, with assistance as needed. -In progress                       History:     Past Medical History:   Diagnosis Date    A-fib     Cerebrovascular accident (CVA) due to embolism of right middle cerebral artery 2024    Current use of long term anticoagulation     on Xarelto    Dysphagia due to recent stroke 2024    Hypertension     Stroke 2013       Past Surgical History:   Procedure Laterality Date    CARDIAC PACEMAKER PLACEMENT  2019    [employer] Model number [id number]  Serial number [id number] device MRI conditional for 1.5 Estela magnets    CATARACT EXTRACTION W/  INTRAOCULAR LENS IMPLANT Right 2017    Dr. Case    CATARACT EXTRACTION W/  INTRAOCULAR LENS IMPLANT Left 2017    Dr. Case    ESOPHAGOGASTRODUODENOSCOPY W/ PEG N/A 2024    Procedure: EGD, WITH PEG TUBE INSERTION;  Surgeon: Imer Minaya MD;  Location: [facility] OR [address] [place] [address];  Service: General;  Laterality: N/A;    EYE SURGERY      HYSTERECTOMY      INSERTION, PEG TUBE N/A 2024    Procedure: INSERTION, PEG TUBE;  Surgeon: Ivette Love MD;  Location: [facility] ([address] [place] [address]);  Service: Endoscopy;  Laterality: N/A;       Time Tracking:     PT Received On: 06/15/24  PT Start Time: 918  PT Stop Time: 1000  PT Total Time (min): 42 min     Billable Minutes: Evaluation 10 and Therapeutic Activity 32    6/15/2024

## 2024-06-16 ENCOUNTER — ANESTHESIA EVENT (OUTPATIENT)
Dept: SURGERY | Facility: HOSPITAL | Age: 89
End: 2024-06-16
Payer: MEDICARE

## 2024-06-16 LAB
ANION GAP SERPL CALC-SCNC: 12 MMOL/L (ref 8–16)
APTT PPP: 39.9 SEC (ref 21–32)
APTT PPP: 45.2 SEC (ref 21–32)
BASOPHILS # BLD AUTO: 0.03 K/UL (ref 0–0.2)
BASOPHILS NFR BLD: 0.5 % (ref 0–1.9)
BUN SERPL-MCNC: 10 MG/DL (ref 8–23)
CALCIUM SERPL-MCNC: 9.3 MG/DL (ref 8.7–10.5)
CHLORIDE SERPL-SCNC: 108 MMOL/L (ref 95–110)
CO2 SERPL-SCNC: 20 MMOL/L (ref 23–29)
CREAT SERPL-MCNC: 0.8 MG/DL (ref 0.5–1.4)
DIFFERENTIAL METHOD BLD: ABNORMAL
EOSINOPHIL # BLD AUTO: 0.1 K/UL (ref 0–0.5)
EOSINOPHIL NFR BLD: 1.5 % (ref 0–8)
ERYTHROCYTE [DISTWIDTH] IN BLOOD BY AUTOMATED COUNT: 15.4 % (ref 11.5–14.5)
EST. GFR  (NO RACE VARIABLE): >60 ML/MIN/1.73 M^2
GLUCOSE SERPL-MCNC: 107 MG/DL (ref 70–110)
HCT VFR BLD AUTO: 36.9 % (ref 37–48.5)
HGB BLD-MCNC: 10.9 G/DL (ref 12–16)
IMM GRANULOCYTES # BLD AUTO: 0.02 K/UL (ref 0–0.04)
IMM GRANULOCYTES NFR BLD AUTO: 0.3 % (ref 0–0.5)
LYMPHOCYTES # BLD AUTO: 0.8 K/UL (ref 1–4.8)
LYMPHOCYTES NFR BLD: 13.7 % (ref 18–48)
MAGNESIUM SERPL-MCNC: 1.7 MG/DL (ref 1.6–2.6)
MCH RBC QN AUTO: 24.4 PG (ref 27–31)
MCHC RBC AUTO-ENTMCNC: 29.5 G/DL (ref 32–36)
MCV RBC AUTO: 83 FL (ref 82–98)
MONOCYTES # BLD AUTO: 0.8 K/UL (ref 0.3–1)
MONOCYTES NFR BLD: 13.1 % (ref 4–15)
NEUTROPHILS # BLD AUTO: 4.3 K/UL (ref 1.8–7.7)
NEUTROPHILS NFR BLD: 70.9 % (ref 38–73)
NRBC BLD-RTO: 0 /100 WBC
PLATELET # BLD AUTO: 242 K/UL (ref 150–450)
PMV BLD AUTO: 12.8 FL (ref 9.2–12.9)
POCT GLUCOSE: 112 MG/DL (ref 70–110)
POTASSIUM SERPL-SCNC: 3.4 MMOL/L (ref 3.5–5.1)
RBC # BLD AUTO: 4.47 M/UL (ref 4–5.4)
SODIUM SERPL-SCNC: 140 MMOL/L (ref 136–145)
VANCOMYCIN TROUGH SERPL-MCNC: 16.5 UG/ML (ref 10–22)
WBC # BLD AUTO: 6.04 K/UL (ref 3.9–12.7)

## 2024-06-16 PROCEDURE — 83735 ASSAY OF MAGNESIUM: CPT | Performed by: INTERNAL MEDICINE

## 2024-06-16 PROCEDURE — 21400001 HC TELEMETRY ROOM

## 2024-06-16 PROCEDURE — 25000003 PHARM REV CODE 250: Performed by: STUDENT IN AN ORGANIZED HEALTH CARE EDUCATION/TRAINING PROGRAM

## 2024-06-16 PROCEDURE — 80202 ASSAY OF VANCOMYCIN: CPT | Performed by: INTERNAL MEDICINE

## 2024-06-16 PROCEDURE — 25000003 PHARM REV CODE 250: Performed by: INTERNAL MEDICINE

## 2024-06-16 PROCEDURE — 85025 COMPLETE CBC W/AUTO DIFF WBC: CPT | Performed by: INTERNAL MEDICINE

## 2024-06-16 PROCEDURE — 36415 COLL VENOUS BLD VENIPUNCTURE: CPT | Performed by: INTERNAL MEDICINE

## 2024-06-16 PROCEDURE — 85730 THROMBOPLASTIN TIME PARTIAL: CPT | Mod: 91 | Performed by: INTERNAL MEDICINE

## 2024-06-16 PROCEDURE — 80048 BASIC METABOLIC PNL TOTAL CA: CPT | Performed by: INTERNAL MEDICINE

## 2024-06-16 PROCEDURE — 63600175 PHARM REV CODE 636 W HCPCS: Performed by: INTERNAL MEDICINE

## 2024-06-16 RX ORDER — ACETAMINOPHEN 325 MG/1
650 TABLET ORAL EVERY 4 HOURS PRN
Status: DISCONTINUED | OUTPATIENT
Start: 2024-06-16 | End: 2024-06-18

## 2024-06-16 RX ORDER — DEXTROSE MONOHYDRATE AND SODIUM CHLORIDE 5; .9 G/100ML; G/100ML
INJECTION, SOLUTION INTRAVENOUS CONTINUOUS
Status: DISCONTINUED | OUTPATIENT
Start: 2024-06-16 | End: 2024-06-18

## 2024-06-16 RX ORDER — AMLODIPINE BESYLATE 10 MG/1
10 TABLET ORAL DAILY
Status: DISCONTINUED | OUTPATIENT
Start: 2024-06-16 | End: 2024-06-18

## 2024-06-16 RX ADMIN — BACLOFEN 5 MG: 5 TABLET ORAL at 04:06

## 2024-06-16 RX ADMIN — BACLOFEN 5 MG: 5 TABLET ORAL at 08:06

## 2024-06-16 RX ADMIN — AMLODIPINE BESYLATE 10 MG: 10 TABLET ORAL at 10:06

## 2024-06-16 RX ADMIN — HEPARIN SODIUM AND DEXTROSE 8 UNITS/KG/HR: 10000; 5 INJECTION INTRAVENOUS at 05:06

## 2024-06-16 RX ADMIN — BACLOFEN 5 MG: 5 TABLET ORAL at 10:06

## 2024-06-16 RX ADMIN — DEXTROSE AND SODIUM CHLORIDE: 5; 900 INJECTION, SOLUTION INTRAVENOUS at 10:06

## 2024-06-16 RX ADMIN — VANCOMYCIN HYDROCHLORIDE 1000 MG: 1 INJECTION, POWDER, LYOPHILIZED, FOR SOLUTION INTRAVENOUS at 08:06

## 2024-06-16 RX ADMIN — HEPARIN SODIUM AND DEXTROSE 8 UNITS/KG/HR: 10000; 5 INJECTION INTRAVENOUS at 12:06

## 2024-06-16 RX ADMIN — ACETAMINOPHEN 650 MG: 325 TABLET ORAL at 12:06

## 2024-06-16 NOTE — PROGRESS NOTES
[facility] Medicine  Progress Note    Patient Name: Amirah Davey  MRN: [id number]  Patient Class: IP- Inpatient   Admission Date: 6/12/2024  Length of Stay: 4 days  Attending Physician: David Mayorga MD  Primary Care Provider: Ivana Templeton MD        Subjective:     Principal Problem:Abdominal wall abscess        HPI:  Amirah Davey is a 88 y.o. female with PMHx of recent CVA in May with residual L sided weakness, dysphagia and dysarthria, s/p PEG tube placement, HTN, Afib on eliquis who [place] [facility] [place] malfunction. Patient with recent admit for PEG dislogement on 6/6 and required replacement with general surgery. She presents today after her nursing home noted that there was leaking around the PEG tube site. Per ED note, patient complained of discomfort surrounding PEG site. However, patient denies any abdominal pain at the time of my exam. She had an episode of nausea yesterday without vomiting. She says her tongue/ lips are green colored because the nursing home made her drink something green earlier today. Denies fever, chills, chest pain, SOB, HA or vision changes.    ED: AFVSS. No leukocytosis or electrolyte abnormalities. Cr 1.3 from baseline ~0.9. CT abd/pelvis concerning for small superficial abscess adjacent to the anterior abdominal wall in the upper abdomen. General surgery consulted; ube was removed at bedside in the abscess cavity was probed. Given IV vanc.     Overview/Hospital Course:  Patient is an 88 year old female with a Hx of recent CVA in May with residual L sided weakness, dysphagia and dysarthria, s/p PEG tube placement, HTN, Afib on eliquis who p/w dislodged PEG tube c/b abwall abscess seen on CT. Started on vancomycin. General surgery consulted for g tube replacement and abscess drainage.    Interval History: Pt seen and examined this morning on rounds. EVA. Plan for OR tomorrow for PEG replacement with general surgery. Hep gtt ordered  to end at midnight. Continues to be NPO and on maintenance IVF. 7 beat run of VT on telemetry this morning; asymptomatic. Will check electrolytes and replete as needed.    Objective:     Vital Signs (Most Recent):  Temp: 97.5 °F (36.4 °C) (06/16/24 0819)  Pulse: 72 (06/16/24 0819)  Resp: 18 (06/16/24 0819)  BP: (!) 166/91 (06/16/24 0819)  SpO2: 98 % (06/16/24 0819) Vital Signs (24h Range):  Temp:  [97.5 °F (36.4 °C)-97.9 °F (36.6 °C)] 97.5 °F (36.4 °C)  Pulse:  [69-87] 72  Resp:  [16-18] 18  SpO2:  [95 %-98 %] 98 %  BP: (153-172)/(83-98) 166/91     Weight: 68 kg (150 lb)  Body mass index is 30.3 kg/m².    Intake/Output Summary (Last 24 hours) at 6/16/2024 0903  Last data filed at 6/16/2024 0533  Gross per 24 hour   Intake --   Output 800 ml   Net -800 ml           Physical Exam  Gen: in NAD, appears stated age  Neuro: Left sided weakness (at baseline). AAOx4, CN2-12 grossly intact BL; motor, sensory, and strength grossly intact BL  HEENT: NTNC, EOMI, PERRLA, MMM; no thyromegaly or lymphadenopathy; no JVD appreciated  CVS: RRR, no m/r/g; S1/S2 auscultated with no S3 or S4; capillary refill < 2 sec  Resp: lungs CTAB, no w/r/r; no belabored breathing or accessory muscle use appreciated   Abd: Mild abdominal tendernessBS+ in all 4 quadrants; NTND, soft to palpation; no organomegaly appreciated   Extrem: pulses full, equal, and regular over all 4 extremities; no UE or LE edema BL          Significant Labs: All pertinent labs within the past 24 hours have been reviewed.    Significant Imaging: I have reviewed all pertinent imaging results/findings within the past 24 hours.    Assessment/Plan:      * Abdominal wall abscess  PEG tube malfunction   - presents with dislodged PEG and was found to have abdominal wall abscess on CT  - afebrile without leukocytosis  - general surgery consulted; plan to take to OR for G tube replacement/ abscess drainage on 6/15  - keep NPO  - Plan for OR 6/17  - Hold Hep gtt at midnight  -  continue IV vanc  - continue mIVF    ELIAS (acute kidney injury)  - Cr 1.3, baseline ~1.0  - suspect prerenal from dehydration  - place on cIVFs overnight  - avoid nephrotoxins, renally dose meds    - Resolved    PEG tube malfunction  Plan for PEG tube replacement with general surgery.      Sacral ulcer, limited to breakdown of skin  - appears to be healing well  - turn q2h    Dysphagia due to recent stroke  - ongoing issue since recent stroke in May  - strict NPO  - meds via PEG once replaced    Cerebrovascular accident (CVA) due to embolism of right middle cerebral artery  - recent CVA on 5/24 with residual deficits, currently at baseline  - resume statin once PEG replaced    Chronic a-fib  Chronic anticoagulation   - hold eliquis for upcoming surgery    Hypertension, benign  - normotensive   - resume amlodipine once PEG replaced       VTE Risk Mitigation (From admission, onward)           Ordered     heparin 25,000 units in dextrose 5% (100 units/ml) IV bolus from bag LOW INTENSITY nomogram - [facility]  As needed (PRN)        Question:  Heparin Infusion Adjustment (DO NOT MODIFY ANSWER)  Answer:  \\[email or web address]\epic\Images\Pharmacy\HeparinInfusions\heparin LOW INTENSITY nomogram for [facility] [id number].[email or web address]    06/15/24 1200     heparin 25,000 units in dextrose 5% (100 units/ml) IV bolus from bag LOW INTENSITY nomogram - [facility]  As needed (PRN)        Question:  Heparin Infusion Adjustment (DO NOT MODIFY ANSWER)  Answer:  \\[email or web address]\epic\Images\Pharmacy\HeparinInfusions\heparin LOW INTENSITY nomogram for [facility] [id number]    06/15/24 1200     heparin 25,000 units in dextrose 5% 250 mL (100 units/mL) infusion LOW INTENSITY nomogram - [facility]  Continuous        Question:  Begin at (units/kg/hr)  Answer:  12    06/15/24 1200     heparin 25,000 units in dextrose 5% 250 mL (100 units/mL) infusion LOW INTENSITY nomogram - [facility]  Continuous        Question:  Begin at (units/kg/hr)  Answer:  12 06/13/24 0011     Reason for No Pharmacological VTE  Prophylaxis  Once        Question:  Reasons:  Answer:  Already adequately anticoagulated on oral Anticoagulants    06/12/24 2156     IP VTE HIGH RISK PATIENT  Once         06/12/24 2156                    Discharge Planning   JOAN: 6/18/2024     Code Status: Full Code   Is the patient medically ready for discharge?:     Reason for patient still in hospital (select all that apply): Treatment  Discharge Plan A: Rehab                  David Mayorga MD  Department of Hospital Medicine   [facility]

## 2024-06-16 NOTE — PROGRESS NOTES
[facility] S[facility]  General Surgery  Progress Note    Subjective:     History of Present Illness:  88-year-old female with a past medical history of hypertension, obesity, AFib, and recent MCA stroke with subsequent hemiparesis and dysphagia requiring PEG tube placement who presents to the ED with worsening discharge from around her PEG tube.  Patient has a limited understanding of her medical condition so history was difficult to obtain. In the ED her labwork was fairly unremarkable. A CT scan of the abdomen and pelvis was performed revealing only the tip of the gastrostomy tube within the subcutaneous tissues of the abdominal wall.  There was associated small fluid collection in the surrounding space and the stomach no longer appeared to be adjacent abdominal wall.  General surgery was consulted for evaluation.    Post-Op Info:  Procedure(s) (LRB):  INSERTION, PEG TUBE (N/A)         Interval History: EVA. HDS. Plan for PEG tube placement tomorrow.     Medications:  Continuous Infusions:   dextrose 5 % and 0.9 % NaCl   Intravenous Continuous        heparin (porcine) in D5W  0-40 Units/kg/hr (Adjusted) Intravenous Continuous 4.5 mL/hr at 06/16/24 0008 8 Units/kg/hr at 06/16/24 0008     Scheduled Meds:   amLODIPine  10 mg Oral Daily    baclofen  5 mg Oral TID    vancomycin (VANCOCIN) IV (PEDS and ADULTS)  1,000 mg Intravenous Q24H     PRN Meds:  Current Facility-Administered Medications:     acetaminophen, 650 mg, Oral, Q4H PRN    albuterol-ipratropium, 3 mL, Nebulization, Q4H PRN    bisacodyL, 10 mg, Rectal, Daily PRN    dextrose 10%, 12.5 g, Intravenous, PRN    dextrose 10%, 25 g, Intravenous, PRN    glucagon (human recombinant), 1 mg, Intramuscular, PRN    glucose, 16 g, Oral, PRN    glucose, 24 g, Oral, PRN    heparin (PORCINE), 60 Units/kg (Adjusted), Intravenous, PRN    heparin (PORCINE), 30 Units/kg (Adjusted), Intravenous, PRN    melatonin, 6 mg, Oral, Nightly PRN    ondansetron, 4 mg, Intravenous, Q6H  PRN    polyethylene glycol, 17 g, Oral, Daily PRN    Pharmacy to dose Vancomycin consult, , , Once **AND** vancomycin - pharmacy to dose, , Intravenous, pharmacy to manage frequency     Review of patient's allergies indicates:  No Known Allergies  Objective:     Vital Signs (Most Recent):  Temp: 97.5 °F (36.4 °C) (06/16/24 0819)  Pulse: 72 (06/16/24 0819)  Resp: 18 (06/16/24 0819)  BP: (!) 166/91 (06/16/24 0819)  SpO2: 98 % (06/16/24 0819) Vital Signs (24h Range):  Temp:  [97.5 °F (36.4 °C)-97.9 °F (36.6 °C)] 97.5 °F (36.4 °C)  Pulse:  [69-87] 72  Resp:  [16-18] 18  SpO2:  [95 %-98 %] 98 %  BP: (153-172)/(83-98) 166/91     Weight: 68 kg (150 lb)  Body mass index is 30.3 kg/m².    Intake/Output - Last 3 Shifts         06/14 0700  06/15 0659 06/15 0700  06/16 0659 06/16 0700  06/17 0659    Urine (mL/kg/hr)  800 (0.5)     Total Output  800     Net  -800                     Physical Exam  Vitals and nursing note reviewed.   Constitutional:       Appearance: She is obese.   HENT:      Head: Normocephalic.      Mouth/Throat:      Mouth: Mucous membranes are moist.      Pharynx: Oropharynx is clear.   Eyes:      General: No scleral icterus.     Extraocular Movements: Extraocular movements intact.      Conjunctiva/sclera: Conjunctivae normal.   Cardiovascular:      Rate and Rhythm: Normal rate.      Pulses: Normal pulses.   Pulmonary:      Effort: Pulmonary effort is normal. No respiratory distress.      Breath sounds: No wheezing.   Abdominal:      General: Abdomen is flat. Bowel sounds are normal. There is no distension.      Palpations: Abdomen is soft.      Tenderness: There is abdominal tenderness.      Comments: Peg site without erythema, drainage.    Musculoskeletal:         General: No swelling or tenderness. Normal range of motion.      Cervical back: Normal range of motion.   Skin:     General: Skin is warm and dry.      Coloration: Skin is not jaundiced or pale.   Neurological:      General: No focal deficit  present.      Mental Status: She is oriented to person, place, and time.   Psychiatric:         Mood and Affect: Mood normal.          Significant Labs:  I have reviewed all pertinent lab results within the past 24 hours.    Significant Diagnostics:  I have reviewed all pertinent imaging results/findings within the past 24 hours.  Assessment/Plan:     PEG tube malfunction  88-year-old female with a past medical history of hypertension, obesity, AFib, and recent MCA stroke with subsequent hemiparesis and dysphagia requiring PEG tube placement who presents to the ED with worsening discharge from around her PEG tube.  Found to have dislodgement of her PEG tube now for the 2nd time and now with an associated abdominal wall abscess.    - Plan for peg tube replacement in OR on 6/17  - Please stop heparin gtt at midnight  - NPO at MN  - Consent obtained from daughter         Emma Oshea MD  General Surgery  [facility] - Med Surg

## 2024-06-16 NOTE — ASSESSMENT & PLAN NOTE
88-year-old female with a past medical history of hypertension, obesity, AFib, and recent MCA stroke with subsequent hemiparesis and dysphagia requiring PEG tube placement who presents to the ED with worsening discharge from around her PEG tube.  Found to have dislodgement of her PEG tube now for the 2nd time and now with an associated abdominal wall abscess.    - Plan for peg tube replacement in OR on 6/17  - Please stop heparin gtt at midnight  - NPO at MN  - Consent obtained from daughter

## 2024-06-16 NOTE — ANESTHESIA PREPROCEDURE EVALUATION
[facility]  Anesthesia Pre-Operative Evaluation     Patient Name: Amirah Davey  YOB: 1935  MRN: [id number]  [facility]: [id number]       Admit Date: 6/12/2024   Admit Team: [facility]  Hospital Day: 5  Date of Procedure: 6/15/2024  Anesthesia: Choice Procedure: Procedure(s) (LRB):  INSERTION, PEG TUBE (N/A)  Pre-Operative Diagnosis: PEG tube malfunction [K94.23]  Proceduralist:Surgeons and Role:     * Chloé Sher MD - Primary  Code Status: Full Code   Advanced Directive: <no information>  Isolation Precautions: No active isolations  Capacity: Full capacity       SUBJECTIVE:     Pre-operative evaluation for Procedure(s) (LRB):  INSERTION, PEG TUBE (N/A)  06/16/2024  Hospital LOS: 4 days  ICU LOS: Patient does not have an ICU stay during this admission.    Amirah Davey is a 88 y.o. female w/ a significant PMHx of CVA with L sided weakness, dysphagia, HTN, AF on eliquis who presents with PEG tube malfunction. Pt to undergo PEG tube replacement,. .Moderate to severe TR, PASP 65. Family notes that patient coded on induction 30 years ago during hysterectomy. Reports it may have been due to anesthetic dose.  No problems with anesthesia since then.     Patient now presents for the above procedure(s).    TTE: 5/24  SUMMARY:    1. Technically difficult study.    2. Negative saline bubble study for right to left shunt.    3. Normal left ventricular systolic function. LVEF of > 55%.    4. LV diastolic function is indeterminate. Biatrial enlargement noted.    5. Normal right ventricular systolic function.[id number]    6. Moderate to severe tricuspid regurgitation.    7. IVC not well visualised.       LDA:        Peripheral IV - Single Lumen 06/12/24 1809 20 G Anterior;Right Wrist (Active)   Site Assessment Clean;Dry;Intact;No redness;No swelling 06/14/24 0400   Extremity Assessment Distal to IV No warmth;No swelling;No redness;No abnormal discoloration 06/13/24 2010   Line Status  Infusing 06/14/24 0400   Dressing Status Clean;Dry;Intact 06/14/24 0400   Dressing Intervention Integrity maintained 06/14/24 0400   Dressing Change Due 06/16/24 06/13/24 0730   Site Change Due 06/16/24 06/13/24 0730   Reason Not Rotated Not due 06/13/24 0730   Number of days: 1            Peripheral IV - Single Lumen 06/13/24 0142 22 G Left;Posterior Hand (Active)   Site Assessment Clean;Dry;Intact;No redness;No swelling 06/14/24 0400   Extremity Assessment Distal to IV No warmth;No swelling;No redness;No abnormal discoloration 06/13/24 2010   Line Status Infusing 06/14/24 0400   Dressing Status Clean;Dry;Intact 06/14/24 0400   Dressing Intervention Integrity maintained 06/14/24 0400   Dressing Change Due 06/17/24 06/13/24 0730   Site Change Due 06/17/24 06/13/24 0730   Reason Not Rotated Not due 06/13/24 0730   Number of days: 1            Gastrostomy/Enterostomy 05/29/24 1440 Percutaneous endoscopic gastrostomy (PEG) LUQ (Active)   Securement secured to abdomen 05/31/24 0800   Interventions Prior to Feeding patency checked;residual checked;residual returned 05/31/24 0800   Suction Setting/Drainage Method continuous setting 05/31/24 0800   Drainage yellow 05/30/24 1600   Feeding Type continuous 05/31/24 0800   Clamp Status/Tolerance unclamped;no nausea;no emesis;no abdominal distention;no abdominal discomfort 05/31/24 0800   Feeding Action feeding continued 05/31/24 0800   Dressing dried drainage 05/31/24 0800   Insertion Site no redness;no warmth;no drainage;no swelling 05/31/24 0800   Site Care device rotated;site cleansed w/ sterile normal saline;sterile 4 x 4 gauze dressing applied;outer bumper rotated 05/31/24 0800   Flush/Irrigation flushed w/;sterile normal saline 05/31/24 0800   Current Rate (mL/hr) 45 mL/hr 05/31/24 0800   Goal Rate (mL/hr) 45 mL/hr 05/31/24 0800   Intake (mL) 100 mL 05/31/24 0800   Water Bolus (mL) 200 mL 05/31/24 0800   Formula Name Glucerna 05/31/24 0800   Residual Amount (ml) 10 ml  05/31/24 0800   Number of days: 15                  Drips: None    dextrose 5 % and 0.9 % NaCl   Intravenous Continuous 75 mL/hr at 06/16/24 1035 New Bag at 06/16/24 1035    heparin (porcine) in D5W  0-40 Units/kg/hr (Adjusted) Intravenous Continuous 4.5 mL/hr at 06/16/24 0008 8 Units/kg/hr at 06/16/24 0008       Previous Airway: Induction:  Intravenous    Intubated:  Postinduction    Mask Ventilation:  Easy with oral airway    Attempts:  1    Attempted By:  Staff anesthesiologist    Method of Intubation:  Video laryngoscopy    Blade:  Casarez 3    Laryngeal View Grade: Grade I - full view of cords      Difficult Airway Encountered?: No      Complications:  None    Airway Device:  Oral endotracheal tube    Airway Device Size:  7.0    Style/Cuff Inflation:  Cuffed (inflated to minimal occlusive pressure)    Inflation Amount (mL):  4    Tube secured:  23    Secured at:  The teeth    Placement Verified By:  Capnometry    Complicating Factors:  None    Findings Post-Intubation:  BS equal bilateral and atraumatic/condition of teeth unchanged       Allergies:  Review of patient's allergies indicates:  No Known Allergies    Medications:     Current Outpatient Medications   Medication Instructions    acetaminophen (TYLENOL) 1,000 mg, Per G Tube, Every 8 hours PRN    amLODIPine (NORVASC) 5 mg, Per G Tube, Daily    apixaban (ELIQUIS) 5 mg, Per G Tube, 2 times daily    atorvastatin (LIPITOR) 40 mg, Per G Tube, Daily    chlorhexidine (PERIDEX) 0.12 % solution Swish & spit: 15 ml by mouth once daily.    famotidine (PEPCID) 40 mg, Per G Tube, Daily    hydroCHLOROthiazide (MICROZIDE) 25 mg, Per G Tube, Daily, DO NOT TAKE UNTIL FOLLOW UP WITH PRIMARY CARE. Rehab can add back slowly if BP is elevated. Would start with lisinopril first    LIDOcaine (LIDODERM) 5 % 1 patch, Transdermal, Daily, Remove & Discard patch within 12 hours or as directed by MD    lisinopriL (PRINIVIL,ZESTRIL) 20 mg, Per G Tube, Daily, DO NOT TAKE UNTIL YOU SEE  "YOUR PRIMARY DOCTOR. Rehab can add back if BP becomes elevated. Would start with this one over HCTZ    nut.tx.gluc intol,lf,soy/fiber (GLUCERNA 1.5 SHELLEY ORAL) Feed per tube at 55 ml/hr continuously     Inpatient Medications:   amLODIPine  10 mg Oral Daily    baclofen  5 mg Oral TID    vancomycin (VANCOCIN) IV (PEDS and ADULTS)  1,000 mg Intravenous Q24H     Antibiotics (From admission, onward)      Start     Stop Route Frequency Ordered    06/14/24 2100  vancomycin (VANCOCIN) 1,000 mg in dextrose 5 % (D5W) 250 mL IVPB (Vial-Mate)         -- IV Every 24 hours (non-standard times) 06/14/24 2012 06/12/24 2216  vancomycin - pharmacy to dose  (vancomycin IVPB (PEDS and ADULTS))        Placed in "And" Linked Group    -- IV pharmacy to manage frequency 06/12/24 2116          VTE Risk Mitigation (From admission, onward)           Ordered     heparin 25,000 units in dextrose 5% (100 units/ml) IV bolus from bag LOW INTENSITY nomogram - [facility]  As needed (PRN)        Question:  Heparin Infusion Adjustment (DO NOT MODIFY ANSWER)  Answer:  \\[email or web address]\epic\Images\Pharmacy\HeparinInfusions\heparin LOW INTENSITY nomogram for [facility] [id number].pdf    06/15/24 1200     heparin 25,000 units in dextrose 5% (100 units/ml) IV bolus from bag LOW INTENSITY nomogram - [facility]  As needed (PRN)        Question:  Heparin Infusion Adjustment (DO NOT MODIFY ANSWER)  Answer:  \\[email or web address]\epic\Images\Pharmacy\HeparinInfusions\heparin LOW INTENSITY nomogram for [facility] [id number].pdf    06/15/24 1200     heparin 25,000 units in dextrose 5% 250 mL (100 units/mL) infusion LOW INTENSITY nomogram - [facility]  Continuous        Question:  Begin at (units/kg/hr)  Answer:  12    06/15/24 1200     heparin 25,000 units in dextrose 5% 250 mL (100 units/mL) infusion LOW INTENSITY nomogram - [facility]  Continuous        Question:  Begin at (units/kg/hr)  Answer:  12 06/13/24 0011     Reason for No Pharmacological VTE Prophylaxis  Once        Question:  Reasons:  Answer:  Already " adequately anticoagulated on oral Anticoagulants    06/12/24 2156     IP VTE HIGH RISK PATIENT  Once         06/12/24 2156                    History:     Active Hospital Problems    Diagnosis  POA    *Abdominal wall abscess [L02.211]  Yes    ELIAS (acute kidney injury) [N17.9]  Yes    PEG tube malfunction [K94.23]  Yes    Sacral ulcer, limited to breakdown of skin [L98.421]  Yes    Chronic anticoagulation [Z79.01]  Not Applicable    Cerebrovascular accident (CVA) due to embolism of right middle cerebral artery [I63.411]  Yes    Dysphagia due to recent stroke [I69.391]  Not Applicable    Hypertension, benign [I10]  Yes    Chronic a-fib [I48.20]  Yes      Resolved Hospital Problems   No resolved problems to display.     Medical History:  Past Medical History:   Diagnosis Date    A-fib     Cerebrovascular accident (CVA) due to embolism of right middle cerebral artery 05/23/2024    Current use of long term anticoagulation     on Xarelto    Dysphagia due to recent stroke 05/23/2024    Hypertension     Stroke 03/20/2013     Surgical History:    has a past surgical history that includes Hysterectomy; Eye surgery; Cataract extraction w/  intraocular lens implant (Right, 05/01/2017); Cataract extraction w/  intraocular lens implant (Left, 05/29/2017); Cardiac pacemaker placement (09/25/2019); insertion, peg tube (N/A, 5/27/2024); and Esophagogastroduodenoscopy w/ PEG (N/A, 5/29/2024).   Social History:    has no history on file for sexual activity.  reports that she has never smoked. She does not have any smokeless tobacco history on file. She reports that she does not drink alcohol.    OBJECTIVE:   Vital Signs Range (Last 24H):  Temp:  [36.4 °C (97.5 °F)-36.6 °C (97.9 °F)]   Pulse:  [69-87]   Resp:  [16-18]   BP: (153-172)/(83-98)   SpO2:  [95 %-98 %]   Lab Results   Component Value Date    WBC 6.04 06/16/2024    HGB 10.9 (L) 06/16/2024    HCT 36.9 (L) 06/16/2024     06/16/2024     06/16/2024    K 3.4 (L)  "06/16/2024     06/16/2024    CREATININE 0.8 06/16/2024    BUN 10 06/16/2024    CO2 20 (L) 06/16/2024     06/16/2024    CALCIUM 9.3 06/16/2024    MG 1.7 06/16/2024    PHOS 2.4 (L) 06/15/2024    ALKPHOS 67 06/12/2024    ALT 17 06/12/2024    AST 23 06/12/2024    ALBUMIN 3.1 (L) 06/12/2024    INR 1.1 06/15/2024    APTT 39.9 (H) 06/16/2024    HGBA1C 6.4 (H) 05/19/2024    CPK 73 05/18/2024    TROPONINI 0.006 05/24/2024     Recent Labs     06/14/24  0503 06/15/24  0347 06/15/24  1210 06/16/24  0418 06/16/24  0906   WBC 4.13 3.87* 5.80 6.04  --    HGB 9.9* 9.5* 11.2* 10.9*  --    HCT 31.6* 31.6* 38.0 36.9*  --     224 228 242  --     142  --   --  140   K 3.7 3.1*  --   --  3.4*   CREATININE 0.9 0.9  --   --  0.8   GLU 87 362*  --   --  107   INR  --   --  1.1  --   --      No results for input(s): "PH", "PCO2", "PO2", "HCO3", "POCSATURATED", "BE" in the last 72 hours.   No LMP recorded. Patient has had a hysterectomy.    Please see Results Review for additional labs & imaging.     EKG:   Results for orders placed or performed in visit on 06/20/19   EKG 12-lead    Collection Time: 06/20/19  5:25 PM    Narrative    Test Reason : R00.1,I10,    Vent. Rate : 042 BPM     Atrial Rate : 326 BPM     P-R Int : 000 ms          QRS Dur : 076 ms      QT Int : 476 ms       P-R-T Axes : 000 -17 100 degrees     QTc Int : 397 ms    Atrial fibrillation with slow ventricular response with premature  ventricular or aberrantly conducted complexes  LVH with secondary ST-T wave changes  Abnormal R wave progression  Abnormal ECG  No previous ECGs available  Confirmed by Shad Landon MD ([id number]) on 6/21/2019 12:30:51 PM    Referred By: DR LINDSEY           Confirmed By:Shad Landon MD       ECHO:  See subjective, if available.    ASSESSMENT/PLAN:         Pre-op Assessment    I have reviewed the Patient Summary Reports.     I have reviewed the Nursing Notes. I have reviewed the NPO Status.   I have reviewed the Medications. "     Review of Systems  Anesthesia Hx:             Denies Family Hx of Anesthesia complications.    Denies Personal Hx of Anesthesia complications.                    Cardiovascular:     Hypertension                                        Renal/:  Chronic Renal Disease                Neurological:   CVA                                        Physical Exam  General: Well nourished, Cooperative and Alert    Airway:  Mallampati: III   Mouth Opening: Small, but > 3cm    Dental:  Edentulous        Anesthesia Plan  Type of Anesthesia, risks & benefits discussed:    Anesthesia Type: Gen ETT  Intra-op Monitoring Plan: Standard ASA Monitors  Post Op Pain Control Plan: multimodal analgesia and IV/PO Opioids PRN  Induction:  IV  Airway Plan: Direct and Video  Informed Consent: Informed consent signed with the Patient and all parties understand the risks and agree with anesthesia plan.  All questions answered. Patient consented to blood products? Yes  ASA Score: 3  Day of Surgery Review of History & Physical: H&P Update referred to the surgeon/provider.    Ready For Surgery From Anesthesia Perspective.     .

## 2024-06-16 NOTE — PLAN OF CARE
Patient understand the importance of improving nutritional intake. Patient is to have surgery on Monday 6/17  Problem: Adult Inpatient Plan of Care  Goal: Plan of Care Review  Outcome: Not Progressing  Goal: Patient-Specific Goal (Individualized)  Outcome: Not Progressing  Goal: Absence of Hospital-Acquired Illness or Injury  Outcome: Not Progressing  Goal: Optimal Comfort and Wellbeing  Outcome: Not Progressing  Goal: Readiness for Transition of Care  Outcome: Not Progressing     Problem: Infection  Goal: Absence of Infection Signs and Symptoms  Outcome: Not Progressing     Problem: Acute Kidney Injury/Impairment  Goal: Fluid and Electrolyte Balance  Outcome: Not Progressing  Goal: Improved Oral Intake  Outcome: Not Progressing  Goal: Effective Renal Function  Outcome: Not Progressing     Problem: Fall Injury Risk  Goal: Absence of Fall and Fall-Related Injury  Outcome: Not Progressing     Problem: Skin Injury Risk Increased  Goal: Skin Health and Integrity  Outcome: Not Progressing

## 2024-06-16 NOTE — SUBJECTIVE & OBJECTIVE
Interval History: Pt seen and examined this morning on rounds. EVA. Plan for OR tomorrow for PEG replacement with general surgery. Hep gtt ordered to end at midnight. Continues to be NPO and on maintenance IVF. 7 beat run of VT on telemetry this morning; asymptomatic. Will check electrolytes and replete as needed.    Objective:     Vital Signs (Most Recent):  Temp: 97.5 °F (36.4 °C) (06/16/24 0819)  Pulse: 72 (06/16/24 0819)  Resp: 18 (06/16/24 0819)  BP: (!) 166/91 (06/16/24 0819)  SpO2: 98 % (06/16/24 0819) Vital Signs (24h Range):  Temp:  [97.5 °F (36.4 °C)-97.9 °F (36.6 °C)] 97.5 °F (36.4 °C)  Pulse:  [69-87] 72  Resp:  [16-18] 18  SpO2:  [95 %-98 %] 98 %  BP: (153-172)/(83-98) 166/91     Weight: 68 kg (150 lb)  Body mass index is 30.3 kg/m².    Intake/Output Summary (Last 24 hours) at 6/16/2024 0903  Last data filed at 6/16/2024 0533  Gross per 24 hour   Intake --   Output 800 ml   Net -800 ml           Physical Exam  Gen: in NAD, appears stated age  Neuro: Left sided weakness (at baseline). AAOx4, CN2-12 grossly intact BL; motor, sensory, and strength grossly intact BL  HEENT: NTNC, EOMI, PERRLA, MMM; no thyromegaly or lymphadenopathy; no JVD appreciated  CVS: RRR, no m/r/g; S1/S2 auscultated with no S3 or S4; capillary refill < 2 sec  Resp: lungs CTAB, no w/r/r; no belabored breathing or accessory muscle use appreciated   Abd: Mild abdominal tendernessBS+ in all 4 quadrants; NTND, soft to palpation; no organomegaly appreciated   Extrem: pulses full, equal, and regular over all 4 extremities; no UE or LE edema BL          Significant Labs: All pertinent labs within the past 24 hours have been reviewed.    Significant Imaging: I have reviewed all pertinent imaging results/findings within the past 24 hours.

## 2024-06-16 NOTE — SUBJECTIVE & OBJECTIVE
Interval History: NAEON. HDS. Plan for PEG tube placement tomorrow.     Medications:  Continuous Infusions:   dextrose 5 % and 0.9 % NaCl   Intravenous Continuous        heparin (porcine) in D5W  0-40 Units/kg/hr (Adjusted) Intravenous Continuous 4.5 mL/hr at 06/16/24 0008 8 Units/kg/hr at 06/16/24 0008     Scheduled Meds:   amLODIPine  10 mg Oral Daily    baclofen  5 mg Oral TID    vancomycin (VANCOCIN) IV (PEDS and ADULTS)  1,000 mg Intravenous Q24H     PRN Meds:  Current Facility-Administered Medications:     acetaminophen, 650 mg, Oral, Q4H PRN    albuterol-ipratropium, 3 mL, Nebulization, Q4H PRN    bisacodyL, 10 mg, Rectal, Daily PRN    dextrose 10%, 12.5 g, Intravenous, PRN    dextrose 10%, 25 g, Intravenous, PRN    glucagon (human recombinant), 1 mg, Intramuscular, PRN    glucose, 16 g, Oral, PRN    glucose, 24 g, Oral, PRN    heparin (PORCINE), 60 Units/kg (Adjusted), Intravenous, PRN    heparin (PORCINE), 30 Units/kg (Adjusted), Intravenous, PRN    melatonin, 6 mg, Oral, Nightly PRN    ondansetron, 4 mg, Intravenous, Q6H PRN    polyethylene glycol, 17 g, Oral, Daily PRN    Pharmacy to dose Vancomycin consult, , , Once **AND** vancomycin - pharmacy to dose, , Intravenous, pharmacy to manage frequency     Review of patient's allergies indicates:  No Known Allergies  Objective:     Vital Signs (Most Recent):  Temp: 97.5 °F (36.4 °C) (06/16/24 0819)  Pulse: 72 (06/16/24 0819)  Resp: 18 (06/16/24 0819)  BP: (!) 166/91 (06/16/24 0819)  SpO2: 98 % (06/16/24 0819) Vital Signs (24h Range):  Temp:  [97.5 °F (36.4 °C)-97.9 °F (36.6 °C)] 97.5 °F (36.4 °C)  Pulse:  [69-87] 72  Resp:  [16-18] 18  SpO2:  [95 %-98 %] 98 %  BP: (153-172)/(83-98) 166/91     Weight: 68 kg (150 lb)  Body mass index is 30.3 kg/m².    Intake/Output - Last 3 Shifts         06/14 0700  06/15 0659 06/15 0700 06/16 0659 06/16 0700 06/17 0659    Urine (mL/kg/hr)  800 (0.5)     Total Output  800     Net  -800                     Physical  Exam  Vitals and nursing note reviewed.   Constitutional:       Appearance: She is obese.   HENT:      Head: Normocephalic.      Mouth/Throat:      Mouth: Mucous membranes are moist.      Pharynx: Oropharynx is clear.   Eyes:      General: No scleral icterus.     Extraocular Movements: Extraocular movements intact.      Conjunctiva/sclera: Conjunctivae normal.   Cardiovascular:      Rate and Rhythm: Normal rate.      Pulses: Normal pulses.   Pulmonary:      Effort: Pulmonary effort is normal. No respiratory distress.      Breath sounds: No wheezing.   Abdominal:      General: Abdomen is flat. Bowel sounds are normal. There is no distension.      Palpations: Abdomen is soft.      Tenderness: There is abdominal tenderness.      Comments: Peg site without erythema, drainage.    Musculoskeletal:         General: No swelling or tenderness. Normal range of motion.      Cervical back: Normal range of motion.   Skin:     General: Skin is warm and dry.      Coloration: Skin is not jaundiced or pale.   Neurological:      General: No focal deficit present.      Mental Status: She is oriented to person, place, and time.   Psychiatric:         Mood and Affect: Mood normal.          Significant Labs:  I have reviewed all pertinent lab results within the past 24 hours.    Significant Diagnostics:  I have reviewed all pertinent imaging results/findings within the past 24 hours.

## 2024-06-16 NOTE — ASSESSMENT & PLAN NOTE
PEG tube malfunction   - presents with dislodged PEG and was found to have abdominal wall abscess on CT  - afebrile without leukocytosis  - general surgery consulted; plan to take to OR for G tube replacement/ abscess drainage on 6/15  - keep NPO  - Plan for OR 6/17  - Hold Hep gtt at midnight  - continue IV vanc  - continue mIVF

## 2024-06-17 ENCOUNTER — ANESTHESIA (OUTPATIENT)
Dept: SURGERY | Facility: HOSPITAL | Age: 89
End: 2024-06-17
Payer: MEDICARE

## 2024-06-17 LAB
ANION GAP SERPL CALC-SCNC: 11 MMOL/L (ref 8–16)
APTT PPP: 30.5 SEC (ref 21–32)
BASOPHILS # BLD AUTO: 0.02 K/UL (ref 0–0.2)
BASOPHILS NFR BLD: 0.3 % (ref 0–1.9)
BUN SERPL-MCNC: 8 MG/DL (ref 8–23)
CALCIUM SERPL-MCNC: 9 MG/DL (ref 8.7–10.5)
CHLORIDE SERPL-SCNC: 108 MMOL/L (ref 95–110)
CO2 SERPL-SCNC: 21 MMOL/L (ref 23–29)
CREAT SERPL-MCNC: 0.8 MG/DL (ref 0.5–1.4)
DIFFERENTIAL METHOD BLD: ABNORMAL
EOSINOPHIL # BLD AUTO: 0.1 K/UL (ref 0–0.5)
EOSINOPHIL NFR BLD: 1.3 % (ref 0–8)
ERYTHROCYTE [DISTWIDTH] IN BLOOD BY AUTOMATED COUNT: 15.4 % (ref 11.5–14.5)
EST. GFR  (NO RACE VARIABLE): >60 ML/MIN/1.73 M^2
GLUCOSE SERPL-MCNC: 92 MG/DL (ref 70–110)
HCT VFR BLD AUTO: 35.3 % (ref 37–48.5)
HGB BLD-MCNC: 11 G/DL (ref 12–16)
IMM GRANULOCYTES # BLD AUTO: 0.04 K/UL (ref 0–0.04)
IMM GRANULOCYTES NFR BLD AUTO: 0.7 % (ref 0–0.5)
LYMPHOCYTES # BLD AUTO: 0.9 K/UL (ref 1–4.8)
LYMPHOCYTES NFR BLD: 14.1 % (ref 18–48)
MAGNESIUM SERPL-MCNC: 1.6 MG/DL (ref 1.6–2.6)
MCH RBC QN AUTO: 25 PG (ref 27–31)
MCHC RBC AUTO-ENTMCNC: 31.2 G/DL (ref 32–36)
MCV RBC AUTO: 80 FL (ref 82–98)
MONOCYTES # BLD AUTO: 0.8 K/UL (ref 0.3–1)
MONOCYTES NFR BLD: 13.1 % (ref 4–15)
NEUTROPHILS # BLD AUTO: 4.2 K/UL (ref 1.8–7.7)
NEUTROPHILS NFR BLD: 70.5 % (ref 38–73)
NRBC BLD-RTO: 0 /100 WBC
PLATELET # BLD AUTO: 223 K/UL (ref 150–450)
PMV BLD AUTO: 11.7 FL (ref 9.2–12.9)
POCT GLUCOSE: 135 MG/DL (ref 70–110)
POCT GLUCOSE: 135 MG/DL (ref 70–110)
POCT GLUCOSE: 140 MG/DL (ref 70–110)
POCT GLUCOSE: 168 MG/DL (ref 70–110)
POTASSIUM SERPL-SCNC: 3.2 MMOL/L (ref 3.5–5.1)
RBC # BLD AUTO: 4.4 M/UL (ref 4–5.4)
SODIUM SERPL-SCNC: 140 MMOL/L (ref 136–145)
WBC # BLD AUTO: 6.02 K/UL (ref 3.9–12.7)

## 2024-06-17 PROCEDURE — 25000003 PHARM REV CODE 250: Performed by: NURSE ANESTHETIST, CERTIFIED REGISTERED

## 2024-06-17 PROCEDURE — 25000003 PHARM REV CODE 250: Performed by: STUDENT IN AN ORGANIZED HEALTH CARE EDUCATION/TRAINING PROGRAM

## 2024-06-17 PROCEDURE — 82962 GLUCOSE BLOOD TEST: CPT | Performed by: STUDENT IN AN ORGANIZED HEALTH CARE EDUCATION/TRAINING PROGRAM

## 2024-06-17 PROCEDURE — 36000706: Performed by: STUDENT IN AN ORGANIZED HEALTH CARE EDUCATION/TRAINING PROGRAM

## 2024-06-17 PROCEDURE — 85730 THROMBOPLASTIN TIME PARTIAL: CPT | Performed by: INTERNAL MEDICINE

## 2024-06-17 PROCEDURE — 83735 ASSAY OF MAGNESIUM: CPT | Performed by: INTERNAL MEDICINE

## 2024-06-17 PROCEDURE — 80048 BASIC METABOLIC PNL TOTAL CA: CPT | Performed by: INTERNAL MEDICINE

## 2024-06-17 PROCEDURE — 97535 SELF CARE MNGMENT TRAINING: CPT

## 2024-06-17 PROCEDURE — 63600175 PHARM REV CODE 636 W HCPCS: Performed by: NURSE ANESTHETIST, CERTIFIED REGISTERED

## 2024-06-17 PROCEDURE — 36415 COLL VENOUS BLD VENIPUNCTURE: CPT | Performed by: INTERNAL MEDICINE

## 2024-06-17 PROCEDURE — 71000015 HC POSTOP RECOV 1ST HR: Performed by: STUDENT IN AN ORGANIZED HEALTH CARE EDUCATION/TRAINING PROGRAM

## 2024-06-17 PROCEDURE — 37000009 HC ANESTHESIA EA ADD 15 MINS: Performed by: STUDENT IN AN ORGANIZED HEALTH CARE EDUCATION/TRAINING PROGRAM

## 2024-06-17 PROCEDURE — 63600175 PHARM REV CODE 636 W HCPCS: Performed by: INTERNAL MEDICINE

## 2024-06-17 PROCEDURE — 21400001 HC TELEMETRY ROOM

## 2024-06-17 PROCEDURE — 97530 THERAPEUTIC ACTIVITIES: CPT

## 2024-06-17 PROCEDURE — 36000707: Performed by: STUDENT IN AN ORGANIZED HEALTH CARE EDUCATION/TRAINING PROGRAM

## 2024-06-17 PROCEDURE — 25000003 PHARM REV CODE 250: Performed by: INTERNAL MEDICINE

## 2024-06-17 PROCEDURE — 0DH63UZ INSERTION OF FEEDING DEVICE INTO STOMACH, PERCUTANEOUS APPROACH: ICD-10-PCS | Performed by: STUDENT IN AN ORGANIZED HEALTH CARE EDUCATION/TRAINING PROGRAM

## 2024-06-17 PROCEDURE — 94761 N-INVAS EAR/PLS OXIMETRY MLT: CPT

## 2024-06-17 PROCEDURE — 27201423 OPTIME MED/SURG SUP & DEVICES STERILE SUPPLY: Performed by: STUDENT IN AN ORGANIZED HEALTH CARE EDUCATION/TRAINING PROGRAM

## 2024-06-17 PROCEDURE — 71000033 HC RECOVERY, INTIAL HOUR: Performed by: STUDENT IN AN ORGANIZED HEALTH CARE EDUCATION/TRAINING PROGRAM

## 2024-06-17 PROCEDURE — 85025 COMPLETE CBC W/AUTO DIFF WBC: CPT | Performed by: INTERNAL MEDICINE

## 2024-06-17 PROCEDURE — 43246 EGD PLACE GASTROSTOMY TUBE: CPT | Mod: GC,,, | Performed by: STUDENT IN AN ORGANIZED HEALTH CARE EDUCATION/TRAINING PROGRAM

## 2024-06-17 PROCEDURE — 97112 NEUROMUSCULAR REEDUCATION: CPT | Mod: CQ

## 2024-06-17 PROCEDURE — 99024 POSTOP FOLLOW-UP VISIT: CPT | Mod: POP,,, | Performed by: STUDENT IN AN ORGANIZED HEALTH CARE EDUCATION/TRAINING PROGRAM

## 2024-06-17 PROCEDURE — 37000008 HC ANESTHESIA 1ST 15 MINUTES: Performed by: STUDENT IN AN ORGANIZED HEALTH CARE EDUCATION/TRAINING PROGRAM

## 2024-06-17 RX ORDER — PROPOFOL 10 MG/ML
VIAL (ML) INTRAVENOUS
Status: DISCONTINUED | OUTPATIENT
Start: 2024-06-17 | End: 2024-06-17

## 2024-06-17 RX ORDER — FENTANYL CITRATE 50 UG/ML
INJECTION, SOLUTION INTRAMUSCULAR; INTRAVENOUS
Status: DISCONTINUED | OUTPATIENT
Start: 2024-06-17 | End: 2024-06-17

## 2024-06-17 RX ORDER — PHENYLEPHRINE HYDROCHLORIDE 10 MG/ML
INJECTION INTRAVENOUS
Status: DISCONTINUED | OUTPATIENT
Start: 2024-06-17 | End: 2024-06-17

## 2024-06-17 RX ORDER — LIDOCAINE HYDROCHLORIDE 20 MG/ML
INJECTION, SOLUTION EPIDURAL; INFILTRATION; INTRACAUDAL; PERINEURAL
Status: DISCONTINUED | OUTPATIENT
Start: 2024-06-17 | End: 2024-06-17

## 2024-06-17 RX ORDER — LIDOCAINE HYDROCHLORIDE 10 MG/ML
INJECTION, SOLUTION EPIDURAL; INFILTRATION; INTRACAUDAL; PERINEURAL
Status: DISCONTINUED | OUTPATIENT
Start: 2024-06-17 | End: 2024-06-17 | Stop reason: HOSPADM

## 2024-06-17 RX ORDER — ONDANSETRON HYDROCHLORIDE 2 MG/ML
4 INJECTION, SOLUTION INTRAVENOUS ONCE AS NEEDED
Status: DISCONTINUED | OUTPATIENT
Start: 2024-06-17 | End: 2024-06-17 | Stop reason: HOSPADM

## 2024-06-17 RX ORDER — KETOROLAC TROMETHAMINE 15 MG/ML
15 INJECTION, SOLUTION INTRAMUSCULAR; INTRAVENOUS ONCE AS NEEDED
Status: COMPLETED | OUTPATIENT
Start: 2024-06-17 | End: 2024-06-17

## 2024-06-17 RX ORDER — CEFAZOLIN SODIUM 1 G/3ML
INJECTION, POWDER, FOR SOLUTION INTRAMUSCULAR; INTRAVENOUS
Status: DISCONTINUED | OUTPATIENT
Start: 2024-06-17 | End: 2024-06-17

## 2024-06-17 RX ORDER — DROPERIDOL 2.5 MG/ML
0.62 INJECTION, SOLUTION INTRAMUSCULAR; INTRAVENOUS ONCE AS NEEDED
Status: DISCONTINUED | OUTPATIENT
Start: 2024-06-17 | End: 2024-06-17 | Stop reason: HOSPADM

## 2024-06-17 RX ORDER — ONDANSETRON HYDROCHLORIDE 2 MG/ML
INJECTION, SOLUTION INTRAVENOUS
Status: DISCONTINUED | OUTPATIENT
Start: 2024-06-17 | End: 2024-06-17

## 2024-06-17 RX ORDER — POTASSIUM CHLORIDE 7.45 MG/ML
10 INJECTION INTRAVENOUS
Status: COMPLETED | OUTPATIENT
Start: 2024-06-17 | End: 2024-06-17

## 2024-06-17 RX ORDER — HYDROMORPHONE HYDROCHLORIDE 1 MG/ML
0.2 INJECTION, SOLUTION INTRAMUSCULAR; INTRAVENOUS; SUBCUTANEOUS EVERY 5 MIN PRN
Status: DISCONTINUED | OUTPATIENT
Start: 2024-06-17 | End: 2024-06-17 | Stop reason: HOSPADM

## 2024-06-17 RX ORDER — ROCURONIUM BROMIDE 10 MG/ML
INJECTION, SOLUTION INTRAVENOUS
Status: DISCONTINUED | OUTPATIENT
Start: 2024-06-17 | End: 2024-06-17

## 2024-06-17 RX ORDER — DEXAMETHASONE SODIUM PHOSPHATE 4 MG/ML
INJECTION, SOLUTION INTRA-ARTICULAR; INTRALESIONAL; INTRAMUSCULAR; INTRAVENOUS; SOFT TISSUE
Status: DISCONTINUED | OUTPATIENT
Start: 2024-06-17 | End: 2024-06-17

## 2024-06-17 RX ADMIN — PHENYLEPHRINE HYDROCHLORIDE 200 MCG: 10 INJECTION INTRAVENOUS at 02:06

## 2024-06-17 RX ADMIN — ONDANSETRON 4 MG: 2 INJECTION INTRAMUSCULAR; INTRAVENOUS at 12:06

## 2024-06-17 RX ADMIN — CEFAZOLIN 2 G: 330 INJECTION, POWDER, FOR SOLUTION INTRAMUSCULAR; INTRAVENOUS at 03:06

## 2024-06-17 RX ADMIN — POTASSIUM CHLORIDE 10 MEQ: 7.46 INJECTION, SOLUTION INTRAVENOUS at 09:06

## 2024-06-17 RX ADMIN — DEXAMETHASONE SODIUM PHOSPHATE 4 MG: 4 INJECTION INTRA-ARTICULAR; INTRALESIONAL; INTRAMUSCULAR; INTRAVENOUS; SOFT TISSUE at 03:06

## 2024-06-17 RX ADMIN — ROCURONIUM BROMIDE 50 MG: 10 INJECTION, SOLUTION INTRAVENOUS at 02:06

## 2024-06-17 RX ADMIN — KETOROLAC TROMETHAMINE 15 MG: 15 INJECTION, SOLUTION INTRAMUSCULAR; INTRAVENOUS at 12:06

## 2024-06-17 RX ADMIN — POTASSIUM CHLORIDE 10 MEQ: 7.46 INJECTION, SOLUTION INTRAVENOUS at 11:06

## 2024-06-17 RX ADMIN — POTASSIUM CHLORIDE 10 MEQ: 7.46 INJECTION, SOLUTION INTRAVENOUS at 10:06

## 2024-06-17 RX ADMIN — POTASSIUM CHLORIDE 10 MEQ: 7.46 INJECTION, SOLUTION INTRAVENOUS at 07:06

## 2024-06-17 RX ADMIN — ACETAMINOPHEN 650 MG: 325 TABLET ORAL at 06:06

## 2024-06-17 RX ADMIN — PHENYLEPHRINE HYDROCHLORIDE 200 MCG: 10 INJECTION INTRAVENOUS at 03:06

## 2024-06-17 RX ADMIN — PROPOFOL 120 MG: 10 INJECTION, EMULSION INTRAVENOUS at 02:06

## 2024-06-17 RX ADMIN — VANCOMYCIN HYDROCHLORIDE 1000 MG: 1 INJECTION, POWDER, LYOPHILIZED, FOR SOLUTION INTRAVENOUS at 10:06

## 2024-06-17 RX ADMIN — DEXTROSE AND SODIUM CHLORIDE: 5; 900 INJECTION, SOLUTION INTRAVENOUS at 09:06

## 2024-06-17 RX ADMIN — ONDANSETRON 4 MG: 2 INJECTION INTRAMUSCULAR; INTRAVENOUS at 03:06

## 2024-06-17 RX ADMIN — SUGAMMADEX 300 MG: 100 INJECTION, SOLUTION INTRAVENOUS at 03:06

## 2024-06-17 RX ADMIN — FENTANYL CITRATE 50 MCG: 50 INJECTION, SOLUTION INTRAMUSCULAR; INTRAVENOUS at 02:06

## 2024-06-17 RX ADMIN — LIDOCAINE HYDROCHLORIDE 60 MG: 20 INJECTION, SOLUTION EPIDURAL; INFILTRATION; INTRACAUDAL; PERINEURAL at 02:06

## 2024-06-17 RX ADMIN — DEXTROSE AND SODIUM CHLORIDE: 5; 900 INJECTION, SOLUTION INTRAVENOUS at 10:06

## 2024-06-17 NOTE — PT/OT/SLP PROGRESS
Occupational Therapy   Co-Treatment    Name: Amirah Davey  MRN: [id number]  Admitting Diagnosis:  Abdominal wall abscess  Day of Surgery    Recommendations:     Discharge Recommendations: High Intensity Therapy  Discharge Equipment Recommendations:  grab bar  Barriers to discharge:  Decreased caregiver support (Increased assistance needed)    Assessment:     Amirah Davey is a 88 y.o. female with a medical diagnosis of Abdominal wall abscess. Performance deficits affecting function are weakness, impaired endurance, impaired self care skills, impaired functional mobility, gait instability, decreased lower extremity function, decreased safety awareness, pain.     Rehab Prognosis:  Good; patient would benefit from acute skilled OT services to address these deficits and reach maximum level of function.       Plan:     Patient to be seen 4 x/week to address the above listed problems via self-care/home management, therapeutic activities, therapeutic exercises, neuromuscular re-education  Plan of Care Expires: 07/12/24  Plan of Care Reviewed with: patient, daughter    Subjective     Chief Complaint: None  Patient/Family Comments/goals: Pt.reports she is just tired   Pain/Comfort:  Pain Rating 1:  (unrated)  Location - Orientation 1: generalized  Pain Addressed 1: Pre-medicate for activity      Objective:     Communicated with: Nurse prior to session.  Patient found HOB elevated with peripheral IV, telemetry upon OT entry to room.    General Precautions: Standard, fall, NPO, aspiration    Orthopedic Precautions:N/A  Braces: N/A  Respiratory Status: Room air     Occupational Performance:     Bed Mobility:    Patient completed Supine to Sit with maximal assistance  Patient completed Sit to Supine with maximal assistance     Functional Mobility/Transfers: Pt. Sat at EOB ~ 8 mins  at with Max A, noted L lateral leaning, pushing, Max VC for R hand in lap to decrease pushing    Activities of Daily Living:  Grooming: moderate  assistance Facial hygiene seated at EOB   Upper Body Dressing: maximal assistance gown doffing an donning at bed level    [facility] 6 Click ADL: 12    Treatment & Education:  Educated on the importance of grooming and hygiene, postioning for skin integrity and maintaining strength in UE to engage in ADLs   Environmental scanning seated at EOB  Pt educated on role of occupational therapy, POC, and safety during ADLs and functional mobility. Pt and OT discussed importance of safe, continued mobility to optimize daily living skills. Pt verbalized understanding. Pt given instruction to call for medical staff/nurse for assistance.   Co-treatment with PT for maximal pt participation, safety, and activity tolerance     Patient left HOB elevated with all lines intact, call button in reach, and daughters present    GOALS:   Multidisciplinary Problems       Occupational Therapy Goals          Problem: Occupational Therapy    Goal Priority Disciplines Outcome Interventions   Occupational Therapy Goal     OT, PT/OT Progressing    Description: Goals to be met by: 7/12/24.     Patient will increase functional independence with ADLs by performing:    UE Dressing with Minimal Assistance.  LE Dressing with Moderate Assistance.  Grooming while seated with Stand-by Assistance.  Toileting from bedside commode with Minimal Assistance for hygiene and clothing management.   Sitting at edge of bed x10 minutes with Minimal Assistance.  Toilet transfer to bedside commode with Minimal Assistance.                         Time Tracking:     OT Date of Treatment: 06/17/24  OT Start Time: 1022  OT Stop Time: 1046  OT Total Time (min): 24 min    Billable Minutes:Self Care/Home Management 12  Therapeutic Activity 12    OT/PHILIP: OT          6/17/2024

## 2024-06-17 NOTE — PT/OT/SLP PROGRESS
Speech Language Pathology      Amirah Davey  MRN: [id number]    Patient not seen today secondary to NPO for PEG replacement at 1430. Will follow-up per SLP POC. Continue with most recent recommendations as listed below:          General Recommendations:  Dysphagia therapy  Diet recommendations:  NPO, NPO   Aspiration Precautions:   Continue with long term means of alternative hydration and nutrition      Pleasure feedings of ice chips and/or 1/2 tsp bites of pudding/applesauce   General Precautions: Standard, aspiration, fall  Communication strategies:  none      6/17/2024

## 2024-06-17 NOTE — ANESTHESIA PROCEDURE NOTES
Intubation    Date/Time: 6/17/2024 2:55 PM    Performed by: Lombardi, Nicole A., CRNA  Authorized by: Sj Reid MD    Intubation:     Induction:  Intravenous    Intubated:  Postinduction    Mask Ventilation:  Not attempted    Attempts:  1    Attempted By:  CRNA    Method of Intubation:  Video laryngoscopy    Blade:  Casarez 3    Laryngeal View Grade: Grade I - full view of cords      Difficult Airway Encountered?: No      Complications:  None    Airway Device:  Oral endotracheal tube    Airway Device Size:  7.5    Style/Cuff Inflation:  Cuffed    Inflation Amount (mL):  6    Tube secured:  22    Secured at:  The lips    Placement Verified By:  Capnometry and Revisualization with laryngoscopy    Complicating Factors:  None    Findings Post-Intubation:  BS equal bilateral and atraumatic/condition of teeth unchanged

## 2024-06-17 NOTE — BRIEF OP NOTE
[facility] ([facility])  Brief Operative Note    SUMMARY     Surgery Date: 6/17/2024     Surgeons and Role:     * Chloé Sher MD - Primary     * Chicho Gonzales MD - Resident - Assisting     * Rashmi Mckeon MD - Resident - Assisting        Pre-op Diagnosis:  PEG tube malfunction [K94.23]    Post-op Diagnosis:  Post-Op Diagnosis Codes:     * PEG tube malfunction [K94.23]    Procedure(s) (LRB):  INSERTION, PEG TUBE (N/A)    Anesthesia: General    Implants:  Implant Name Type Inv. Item Serial No.  Lot No. LRB No. Used Action   Safety Peg Kit    [employer] [id number] N/A 1 Implanted       Operative Findings: EGD and percutaneous gastrostomy tube placement. Tube secured 3 cm at the skin.     Estimated Blood Loss: * No values recorded between 6/17/2024  3:03 PM and 6/17/2024  3:31 PM *    Estimated Blood Loss has not been documented. EBL = 5.         Specimens:   Specimen (24h ago, onward)      None            [id number]

## 2024-06-17 NOTE — OP NOTE
[facility]  General Surgery  Operative Note    DATE OF PROCEDURE: 06/17/2024    PREOPERATIVE DIAGNOSES:   1. MCA stroke   2. Dysphagia, unspecified.    POSTOPERATIVE DIAGNOSES:   1. MCA stroke  2. Dysphagia, unspecified.     PROCEDURES PERFORMED:   1. Esophagogastroscopy  2. Percutaneous endoscopic gastrostomy.    ATTENDING SURGEON: Chloé Sher M.D.     HOUSESTAFF SURGEON: Rashmi Mckeon M.D. (PGY-1)    : Chicho Gonzales M.D. (PGY-3)    ANESTHESIA: Local plus monitored anesthesia care    ESTIMATED BLOOD LOSS: Minimal     FINDINGS: 20-Indian percutaneous gastrostomy @ 3 cm placed without apparent complication. Benign appearing gastric polyps. Unable to intubate duodenum.    SPECIMEN: None.     DRAINS: None.     COMPLICATIONS: None.     INDICATIONS: Amirah Davey is a 88 y.o.female referred to [facility] after her PEG tube fell out. She had a recent MCA stroke. The patient is expected to have prolonged dysphagia as a result and General Surgery was consulted for placement of a percutaneous gastrostomy tube. We did obtain informed consent from the patient's daughter who expressed understanding of the risks and benefits and gave consent to proceed.     OPERATIVE PROCEDURE: The patient was identified in preoperative holding and brought back to the operating room. General endotracheal anesthesia was induced. A timeout procedure was performed and all team members present agreed this was the correct procedure on the correct patient.    We then directed our attention to the left upper quadrant for gastrostomy tube placement. The patient's abdomen was prepped and draped. An upper endoscope was introduced into the oropharynx and guided down into the esophagus and stomach. The stomach was insufflated with air and we identified an appropriate position for gastrostomy tube placement 2 finger-breadths below the left subcostal margin, away from her previous PEG tube  site. Palpation of the anterior abdominal wall at this point was visualized endoscopically and transillumination from the endoscope was visualized through the anterior abdominal wall. We made a 1.5 cm transverse skin incision. At this point, the stomach was cannulated with a catheter loaded on a needle. This was grasped by a snare which had been passed through the endoscope. The needle was removed, and a guidewire was placed, and the snare was used to grasp the guidewire. The endoscope, snare, and guidewire were all withdrawn from the patient's mouth. A 20-Albanian gastrostomy tube was loaded onto the guidewire and pulled through the anterior abdominal wall via Seldinger technique. Repeat endoscopy was performed with the gastrostomy tube at the 3 cm oneal at the skin. There was no blanching of the gastric mucosa, and when the tube was twisted, the button did not grab the mucosa. We attempted to intubate the duodenum but were unable to. The insufflation in the stomach was evacuated, and the endoscope was removed. The gastrostomy tube was secured in place using the supplied devices and connected to a bag for gravity drainage.    The patient did well, was extubated and transported to PACU in hemodynamically stable condition. I was present for and performed the entire procedure. All sponge, instrument, and needle counts were correct at the termination of the PEG procedure.

## 2024-06-17 NOTE — TREATMENT PLAN
Routine Post-Op PEG Care:   Please keep tube to gravity for 12 hours after placement.  May administer meds after 6 hours and clamp for 30 minutes after medication administration.  We will give further recs about when to restart tube feeds.   Please notify General Surgery with any significant changes in patient's vital signs within the first 24 hours after PEG placement.  Please call with questions about PEG tube.      Chicho Gonzales MD  General Surgery, PGY-3

## 2024-06-17 NOTE — PLAN OF CARE
[facility] - Surgery (2nd Fl)  Discharge Reassessment    Per MD team, patient is not medically ready for d/c at this time. Patient is scheduled to go to the OR today for PEG tube replacement. Plan is to d/c to rehab when ready.     SW met with patient to review discharge recommendation of rehab and is agreeable to plan. Prior to hospitalization, patient was at [facility] Phone: [phone] and they would like to return. Per Marialuisa from [facility], they are able to accept patient back to their facility. SW sent referral and updated clinicals to facility via [employer]. SW called and left VM for patient's daughter, Sonya, as well.    Patient/family provided list of facilities in-network with patient's payor plan. Providers that are owned, operated, or affiliated with [facility] are included on the list.     Notified that referral sent to below listed facilities from in-network list based on proximity to home/family support:     1. [facility] Phone: [phone]      Patient/family instructed to identify preference.    Preferred Facility: (if more than 1, listed in order of descending preference)    1[facility]: [phone]      If an additional preferred facility not listed above is identified, additional referral to be sent. If above facilities unable to accept, will send additional referrals to in-network providers.     Will continue to follow for needs.    Primary Care Provider: Ivana Templeton MD    Expected Discharge Date: 6/19/2024    Reassessment (most recent)       Discharge Reassessment - 06/17/24 1434          Discharge Reassessment    Assessment Type Discharge Planning Reassessment     Did the patient's condition or plan change since previous assessment? No     Discharge Plan discussed with: Patient     Communicated JOAN with patient/caregiver Yes (P)      Discharge Plan A Rehab (P)      Discharge Plan B Skilled Nursing Facility (P)      DME Needed  Upon Discharge  other (see comments) (P)    TBD    Transition of Care Barriers None (P)      Why the patient remains in the hospital Requires continued medical care (P)         Post-Acute Status    Post-Acute Authorization Placement (P)      Post-Acute Placement Status Referrals Sent (P)      Discharge Delays None known at this time (P)                    Discharge Plan A and Plan B have been determined by review of patient's clinical status, future medical and therapeutic needs, and coverage/benefits for post-acute care in coordination with multidisciplinary team members.    Debbie Gee, JULIETTE, LMSW    Case Management Department  [facility]

## 2024-06-17 NOTE — PLAN OF CARE
Pt being transferred back to floor. Spoke with Nurse aditi Chacon to let her know pt is on the way back.

## 2024-06-17 NOTE — PROGRESS NOTES
Pharmacokinetic Assessment Follow Up: IV Vancomycin    Vancomycin serum concentration assessment(s):    The trough level was drawn correctly and can be used to guide therapy at this time. The measurement is within the desired definitive target range of 10 to 20 mcg/mL.    Of note, did confirm that the vancomycin level was drawn prior to the dose being hung.     Vancomycin Regimen Plan:    Continue regimen to Vancomycin 1000 mg IV every 24 hours with next serum trough concentration measured at 2000 prior to dose on 6/19.    Drug levels (last 3 results):  Recent Labs   Lab Result Units 06/14/24  1923 06/16/24  2028   Vancomycin, Random ug/mL 10.9  --    Vancomycin-Trough ug/mL  --  16.5       Pharmacy will continue to follow and monitor vancomycin.    Please contact pharmacy at extension [phone] for questions regarding this assessment.    Thank you for the consult,   Alicia Oliveira       Patient brief summary:  Amirah Davey is a 88 y.o. female initiated on antimicrobial therapy with IV Vancomycin for treatment of skin & soft tissue infection    The patient's current regimen is 1000 mg q24h    Drug Allergies:   Review of patient's allergies indicates:  No Known Allergies    Actual Body Weight:   68 kg    Renal Function:   Estimated Creatinine Clearance: 43.4 mL/min (based on SCr of 0.8 mg/dL).,     Dialysis Method (if applicable):  N/A    CBC (last 72 hours):  Recent Labs   Lab Result Units 06/14/24  0503 06/15/24  0347 06/15/24  1210 06/16/24  0418   WBC K/uL 4.13 3.87* 5.80 6.04   Hemoglobin g/dL 9.9* 9.5* 11.2* 10.9*   Hematocrit % 31.6* 31.6* 38.0 36.9*   Platelets K/uL 217 224 228 242   Gran % % 64.3 63.8 73.7* 70.9   Lymph % % 16.5* 17.3* 11.7* 13.7*   Mono % % 14.3 14.7 13.1 13.1   Eosinophil % % 3.9 3.1 0.9 1.5   Basophil % % 0.5 0.3 0.3 0.5   Differential Method  Automated Automated Automated Automated       Metabolic Panel (last 72 hours):  Recent Labs   Lab Result Units 06/14/24  0503 06/15/24  0347  06/16/24  0906   Sodium mmol/L 137 142 140   Potassium mmol/L 3.7 3.1* 3.4*   Chloride mmol/L 108 114* 108   CO2 mmol/L 20* 20* 20*   Glucose mg/dL 87 362* 107   BUN mg/dL 22 12 10   Creatinine mg/dL 0.9 0.9 0.8   Magnesium mg/dL 2.1 1.7 1.7   Phosphorus mg/dL 3.6 2.4*  --        Vancomycin Administrations:  vancomycin given in the last 96 hours                     vancomycin (VANCOCIN) 1,000 mg in dextrose 5 % (D5W) 250 mL IVPB (Vial-Mate) (mg) 1,000 mg New Bag 06/16/24 2027     1,000 mg New Bag 06/15/24 2048     1,000 mg New Bag 06/14/24 2216    vancomycin (VANCOCIN) 1,000 mg in dextrose 5 % (D5W) 250 mL IVPB (Vial-Mate) (mg) 1,000 mg New Bag 06/13/24 2044    vancomycin 1,250 mg in dextrose 5 % (D5W) 250 mL IVPB (Vial-Mate) (mg) 1,250 mg New Bag 06/12/24 2151                    Microbiologic Results:  Microbiology Results (last 7 days)       Procedure Component Value Units Date/Time    Blood culture (site 1) [[id number]]     Order Status: Canceled Specimen: Blood     Blood culture (site 2) [[id number]]     Order Status: Canceled Specimen: Blood

## 2024-06-17 NOTE — SUBJECTIVE & OBJECTIVE
Interval History: NAEON. Afebrile. HDS. To OR today for PEG placement     Medications:  Continuous Infusions:   dextrose 5 % and 0.9 % NaCl   Intravenous Continuous 75 mL/hr at 06/16/24 1035 New Bag at 06/16/24 1035     Scheduled Meds:   amLODIPine  10 mg Oral Daily    baclofen  5 mg Oral TID    potassium chloride  10 mEq Intravenous Q1H    vancomycin (VANCOCIN) IV (PEDS and ADULTS)  1,000 mg Intravenous Q24H     PRN Meds:  Current Facility-Administered Medications:     acetaminophen, 650 mg, Oral, Q4H PRN    albuterol-ipratropium, 3 mL, Nebulization, Q4H PRN    bisacodyL, 10 mg, Rectal, Daily PRN    dextrose 10%, 12.5 g, Intravenous, PRN    dextrose 10%, 25 g, Intravenous, PRN    glucagon (human recombinant), 1 mg, Intramuscular, PRN    glucose, 16 g, Oral, PRN    glucose, 24 g, Oral, PRN    melatonin, 6 mg, Oral, Nightly PRN    ondansetron, 4 mg, Intravenous, Q6H PRN    polyethylene glycol, 17 g, Oral, Daily PRN    Pharmacy to dose Vancomycin consult, , , Once **AND** vancomycin - pharmacy to dose, , Intravenous, pharmacy to manage frequency     Review of patient's allergies indicates:  No Known Allergies  Objective:     Vital Signs (Most Recent):  Temp: 98.2 °F (36.8 °C) (06/17/24 0848)  Pulse: 70 (06/17/24 0848)  Resp: 18 (06/17/24 0848)  BP: (!) 173/84 (06/17/24 0848)  SpO2: 100 % (06/17/24 0848) Vital Signs (24h Range):  Temp:  [97.5 °F (36.4 °C)-98.2 °F (36.8 °C)] 98.2 °F (36.8 °C)  Pulse:  [55-89] 70  Resp:  [16-18] 18  SpO2:  [96 %-100 %] 100 %  BP: (134-173)/(76-96) 173/84     Weight: 68 kg (149 lb 14.6 oz)  Body mass index is 30.28 kg/m².    Intake/Output - Last 3 Shifts         06/15 0700  06/16 0659 06/16 0700  06/17 0659 06/17 0700  06/18 0659    Urine (mL/kg/hr) 800 (0.5)      Total Output 800      Net -800                      Physical Exam  Vitals and nursing note reviewed.   Constitutional:       General: She is not in acute distress.     Appearance: She is obese.   HENT:      Head: Normocephalic  and atraumatic.      Mouth/Throat:      Mouth: Mucous membranes are moist.      Pharynx: Oropharynx is clear.   Eyes:      General: No scleral icterus.     Extraocular Movements: Extraocular movements intact.      Conjunctiva/sclera: Conjunctivae normal.   Cardiovascular:      Rate and Rhythm: Normal rate.   Pulmonary:      Effort: Pulmonary effort is normal. No respiratory distress.   Abdominal:      General: Bowel sounds are normal. There is no distension.      Palpations: Abdomen is soft.      Tenderness: There is abdominal tenderness.      Comments: Peg site without erythema, drainage. Mildly TTP. No peritonitic signs    Musculoskeletal:         General: No deformity.   Skin:     General: Skin is warm and dry.      Coloration: Skin is not jaundiced or pale.   Neurological:      General: No focal deficit present.      Mental Status: She is alert.          Significant Labs:  I have reviewed all pertinent lab results within the past 24 hours.    Significant Diagnostics:  I have reviewed all pertinent imaging results/findings within the past 24 hours.

## 2024-06-17 NOTE — PROGRESS NOTES
[facility]  Progress Note    Patient Name: Amirah Davey  MRN: [id number]  Patient Class: IP- Inpatient   Admission Date: 6/12/2024  Length of Stay: 5 days  Attending Physician: David Mayorga MD  Primary Care Provider: Ivana Templeton MD        Subjective:     Principal Problem:Abdominal wall abscess        HPI:  Amirah Davey is a 88 y.o. female with PMHx of recent CVA in May with residual L sided weakness, dysphagia and dysarthria, s/p PEG tube placement, HTN, Afib on eliquis who presents to [facility] for evaluation of PEG tube malfunction. Patient with recent admit for PEG dislogement on 6/6 and required replacement with general surgery. She presents today after her nursing home noted that there was leaking around the PEG tube site. Per ED note, patient complained of discomfort surrounding PEG site. However, patient denies any abdominal pain at the time of my exam. She had an episode of nausea yesterday without vomiting. She says her tongue/ lips are green colored because the nursing home made her drink something green earlier today. Denies fever, chills, chest pain, SOB, HA or vision changes.    ED: AFVSS. No leukocytosis or electrolyte abnormalities. Cr 1.3 from baseline ~0.9. CT abd/pelvis concerning for small superficial abscess adjacent to the anterior abdominal wall in the upper abdomen. General surgery consulted; ube was removed at bedside in the abscess cavity was probed. Given IV vanc.     Overview/Hospital Course:  Patient is an 88 year old female with a Hx of recent CVA in May with residual L sided weakness, dysphagia and dysarthria, s/p PEG tube placement, HTN, Afib on eliquis who p/w dislodged PEG tube c/b abwall abscess seen on CT. Started on vancomycin. General surgery consulted for g tube replacement and abscess drainage.    Interval History: Pt seen and examined this morning on snehal VELASQUEZ. Plan for OR this morning for PEG replacement with general surgery. Hep gtt on  hold since midnight. No complaints this AM.    Objective:     Vital Signs (Most Recent):  Temp: 98.2 °F (36.8 °C) (06/17/24 0848)  Pulse: 70 (06/17/24 0848)  Resp: 18 (06/17/24 0848)  BP: (!) 173/84 (06/17/24 0848)  SpO2: 100 % (06/17/24 0848) Vital Signs (24h Range):  Temp:  [97.5 °F (36.4 °C)-98.2 °F (36.8 °C)] 98.2 °F (36.8 °C)  Pulse:  [55-89] 70  Resp:  [16-18] 18  SpO2:  [96 %-100 %] 100 %  BP: (134-173)/(76-96) 173/84     Weight: 68 kg (149 lb 14.6 oz)  Body mass index is 30.28 kg/m².  No intake or output data in the 24 hours ending 06/17/24 0931          Physical Exam  Gen: in NAD, appears stated age  Neuro: Left sided weakness (at baseline). AAOx4, CN2-12 grossly intact BL; motor, sensory, and strength grossly intact BL  HEENT: NTNC, EOMI, PERRLA, MMM; no thyromegaly or lymphadenopathy; no JVD appreciated  CVS: RRR, no m/r/g; S1/S2 auscultated with no S3 or S4; capillary refill < 2 sec  Resp: lungs CTAB, no w/r/r; no belabored breathing or accessory muscle use appreciated   Abd: Mild abdominal tendernessBS+ in all 4 quadrants; NTND, soft to palpation; no organomegaly appreciated   Extrem: pulses full, equal, and regular over all 4 extremities; no UE or LE edema BL          Significant Labs: All pertinent labs within the past 24 hours have been reviewed.    Significant Imaging: I have reviewed all pertinent imaging results/findings within the past 24 hours.Interval History:     Assessment/Plan:      * Abdominal wall abscess  PEG tube malfunction   - presents with dislodged PEG and was found to have abdominal wall abscess on CT  - afebrile without leukocytosis  - general surgery consulted; plan to take to OR for G tube replacement/ abscess drainage on 6/15  - keep NPO  - Plan for OR today  - Hep gtt held since midnight; will resume post procedure and eventually switch back to Eliquis if no complications from PEG tube replacement  - continue IV vanc  - continue mIVF    ELIAS (acute kidney injury)  - Cr 1.3,  baseline ~1.0  - suspect prerenal from dehydration  - place on cIVFs overnight  - avoid nephrotoxins, renally dose meds    - Resolved    PEG tube malfunction  Plan for PEG tube replacement with general surgery.      Sacral ulcer, limited to breakdown of skin  - appears to be healing well  - turn q2h    Dysphagia due to recent stroke  - ongoing issue since recent stroke in May  - strict NPO  - meds via PEG once replaced    Cerebrovascular accident (CVA) due to embolism of right middle cerebral artery  - recent CVA on 5/24 with residual deficits, currently at baseline  - resume statin once PEG replaced    Chronic a-fib  Chronic anticoagulation   - hold eliquis for upcoming surgery    Hypertension, benign  - normotensive   - resume amlodipine once PEG replaced       VTE Risk Mitigation (From admission, onward)           Ordered     heparin 25,000 units in dextrose 5% 250 mL (100 units/mL) infusion LOW INTENSITY nomogram - [facility]  Continuous        Question:  Begin at (units/kg/hr)  Answer:  12    06/15/24 1200     heparin 25,000 units in dextrose 5% 250 mL (100 units/mL) infusion LOW INTENSITY nomogram - [facility]  Continuous        Question:  Begin at (units/kg/hr)  Answer:  12 06/13/24 0011     Reason for No Pharmacological VTE Prophylaxis  Once        Question:  Reasons:  Answer:  Already adequately anticoagulated on oral Anticoagulants    06/12/24 2156     IP VTE HIGH RISK PATIENT  Once         06/12/24 2156                    Discharge Planning   JOAN: 6/18/2024     Code Status: Full Code   Is the patient medically ready for discharge?:     Reason for patient still in hospital (select all that apply): Treatment  Discharge Plan A: Rehab                  David Mayorga MD  Department of Hospital Medicine   [facility]

## 2024-06-17 NOTE — ASSESSMENT & PLAN NOTE
Patient is an 88-year-old female with a past medical history of hypertension, obesity, AFib, and recent MCA stroke with subsequent hemiparesis and dysphagia requiring PEG tube placement who presents to the ED with worsening discharge from around her PEG tube.  Found to have dislodgement of her PEG tube now for the 2nd time and now with an associated abdominal wall abscess.    - NPO  - To OR today for peg tube replacement  - Please stop heparin gtt at midnight  - Consent obtained from daughter   - Remainder of care per primary team  - Please contact general surgery with any questions, concerns, or clinical status changes

## 2024-06-17 NOTE — NURSING TRANSFER
Nursing Transfer Note      Reason patient is being transferred: PACU 05    Transfer To:     Transfer via stretcher    Transfer with IV fluids infusing and cardiac monitoring    Transported by Patient Transport    Medicines sent: None    Any special needs or follow-up needed: Routine Care - Nursing communication regarding the use of PEG tube.    Chart send with patient: Yes    Notified: daughter    Patient reassessed at: 6/17/2024 6482 (date, time)

## 2024-06-17 NOTE — TRANSFER OF CARE
"Anesthesia Transfer of Care Note    Patient: Amirah Davey    Procedure(s) Performed: Procedure(s) (LRB):  INSERTION, PEG TUBE (N/A)    Patient location: PACU    Anesthesia Type: general    Transport from OR: Transported from OR on room air with adequate spontaneous ventilation    Post pain: adequate analgesia    Post assessment: no apparent anesthetic complications and tolerated procedure well    Post vital signs: stable    Level of consciousness: responds to stimulation    Nausea/Vomiting: no nausea/vomiting    Complications: none    Transfer of care protocol was followed      Last vitals: Visit Vitals  BP (!) 180/90   Pulse 69   Temp 36.7 °C (98 °F)   Resp 18   Ht 4' 11" (1.499 m)   Wt 68 kg (149 lb 14.6 oz)   SpO2 98%   Breastfeeding No   BMI 30.28 kg/m²     "

## 2024-06-17 NOTE — PROGRESS NOTES
[facility] ([facility])  General Surgery  Progress Note    Subjective:     History of Present Illness:  88-year-old female with a past medical history of hypertension, obesity, AFib, and recent MCA stroke with subsequent hemiparesis and dysphagia requiring PEG tube placement who presents to the ED with worsening discharge from around her PEG tube.  Patient has a limited understanding of her medical condition so history was difficult to obtain. In the ED her labwork was fairly unremarkable. A CT scan of the abdomen and pelvis was performed revealing only the tip of the gastrostomy tube within the subcutaneous tissues of the abdominal wall.  There was associated small fluid collection in the surrounding space and the stomach no longer appeared to be adjacent abdominal wall.  General surgery was consulted for evaluation.    Post-Op Info:  Procedure(s) (LRB):  INSERTION, PEG TUBE (N/A)   Day of Surgery     Interval History: NAEON. Afebrile. HDS. To OR today for PEG placement     Medications:  Continuous Infusions:   dextrose 5 % and 0.9 % NaCl   Intravenous Continuous 75 mL/hr at 06/16/24 1035 New Bag at 06/16/24 1035     Scheduled Meds:   amLODIPine  10 mg Oral Daily    baclofen  5 mg Oral TID    potassium chloride  10 mEq Intravenous Q1H    vancomycin (VANCOCIN) IV (PEDS and ADULTS)  1,000 mg Intravenous Q24H     PRN Meds:  Current Facility-Administered Medications:     acetaminophen, 650 mg, Oral, Q4H PRN    albuterol-ipratropium, 3 mL, Nebulization, Q4H PRN    bisacodyL, 10 mg, Rectal, Daily PRN    dextrose 10%, 12.5 g, Intravenous, PRN    dextrose 10%, 25 g, Intravenous, PRN    glucagon (human recombinant), 1 mg, Intramuscular, PRN    glucose, 16 g, Oral, PRN    glucose, 24 g, Oral, PRN    melatonin, 6 mg, Oral, Nightly PRN    ondansetron, 4 mg, Intravenous, Q6H PRN    polyethylene glycol, 17 g, Oral, Daily PRN    Pharmacy to dose Vancomycin consult, , , Once **AND** vancomycin - pharmacy to dose, ,  Intravenous, pharmacy to manage frequency     Review of patient's allergies indicates:  No Known Allergies  Objective:     Vital Signs (Most Recent):  Temp: 98.2 °F (36.8 °C) (06/17/24 0848)  Pulse: 70 (06/17/24 0848)  Resp: 18 (06/17/24 0848)  BP: (!) 173/84 (06/17/24 0848)  SpO2: 100 % (06/17/24 0848) Vital Signs (24h Range):  Temp:  [97.5 °F (36.4 °C)-98.2 °F (36.8 °C)] 98.2 °F (36.8 °C)  Pulse:  [55-89] 70  Resp:  [16-18] 18  SpO2:  [96 %-100 %] 100 %  BP: (134-173)/(76-96) 173/84     Weight: 68 kg (149 lb 14.6 oz)  Body mass index is 30.28 kg/m².    Intake/Output - Last 3 Shifts         06/15 0700  06/16 0659 06/16 0700  06/17 0659 06/17 0700  06/18 0659    Urine (mL/kg/hr) 800 (0.5)      Total Output 800      Net -800                      Physical Exam  Vitals and nursing note reviewed.   Constitutional:       General: She is not in acute distress.     Appearance: She is obese.   HENT:      Head: Normocephalic and atraumatic.      Mouth/Throat:      Mouth: Mucous membranes are moist.      Pharynx: Oropharynx is clear.   Eyes:      General: No scleral icterus.     Extraocular Movements: Extraocular movements intact.      Conjunctiva/sclera: Conjunctivae normal.   Cardiovascular:      Rate and Rhythm: Normal rate.   Pulmonary:      Effort: Pulmonary effort is normal. No respiratory distress.   Abdominal:      General: Bowel sounds are normal. There is no distension.      Palpations: Abdomen is soft.      Tenderness: There is abdominal tenderness.      Comments: Peg site without erythema, drainage. Mildly TTP. No peritonitic signs    Musculoskeletal:         General: No deformity.   Skin:     General: Skin is warm and dry.      Coloration: Skin is not jaundiced or pale.   Neurological:      General: No focal deficit present.      Mental Status: She is alert.          Significant Labs:  I have reviewed all pertinent lab results within the past 24 hours.    Significant Diagnostics:  I have reviewed all pertinent  imaging results/findings within the past 24 hours.  Assessment/Plan:     PEG tube malfunction  Patient is an 88-year-old female with a past medical history of hypertension, obesity, AFib, and recent MCA stroke with subsequent hemiparesis and dysphagia requiring PEG tube placement who presents to the ED with worsening discharge from around her PEG tube.  Found to have dislodgement of her PEG tube now for the 2nd time and now with an associated abdominal wall abscess.    - NPO  - To OR today for peg tube replacement  - Please stop heparin gtt at midnight  - Consent obtained from daughter   - Remainder of care per primary team  - Please contact general surgery with any questions, concerns, or clinical status changes      Case discussed with Dr. Sher.      Evan Clements PA-C  General Surgery  [facility] (2nd Fl)

## 2024-06-17 NOTE — SUBJECTIVE & OBJECTIVE
Interval History: Pt seen and examined this morning on paolo. EVA. Plan for OR this morning for PEG replacement with general surgery. Hep gtt on hold since midnight. No complaints this AM.    Objective:     Vital Signs (Most Recent):  Temp: 98.2 °F (36.8 °C) (06/17/24 0848)  Pulse: 70 (06/17/24 0848)  Resp: 18 (06/17/24 0848)  BP: (!) 173/84 (06/17/24 0848)  SpO2: 100 % (06/17/24 0848) Vital Signs (24h Range):  Temp:  [97.5 °F (36.4 °C)-98.2 °F (36.8 °C)] 98.2 °F (36.8 °C)  Pulse:  [55-89] 70  Resp:  [16-18] 18  SpO2:  [96 %-100 %] 100 %  BP: (134-173)/(76-96) 173/84     Weight: 68 kg (149 lb 14.6 oz)  Body mass index is 30.28 kg/m².  No intake or output data in the 24 hours ending 06/17/24 0931          Physical Exam  Gen: in NAD, appears stated age  Neuro: Left sided weakness (at baseline). AAOx4, CN2-12 grossly intact BL; motor, sensory, and strength grossly intact BL  HEENT: NTNC, EOMI, PERRLA, MMM; no thyromegaly or lymphadenopathy; no JVD appreciated  CVS: RRR, no m/r/g; S1/S2 auscultated with no S3 or S4; capillary refill < 2 sec  Resp: lungs CTAB, no w/r/r; no belabored breathing or accessory muscle use appreciated   Abd: Mild abdominal tendernessBS+ in all 4 quadrants; NTND, soft to palpation; no organomegaly appreciated   Extrem: pulses full, equal, and regular over all 4 extremities; no UE or LE edema BL          Significant Labs: All pertinent labs within the past 24 hours have been reviewed.    Significant Imaging: I have reviewed all pertinent imaging results/findings within the past 24 hours.Interval History:

## 2024-06-17 NOTE — ASSESSMENT & PLAN NOTE
PEG tube malfunction   - presents with dislodged PEG and was found to have abdominal wall abscess on CT  - afebrile without leukocytosis  - general surgery consulted; plan to take to OR for G tube replacement/ abscess drainage on 6/15  - keep NPO  - Plan for OR today  - Hep gtt held since midnight; will resume post procedure and eventually switch back to Eliquis if no complications from PEG tube replacement  - continue IV vanc  - continue mIVF

## 2024-06-17 NOTE — PT/OT/SLP PROGRESS
"Physical Therapy Treatment    Patient Name:  Amirah Davey   MRN:  [id number]    Recommendations:     Discharge Recommendations: High Intensity Therapy  Discharge Equipment Recommendations: grab bar, wheelchair  Barriers to discharge:  increased assistance    Assessment:     mAirah Davey is a 88 y.o. female admitted with a medical diagnosis of Abdominal wall abscess.  She presents with the following impairments/functional limitations: weakness, impaired endurance, impaired self care skills, impaired functional mobility, impaired balance, impaired cognition, decreased upper extremity function, decreased lower extremity function, decreased safety awareness.    Rehab Prognosis: Good; patient would benefit from acute skilled PT services to address these deficits and reach maximum level of function.    Recent Surgery: Procedure(s) (LRB):  INSERTION, PEG TUBE (N/A) Day of Surgery    Plan:     During this hospitalization, patient to be seen 4 x/week to address the identified rehab impairments via gait training, therapeutic activities, therapeutic exercises, neuromuscular re-education, wheelchair management/training and progress toward the following goals:    Plan of Care Expires:  07/15/24    Subjective     Chief Complaint: "I'm tired"   Patient/Family Comments/goals: to get better  Pain/Comfort:  Pain Rating 1:  (unrated)  Location - Orientation 1: generalized  Pain Addressed 1: Pre-medicate for activity      Objective:     Communicated with NSG prior to session.  Patient found right sidelying with PureWick, peripheral IV, telemetry upon PT entry to room.     General Precautions: Standard, aspiration, fall, NPO  Orthopedic Precautions: N/A  Braces: N/A  Respiratory Status: Room air     Functional Mobility:  Bed Mobility:     Supine to Sit: maximal assistance  Sit to Supine: maximal assistance  Sitting balance: EOB max with L push/lean       AM-PAC 6 CLICK MOBILITY  Turning over in bed (including adjusting bedclothes, sheets " and blankets)?: 2  Sitting down on and standing up from a chair with arms (e.g., wheelchair, bedside commode, etc.): 1  Moving from lying on back to sitting on the side of the bed?: 2  Moving to and from a bed to a chair (including a wheelchair)?: 1  Need to walk in hospital room?: 1  Climbing 3-5 steps with a railing?: 1  Basic Mobility Total Score: 8       Treatment & Education:  Pt with significant lean/push to L, R elbow prop utilized with mild improvement noted after propping. Pt sat EOB and performed tasks with OT while continuing to need max A .   Bedside table in front of patient and area set up for function, convenience, and safety. RN aware of patient's mobility needs and status. Questions/concerns addressed within PTA scope of practice; patient  with no further questions. Time was provided for active listening, discussion of health disposition, and discussion of safe discharge.    Patient left HOB elevated with all lines intact, call button in reach, and family present..    GOALS:   Multidisciplinary Problems       Physical Therapy Goals          Problem: Physical Therapy    Goal Priority Disciplines Outcome Goal Variances Interventions   Physical Therapy Goal     PT, PT/OT Progressing     Description: Goals to be met by: 7/15/2024     Patient will increase functional independence with mobility by performin. Supine to sit with Minimal Assistance. -In progress  2. Sit to supine with Minimal Assistance. -In progress  3. Rolling to Left and Right with Minimal Assistance. -In progress  4. Sit to stand transfer with Maximum Assistance. -In progress  5. Gait x 5 feet with Maximum Assistance using LRAD. -In progress  6. Lower extremity exercise program x10 reps per handout, with assistance as needed. -In progress                       Time Tracking:     PT Received On: 24  PT Start Time: 1022     PT Stop Time: 1046  PT Total Time (min): 24 min     Billable Minutes: Neuromuscular Re-education  24    Treatment Type: Treatment  PT/PTA: PTA     Number of PTA visits since last PT visit: 1 06/17/2024

## 2024-06-18 LAB
ANION GAP SERPL CALC-SCNC: 9 MMOL/L (ref 8–16)
APTT PPP: 30.8 SEC (ref 21–32)
BASOPHILS # BLD AUTO: 0.01 K/UL (ref 0–0.2)
BASOPHILS NFR BLD: 0.1 % (ref 0–1.9)
BUN SERPL-MCNC: 11 MG/DL (ref 8–23)
CALCIUM SERPL-MCNC: 8.7 MG/DL (ref 8.7–10.5)
CHLORIDE SERPL-SCNC: 111 MMOL/L (ref 95–110)
CO2 SERPL-SCNC: 19 MMOL/L (ref 23–29)
CREAT SERPL-MCNC: 0.9 MG/DL (ref 0.5–1.4)
DIFFERENTIAL METHOD BLD: ABNORMAL
EOSINOPHIL # BLD AUTO: 0 K/UL (ref 0–0.5)
EOSINOPHIL NFR BLD: 0.1 % (ref 0–8)
ERYTHROCYTE [DISTWIDTH] IN BLOOD BY AUTOMATED COUNT: 15.7 % (ref 11.5–14.5)
EST. GFR  (NO RACE VARIABLE): >60 ML/MIN/1.73 M^2
GLUCOSE SERPL-MCNC: 167 MG/DL (ref 70–110)
HCT VFR BLD AUTO: 31.6 % (ref 37–48.5)
HGB BLD-MCNC: 10 G/DL (ref 12–16)
IMM GRANULOCYTES # BLD AUTO: 0.04 K/UL (ref 0–0.04)
IMM GRANULOCYTES NFR BLD AUTO: 0.6 % (ref 0–0.5)
LYMPHOCYTES # BLD AUTO: 0.5 K/UL (ref 1–4.8)
LYMPHOCYTES NFR BLD: 7.6 % (ref 18–48)
MAGNESIUM SERPL-MCNC: 1.6 MG/DL (ref 1.6–2.6)
MCH RBC QN AUTO: 25.1 PG (ref 27–31)
MCHC RBC AUTO-ENTMCNC: 31.6 G/DL (ref 32–36)
MCV RBC AUTO: 79 FL (ref 82–98)
MONOCYTES # BLD AUTO: 0.5 K/UL (ref 0.3–1)
MONOCYTES NFR BLD: 7.1 % (ref 4–15)
NEUTROPHILS # BLD AUTO: 5.7 K/UL (ref 1.8–7.7)
NEUTROPHILS NFR BLD: 84.5 % (ref 38–73)
NRBC BLD-RTO: 0 /100 WBC
PLATELET # BLD AUTO: 203 K/UL (ref 150–450)
PMV BLD AUTO: 11.6 FL (ref 9.2–12.9)
POCT GLUCOSE: 119 MG/DL (ref 70–110)
POCT GLUCOSE: 125 MG/DL (ref 70–110)
POCT GLUCOSE: 143 MG/DL (ref 70–110)
POCT GLUCOSE: 148 MG/DL (ref 70–110)
POCT GLUCOSE: 166 MG/DL (ref 70–110)
POCT GLUCOSE: 198 MG/DL (ref 70–110)
POTASSIUM SERPL-SCNC: 4 MMOL/L (ref 3.5–5.1)
RBC # BLD AUTO: 3.98 M/UL (ref 4–5.4)
SODIUM SERPL-SCNC: 139 MMOL/L (ref 136–145)
WBC # BLD AUTO: 6.74 K/UL (ref 3.9–12.7)

## 2024-06-18 PROCEDURE — 85025 COMPLETE CBC W/AUTO DIFF WBC: CPT | Performed by: INTERNAL MEDICINE

## 2024-06-18 PROCEDURE — 83735 ASSAY OF MAGNESIUM: CPT | Performed by: INTERNAL MEDICINE

## 2024-06-18 PROCEDURE — 36415 COLL VENOUS BLD VENIPUNCTURE: CPT | Performed by: INTERNAL MEDICINE

## 2024-06-18 PROCEDURE — 97530 THERAPEUTIC ACTIVITIES: CPT

## 2024-06-18 PROCEDURE — 85730 THROMBOPLASTIN TIME PARTIAL: CPT | Performed by: INTERNAL MEDICINE

## 2024-06-18 PROCEDURE — 92526 ORAL FUNCTION THERAPY: CPT

## 2024-06-18 PROCEDURE — 80048 BASIC METABOLIC PNL TOTAL CA: CPT | Performed by: INTERNAL MEDICINE

## 2024-06-18 PROCEDURE — 97535 SELF CARE MNGMENT TRAINING: CPT

## 2024-06-18 PROCEDURE — 25000003 PHARM REV CODE 250: Performed by: INTERNAL MEDICINE

## 2024-06-18 PROCEDURE — 97112 NEUROMUSCULAR REEDUCATION: CPT

## 2024-06-18 PROCEDURE — 21400001 HC TELEMETRY ROOM

## 2024-06-18 RX ORDER — TALC
6 POWDER (GRAM) TOPICAL NIGHTLY PRN
Status: DISCONTINUED | OUTPATIENT
Start: 2024-06-18 | End: 2024-06-19 | Stop reason: HOSPADM

## 2024-06-18 RX ORDER — ACETAMINOPHEN 325 MG/1
650 TABLET ORAL EVERY 4 HOURS PRN
Status: DISCONTINUED | OUTPATIENT
Start: 2024-06-18 | End: 2024-06-19 | Stop reason: HOSPADM

## 2024-06-18 RX ORDER — POLYETHYLENE GLYCOL 3350 17 G/17G
17 POWDER, FOR SOLUTION ORAL DAILY PRN
Status: DISCONTINUED | OUTPATIENT
Start: 2024-06-18 | End: 2024-06-19 | Stop reason: HOSPADM

## 2024-06-18 RX ORDER — AMLODIPINE BESYLATE 10 MG/1
10 TABLET ORAL DAILY
Status: DISCONTINUED | OUTPATIENT
Start: 2024-06-19 | End: 2024-06-19 | Stop reason: HOSPADM

## 2024-06-18 RX ORDER — IBUPROFEN 200 MG
16 TABLET ORAL
Status: DISCONTINUED | OUTPATIENT
Start: 2024-06-18 | End: 2024-06-19 | Stop reason: HOSPADM

## 2024-06-18 RX ORDER — OXYCODONE HYDROCHLORIDE 5 MG/1
5 TABLET ORAL EVERY 6 HOURS PRN
Status: DISCONTINUED | OUTPATIENT
Start: 2024-06-18 | End: 2024-06-18

## 2024-06-18 RX ORDER — OXYCODONE HYDROCHLORIDE 5 MG/1
5 TABLET ORAL EVERY 6 HOURS PRN
Status: DISCONTINUED | OUTPATIENT
Start: 2024-06-18 | End: 2024-06-19 | Stop reason: HOSPADM

## 2024-06-18 RX ORDER — BACLOFEN 5 MG/1
5 TABLET ORAL 3 TIMES DAILY
Status: DISCONTINUED | OUTPATIENT
Start: 2024-06-18 | End: 2024-06-19 | Stop reason: HOSPADM

## 2024-06-18 RX ORDER — IBUPROFEN 200 MG
24 TABLET ORAL
Status: DISCONTINUED | OUTPATIENT
Start: 2024-06-18 | End: 2024-06-19 | Stop reason: HOSPADM

## 2024-06-18 RX ADMIN — BACLOFEN 5 MG: 5 TABLET ORAL at 03:06

## 2024-06-18 RX ADMIN — BACLOFEN 5 MG: 5 TABLET ORAL at 08:06

## 2024-06-18 RX ADMIN — OXYCODONE 5 MG: 5 TABLET ORAL at 08:06

## 2024-06-18 RX ADMIN — OXYCODONE 5 MG: 5 TABLET ORAL at 06:06

## 2024-06-18 RX ADMIN — BACLOFEN 5 MG: 5 TABLET ORAL at 09:06

## 2024-06-18 RX ADMIN — AMLODIPINE BESYLATE 10 MG: 10 TABLET ORAL at 08:06

## 2024-06-18 RX ADMIN — APIXABAN 5 MG: 5 TABLET, FILM COATED ORAL at 12:06

## 2024-06-18 RX ADMIN — APIXABAN 5 MG: 5 TABLET, FILM COATED ORAL at 09:06

## 2024-06-18 NOTE — CONSULTS
"  [facility]  Adult Nutrition  Consult Note    SUMMARY     Recommendations    1. EN Recommendations: Glucerna 1.5 @ 45 ml/hr x 24 hours to provide 1620 kcals, 89g of protein, and 820 ml of fluid     - Start at trickle feeds, slowly advancing to goal.     Additional FW 30 mL/hr while on TF.     - Bolus feeds when medically stable 4.5 cartons providing 1602 kcals, 88g PRO and 810 mL fluid; additional FW ~100mL BEFORE and AFTER feedingg.     2. Monitor for s/s of intolerance such as residuals >500ml, n/v/d, or abdominal distension.     3. Monitor for refeeding syndrome, if labs show signs replete phosphorus, magnesium and potassium quickly.     4. RD following    Goals: Meet % EEN/EPN by follow up date  Nutrition Goal Status: new  Communication of RD Recs: other (comment) (POC)    Assessment and Plan    Nutrition Problem  Inadequate energy intake    Related to (etiology):   Inability to consume sufficient energy    Signs and Symptoms (as evidenced by):   NPO, PEG dependent     Interventions/Recommendations (treatment strategy):  Collaboration of nutrition care with other providers  NPO  EN    Nutrition Diagnosis Status:   New      Reason for Assessment    Reason For Assessment: consult  Diagnosis:  (Abdominal wall adscess)  Relevant Medical History: HTN, stroke, A-fib  Interdisciplinary Rounds: did not attend  General Information Comments: RD consulted for TF rec's. PEG in place. Dysphagia noted. Weight is stable.  Nutrition Discharge Planning: Pending Clinical Course    Nutrition Risk Screen    Nutrition Risk Screen: difficulty chewing/swallowing    Nutrition/Diet History    Patient Reported Diet/Restrictions/Preferences: no oral intake  Spiritual, Cultural Beliefs, Tenriism Practices, Values that Affect Care: no  Food Allergies: NKFA  Factors Affecting Nutritional Intake: NPO    Anthropometrics    Temp: 97.7 °F (36.5 °C)  Height Method: Stated  Height: 4' 11" (149.9 cm)  Height (inches): 59 in  Weight " Method: Standard Scale  Weight: 68 kg (149 lb 14.6 oz)  Weight (lb): 149.91 lb  Ideal Body Weight (IBW), Female: 95 lb  % Ideal Body Weight, Female (lb): 157.8 %  BMI (Calculated): 30.3       Lab/Procedures/Meds    Pertinent Labs Reviewed: reviewed  Pertinent Labs Comments: CO2 19, Cl 111, glu 167  Pertinent Medications Reviewed: reviewed  Pertinent Medications Comments: amlodipine, baclofen, dextrose and NaCl    Estimated/Assessed Needs    Weight Used For Calorie Calculations: 67.6 kg (149 lb)  Energy Calorie Requirements (kcal): 1690 kcal/d (25 kcal/kg)  Energy Need Method: Kcal/kg  Protein Requirements: 81-95 g (1.2-1.4 g/kg)  Weight Used For Protein Calculations: 67.6 kg (149 lb)        RDA Method (mL): 1690         Nutrition Prescription Ordered    Current Diet Order: NPO    Evaluation of Received Nutrient/Fluid Intake    I/O: -750 mL  Comments: LBM: 6/11  % Intake of Estimated Energy Needs: 75 - 100 %  % Meal Intake: NPO    Nutrition Risk    Level of Risk/Frequency of Follow-up: low - moderate (1x/ week)       Monitor and Evaluation    Food and Nutrient Intake: energy intake, food and beverage intake, enteral nutrition intake, parenteral nutrition intake  Food and Nutrient Adminstration: diet order, enteral and parenteral nutrition administration  Physical Activity and Function: nutrition-related ADLs and IADLs, factors affecting access to physical activity  Anthropometric Measurements: weight, weight change, body mass index  Biochemical Data, Medical Tests and Procedures: electrolyte and renal panel, gastrointestinal profile, glucose/endocrine profile, inflammatory profile, lipid profile  Nutrition-Focused Physical Findings: overall appearance, extremities, muscles and bones, head and eyes, skin       Nutrition Follow-Up    RD Follow-up?: Yes    Kj Freitas MS, RD, LDN

## 2024-06-18 NOTE — PLAN OF CARE
Problem: Adult Inpatient Plan of Care  Goal: Plan of Care Review  Outcome: Progressing  Goal: Patient-Specific Goal (Individualized)  Outcome: Progressing  Goal: Absence of Hospital-Acquired Illness or Injury  Outcome: Progressing  Goal: Optimal Comfort and Wellbeing  Outcome: Progressing  Goal: Readiness for Transition of Care  Outcome: Progressing     Problem: Infection  Goal: Absence of Infection Signs and Symptoms  Outcome: Progressing     Problem: Acute Kidney Injury/Impairment  Goal: Fluid and Electrolyte Balance  Outcome: Progressing  Goal: Improved Oral Intake  Outcome: Progressing  Goal: Effective Renal Function  Outcome: Progressing     Problem: Fall Injury Risk  Goal: Absence of Fall and Fall-Related Injury  Outcome: Progressing     Problem: Skin Injury Risk Increased  Goal: Skin Health and Integrity  Outcome: Progressing     Problem: Wound  Goal: Optimal Coping  Outcome: Progressing  Goal: Optimal Functional Ability  Outcome: Progressing  Goal: Absence of Infection Signs and Symptoms  Outcome: Progressing  Goal: Improved Oral Intake  Outcome: Progressing  Goal: Optimal Pain Control and Function  Outcome: Progressing  Goal: Skin Health and Integrity  Outcome: Progressing  Goal: Optimal Wound Healing  Outcome: Progressing

## 2024-06-18 NOTE — PLAN OF CARE
06/18/24 0955   Post-Acute Status   Post-Acute Authorization Placement   Post-Acute Placement Status Pending post-acute provider review/more information requested   Discharge Delays None known at this time   Discharge Plan   Discharge Plan A Rehab   Discharge Plan B Skilled Nursing Facility     0951  [facility] [phone] reviewing patient's case now. Will continue to follow for needs.    1440  Per Sheri Dhillon w/Admissions, [facility] has officially accepted patient and can admit once patient is medically ready for d/c. MD team made aware. Will continue to follow for needs.    Discharge Plan A and Plan B have been determined by review of patient's clinical status, future medical and therapeutic needs, and coverage/benefits for post-acute care in coordination with multidisciplinary team members.    JULIETTE Esparza, LMSW    Case Management Department  [facility]

## 2024-06-18 NOTE — PLAN OF CARE
Problem: Physical Therapy  Goal: Physical Therapy Goal  Description: Goals to be met by: 7/15/2024     Patient will increase functional independence with mobility by performin. Supine to sit with Minimal Assistance. -In progress  2. Sit to supine with Minimal Assistance. -In progress  3. Rolling to Left and Right with Minimal Assistance. -In progress  4. Sit to stand transfer with Maximum Assistance. -met 24  Revised goal: sit to stand with moderate assist with LRAD-not met  5. Gait x 5 feet with Maximum Assistance using LRAD. -in progress  6. Lower extremity exercise program x10 reps per handout, with assistance as needed. -In progress  Outcome: Progressing   Pt's goals revised as appropriate and pt will continue to benefit from skilled PT services to work towards improved functional mobility including: bed mobility, transfers, and gait. Neida Elmore PT   2024

## 2024-06-18 NOTE — PROGRESS NOTES
[facility] Medicine  Progress Note    Patient Name: Amirah Davey  MRN: [id number]  Patient Class: IP- Inpatient   Admission Date: 6/12/2024  Length of Stay: 6 days  Attending Physician: David Mayorga MD  Primary Care Provider: Ivana Templeton MD        Subjective:     Principal Problem:Abdominal wall abscess        HPI:  Amirah Davey is a 88 y.o. female with PMHx of recent CVA in May with residual L sided weakness, dysphagia and dysarthria, s/p PEG tube placement, HTN, Afib on eliquis who presents to [facility] for evaluation of PEG tube malfunction. Patient with recent admit for PEG dislogement on 6/6 and required replacement with general surgery. She presents today after her nursing home noted that there was leaking around the PEG tube site. Per ED note, patient complained of discomfort surrounding PEG site. However, patient denies any abdominal pain at the time of my exam. She had an episode of nausea yesterday without vomiting. She says her tongue/ lips are green colored because the nursing home made her drink something green earlier today. Denies fever, chills, chest pain, SOB, HA or vision changes.    ED: AFVSS. No leukocytosis or electrolyte abnormalities. Cr 1.3 from baseline ~0.9. CT abd/pelvis concerning for small superficial abscess adjacent to the anterior abdominal wall in the upper abdomen. General surgery consulted; ube was removed at bedside in the abscess cavity was probed. Given IV vanc.     Overview/Hospital Course:  Patient is an 88 year old female with a Hx of recent CVA in May with residual L sided weakness, dysphagia and dysarthria, s/p PEG tube placement, HTN, Afib on eliquis who p/w dislodged PEG tube c/b abwall abscess seen on CT. Started on vancomycin. General surgery consulted for g tube replacement and abscess drainage.    Interval History: Pt seen and examined this morning on snehal VELASQUEZ. S/p PEG tube placement yesterday with general surgery. No complications.  Patient reports minimal pain at site of PEG. Antibiotics discontinued. Okay to restart TF's today.    Objective:     Vital Signs (Most Recent):  Temp: 97.7 °F (36.5 °C) (06/18/24 0813)  Pulse: 68 (06/18/24 0813)  Resp: 18 (06/18/24 0825)  BP: (!) 179/82 (06/18/24 0813)  SpO2: 100 % (06/18/24 0813) Vital Signs (24h Range):  Temp:  [97.3 °F (36.3 °C)-98 °F (36.7 °C)] 97.7 °F (36.5 °C)  Pulse:  [68-79] 68  Resp:  [11-18] 18  SpO2:  [96 %-100 %] 100 %  BP: (153-180)/(70-93) 179/82     Weight: 68 kg (149 lb 14.6 oz)  Body mass index is 30.28 kg/m².    Intake/Output Summary (Last 24 hours) at 6/18/2024 0948  Last data filed at 6/18/2024 0042  Gross per 24 hour   Intake --   Output 750 ml   Net -750 ml             Physical Exam  Gen: in NAD, appears stated age  Neuro: Left sided weakness (at baseline). AAOx4, CN2-12 grossly intact BL; motor, sensory, and strength grossly intact BL  HEENT: NTNC, EOMI, PERRLA, MMM; no thyromegaly or lymphadenopathy; no JVD appreciated  CVS: RRR, no m/r/g; S1/S2 auscultated with no S3 or S4; capillary refill < 2 sec  Resp: lungs CTAB, no w/r/r; no belabored breathing or accessory muscle use appreciated   Abd: PEG tube CDI. Mild abdominal tendernessBS+ in all 4 quadrants; NTND, soft to palpation; no organomegaly appreciated   Extrem: pulses full, equal, and regular over all 4 extremities; no UE or LE edema BL          Significant Labs: All pertinent labs within the past 24 hours have been reviewed.    Significant Imaging: I have reviewed all pertinent imaging results/findings within the past 24 hours.Interval History:     Assessment/Plan:      * Abdominal wall abscess  PEG tube malfunction   - presents with dislodged PEG and was found to have abdominal wall abscess on CT  - afebrile without leukocytosis  - keep NPO  - S/p PEG tube replacement 6/17 without complication  - Resume home eliquis  - Start TF's this AM  - S/p course of vancomycin    ELIAS (acute kidney injury)  - Cr 1.3, baseline  ~1.0  - suspect prerenal from dehydration  - place on cIVFs overnight  - avoid nephrotoxins, renally dose meds    - Resolved    PEG tube malfunction  S/p PEG tube replacement with general surgery.      Sacral ulcer, limited to breakdown of skin  - appears to be healing well  - turn q2h    Dysphagia due to recent stroke  - ongoing issue since recent stroke in May  - strict NPO  - meds via PEG once replaced    Cerebrovascular accident (CVA) due to embolism of right middle cerebral artery  - recent CVA on 5/24 with residual deficits, currently at baseline  - resume statin once PEG replaced    Chronic a-fib  Chronic anticoagulation   - hold eliquis for upcoming surgery    Hypertension, benign  - normotensive   - resume amlodipine once PEG replaced       VTE Risk Mitigation (From admission, onward)           Ordered     apixaban tablet 5 mg  2 times daily         06/18/24 0951     heparin 25,000 units in dextrose 5% 250 mL (100 units/mL) infusion LOW INTENSITY nomogram - [facility]  Continuous        Question:  Begin at (units/kg/hr)  Answer:  12    06/15/24 1200     heparin 25,000 units in dextrose 5% 250 mL (100 units/mL) infusion LOW INTENSITY nomogram - [facility]  Continuous        Question:  Begin at (units/kg/hr)  Answer:  12 06/13/24 0011     Reason for No Pharmacological VTE Prophylaxis  Once        Question:  Reasons:  Answer:  Already adequately anticoagulated on oral Anticoagulants    06/12/24 2156     IP VTE HIGH RISK PATIENT  Once         06/12/24 2156                    Discharge Planning   JOAN: 6/19/2024     Code Status: Full Code   Is the patient medically ready for discharge?:     Reason for patient still in hospital (select all that apply): Treatment  Discharge Plan A: Rehab   Discharge Delays: None known at this time              David Mayorga MD  Department of Hospital Medicine   [facility]

## 2024-06-18 NOTE — ASSESSMENT & PLAN NOTE
PEG tube malfunction   - presents with dislodged PEG and was found to have abdominal wall abscess on CT  - afebrile without leukocytosis  - keep NPO  - S/p PEG tube replacement 6/17 without complication  - Resume home eliquis  - Start TF's this AM  - S/p course of vancomycin

## 2024-06-18 NOTE — PLAN OF CARE
Recommendations    1. EN Recommendations: Glucerna 1.5 @ 45 ml/hr x 24 hours to provide 1620 kcals, 89g of protein, and 820 ml of fluid     - Start at trickle feeds, slowly advancing to goal.     Additional FW 30 mL/hr while on TF.     - Bolus feeds when medically stable 4.5 cartons providing 1602 kcals, 88g PRO and 810 mL fluid; additional FW ~100mL BEFORE and AFTER feedingg.     2. Monitor for s/s of intolerance such as residuals >500ml, n/v/d, or abdominal distension.     3. Monitor for refeeding syndrome, if labs show signs replete phosphorus, magnesium and potassium quickly.     4. RD following    Goals: Meet % EEN/EPN by follow up date  Nutrition Goal Status: new  Communication of RD Recs: other (comment) (POC)

## 2024-06-18 NOTE — PT/OT/SLP PROGRESS
Occupational Therapy   Treatment  Co-treatment performed due to patient's multiple deficits requiring two skilled therapists to appropriately and safely assess patient's strength and endurance while facilitating functional tasks in addition to accommodating for patient's activity tolerance.     Name: Amirah Davey  MRN: [id number]  Admitting Diagnosis:  Abdominal wall abscess  1 Day Post-Op    Recommendations:     Discharge Recommendations: High Intensity Therapy  Discharge Equipment Recommendations:  grab bar, wheelchair  Barriers to discharge:  Inaccessible home environment (increased skilled assistance required)    Assessment:     Amirah Davey is a 88 y.o. female with a medical diagnosis of Abdominal wall abscess.  She presents with gravitational pull and favoritism to the L side when EOB. She requires Max A with verbal cues and tactile cues to regain upright position. PT/OT applied head midline alignment to assist with L lean. L side continue to present with decreased strength and task involvement. Pt requires total A for bed mobility and Max A for EOB and sit to stand. OT performed LUE joint mobilization and retrograde massage. Performance deficits affecting function are weakness, impaired endurance, impaired self care skills, impaired functional mobility, gait instability, impaired balance, impaired cognition, decreased upper extremity function, decreased lower extremity function, decreased safety awareness.     Rehab Prognosis:  Good; patient would benefit from acute skilled OT services to address these deficits and reach maximum level of function.       Plan:     Patient to be seen 4 x/week to address the above listed problems via self-care/home management, therapeutic activities, therapeutic exercises, neuromuscular re-education  Plan of Care Expires: 07/12/24  Plan of Care Reviewed with: patient    Subjective     Chief Complaint: I can't do that  Patient/Family Comments/goals: I won't cry  today  Pain/Comfort:  Pain Rating 1: 0/10  Pain Rating Post-Intervention 1:  (not rated)    Objective:     Communicated with: RN prior to session.  Patient found HOB elevated with telemetry, PureWick upon OT entry to room.    General Precautions: Standard, fall, NPO, aspiration    Orthopedic Precautions:N/A  Braces: N/A  Respiratory Status: Room air     Occupational Performance:     Bed Mobility:    Patient completed Rolling/Turning to Left with  total assistance  Patient completed Rolling/Turning to Right with total assistance  Patient completed Scooting/Bridging with total assistance and 2 persons  Patient completed Supine to Sit with total assistance and 2 persons  Patient completed Sit to Supine with total assistance and 2 persons     Functional Mobility/Transfers:  Patient completed Sit <> Stand Transfer with maximal assistance  with  no assistive device    Completed 4 trials. Required verbal cues and tactile cues for posture right 2/2 severe L lean with trunk and head  Functional Mobility: not tested        [facility] 6 Click ADL: 12    Treatment & Education:  -LUE joint mobilization 1x10 ea digit  -LUE retrograde massage starting at palm  -Education on importance of OOB activity to improve overall activity tolerance and promote recovery  -Pt educated to call for assistance and to transfer with hospital staff only  -Provided education regarding role of OT with pt verbalizing understanding.  Pt had no further questions & when asked whether there were any concerns pt reported none.      Patient left HOB elevated with all lines intact, call button in reach, and dtr present    GOALS:   Multidisciplinary Problems       Occupational Therapy Goals          Problem: Occupational Therapy    Goal Priority Disciplines Outcome Interventions   Occupational Therapy Goal     OT, PT/OT Progressing    Description: Goals to be met by: 7/12/24.     Patient will increase functional independence with ADLs by performing:    UE Dressing  with Minimal Assistance.  LE Dressing with Moderate Assistance.  Grooming while seated with Stand-by Assistance.  Toileting from bedside commode with Minimal Assistance for hygiene and clothing management.   Sitting at edge of bed x10 minutes with Minimal Assistance.  Toilet transfer to bedside commode with Minimal Assistance.                         Time Tracking:     OT Date of Treatment: 06/18/24  OT Start Time: 0901  OT Stop Time: 0928  OT Total Time (min): 27 min    Billable Minutes:Therapeutic Activity 13  Neuromuscular Re-education 14    OT/PHILIP: OT          6/18/2024

## 2024-06-18 NOTE — ASSESSMENT & PLAN NOTE
Patient is an 88-year-old female with a past medical history of hypertension, obesity, AFib, and recent MCA stroke with subsequent hemiparesis and dysphagia requiring PEG tube placement who presents to the ED with worsening discharge from around her PEG tube.  Found to have dislodgement of her PEG tube now for the 2nd time and now with an associated abdominal wall abscess.She is now s/p gastrostomy tube placement on 6/17.     - OK to administer medications, clamp for 30 minutes after medication administration  - OK to start tube feeds this morning  - Recommend nutrition consult for goal tube feed optimization.   - Please call with any further questions about PEG tube.     General Surgery to sign off. Please do not hesitate to contact with further questions/concerns.

## 2024-06-18 NOTE — ANESTHESIA POSTPROCEDURE EVALUATION
Anesthesia Post Evaluation    Patient: Amirah Davey    Procedure(s) Performed: Procedure(s) (LRB):  INSERTION, PEG TUBE (N/A)    Final Anesthesia Type: general      Patient location during evaluation: floor  Patient participation: Yes- Able to Participate  Level of consciousness: awake and alert and awake  Post-procedure vital signs: reviewed and stable  Pain management: adequate  Airway patency: patent    PONV status at discharge: No PONV  Anesthetic complications: no      Cardiovascular status: blood pressure returned to baseline  Respiratory status: unassisted and spontaneous ventilation  Hydration status: euvolemic  Follow-up not needed.              Vitals Value Taken Time   /93 06/18/24 0459   Temp 36.3 °C (97.4 °F) 06/18/24 0459   Pulse 69 06/18/24 0459   Resp 18 06/18/24 0459   SpO2 99 % 06/18/24 0459         Event Time   Out of Recovery 06/17/2024 16:00:00         Pain/Emily Score: Pain Rating Prior to Med Admin: 7 (6/17/2024 12:08 PM)  Emily Score: 10 (6/17/2024  4:00 PM)

## 2024-06-18 NOTE — PT/OT/SLP PROGRESS
"Physical Therapy Treatment/co treat with OT    Patient Name:  Amirah Davey   MRN:  [id number]    Recommendations:     Discharge Recommendations: High Intensity Therapy  Discharge Equipment Recommendations: grab bar, wheelchair, bedside commode, walker, christopher  Barriers to discharge: Decreased caregiver support  Requires assist for mobility  Assessment:     Amirah Davey is a 88 y.o. female admitted with a medical diagnosis of Abdominal wall abscess.  She presents with the following impairments/functional limitations: weakness, impaired balance, decreased lower extremity function, decreased upper extremity function, impaired functional mobility . Pt is unsafe with functional mobility at this time due to pt requires max assist for bed mobility, max assist for transfers, and minimal to max assist for sitting balance due to pt pushing to the L with his R UE/LE. Pt is motivated to progress with functional mobility.     Rehab Prognosis: Fair; patient would benefit from acute skilled PT services to address these deficits and reach maximum level of function.    Recent Surgery: Procedure(s) (LRB):  INSERTION, PEG TUBE (N/A) 1 Day Post-Op    Plan:     During this hospitalization, patient to be seen 4 x/week to address the identified rehab impairments via gait training, therapeutic activities, therapeutic exercises, neuromuscular re-education and progress toward the following goals:    Plan of Care Expires:  07/15/24    Subjective   "Thank you for being patient with me"    Pain/Comfort:  Pain Rating 1:  (pt c/o pain in her L UE with stretching and ROM, pt unable to grade pain)  Location - Side 1: Left  Location - Orientation 1: generalized  Location 1: arm  Pain Addressed 1: Reposition, Cessation of Activity  Pain Rating Post-Intervention 1: 0/10 (at rest)      Objective:     Communicated with nurse prior to session.  Patient found HOB elevated with PureWick, telemetry upon PT entry to room.     General Precautions: Standard, " aspiration, fall  Orthopedic Precautions: N/A  Braces: N/A  Respiratory Status: Room air     Functional Mobility:  Bed Mobility:     Rolling Left:  moderate assistance  Supine to Sit: maximal assistance and of 2 persons  Sit to Supine: maximal assistance and of 2 persons  Transfers:     Sit to Stand:  maximal assistance with hand-held assist    AM-PAC 6 CLICK MOBILITY  Turning over in bed (including adjusting bedclothes, sheets and blankets)?: 2  Sitting down on and standing up from a chair with arms (e.g., wheelchair, bedside commode, etc.): 2  Moving from lying on back to sitting on the side of the bed?: 2  Moving to and from a bed to a chair (including a wheelchair)?: 2  Need to walk in hospital room?: 1  Climbing 3-5 steps with a railing?: 1  Basic Mobility Total Score: 10     Treatment & Education:  Pt required max assist to scoot to the EOB in sitting to have her feet supported on the floor.  pt sat on the EOB ~ 12 min with max assist initially due to pt pushing to the L with her R UE/LE. Pt given verbal and manual cues for midline orientation and upright posture and improved to requiring minimal assist to remain upright.. Pt stood x 3 trials with max assist with HHA for ~ 10-20 sec each trial. Pt required manual cues to facilitate L hip and knee ext and for upright posture . Pt received manual stretching and massage in sitting from OT while PT assisted pt with remaining upright in sitting.  Co-treat with OT due to medical complexity of pt and need for skilled hands for safe intervention.   Patient left HOB elevated with all lines intact, call button in reach, nurse notified, and daughter present..    GOALS:   Multidisciplinary Problems       Physical Therapy Goals          Problem: Physical Therapy    Goal Priority Disciplines Outcome Goal Variances Interventions   Physical Therapy Goal     PT, PT/OT Progressing     Description: Goals to be met by: 7/15/2024     Patient will increase functional independence  with mobility by performin. Supine to sit with Minimal Assistance. -In progress  2. Sit to supine with Minimal Assistance. -In progress  3. Rolling to Left and Right with Minimal Assistance. -In progress  4. Sit to stand transfer with Maximum Assistance. -met 24  Revised goal: sit to stand with moderate assist with LRAD-not met  5. Gait x 5 feet with Maximum Assistance using LRAD. -in progress  6. Lower extremity exercise program x10 reps per handout, with assistance as needed. -In progress                       Time Tracking:     PT Received On: 24  PT Start Time: 904     PT Stop Time: 927  PT Total Time (min): 23 min     Billable Minutes: Therapeutic Activity 10 and Neuromuscular Re-education 13    Treatment Type: Treatment  PT/PTA: PT     Number of PTA visits since last PT visit: 0     2024

## 2024-06-18 NOTE — NURSING
Dr Addie Samano updated regarding pt status, vag exam and request to d/rubén youngblood.  Per Dr Addie Samano, ok to d/c vanco, GBS - at this time, Dr Addie Samano encourages discharge of pt to home, Nurse attempted to give pt suppository, pt refused.

## 2024-06-18 NOTE — PROGRESS NOTES
Pharmacokinetic Assessment Follow Up: IV Vancomycin    Therapy with vancomycin complete and/or consult discontinued by provider. Pharmacy will sign off, please re-consult as needed.    Donis Andrews, PharmD, BCPS

## 2024-06-18 NOTE — PT/OT/SLP PROGRESS
Speech Language Pathology Treatment    Patient Name:  Amirah Davey   MRN:  [id number]  Admitting Diagnosis: Abdominal wall abscess    Recommendations:                 General Recommendations:  Dysphagia therapy  Diet recommendations:  Puree Diet - IDDSI Level 4, Liquid Diet Level: Thin liquids - IDDSI Level 0   Aspiration Precautions: 1 bite/sip at a time, Check for pocketing/oral residue, Continue alternate means of nutrition, Double swallow with each bite/sip, Eliminate distractions, Feed only when awake/alert, HOB to 90 degrees, Meds crushed in puree, Small bites/sips, and Strict aspiration precautions   General Precautions: Standard, aspiration, fall, NPO  Communication strategies:  none    Assessment:     Amirah Davey is a 88 y.o. female with an SLP diagnosis of Dysphagia. She presents with improved tolerance of PO trials this date and is appropriate to resume baseline puree diet with thin liquids with close ongoing monitoring. SLP to continue to follow.      Subjective     Pt found resting in bed with daughter at bedside upon SLP entry into room. Pt agreeable to participate in all aspects of session.      Patient goals: to leave the hospital     Pain/Comfort:  Pain Rating 1: 0/10    Respiratory Status: Room air    Objective:     Has the patient been evaluated by SLP for swallowing?   Yes  Keep patient NPO? No   Current Respiratory Status:        Pt seen for ongoing dysphagia therapy. Daughter at bedside reported PEG replacement yesterday was completed without complications and that they were waiting for re initiation of feeds per medical team. Family noted that pending tolerance of tube feeds, pt would be discharged likely back to rehab facility. Pt endorsed good consumption of ice chips since most recent SLP session. HOB elevated for all PO intake. Pt consumed very small self-regulated open cup sips of thin liquids x8 with singular instance of wet vocal quality with soft coughing though voice returned to  baseline. 1/2 tsp bites of applesauce completed with good oral containment and no overt signs of airway compromise. Pt continued to require encouragement from SLP and family to engage in direct PO trials this date. SLP provided education regarding overall impressions, re initiation of puree diet with thin liquids, safe swallow strategies, ongoing SLP POC, and close monitoring to ensure ongoing diet tolerance. Pt and family verbalized understanding and had no additional questions or concerns upon SLP exit.       Goals:   Multidisciplinary Problems       SLP Goals          Problem: SLP    Goal Priority Disciplines Outcome   SLP Goal     SLP Progressing   Description: Speech Pathology Goals  To be met by 6/28/24    1. Pt will participate in ongoing diagnostic dysphagia therapy                              Plan:     Patient to be seen:  4 x/week   Plan of Care expires:  07/14/24  Plan of Care reviewed with:  patient, daughter   SLP Follow-Up:  Yes       Discharge recommendations:  High Intensity Therapy   Barriers to Discharge:   none per speech    Time Tracking:     SLP Treatment Date:   06/18/24  Speech Start Time:  1028  Speech Stop Time:  1045     Speech Total Time (min):  17 min    Billable Minutes: Treatment Swallowing Dysfunction 9 and Self Care/Home Management Training 8      06/18/2024

## 2024-06-18 NOTE — NURSING
Dr. Miguel notified pt refused baclofen via peg at current time, due to pt not returning from surgery until after 6pm. Per notes pt finished about 330 but pt's daughers refused. No additional orders noted at this time.

## 2024-06-18 NOTE — SUBJECTIVE & OBJECTIVE
Interval History: Pt seen and examined this morning on paolo. EVA. S/p PEG tube placement yesterday with general surgery. No complications. Patient reports minimal pain at site of PEG. Antibiotics discontinued. Okay to restart TF's today.    Objective:     Vital Signs (Most Recent):  Temp: 97.7 °F (36.5 °C) (06/18/24 0813)  Pulse: 68 (06/18/24 0813)  Resp: 18 (06/18/24 0825)  BP: (!) 179/82 (06/18/24 0813)  SpO2: 100 % (06/18/24 0813) Vital Signs (24h Range):  Temp:  [97.3 °F (36.3 °C)-98 °F (36.7 °C)] 97.7 °F (36.5 °C)  Pulse:  [68-79] 68  Resp:  [11-18] 18  SpO2:  [96 %-100 %] 100 %  BP: (153-180)/(70-93) 179/82     Weight: 68 kg (149 lb 14.6 oz)  Body mass index is 30.28 kg/m².    Intake/Output Summary (Last 24 hours) at 6/18/2024 0948  Last data filed at 6/18/2024 0042  Gross per 24 hour   Intake --   Output 750 ml   Net -750 ml             Physical Exam  Gen: in NAD, appears stated age  Neuro: Left sided weakness (at baseline). AAOx4, CN2-12 grossly intact BL; motor, sensory, and strength grossly intact BL  HEENT: NTNC, EOMI, PERRLA, MMM; no thyromegaly or lymphadenopathy; no JVD appreciated  CVS: RRR, no m/r/g; S1/S2 auscultated with no S3 or S4; capillary refill < 2 sec  Resp: lungs CTAB, no w/r/r; no belabored breathing or accessory muscle use appreciated   Abd: PEG tube CDI. Mild abdominal tendernessBS+ in all 4 quadrants; NTND, soft to palpation; no organomegaly appreciated   Extrem: pulses full, equal, and regular over all 4 extremities; no UE or LE edema BL          Significant Labs: All pertinent labs within the past 24 hours have been reviewed.    Significant Imaging: I have reviewed all pertinent imaging results/findings within the past 24 hours.Interval History:

## 2024-06-18 NOTE — PROGRESS NOTES
[facility]  General Surgery  Progress Note    Subjective:     History of Present Illness:  88-year-old female with a past medical history of hypertension, obesity, AFib, and recent MCA stroke with subsequent hemiparesis and dysphagia requiring PEG tube placement who presents to the ED with worsening discharge from around her PEG tube.  Patient has a limited understanding of her medical condition so history was difficult to obtain. In the ED her labwork was fairly unremarkable. A CT scan of the abdomen and pelvis was performed revealing only the tip of the gastrostomy tube within the subcutaneous tissues of the abdominal wall.  There was associated small fluid collection in the surrounding space and the stomach no longer appeared to be adjacent abdominal wall.  General surgery was consulted for evaluation.    Post-Op Info:  Procedure(s) (LRB):  INSERTION, PEG TUBE (N/A)   1 Day Post-Op     Interval History: Ms. Davey reports she is doing well this morning, her only complaint is the abdominal binder causing discomfort as it feels very tight. Gastrostomy tube with scant gastric contents.     Medications:  Continuous Infusions:   dextrose 5 % and 0.9 % NaCl   Intravenous Continuous 75 mL/hr at 06/17/24 2245 New Bag at 06/17/24 2245     Scheduled Meds:   amLODIPine  10 mg Oral Daily    baclofen  5 mg Oral TID     PRN Meds:  Current Facility-Administered Medications:     acetaminophen, 650 mg, Oral, Q4H PRN    albuterol-ipratropium, 3 mL, Nebulization, Q4H PRN    bisacodyL, 10 mg, Rectal, Daily PRN    dextrose 10%, 12.5 g, Intravenous, PRN    dextrose 10%, 25 g, Intravenous, PRN    glucagon (human recombinant), 1 mg, Intramuscular, PRN    glucose, 16 g, Oral, PRN    glucose, 24 g, Oral, PRN    melatonin, 6 mg, Oral, Nightly PRN    ondansetron, 4 mg, Intravenous, Q6H PRN    oxyCODONE, 5 mg, Oral, Q6H PRN    polyethylene glycol, 17 g, Oral, Daily PRN     Review of patient's allergies indicates:  No Known  Allergies  Objective:     Vital Signs (Most Recent):  Temp: 97.7 °F (36.5 °C) (06/18/24 0813)  Pulse: 68 (06/18/24 0813)  Resp: 18 (06/18/24 0825)  BP: (!) 179/82 (06/18/24 0813)  SpO2: 100 % (06/18/24 0813) Vital Signs (24h Range):  Temp:  [97.3 °F (36.3 °C)-98.2 °F (36.8 °C)] 97.7 °F (36.5 °C)  Pulse:  [68-79] 68  Resp:  [11-18] 18  SpO2:  [96 %-100 %] 100 %  BP: (153-180)/(70-93) 179/82     Weight: 68 kg (149 lb 14.6 oz)  Body mass index is 30.28 kg/m².    Intake/Output - Last 3 Shifts         06/16 0700  06/17 0659 06/17 0700  06/18 0659 06/18 0700  06/19 0659    Urine (mL/kg/hr)  750 (0.5)     Emesis/NG output  0     Stool  0     Total Output  750     Net  -750            Urine Occurrence  2 x     Stool Occurrence  0 x     Emesis Occurrence  0 x              Physical Exam  Vitals and nursing note reviewed.   Constitutional:       General: She is not in acute distress.     Appearance: She is not toxic-appearing.   HENT:      Head: Normocephalic and atraumatic.   Cardiovascular:      Rate and Rhythm: Normal rate.   Pulmonary:      Effort: Pulmonary effort is normal. No respiratory distress.   Abdominal:      General: There is no distension.      Palpations: Abdomen is soft.      Tenderness: There is no abdominal tenderness. There is no guarding.      Comments:   G tube in place to gravity with thin gastric output.   Abdominal binder loosely replaced   Skin:     General: Skin is warm and dry.   Neurological:      Mental Status: She is alert. Mental status is at baseline.          Significant Labs:  I have reviewed all pertinent lab results within the past 24 hours.    Significant Diagnostics:  I have reviewed all pertinent imaging results/findings within the past 24 hours.  Assessment/Plan:     PEG tube malfunction  Patient is an 88-year-old female with a past medical history of hypertension, obesity, AFib, and recent MCA stroke with subsequent hemiparesis and dysphagia requiring PEG tube placement who presents to  the ED with worsening discharge from around her PEG tube.  Found to have dislodgement of her PEG tube now for the 2nd time and now with an associated abdominal wall abscess.She is now s/p gastrostomy tube placement on 6/17.     - OK to administer medications, clamp for 30 minutes after medication administration  - OK to start tube feeds this morning  - Recommend nutrition consult for goal tube feed optimization.   - Please call with any further questions about PEG tube.     General Surgery to sign off. Please do not hesitate to contact with further questions/concerns.         Chicho Gonzales MD  General Surgery  [facility] - [facility]

## 2024-06-18 NOTE — SUBJECTIVE & OBJECTIVE
Interval History: Ms. Davey reports she is doing well this morning, her only complaint is the abdominal binder causing discomfort as it feels very tight. Gastrostomy tube with scant gastric contents.     Medications:  Continuous Infusions:   dextrose 5 % and 0.9 % NaCl   Intravenous Continuous 75 mL/hr at 06/17/24 2245 New Bag at 06/17/24 2245     Scheduled Meds:   amLODIPine  10 mg Oral Daily    baclofen  5 mg Oral TID     PRN Meds:  Current Facility-Administered Medications:     acetaminophen, 650 mg, Oral, Q4H PRN    albuterol-ipratropium, 3 mL, Nebulization, Q4H PRN    bisacodyL, 10 mg, Rectal, Daily PRN    dextrose 10%, 12.5 g, Intravenous, PRN    dextrose 10%, 25 g, Intravenous, PRN    glucagon (human recombinant), 1 mg, Intramuscular, PRN    glucose, 16 g, Oral, PRN    glucose, 24 g, Oral, PRN    melatonin, 6 mg, Oral, Nightly PRN    ondansetron, 4 mg, Intravenous, Q6H PRN    oxyCODONE, 5 mg, Oral, Q6H PRN    polyethylene glycol, 17 g, Oral, Daily PRN     Review of patient's allergies indicates:  No Known Allergies  Objective:     Vital Signs (Most Recent):  Temp: 97.7 °F (36.5 °C) (06/18/24 0813)  Pulse: 68 (06/18/24 0813)  Resp: 18 (06/18/24 0825)  BP: (!) 179/82 (06/18/24 0813)  SpO2: 100 % (06/18/24 0813) Vital Signs (24h Range):  Temp:  [97.3 °F (36.3 °C)-98.2 °F (36.8 °C)] 97.7 °F (36.5 °C)  Pulse:  [68-79] 68  Resp:  [11-18] 18  SpO2:  [96 %-100 %] 100 %  BP: (153-180)/(70-93) 179/82     Weight: 68 kg (149 lb 14.6 oz)  Body mass index is 30.28 kg/m².    Intake/Output - Last 3 Shifts         06/16 0700  06/17 0659 06/17 0700 06/18 0659 06/18 0700 06/19 0659    Urine (mL/kg/hr)  750 (0.5)     Emesis/NG output  0     Stool  0     Total Output  750     Net  -750            Urine Occurrence  2 x     Stool Occurrence  0 x     Emesis Occurrence  0 x              Physical Exam  Vitals and nursing note reviewed.   Constitutional:       General: She is not in acute distress.     Appearance: She is not  toxic-appearing.   HENT:      Head: Normocephalic and atraumatic.   Cardiovascular:      Rate and Rhythm: Normal rate.   Pulmonary:      Effort: Pulmonary effort is normal. No respiratory distress.   Abdominal:      General: There is no distension.      Palpations: Abdomen is soft.      Tenderness: There is no abdominal tenderness. There is no guarding.      Comments:   G tube in place to gravity with thin gastric output.   Abdominal binder loosely replaced   Skin:     General: Skin is warm and dry.   Neurological:      Mental Status: She is alert. Mental status is at baseline.          Significant Labs:  I have reviewed all pertinent lab results within the past 24 hours.    Significant Diagnostics:  I have reviewed all pertinent imaging results/findings within the past 24 hours.

## 2024-06-19 VITALS
BODY MASS INDEX: 30.23 KG/M2 | TEMPERATURE: 97 F | HEART RATE: 70 BPM | SYSTOLIC BLOOD PRESSURE: 138 MMHG | RESPIRATION RATE: 16 BRPM | HEIGHT: 59 IN | DIASTOLIC BLOOD PRESSURE: 77 MMHG | WEIGHT: 149.94 LBS | OXYGEN SATURATION: 96 %

## 2024-06-19 LAB
ANION GAP SERPL CALC-SCNC: 7 MMOL/L (ref 8–16)
APTT PPP: 34.3 SEC (ref 21–32)
BASOPHILS # BLD AUTO: 0.04 K/UL (ref 0–0.2)
BASOPHILS NFR BLD: 0.5 % (ref 0–1.9)
BUN SERPL-MCNC: 19 MG/DL (ref 8–23)
CALCIUM SERPL-MCNC: 8.8 MG/DL (ref 8.7–10.5)
CHLORIDE SERPL-SCNC: 111 MMOL/L (ref 95–110)
CO2 SERPL-SCNC: 21 MMOL/L (ref 23–29)
CREAT SERPL-MCNC: 0.9 MG/DL (ref 0.5–1.4)
DIFFERENTIAL METHOD BLD: ABNORMAL
EOSINOPHIL # BLD AUTO: 0.2 K/UL (ref 0–0.5)
EOSINOPHIL NFR BLD: 2.1 % (ref 0–8)
ERYTHROCYTE [DISTWIDTH] IN BLOOD BY AUTOMATED COUNT: 15.9 % (ref 11.5–14.5)
EST. GFR  (NO RACE VARIABLE): >60 ML/MIN/1.73 M^2
GLUCOSE SERPL-MCNC: 103 MG/DL (ref 70–110)
HCT VFR BLD AUTO: 31.5 % (ref 37–48.5)
HGB BLD-MCNC: 9.8 G/DL (ref 12–16)
IMM GRANULOCYTES # BLD AUTO: 0.06 K/UL (ref 0–0.04)
IMM GRANULOCYTES NFR BLD AUTO: 0.7 % (ref 0–0.5)
LYMPHOCYTES # BLD AUTO: 1.3 K/UL (ref 1–4.8)
LYMPHOCYTES NFR BLD: 15.1 % (ref 18–48)
MAGNESIUM SERPL-MCNC: 1.7 MG/DL (ref 1.6–2.6)
MCH RBC QN AUTO: 25.3 PG (ref 27–31)
MCHC RBC AUTO-ENTMCNC: 31.1 G/DL (ref 32–36)
MCV RBC AUTO: 81 FL (ref 82–98)
MONOCYTES # BLD AUTO: 0.9 K/UL (ref 0.3–1)
MONOCYTES NFR BLD: 10 % (ref 4–15)
NEUTROPHILS # BLD AUTO: 6.1 K/UL (ref 1.8–7.7)
NEUTROPHILS NFR BLD: 71.6 % (ref 38–73)
NRBC BLD-RTO: 0 /100 WBC
PLATELET # BLD AUTO: 203 K/UL (ref 150–450)
PMV BLD AUTO: 11.5 FL (ref 9.2–12.9)
POCT GLUCOSE: 110 MG/DL (ref 70–110)
POCT GLUCOSE: 142 MG/DL (ref 70–110)
POTASSIUM SERPL-SCNC: 3.6 MMOL/L (ref 3.5–5.1)
RBC # BLD AUTO: 3.88 M/UL (ref 4–5.4)
SODIUM SERPL-SCNC: 139 MMOL/L (ref 136–145)
WBC # BLD AUTO: 8.49 K/UL (ref 3.9–12.7)

## 2024-06-19 PROCEDURE — 25000003 PHARM REV CODE 250: Performed by: INTERNAL MEDICINE

## 2024-06-19 PROCEDURE — 97110 THERAPEUTIC EXERCISES: CPT

## 2024-06-19 PROCEDURE — 97112 NEUROMUSCULAR REEDUCATION: CPT

## 2024-06-19 PROCEDURE — 85025 COMPLETE CBC W/AUTO DIFF WBC: CPT | Performed by: INTERNAL MEDICINE

## 2024-06-19 PROCEDURE — 36415 COLL VENOUS BLD VENIPUNCTURE: CPT | Performed by: INTERNAL MEDICINE

## 2024-06-19 PROCEDURE — 85730 THROMBOPLASTIN TIME PARTIAL: CPT | Performed by: INTERNAL MEDICINE

## 2024-06-19 PROCEDURE — 80048 BASIC METABOLIC PNL TOTAL CA: CPT | Performed by: INTERNAL MEDICINE

## 2024-06-19 PROCEDURE — 97530 THERAPEUTIC ACTIVITIES: CPT

## 2024-06-19 PROCEDURE — 92526 ORAL FUNCTION THERAPY: CPT

## 2024-06-19 PROCEDURE — 83735 ASSAY OF MAGNESIUM: CPT | Performed by: INTERNAL MEDICINE

## 2024-06-19 RX ORDER — BACLOFEN 5 MG/1
5 TABLET ORAL 3 TIMES DAILY
Start: 2024-06-19

## 2024-06-19 RX ORDER — ACETAMINOPHEN 500 MG
1000 TABLET ORAL EVERY 6 HOURS PRN
Start: 2024-06-19

## 2024-06-19 RX ADMIN — AMLODIPINE BESYLATE 10 MG: 10 TABLET ORAL at 09:06

## 2024-06-19 RX ADMIN — BACLOFEN 5 MG: 5 TABLET ORAL at 09:06

## 2024-06-19 RX ADMIN — OXYCODONE 5 MG: 5 TABLET ORAL at 09:06

## 2024-06-19 RX ADMIN — APIXABAN 5 MG: 5 TABLET, FILM COATED ORAL at 09:06

## 2024-06-19 NOTE — PT/OT/SLP PROGRESS
"Physical Therapy Treatment    Patient Name:  Amirah Davey   MRN:  [id number]    Recommendations:     Discharge Recommendations: High Intensity Therapy  Discharge Equipment Recommendations: bedside commode, lift device, wheelchair, grab bar  Barriers to discharge: Decreased caregiver support  Requires assist for mobility  Assessment:     Amirah Davey is a 88 y.o. female admitted with a medical diagnosis of Abdominal wall abscess.  She presents with the following impairments/functional limitations: weakness, impaired self care skills, impaired functional mobility, decreased upper extremity function, decreased lower extremity function, impaired balance, pain, decreased safety awareness . Pt is unsafe with functional mobility at this time due to pt requires max assist for bed mobility, max to total assist for transfers, and minimal to max assist for sitting balance due to pt pushes to the L with her R UE/LE causing LOB. Pt is motivated to progress with functional mobility.     Rehab Prognosis: Fair; patient would benefit from acute skilled PT services to address these deficits and reach maximum level of function.    Recent Surgery: Procedure(s) (LRB):  INSERTION, PEG TUBE (N/A) 2 Days Post-Op    Plan:     During this hospitalization, patient to be seen 4 x/week to address the identified rehab impairments via therapeutic activities, therapeutic exercises, neuromuscular re-education and progress toward the following goals:    Plan of Care Expires:  07/15/24    Subjective   "I want to move slow, it hurts if I move too fast"  Pain/Comfort:  Pain Rating 1:  (pt c/o pain with movement and stretching in L UE- unable to grade)  Location - Side 1: Left  Location - Orientation 1: generalized  Location 1: arm  Pain Addressed 1: Reposition, Cessation of Activity, Nurse notified (nurse present at end of treatment to give pt pain medicine)  Pain Rating Post-Intervention 1: 0/10 (at rest)      Objective:     Communicated with nurse " prior to session.  Patient found HOB elevated with PureWick, telemetry upon PT entry to room.     General Precautions: Standard, aspiration, fall  Orthopedic Precautions: N/A  Braces: N/A  Respiratory Status: Room air     Functional Mobility:  Bed Mobility:     Rolling Right: maximal assistance  Supine to Sit: maximal assistance  Sit to Supine: maximal assistance  Transfers:     Sit to Stand:  maximal assistance with hand-held assist  Bed to/from bedside Chair: maximal assistance with  hand-held assist  using  Squat Pivot with manual cues to facilitate L hip and knee ext.    AM-PAC 6 CLICK MOBILITY  Turning over in bed (including adjusting bedclothes, sheets and blankets)?: 2  Sitting down on and standing up from a chair with arms (e.g., wheelchair, bedside commode, etc.): 2  Moving from lying on back to sitting on the side of the bed?: 2  Moving to and from a bed to a chair (including a wheelchair)?: 2  Need to walk in hospital room?: 1  Climbing 3-5 steps with a railing?: 1  Basic Mobility Total Score: 10     Treatment & Education:   pt sat on the EOB ~ 12 min total time between transfers and standing trials with max assist initially due to pt pushing to the L with her R UE/LE causing LOB to the L. Pt received verbal and manual cues for midline orientation , head in midline position (due to pt pould side bend to the Left), and upright posture. Pt then required minimal assist to remain upright. Pt stood with HW in R UE x 1 time with total assist due to pt pushing to the L with her R UE/LE. Pt then stood x 3 trials with HHA with max assist for ~ 20-30 sec each trial. Pt required manual cues to facilitate L hip and knee ext. Pt received gentle stretching to her L UE and LE in supine within available planes of movement prior to coming to sit.    Patient left HOB elevated (pt remained up in bedside chair ~ 1 hour prior to PT return to transfer pt back to bed as above) with all lines intact, call button in reach, nurse  notified, and daughter present..    GOALS:   Multidisciplinary Problems       Physical Therapy Goals          Problem: Physical Therapy    Goal Priority Disciplines Outcome Goal Variances Interventions   Physical Therapy Goal     PT, PT/OT Progressing     Description: Goals to be met by: 7/15/2024     Patient will increase functional independence with mobility by performin. Supine to sit with Minimal Assistance. -In progress  2. Sit to supine with Minimal Assistance. -In progress  3. Rolling to Left and Right with Minimal Assistance. -In progress  4. Sit to stand transfer with Maximum Assistance. -met 24  Revised goal: sit to stand with moderate assist with LRAD-not met  5. Gait x 5 feet with Maximum Assistance using LRAD. -in progress  6. Lower extremity exercise program x10 reps per handout, with assistance as needed. -In progress                       Time Tracking:     PT Received On: 24  PT Start Time: 0835     PT Stop Time: 09 (PT returned [phone] to transfer pt back to bed= 60 min total time)  PT Total Time (min): 45 min     Billable Minutes: Therapeutic Activity 25, Therapeutic Exercise 15, and Neuromuscular Re-education 20    Treatment Type: Treatment  PT/PTA: PT     Number of PTA visits since last PT visit: 0     2024

## 2024-06-19 NOTE — DISCHARGE SUMMARY
[facility]  Discharge Summary      Patient Name: Amirah Davey  MRN: [id number]  BHANU: [id number]  Patient Class: IP- Inpatient  Admission Date: 6/12/2024  Hospital Length of Stay: 7 days  Discharge Date and Time:  06/19/2024 7:08 AM  Attending Physician: David Mayorga MD   Discharging Provider: Dvaid Mayorga MD  Primary Care Provider: Ivana Templeton MD  [facility] Medicine Team: [facility] David Mayorga MD  Primary Care Team: [facility]    HPI:   Amirah Davey is a 88 y.o. female with PMHx of recent CVA in May with residual L sided weakness, dysphagia and dysarthria, s/p PEG tube placement, HTN, Afib on eliquis who presents to [facility] for evaluation of PEG tube malfunction. Patient with recent admit for PEG dislogement on 6/6 and required replacement with general surgery. She presents today after her nursing home noted that there was leaking around the PEG tube site. Per ED note, patient complained of discomfort surrounding PEG site. However, patient denies any abdominal pain at the time of my exam. She had an episode of nausea yesterday without vomiting. She says her tongue/ lips are green colored because the nursing home made her drink something green earlier today. Denies fever, chills, chest pain, SOB, HA or vision changes.    ED: AFVSS. No leukocytosis or electrolyte abnormalities. Cr 1.3 from baseline ~0.9. CT abd/pelvis concerning for small superficial abscess adjacent to the anterior abdominal wall in the upper abdomen. General surgery consulted; ube was removed at bedside in the abscess cavity was probed. Given IV vanc.     Procedure(s) (LRB):  INSERTION, PEG TUBE (N/A)      Hospital Course:   Patient is an 88 year old female with a Hx of recent CVA in May with residual L sided weakness, dysphagia and dysarthria, s/p PEG tube placement, HTN, Afib on eliquis who p/w dislodged PEG tube c/b abwall abscess seen on CT. Started on vancomycin. General surgery consulted for  g tube replacement and abscess drainage. PEG tube replaced and no abscess or fluid collection noted. Vancomycin discontinued. Patient started on tubefeeds without issues. SLP consulted and recommending pureed diet and continued dysphagia therapy outpatient. Patient seen and examined on day of discharge. Pt deemed appropriate for discharge to inpatient rehab. Plan discussed with pt, who was agreeable and amenable; medications were discussed and reviewed, outpatient follow-up scheduled, ER precautions were given, all questions were answered to the pt's satisfaction, and patient was subsequently discharged.       Goals of Care Treatment Preferences:  Code Status: Full Code      Consults:   Consults (From admission, onward)          Status Ordering Provider     Inpatient consult to Registered Dietitian/Nutritionist  Once        Provider:  (Not yet assigned)    Completed DENISE MARTI     Inpatient consult to Midline team  Once        Provider:  (Not yet assigned)    Completed DENISE MARTI     Inpatient consult to General surgery  Once        Provider:  (Not yet assigned)    Completed BARON ANN            No new Assessment & Plan notes have been filed under this hospital service since the last note was generated.  Service: Hospital Medicine    Final Active Diagnoses:    Diagnosis Date Noted POA    PRINCIPAL PROBLEM:  Abdominal wall abscess [L02.211] 06/12/2024 Yes    ELIAS (acute kidney injury) [N17.9] 06/12/2024 Yes    PEG tube malfunction [K94.23] 06/06/2024 Yes    Sacral ulcer, limited to breakdown of skin [L98.421] 05/31/2024 Yes    Chronic anticoagulation [Z79.01] 05/24/2024 Not Applicable    Cerebrovascular accident (CVA) due to embolism of right middle cerebral artery [I63.411] 05/23/2024 Yes    Dysphagia due to recent stroke [I69.391] 05/23/2024 Not Applicable    Hypertension, benign [I10]  Yes    Chronic a-fib [I48.20]  Yes      Problems Resolved During this Admission:       Discharged Condition:  good    Disposition: Rehab Facility    Follow Up:   Follow-up Information       Ivana Templeton MD Follow up.    Specialty: Family Medicine  Contact information:  [address]  [address] [place] [place]  [phone]                           Patient Instructions:   No discharge procedures on file.    Significant Diagnostic Studies: N/A    Pending Diagnostic Studies:       Procedure Component Value Units Date/Time    APTT [[id number]] Collected: 06/15/24 2334    Order Status: Sent Lab Status: No result     Specimen: Blood     APTT [[id number]] Collected: 06/13/24 0217    Order Status: Sent Lab Status: No result     Specimen: Blood     CBC with Automated Differential [[id number]] Collected: 06/13/24 0217    Order Status: Sent Lab Status: No result     Specimen: Blood            Medications:  Reconciled Home Medications:      Medication List        START taking these medications      baclofen 5 mg Tab tablet  Commonly known as: LIORESAL  1 tablet (5 mg total) by Per G Tube route 3 (three) times daily.            CONTINUE taking these medications      acetaminophen 500 MG tablet  Commonly known as: TYLENOL  2 tablets (1,000 mg total) by Per G Tube route every 6 (six) hours as needed for Pain.     amLODIPine 5 MG tablet  Commonly known as: NORVASC  1 tablet (5 mg total) by Per G Tube route once daily.     apixaban 5 mg Tab  Commonly known as: ELIQUIS  1 tablet (5 mg total) by Per G Tube route 2 (two) times daily.     atorvastatin 40 MG tablet  Commonly known as: LIPITOR  1 tablet (40 mg total) by Per G Tube route once daily.     famotidine 40 MG tablet  Commonly known as: PEPCID  1 tablet (40 mg total) by Per G Tube route once daily.     GLUCERNA 1.5 SHELLEY ORAL  Feed per tube at 55 ml/hr continuously     LIDOcaine 5 %  Commonly known as: LIDODERM  Place 1 patch onto the skin once daily. Remove & Discard patch within 12 hours or as directed by MD     lisinopriL 20 MG tablet  Commonly known as: PRINIVIL,ZESTRIL  1  tablet (20 mg total) by Per G Tube route once daily. DO NOT TAKE UNTIL YOU SEE YOUR PRIMARY DOCTOR. Rehab can add back if BP becomes elevated. Would start with this one over HCTZ            STOP taking these medications      chlorhexidine 0.12 % solution  Commonly known as: PERIDEX     hydroCHLOROthiazide 12.5 mg capsule  Commonly known as: MICROZIDE              Indwelling Lines/Drains at time of discharge:   Lines/Drains/Airways       Drain  Duration                  Gastrostomy/Enterostomy 05/29/24 1440 Percutaneous endoscopic gastrostomy (PEG) LUQ 20 days         Gastrostomy/Enterostomy 06/17/24 1511 Percutaneous endoscopic gastrostomy (PEG) feeding 1 day    Female External Urinary Catheter w/ Suction 06/19/24 0427 <1 day                    Time spent on the discharge of patient: 35 minutes         David Mayorga MD  Department of Hospital Medicine  [facility] Surg

## 2024-06-19 NOTE — PLAN OF CARE
Sent facility orders via [facility]. Awaiting a response from facility.      Spoke with Marialuisa at [facility] and was asked to set up transportation for 12:00pm. Transportation scheduled for 12:00pm and informed patient's nurse Harvey to call report to [phone], option 3 and ask for the charge nurse.         JOHN Key  Case Management  [phone]

## 2024-06-19 NOTE — PLAN OF CARE
[facility]    FACILITY TRANSFER ORDERS      Patient Name: Amirah Davey  YOB: 1935    PCP: Ivana Templeton MD   PCP Address: [address] / [place]  PCP Phone Number: [phone]  PCP Fax: [phone]    Encounter Date: 06/19/2024    Admit to: Inpatient rehab    Vital Signs:  Routine    Diagnoses:   Active Hospital Problems    Diagnosis  POA    *Abdominal wall abscess [L02.211]  Yes     Priority: 1 - High    ELIAS (acute kidney injury) [N17.9]  Yes     Priority: 2     PEG tube malfunction [K94.23]  Yes     Priority: 2     Sacral ulcer, limited to breakdown of skin [L98.421]  Yes    Chronic anticoagulation [Z79.01]  Not Applicable    Cerebrovascular accident (CVA) due to embolism of right middle cerebral artery [I63.411]  Yes    Dysphagia due to recent stroke [I69.391]  Not Applicable    Hypertension, benign [I10]  Yes    Chronic a-fib [I48.20]  Yes      Resolved Hospital Problems   No resolved problems to display.       Allergies:Review of patient's allergies indicates:  No Known Allergies    Diet: pureed diet thin liquid    Activities: Activity as tolerated    Goals of Care Treatment Preferences:  Code Status: Full Code      Nursing: Routine per facility     Labs: N/a    CONSULTS:    Physical Therapy to evaluate and treat. , Occupational Therapy to evaluate and treat., and Speech Therapy to evaluate and treat for Swallowing.    MISCELLANEOUS CARE:  N/a    WOUND CARE ORDERS  N/a    Medications: Review discharge medications with patient and family and provide education.      Current Discharge Medication List        START taking these medications    Details   baclofen (LIORESAL) 5 mg Tab tablet 1 tablet (5 mg total) by Per G Tube route 3 (three) times daily.           CONTINUE these medications which have CHANGED    Details   acetaminophen (TYLENOL) 500 MG tablet 2 tablets (1,000 mg total) by Per G Tube route every 6 (six) hours as needed for Pain.            CONTINUE these medications which have NOT CHANGED    Details   amLODIPine (NORVASC) 5 MG tablet 1 tablet (5 mg total) by Per G Tube route once daily.  Qty: 90 tablet, Refills: 3    Comments: .      apixaban (ELIQUIS) 5 mg Tab 1 tablet (5 mg total) by Per G Tube route 2 (two) times daily.  Qty: 90 tablet, Refills: 2      atorvastatin (LIPITOR) 40 MG tablet 1 tablet (40 mg total) by Per G Tube route once daily.  Qty: 90 tablet, Refills: 3      famotidine (PEPCID) 40 MG tablet 1 tablet (40 mg total) by Per G Tube route once daily.  Qty: 90 tablet, Refills: 3      LIDOcaine (LIDODERM) 5 % Place 1 patch onto the skin once daily. Remove & Discard patch within 12 hours or as directed by MD  Qty: 10 patch, Refills: 0      lisinopriL (PRINIVIL,ZESTRIL) 20 MG tablet 1 tablet (20 mg total) by Per G Tube route once daily. DO NOT TAKE UNTIL YOU SEE YOUR PRIMARY DOCTOR. Rehab can add back if BP becomes elevated. Would start with this one over HCTZ  Qty: 1 tablet, Refills: 0    Comments: DO NOT FILL, MODIFYING ORDER TO HOLD      nut.tx.gluc intol,lf,soy/fiber (GLUCERNA 1.5 SHELLEY ORAL) Feed per tube at 55 ml/hr continuously           STOP taking these medications       chlorhexidine (PERIDEX) 0.12 % solution Comments:   Reason for Stopping:         hydroCHLOROthiazide (MICROZIDE) 12.5 mg capsule Comments:   Reason for Stopping:                  Immunizations Administered as of 6/19/2024       No immunizations on file.              _________________________________  David Mayorga MD  06/19/2024

## 2024-06-19 NOTE — PLAN OF CARE
Problem: Physical Therapy  Goal: Physical Therapy Goal  Description: Goals to be met by: 7/15/2024     Patient will increase functional independence with mobility by performin. Supine to sit with Minimal Assistance. -In progress  2. Sit to supine with Minimal Assistance. -In progress  3. Rolling to Left and Right with Minimal Assistance. -In progress  4. Sit to stand transfer with Maximum Assistance. -met 24  Revised goal: sit to stand with moderate assist with LRAD-not met  5. Gait x 5 feet with Maximum Assistance using LRAD. -in progress  6. Lower extremity exercise program x10 reps per handout, with assistance as needed. -In progress  Outcome: Progressing   Pt's goals remain appropriate and pt will continue to benefit from skilled PT services to work towards improved functional mobility including: bed mobility, transfers, and gait. Neida Elmore PT  2024

## 2024-06-20 NOTE — PLAN OF CARE
[facility] - Med Surg  Discharge Final Note    Primary Care Provider: Ivana Templeton MD    Expected Discharge Date: 6/19/2024      Patient discharged to [facility]. Discharge Plan A and Plan B have been determined by review of patient's clinical status, future medical and therapeutic needs, and coverage/benefits for post-acute care in coordination with multidisciplinary team members.  Final Discharge Note (most recent)       Final Note - 06/20/24 0820          Final Note    Assessment Type Final Discharge Note (P)      Anticipated Discharge Disposition Rehab Facility (P)         Post-Acute Status    Post-Acute Authorization Placement (P)      Post-Acute Placement Status Set-up Complete/Auth obtained (P)      Discharge Delays None known at this time (P)                      Important Message from Medicare             Contact Info       Ivana Templeton MD   Specialty: Family Medicine   Relationship: PCP - General    [address] [place] [address]   Phone: [phone]       Next Steps: Follow up            JOHN Key  Case Management  [phone]

## 2024-06-28 ENCOUNTER — HOSPITAL ENCOUNTER (EMERGENCY)
Facility: HOSPITAL | Age: 89
Discharge: HOME | End: 2024-06-28
Attending: STUDENT IN AN ORGANIZED HEALTH CARE EDUCATION/TRAINING PROGRAM
Payer: MEDICARE

## 2024-06-28 VITALS
WEIGHT: 149 LBS | SYSTOLIC BLOOD PRESSURE: 161 MMHG | DIASTOLIC BLOOD PRESSURE: 64 MMHG | OXYGEN SATURATION: 99 % | BODY MASS INDEX: 30.04 KG/M2 | TEMPERATURE: 98 F | HEIGHT: 59 IN | HEART RATE: 70 BPM | RESPIRATION RATE: 18 BRPM

## 2024-06-28 DIAGNOSIS — R15.9 INCONTINENCE OF FECES, UNSPECIFIED FECAL INCONTINENCE TYPE: Primary | ICD-10-CM

## 2024-06-28 DIAGNOSIS — R41.82 AMS (ALTERED MENTAL STATUS): ICD-10-CM

## 2024-06-28 LAB
ALBUMIN SERPL BCP-MCNC: 2.8 G/DL (ref 3.5–5.2)
ALP SERPL-CCNC: 70 U/L (ref 55–135)
ALT SERPL W/O P-5'-P-CCNC: 18 U/L (ref 10–44)
ANION GAP SERPL CALC-SCNC: 9 MMOL/L (ref 8–16)
AST SERPL-CCNC: 27 U/L (ref 10–40)
BASOPHILS # BLD AUTO: 0.02 K/UL (ref 0–0.2)
BASOPHILS NFR BLD: 0.3 % (ref 0–1.9)
BILIRUB SERPL-MCNC: 0.3 MG/DL (ref 0.1–1)
BILIRUB UR QL STRIP: NEGATIVE
BUN SERPL-MCNC: 26 MG/DL (ref 8–23)
CALCIUM SERPL-MCNC: 9.1 MG/DL (ref 8.7–10.5)
CHLORIDE SERPL-SCNC: 107 MMOL/L (ref 95–110)
CLARITY UR REFRACT.AUTO: CLEAR
CO2 SERPL-SCNC: 24 MMOL/L (ref 23–29)
COLOR UR AUTO: COLORLESS
CREAT SERPL-MCNC: 0.9 MG/DL (ref 0.5–1.4)
DIFFERENTIAL METHOD BLD: ABNORMAL
EOSINOPHIL # BLD AUTO: 0.1 K/UL (ref 0–0.5)
EOSINOPHIL NFR BLD: 1.3 % (ref 0–8)
ERYTHROCYTE [DISTWIDTH] IN BLOOD BY AUTOMATED COUNT: 16.8 % (ref 11.5–14.5)
EST. GFR  (NO RACE VARIABLE): >60 ML/MIN/1.73 M^2
GLUCOSE SERPL-MCNC: 100 MG/DL (ref 70–110)
GLUCOSE UR QL STRIP: NEGATIVE
HCT VFR BLD AUTO: 31.5 % (ref 37–48.5)
HGB BLD-MCNC: 10.1 G/DL (ref 12–16)
HGB UR QL STRIP: NEGATIVE
IMM GRANULOCYTES # BLD AUTO: 0.03 K/UL (ref 0–0.04)
IMM GRANULOCYTES NFR BLD AUTO: 0.4 % (ref 0–0.5)
KETONES UR QL STRIP: NEGATIVE
LEUKOCYTE ESTERASE UR QL STRIP: NEGATIVE
LYMPHOCYTES # BLD AUTO: 0.7 K/UL (ref 1–4.8)
LYMPHOCYTES NFR BLD: 10.6 % (ref 18–48)
MCH RBC QN AUTO: 25.5 PG (ref 27–31)
MCHC RBC AUTO-ENTMCNC: 32.1 G/DL (ref 32–36)
MCV RBC AUTO: 80 FL (ref 82–98)
MONOCYTES # BLD AUTO: 0.7 K/UL (ref 0.3–1)
MONOCYTES NFR BLD: 9.6 % (ref 4–15)
NEUTROPHILS # BLD AUTO: 5.4 K/UL (ref 1.8–7.7)
NEUTROPHILS NFR BLD: 77.8 % (ref 38–73)
NITRITE UR QL STRIP: NEGATIVE
NRBC BLD-RTO: 0 /100 WBC
OHS QRS DURATION: 114 MS
OHS QTC CALCULATION: 453 MS
PH UR STRIP: 8 [PH] (ref 5–8)
PLATELET # BLD AUTO: 202 K/UL (ref 150–450)
PMV BLD AUTO: 11.9 FL (ref 9.2–12.9)
POTASSIUM SERPL-SCNC: 4 MMOL/L (ref 3.5–5.1)
PROT SERPL-MCNC: 6.4 G/DL (ref 6–8.4)
PROT UR QL STRIP: NEGATIVE
RBC # BLD AUTO: 3.96 M/UL (ref 4–5.4)
SODIUM SERPL-SCNC: 140 MMOL/L (ref 136–145)
SP GR UR STRIP: 1.01 (ref 1–1.03)
TROPONIN I SERPL DL<=0.01 NG/ML-MCNC: 0.01 NG/ML (ref 0–0.03)
URN SPEC COLLECT METH UR: ABNORMAL
WBC # BLD AUTO: 6.99 K/UL (ref 3.9–12.7)

## 2024-06-28 PROCEDURE — 93005 ELECTROCARDIOGRAM TRACING: CPT

## 2024-06-28 PROCEDURE — 99285 EMERGENCY DEPT VISIT HI MDM: CPT | Mod: 25

## 2024-06-28 PROCEDURE — 81003 URINALYSIS AUTO W/O SCOPE: CPT

## 2024-06-28 PROCEDURE — 93010 ELECTROCARDIOGRAM REPORT: CPT | Mod: ,,, | Performed by: INTERNAL MEDICINE

## 2024-06-28 PROCEDURE — 80053 COMPREHEN METABOLIC PANEL: CPT | Performed by: STUDENT IN AN ORGANIZED HEALTH CARE EDUCATION/TRAINING PROGRAM

## 2024-06-28 PROCEDURE — 85025 COMPLETE CBC W/AUTO DIFF WBC: CPT

## 2024-06-28 PROCEDURE — 84484 ASSAY OF TROPONIN QUANT: CPT

## 2024-06-28 NOTE — DISCHARGE INSTRUCTIONS
Please return to the emergency department if you develop any worsening symptoms, chest pain, shortness of breath, difficulty breathing, or any other symptoms that concern you.  Please follow up with the primary care provider regarding your visit here today.

## 2024-06-28 NOTE — ED TRIAGE NOTES
Patient identifiers for Amirah Davey 88 y.o. female checked and correct.  Chief Complaint   Patient presents with    Urinary Tract Infection     Arrives from [facility] rehab for worsening UTI Sx. Started on Cipro yesterday and per staff pt sx are not improving. Pt was screaming at the nursing home.      Past Medical History:   Diagnosis Date    A-fib     Cerebrovascular accident (CVA) due to embolism of right middle cerebral artery 05/23/2024    Current use of long term anticoagulation     on Xarelto    Dysphagia due to recent stroke 05/23/2024    Hypertension     Stroke 03/20/2013     Allergies reported: Review of patient's allergies indicates:  No Known Allergies      LOC: Patient is awake, alert, and aware of environment with an appropriate affect. Patient is oriented x 2 and speaking appropriately.  APPEARANCE: Patient resting comfortably and in no acute distress. Patient is clean and well groomed, patient's clothing is properly fastened.  SKIN: The skin is warm and dry. Patient has normal skin turgor and moist mucus membranes. Skin breakdown noted to rectal area  MUSKULOSKELETAL: Patient is moving all extremities well, no obvious deformities noted. Pulses intact.   RESPIRATORY: Airway is open and patent. Respirations are spontaneous and non-labored with normal effort and rate.  CARDIAC: Patient has a normal rate and rhythm. Normal sinus on cardiac monitor. No peripheral edema noted.   ABDOMEN: No distention noted. Soft and non-tender upon palpation.  NEUROLOGICAL:  PERRL. Facial expression is symmetrical. Hand grasps are equal bilaterally. Normal sensation in all extremities when touched with finger.

## 2024-06-28 NOTE — ED PROVIDER NOTES
Source of History:  Patient and chart    Chief complaint:  Urinary Tract Infection (Arrives from [facility] rehab for worsening UTI Sx. Started on Cipro yesterday and per staff pt sx are not improving. Pt was screaming at the nursing home. )      HPI:  Amirah Davey is a 88 y.o. female with a medical history as below presents to the emergency department with a chief complaint of UTI.  I spoke with the patient was nurse at the nursing home.  She states that the patient was diagnosed by the in-house physician with a UTI and prescribed Cipro yesterday.  However, patient was altered last night and was screaming throughout the night.  The nursing staff were concerned that the Cipro was not providing adequate coverage and sent her to the emergency department for further evaluation.  Further information is limited by patient presentation.  However, per the nurse at the nursing home the patient was otherwise at her baseline status.    Review of patient's allergies indicates:  No Known Allergies    No current facility-administered medications on file prior to encounter.     Current Outpatient Medications on File Prior to Encounter   Medication Sig Dispense Refill    acetaminophen (TYLENOL) 500 MG tablet 2 tablets (1,000 mg total) by Per G Tube route every 6 (six) hours as needed for Pain.      amLODIPine (NORVASC) 5 MG tablet 1 tablet (5 mg total) by Per G Tube route once daily. 90 tablet 3    apixaban (ELIQUIS) 5 mg Tab 1 tablet (5 mg total) by Per G Tube route 2 (two) times daily. 90 tablet 2    atorvastatin (LIPITOR) 40 MG tablet 1 tablet (40 mg total) by Per G Tube route once daily. 90 tablet 3    baclofen (LIORESAL) 5 mg Tab tablet 1 tablet (5 mg total) by Per G Tube route 3 (three) times daily.      famotidine (PEPCID) 40 MG tablet 1 tablet (40 mg total) by Per G Tube route once daily. 90 tablet 3    LIDOcaine (LIDODERM) 5 % Place 1 patch onto the skin once daily. Remove & Discard patch within 12 hours or as directed  by MD 10 patch 0    lisinopriL (PRINIVIL,ZESTRIL) 20 MG tablet 1 tablet (20 mg total) by Per G Tube route once daily. DO NOT TAKE UNTIL YOU SEE YOUR PRIMARY DOCTOR. Rehab can add back if BP becomes elevated. Would start with this one over HCTZ 1 tablet 0    nut.tx.gluc intol,lf,soy/fiber (GLUCERNA 1.5 SHELLEY ORAL) Feed per tube at 55 ml/hr continuously         PMH:  As per HPI and below:  Past Medical History:   Diagnosis Date    A-fib     Cerebrovascular accident (CVA) due to embolism of right middle cerebral artery 05/23/2024    Current use of long term anticoagulation     on Xarelto    Dysphagia due to recent stroke 05/23/2024    Hypertension     Stroke 03/20/2013     Past Surgical History:   Procedure Laterality Date    CARDIAC PACEMAKER PLACEMENT  09/25/2019    [employer] Model number [id number]  Serial number [id number] device MRI conditional for 1.5 Estela magnets    CATARACT EXTRACTION W/  INTRAOCULAR LENS IMPLANT Right 05/01/2017    Dr. Case    CATARACT EXTRACTION W/  INTRAOCULAR LENS IMPLANT Left 05/29/2017    Dr. Case    ESOPHAGOGASTRODUODENOSCOPY W/ PEG N/A 5/29/2024    Procedure: EGD, WITH PEG TUBE INSERTION;  Surgeon: Imer Minaya MD;  Location: [facility] OR [address] [place] [address];  Service: General;  Laterality: N/A;    EYE SURGERY      HYSTERECTOMY      INSERTION, PEG TUBE N/A 5/27/2024    Procedure: INSERTION, PEG TUBE;  Surgeon: Ivette Love MD;  Location: [facility] ([address] [place] [address]);  Service: Endoscopy;  Laterality: N/A;    INSERTION, PEG TUBE N/A 6/17/2024    Procedure: INSERTION, PEG TUBE;  Surgeon: Chloé Sher MD;  [facility];  Service: General;  Laterality: N/A;       Social History     Socioeconomic History    Marital status:    Tobacco Use    Smoking status: Never   Substance and Sexual Activity    Alcohol use: No     Social Determinants of Health     Financial Resource Strain: Patient Declined (5/24/2024)    Overall Financial Resource Strain (CARDIA)     Difficulty of Paying  Living Expenses: Patient declined   Food Insecurity: Patient Declined (5/24/2024)    Hunger Vital Sign     Worried About Running Out of Food in the Last Year: Patient declined     Ran Out of Food in the Last Year: Patient declined   Transportation Needs: Patient Declined (5/24/2024)    TRANSPORTATION NEEDS     Transportation : Patient declined   Physical Activity: Patient Declined (5/24/2024)    Exercise Vital Sign     Days of Exercise per Week: Patient declined     Minutes of Exercise per Session: Patient declined   Stress: Patient Declined (5/24/2024)    [employer] - Occupational Stress Questionnaire     Feeling of Stress : Patient declined   Housing Stability: Patient Declined (5/24/2024)    Housing Stability Vital Sign     Unable to Pay for Housing in the Last Year: Patient declined     Homeless in the Last Year: Patient declined       No family history on file.    Physical Exam:      Vitals:    06/28/24 1515   BP: (!) 161/64   Pulse: 70   Resp: 18   Temp:      Physical Exam  Gen:  Hemodynamically stable in no acute distress  Mental Status: Awake, alert, cooperative  Skin: Warm, dry. No rashes seen.  Linear fissure in her inner gluteal fold.  Does not appear infected  Eyes: No conjunctival injection.  Pulm: No increased work of breathing.  No significant tachypnea.  No conversational dyspnea.  Lungs clear.  CV: Regular rate.   Abd: Soft.  Not distended.  Nontender.   MSK: No deformities.  Patient is contracted at baseline   Neuro: Awake. Speech normal.       Procedures  Laboratory Studies:  Labs Reviewed   URINALYSIS, REFLEX TO URINE CULTURE - Abnormal; Notable for the following components:       Result Value    Color, UA Colorless (*)     All other components within normal limits    Narrative:     Specimen Source->Urine   CBC W/ AUTO DIFFERENTIAL - Abnormal; Notable for the following components:    RBC 3.96 (*)     Hemoglobin 10.1 (*)     Hematocrit 31.5 (*)     MCV 80 (*)     MCH  25.5 (*)     RDW 16.8 (*)     Lymph # 0.7 (*)     Gran % 77.8 (*)     Lymph % 10.6 (*)     All other components within normal limits   COMPREHENSIVE METABOLIC PANEL - Abnormal; Notable for the following components:    BUN 26 (*)     Albumin 2.8 (*)     All other components within normal limits   TROPONIN I       EKG (independently interpreted by me):  Junctional rhythm.  Widened QRS.  Right axis deviation in his STEMI    X-rays (independently interpreted by me):  No pneumothorax or consolidation apparent    Chart reviewed.  We have no record of the patient's workup for UTI.    Imaging Results              X-Ray Chest AP Portable (Final result)  Result time 06/28/24 11:21:41      Final result by Felton Huber III, MD (06/28/24 11:21:41)                   Impression:      No acute process seen.      Electronically signed by: Felton Huber MD  Date:    06/28/2024  Time:    11:21               Narrative:    EXAMINATION:  XR CHEST AP PORTABLE    CLINICAL HISTORY:  Altered mental status, unspecified    FINDINGS:  There is a loop recorder.  There is cardiomegaly aortic plaque and DJD.  There is chronic right rotator cuff tear.  Lungs are clear.                                       CT Head Without Contrast (Final result)  Result time 06/28/24 11:05:00      Final result by Azar Hernández MD (06/28/24 11:05:00)                   Impression:      No new acute intracranial process.  Evolution of previously identified right MCA infarct.  Additional chronic infarcts noted.  No intracranial hemorrhage or mass effect..    8 mm left orbital lesion again identified as described previously.  Nonspecific but may represent a cavernous venous malformation or venous varicose.      Electronically signed by: Azar Hernández  Date:    06/28/2024  Time:    11:05               Narrative:    EXAMINATION:  CT HEAD WITHOUT CONTRAST    CLINICAL HISTORY:  ams;    TECHNIQUE:  Low dose axial images were obtained through the head.  Coronal  and sagittal reformations were also performed. Contrast was not administered.    COMPARISON:  MRI 05/24/2024    FINDINGS:  Expected evolution of right MCA frontal parietal infarct.  No intracranial hemorrhage or mass effect.    No new acute territorial infarct.    Diffuse volume loss.  Decreased attenuation within the periventricular subcortical white matter nonspecific but may reflect moderate chronic small vessel ischemic change, stable.  Chronic right occipital, small cerebellar, and right thalamic infarcts again identified.    Atherosclerotic vascular calcifications are noted at the skull base.    8 mm retro bulbar lesion again identified.    The calvarium is intact.  The visualized sinuses and mastoid air cells are clear.                                      Medications Given:  Medications - No data to display    Discussed with:  I have contacted the patient's nurse at the nursing home.  She states that Dr. Davis diagnosed the patient with a UTI and prescribed ciprofloxacin.  She said that she was send me the patient was record, but she did not.    MDM:    88 y.o. female with recent diagnosis of UTI sent in due to evaluation of being more agitated over the last two days. On arrival here, patient is mildly confused at baseline. She appears baseline here. She is cooperative. She answers most questions appropriately but is not oriented to situation. She has a reassuring exam. Vitals also reassuring. Will repeat UA here however patient already diagnosed with UTI yesterday and is on ciprofloxacin. Given her agitation over the last two days will obtain HCT, labs, repeat UA. If w/u reassuring, plan for dc back to NH.    Labs/imaging reviewed and reassuring. Patient stable for dc back to NH. Can continue cipro while awaiting cultures.         Medical Decision Making  Amount and/or Complexity of Data Reviewed  Labs: ordered. Decision-making details documented in ED Course.  Radiology: ordered.         Diagnostic  Impression:    1. Incontinence of feces, unspecified fecal incontinence type    2. AMS (altered mental status)         ED Disposition Condition    Discharge Stable          ED Prescriptions    None       Follow-up Information       Follow up With Specialties Details Why Contact Info    Ivana Templeton MD Family Medicine In 2 weeks For re-evaluation [address] [facility] [facility]  [address]  [place] [address]  [phone]              Patient and/or family understands the plan and is in agreement, verbalized understanding, questions answered    V Jj Acosta MD  Resident  Emergency Medicine         Leo Acosta MD  Resident  06/29/24 0827    ED Course as of 06/29/24 1701 Fri Jun 28, 2024   0931 PMHx of recent CVA in May with residual L sided weakness, dysphagia and dysarthria, s/p PEG tube placement, HTN, Afib on eliquis [NN]   1051 Patient is an 88-year-old female who from her nursing home.  History obtained from both the patient and the patient's nurse at the nursing facility.  The nurse states that the patient has some baseline confusion and has baseline left-sided weakness.  No new focal neurologic deficits but they report that the patient has been more agitated and yelling since yesterday.  She was recently diagnosed with a urinary tract infection yesterday and started on ciprofloxacin.  They are concerned because she has been more agitated.  The patient denies any urinary symptoms.  She has no abdominal pain, vomiting, fever, chest pain, shortness of breath. [NN]   1052 On exam, patient is slightly confused but oriented to person, place but not time.  Her abdominal exam is benign.  Normal rate, regular rhythm.  Lungs clear.  She has baseline weakness on her left but has normal motor on the right.  She has no new focal neurologic deficits.  Overall her vitals and exam reassuring.  She does seem slightly agitated but is redirectable.  Will obtain screening labs, urinalysis and CT head.  Anticipate patient can be  discharged back to her nursing facility if the rest of her workup is reassuring. [NN]   1053 UA negative for infection [NN]   1053 Hemoglobin(!): 10.1  stable [NN]   1053 WBC: 6.99 [NN]   1054 No STEMI on EKG [NN]   1203 Hemoglobin(!): 10.1  stable [NN]      ED Course User Index  [NN] Gail Nava MD        Staff Attestation:     I personally evaluated the patient and have reviewed the resident's note. This case was discussed with the resident, and I agree with the history, physical exam and medical decision making as documented with the following additions/exceptions/observations:  see my documentation in the ED course       Gail Nava MD  06/29/24 2031

## 2024-06-28 NOTE — ED NOTES
Nurses Note -- 4 Eyes      6/28/2024   9:59 AM      Skin assessed during: Q Shift Change      [] No Altered Skin Integrity Present    []Prevention Measures Documented      [x] Yes- Altered Skin Integrity Present or Discovered   [x] LDA Added if Not in Epic (Describe Wound)   [x] New Altered Skin Integrity was Present on Admit and Documented in LDA   [x] Wound Image Taken    Wound Care Consulted? No    Attending Nurse:  Zay Beckham RN/Staff Member:   MD Karla

## 2024-06-29 ENCOUNTER — HOSPITAL ENCOUNTER (INPATIENT)
Facility: HOSPITAL | Age: 89
LOS: 10 days | Discharge: NURSING, REHAB OR LONG-TERM CARE | DRG: 394 | End: 2024-07-09
Attending: EMERGENCY MEDICINE | Admitting: INTERNAL MEDICINE
Payer: MEDICARE

## 2024-06-29 DIAGNOSIS — K94.23 PEG TUBE MALFUNCTION: Primary | ICD-10-CM

## 2024-06-29 DIAGNOSIS — R13.10 DYSPHAGIA, UNSPECIFIED TYPE: ICD-10-CM

## 2024-06-29 DIAGNOSIS — R07.9 CHEST PAIN: ICD-10-CM

## 2024-06-29 PROBLEM — E44.1 MALNUTRITION OF MILD DEGREE: Status: ACTIVE | Noted: 2024-06-29

## 2024-06-29 LAB
ALBUMIN SERPL BCP-MCNC: 3 G/DL (ref 3.5–5.2)
ALP SERPL-CCNC: 59 U/L (ref 55–135)
ALT SERPL W/O P-5'-P-CCNC: 20 U/L (ref 10–44)
ANION GAP SERPL CALC-SCNC: 13 MMOL/L (ref 8–16)
AST SERPL-CCNC: 35 U/L (ref 10–40)
BASOPHILS # BLD AUTO: 0.03 K/UL (ref 0–0.2)
BASOPHILS NFR BLD: 0.4 % (ref 0–1.9)
BILIRUB SERPL-MCNC: 0.4 MG/DL (ref 0.1–1)
BUN SERPL-MCNC: 21 MG/DL (ref 8–23)
CALCIUM SERPL-MCNC: 9.4 MG/DL (ref 8.7–10.5)
CHLORIDE SERPL-SCNC: 108 MMOL/L (ref 95–110)
CO2 SERPL-SCNC: 20 MMOL/L (ref 23–29)
CREAT SERPL-MCNC: 1.1 MG/DL (ref 0.5–1.4)
DIFFERENTIAL METHOD BLD: ABNORMAL
EOSINOPHIL # BLD AUTO: 0.2 K/UL (ref 0–0.5)
EOSINOPHIL NFR BLD: 2.4 % (ref 0–8)
ERYTHROCYTE [DISTWIDTH] IN BLOOD BY AUTOMATED COUNT: 16.8 % (ref 11.5–14.5)
EST. GFR  (NO RACE VARIABLE): 48.3 ML/MIN/1.73 M^2
GLUCOSE SERPL-MCNC: 100 MG/DL (ref 70–110)
HCT VFR BLD AUTO: 33.8 % (ref 37–48.5)
HGB BLD-MCNC: 10.2 G/DL (ref 12–16)
IMM GRANULOCYTES # BLD AUTO: 0.05 K/UL (ref 0–0.04)
IMM GRANULOCYTES NFR BLD AUTO: 0.6 % (ref 0–0.5)
LYMPHOCYTES # BLD AUTO: 1.1 K/UL (ref 1–4.8)
LYMPHOCYTES NFR BLD: 14.4 % (ref 18–48)
MCH RBC QN AUTO: 24.8 PG (ref 27–31)
MCHC RBC AUTO-ENTMCNC: 30.2 G/DL (ref 32–36)
MCV RBC AUTO: 82 FL (ref 82–98)
MONOCYTES # BLD AUTO: 0.8 K/UL (ref 0.3–1)
MONOCYTES NFR BLD: 9.8 % (ref 4–15)
NEUTROPHILS # BLD AUTO: 5.7 K/UL (ref 1.8–7.7)
NEUTROPHILS NFR BLD: 72.4 % (ref 38–73)
NRBC BLD-RTO: 0 /100 WBC
PLATELET # BLD AUTO: 215 K/UL (ref 150–450)
PMV BLD AUTO: 11.7 FL (ref 9.2–12.9)
POTASSIUM SERPL-SCNC: 4.4 MMOL/L (ref 3.5–5.1)
PROT SERPL-MCNC: 7.2 G/DL (ref 6–8.4)
RBC # BLD AUTO: 4.12 M/UL (ref 4–5.4)
SODIUM SERPL-SCNC: 141 MMOL/L (ref 136–145)
WBC # BLD AUTO: 7.86 K/UL (ref 3.9–12.7)

## 2024-06-29 PROCEDURE — 25000003 PHARM REV CODE 250

## 2024-06-29 PROCEDURE — 80053 COMPREHEN METABOLIC PANEL: CPT

## 2024-06-29 PROCEDURE — 85025 COMPLETE CBC W/AUTO DIFF WBC: CPT

## 2024-06-29 PROCEDURE — 21400001 HC TELEMETRY ROOM

## 2024-06-29 PROCEDURE — 99285 EMERGENCY DEPT VISIT HI MDM: CPT

## 2024-06-29 RX ORDER — SODIUM CHLORIDE 0.9 % (FLUSH) 0.9 %
10 SYRINGE (ML) INJECTION EVERY 12 HOURS PRN
Status: DISCONTINUED | OUTPATIENT
Start: 2024-06-29 | End: 2024-07-09 | Stop reason: HOSPADM

## 2024-06-29 RX ORDER — IBUPROFEN 200 MG
24 TABLET ORAL
Status: DISCONTINUED | OUTPATIENT
Start: 2024-06-29 | End: 2024-07-09 | Stop reason: HOSPADM

## 2024-06-29 RX ORDER — BACLOFEN 5 MG/1
5 TABLET ORAL 3 TIMES DAILY
Status: DISCONTINUED | OUTPATIENT
Start: 2024-06-29 | End: 2024-06-29

## 2024-06-29 RX ORDER — AMLODIPINE BESYLATE 5 MG/1
5 TABLET ORAL DAILY
Status: DISCONTINUED | OUTPATIENT
Start: 2024-06-29 | End: 2024-06-29

## 2024-06-29 RX ORDER — FAMOTIDINE 20 MG/1
40 TABLET, FILM COATED ORAL DAILY
Status: DISCONTINUED | OUTPATIENT
Start: 2024-06-30 | End: 2024-07-06

## 2024-06-29 RX ORDER — PROCHLORPERAZINE EDISYLATE 5 MG/ML
5 INJECTION INTRAMUSCULAR; INTRAVENOUS EVERY 6 HOURS PRN
Status: DISCONTINUED | OUTPATIENT
Start: 2024-06-29 | End: 2024-07-05

## 2024-06-29 RX ORDER — ATORVASTATIN CALCIUM 40 MG/1
40 TABLET, FILM COATED ORAL DAILY
Status: DISCONTINUED | OUTPATIENT
Start: 2024-06-30 | End: 2024-07-09 | Stop reason: HOSPADM

## 2024-06-29 RX ORDER — AMLODIPINE BESYLATE 5 MG/1
5 TABLET ORAL DAILY
Status: DISCONTINUED | OUTPATIENT
Start: 2024-06-30 | End: 2024-06-30

## 2024-06-29 RX ORDER — SODIUM CHLORIDE, SODIUM LACTATE, POTASSIUM CHLORIDE, CALCIUM CHLORIDE 600; 310; 30; 20 MG/100ML; MG/100ML; MG/100ML; MG/100ML
INJECTION, SOLUTION INTRAVENOUS CONTINUOUS
Status: ACTIVE | OUTPATIENT
Start: 2024-06-29 | End: 2024-06-29

## 2024-06-29 RX ORDER — MEGESTROL ACETATE 40 MG/1
40 TABLET ORAL DAILY
Status: DISCONTINUED | OUTPATIENT
Start: 2024-06-29 | End: 2024-06-29

## 2024-06-29 RX ORDER — ATORVASTATIN CALCIUM 40 MG/1
40 TABLET, FILM COATED ORAL DAILY
Status: DISCONTINUED | OUTPATIENT
Start: 2024-06-29 | End: 2024-06-29

## 2024-06-29 RX ORDER — POLYETHYLENE GLYCOL 3350 17 G/17G
17 POWDER, FOR SOLUTION ORAL 2 TIMES DAILY PRN
Status: DISCONTINUED | OUTPATIENT
Start: 2024-06-29 | End: 2024-07-09 | Stop reason: HOSPADM

## 2024-06-29 RX ORDER — MEGESTROL ACETATE 40 MG/1
40 TABLET ORAL DAILY
Status: DISCONTINUED | OUTPATIENT
Start: 2024-06-29 | End: 2024-06-30

## 2024-06-29 RX ORDER — BACLOFEN 5 MG/1
5 TABLET ORAL 3 TIMES DAILY
Status: DISCONTINUED | OUTPATIENT
Start: 2024-06-29 | End: 2024-07-02

## 2024-06-29 RX ORDER — SIMETHICONE 80 MG
1 TABLET,CHEWABLE ORAL 4 TIMES DAILY PRN
Status: DISCONTINUED | OUTPATIENT
Start: 2024-06-29 | End: 2024-07-09 | Stop reason: HOSPADM

## 2024-06-29 RX ORDER — IBUPROFEN 200 MG
16 TABLET ORAL
Status: DISCONTINUED | OUTPATIENT
Start: 2024-06-29 | End: 2024-07-09 | Stop reason: HOSPADM

## 2024-06-29 RX ORDER — GLUCAGON 1 MG
1 KIT INJECTION
Status: DISCONTINUED | OUTPATIENT
Start: 2024-06-29 | End: 2024-07-09 | Stop reason: HOSPADM

## 2024-06-29 RX ORDER — BISACODYL 10 MG/1
10 SUPPOSITORY RECTAL DAILY PRN
Status: DISCONTINUED | OUTPATIENT
Start: 2024-06-29 | End: 2024-07-09 | Stop reason: HOSPADM

## 2024-06-29 RX ORDER — FAMOTIDINE 20 MG/1
40 TABLET, FILM COATED ORAL DAILY
Status: DISCONTINUED | OUTPATIENT
Start: 2024-06-29 | End: 2024-06-29

## 2024-06-29 RX ORDER — NALOXONE HCL 0.4 MG/ML
0.02 VIAL (ML) INJECTION
Status: DISCONTINUED | OUTPATIENT
Start: 2024-06-29 | End: 2024-07-09 | Stop reason: HOSPADM

## 2024-06-29 RX ORDER — ONDANSETRON 8 MG/1
8 TABLET, ORALLY DISINTEGRATING ORAL EVERY 8 HOURS PRN
Status: DISCONTINUED | OUTPATIENT
Start: 2024-06-29 | End: 2024-07-09 | Stop reason: HOSPADM

## 2024-06-29 RX ORDER — ALUMINUM HYDROXIDE, MAGNESIUM HYDROXIDE, AND SIMETHICONE 1200; 120; 1200 MG/30ML; MG/30ML; MG/30ML
30 SUSPENSION ORAL 4 TIMES DAILY PRN
Status: DISCONTINUED | OUTPATIENT
Start: 2024-06-29 | End: 2024-07-09 | Stop reason: HOSPADM

## 2024-06-29 RX ORDER — TALC
6 POWDER (GRAM) TOPICAL NIGHTLY PRN
Status: DISCONTINUED | OUTPATIENT
Start: 2024-06-29 | End: 2024-07-09 | Stop reason: HOSPADM

## 2024-06-29 RX ADMIN — APIXABAN 5 MG: 5 TABLET, FILM COATED ORAL at 09:06

## 2024-06-29 RX ADMIN — BACLOFEN 5 MG: 5 TABLET ORAL at 02:06

## 2024-06-29 RX ADMIN — BACLOFEN 5 MG: 5 TABLET ORAL at 09:06

## 2024-06-29 NOTE — ASSESSMENT & PLAN NOTE
L hemiplegia  Reduced mobility  - Chronic issue  - Continue statin  - Tentative plan to return to [facility] at time of discharge  - PT/OT consulted

## 2024-06-29 NOTE — NURSING
"Patient arrived via stretcher at 1645. Patient squirming yelling she's in pain. Patients\ oriented to self and place. Patients restless and crying, Patients asking for her daughters. I returned call to Sonya  who informed me that  she and her sister Roselyn were "on the way because I know she's cutting up". Completed as much of admit as possible given pts confused. Skin assessed. VSS. Spoke to telemetry and confirmed they are receiving tele monitoring on patient.. Pt is now resting bed alarm set.  Dr Mayorga is aware patient is admitted. Wound care consult ordered for buttocks skin tear and peg site.Will continue to monitor.    "

## 2024-06-29 NOTE — ED PROVIDER NOTES
Encounter Date: 6/29/2024       History     Chief Complaint   Patient presents with    PEG Tube Problem     Arrived via [facility] for PEG tube inadvertently removed by patient     88-year-old female history of AFib, hypertension and CVA with residual dysphagia presents to emergency department [facility] displacement by the patient.  It is unclear how long the PEG tube has been out but at least for a couple of hours.  Patient initially had PEG  placed on 05/27 after experiencing CVA however it was most recently reinserted via EGD on 06/17 .  Patient does not provide much history due to cognitive declined.  Triage note chest pain however on my assessment patient it does not endorse discomfort in the chest, however has left arm soreness she reports due to recent stroke.  Patient was daughter contacted during ED stay.  Rehab      Review of patient's allergies indicates:  No Known Allergies  Past Medical History:   Diagnosis Date    A-fib     Cerebrovascular accident (CVA) due to embolism of right middle cerebral artery 05/23/2024    Current use of long term anticoagulation     on Xarelto    Dysphagia due to recent stroke 05/23/2024    Hypertension     Stroke 03/20/2013     Past Surgical History:   Procedure Laterality Date    CARDIAC PACEMAKER PLACEMENT  09/25/2019    [employer]. Model number [id number]  Serial number [id number] device MRI conditional for 1.5 Estela magnets    CATARACT EXTRACTION W/  INTRAOCULAR LENS IMPLANT Right 05/01/2017    Dr. Case    CATARACT EXTRACTION W/  INTRAOCULAR LENS IMPLANT Left 05/29/2017    Dr. Case    ESOPHAGOGASTRODUODENOSCOPY W/ PEG N/A 5/29/2024    Procedure: EGD, WITH PEG TUBE INSERTION;  Surgeon: Imer Minaya MD;  Location: [facility] OR [address] [place] [address];  Service: General;  Laterality: N/A;    EYE SURGERY      HYSTERECTOMY      INSERTION, PEG TUBE N/A 5/27/2024    Procedure: INSERTION, PEG TUBE;  Surgeon: Ivette Love MD;   Location: [facility] ENDO (2ND FLR);  Service: Endoscopy;  Laterality: N/A;    INSERTION, PEG TUBE N/A 6/17/2024    Procedure: INSERTION, PEG TUBE;  Surgeon: Chloé Sher MD;  Location: [facility] OR 2ND FLR;  Service: General;  Laterality: N/A;     No family history on file.  Social History     Tobacco Use    Smoking status: Never   Substance Use Topics    Alcohol use: No     Review of Systems  See HPI  Physical Exam     Initial Vitals [06/29/24 0735]   BP Pulse Resp Temp SpO2   (!) 145/62 70 17 97.7 °F (36.5 °C) 99 %      MAP       --         Physical Exam    Vitals reviewed.  Constitutional:   Elderly with chronic diseases   HENT:   Head: Normocephalic and atraumatic.   Eyes: Conjunctivae and EOM are normal.   Neck:   Normal range of motion.  Cardiovascular:  Normal rate.           Pulmonary/Chest: No respiratory distress.   Abdominal: Abdomen is soft. She exhibits no distension. There is no abdominal tenderness.   Stoma in the gastric region without abnormal drainage, no surrounding erythema or induration. There is no rebound.   Musculoskeletal:         General: Normal range of motion.      Cervical back: Normal range of motion.     Neurological: She is alert.   Patient only orientated to self in and out of situational awareness  Appears to be her baseline  Left upper extremity weakness, adducted towards midline   Skin: Skin is warm and dry.   Psychiatric:   Patient with cognitive decline, irritable, short-term memory         ED Course   Procedures  Labs Reviewed   CBC W/ AUTO DIFFERENTIAL - Abnormal; Notable for the following components:       Result Value    Hemoglobin 10.2 (*)     Hematocrit 33.8 (*)     MCH 24.8 (*)     MCHC 30.2 (*)     RDW 16.8 (*)     Immature Granulocytes 0.6 (*)     Immature Grans (Abs) 0.05 (*)     Lymph % 14.4 (*)     All other components within normal limits   COMPREHENSIVE METABOLIC PANEL - Abnormal; Notable for the following components:    CO2 20 (*)     Albumin 3.0 (*)     eGFR 48.3  (*)     All other components within normal limits          Imaging Results    None          Medications   amLODIPine tablet 5 mg (has no administration in time range)   apixaban tablet 5 mg (has no administration in time range)   atorvastatin tablet 40 mg (has no administration in time range)   baclofen tablet 5 mg (has no administration in time range)   famotidine tablet 40 mg (has no administration in time range)   sodium chloride 0.9% flush 10 mL (has no administration in time range)   melatonin tablet 6 mg (has no administration in time range)   ondansetron disintegrating tablet 8 mg (has no administration in time range)   prochlorperazine injection Soln 5 mg (has no administration in time range)   polyethylene glycol packet 17 g (has no administration in time range)   bisacodyL suppository 10 mg (has no administration in time range)   simethicone chewable tablet 80 mg (has no administration in time range)   aluminum-magnesium hydroxide-simethicone 200-200-20 mg/5 mL suspension 30 mL (has no administration in time range)   naloxone 0.4 mg/mL injection 0.02 mg (has no administration in time range)   glucose chewable tablet 16 g (has no administration in time range)   glucose chewable tablet 24 g (has no administration in time range)   glucagon (human recombinant) injection 1 mg (has no administration in time range)   dextrose 10% bolus 125 mL 125 mL (has no administration in time range)   dextrose 10% bolus 250 mL 250 mL (has no administration in time range)   lactated ringers infusion (has no administration in time range)     Medical Decision Making  88-year-old female with day aphasia secondary to CVA presents due to PEG tube dislodgement.  Has been out for several hours unable to attempts we insertion emergency department.  This appears to be the 3rd PEG tube insertion needed with most recent performed 6/17.  Placed a consult and discussed with General surgery regarding patient presentation.  Due to patient  was irritable state in self removal by patient DOC conversation recommended.  Daughter updated on patient was plan status for admission.  Discussed plan with hospital medicine they are agreeable.  Pt discussed with supervising physician      Amount and/or Complexity of Data Reviewed  Labs: ordered.    Risk  Decision regarding hospitalization.                                      Clinical Impression:  Final diagnoses:  [K94.23] PEG tube malfunction (Primary)  [R13.10] Dysphagia, unspecified type          ED Disposition Condition    Admit Stable                Alma Iglesias, PA-C  06/29/24 1108

## 2024-06-29 NOTE — ED TRIAGE NOTES
Amirah Davey, a 88 y.o. female presents to the ED w/ complaint of PEG tube problem. Pt [facility] from [facility] tube. Pt also reports left arm pain and midsternal c/p.     Triage note:  Chief Complaint   Patient presents with    PEG Tube Problem     Arrived via [facility] from [facility] for PEG tube inadvertently removed by patient     Review of patient's allergies indicates:  No Known Allergies  Past Medical History:   Diagnosis Date    A-fib     Cerebrovascular accident (CVA) due to embolism of right middle cerebral artery 05/23/2024    Current use of long term anticoagulation     on Xarelto    Dysphagia due to recent stroke 05/23/2024    Hypertension     Stroke 03/20/2013

## 2024-06-29 NOTE — PLAN OF CARE
[facility] - Med Surg ([facility]-)  Initial Discharge Assessment       Primary Care Provider: Ivana Templeton MD    Admission Diagnosis: Chest pain [R07.9]  PEG tube malfunction [K94.23]  Dysphagia, unspecified type [R13.10]    Admission Date: 6/29/2024  Expected Discharge Date:     Transition of Care Barriers: (P) Mobility    Payor: MEDICARE / Plan: MEDICARE PART A & B / Product Type: Government /     Extended Emergency Contact Information  Primary Emergency Contact: HiginioSonya  Address: [address] [place] [address] [facility]  Home Phone: [phone]  Mobile Phone: [phone]  Relation: Daughter    Discharge Plan A: (P) Rehab (Return to [facility])  Discharge Plan B: (P) Home Health, Home with family      [id number] - [place], [place] - [address]  [address]  [facility] [address]  Phone: [phone] Fax: [phone]      Initial Assessment (most recent)       Adult Discharge Assessment - 06/29/24 1539          Discharge Assessment    Assessment Type Discharge Planning Assessment     Confirmed/corrected address, phone number and insurance Yes     Confirmed Demographics Correct on Facesheet     Source of Information patient     When was your last doctors appointment? --   unsure    Does patient/caregiver understand observation status Yes     Communicated JOAN with patient/caregiver Yes;Date not available/Unable to determine     Reason For Admission PEG tube malfunction     People in Home child(toan), adult     Facility Arrived From: [facility]     Do you expect to return to your current living situation? Other (see comments)   Undecided    Do you have help at home or someone to help you manage your care at home? Yes     Who are your caregiver(s) and their phone number(s)? Sonya Sylvester (Daughter)  [phone]     Prior to hospitilization cognitive status: Alert/Oriented     Current cognitive status: Alert/Oriented     Walking or Climbing Stairs  Difficulty no     Dressing/Bathing Difficulty no     Home Accessibility wheelchair accessible     Home Layout Able to live on 1st floor     Equipment Currently Used at Home none (P)      Readmission within 30 days? Yes (P)      Patient currently being followed by outpatient case management? No (P)      Do you currently have service(s) that help you manage your care at home? No (P)      Do you take prescription medications? Yes (P)      Do you have prescription coverage? Yes (P)      Coverage MEDICARE/MEDICARE PART A & B  --  GENERIC COMMERCIAL/GENERIC COMMERCIAL (P)      Do you have any problems affording any of your prescribed medications? No (P)      Is the patient taking medications as prescribed? yes (P)      Who is going to help you get home at discharge? needs transport (P)      How do you get to doctors appointments? family or friend will provide (P)      Are you on dialysis? No (P)      Do you take coumadin? No (P)      Discharge Plan A Rehab (P)    Return to [facility]    Discharge Plan B Home Health;Home with family (P)      DME Needed Upon Discharge  other (see comments) (P)    TBD    Discharge Plan discussed with: Patient (P)      Transition of Care Barriers Mobility (P)         OTHER    Name(s) of People in Home Sonya Sylvester (Daughter)  [phone] (P)                    Pt came from [facility].  Pt unsure if she wants to return to [facility] at this time.  Pt states that she normally lives with her daughter Kanchan in a [facility]. Pt states that she does not use any HME or HH.     No other discharge needs identified at this time.    Discharge Plan A and Plan B have been determined by review of patient's clinical status, future medical and therapeutic needs, and coverage/benefits for post-acute care in coordination with multidisciplinary team members.     Mary Quigley LMSW  Case Management [facility]  [phone]

## 2024-06-29 NOTE — ED NOTES
Tele box [id number] applied to pt. Tony in war room states able to see pt on monitor, rhythm NSR with HR 69.

## 2024-06-29 NOTE — H&P
[facility] - Emergency Dept  [facility] Medicine  History & Physical    Patient Name: Amirah Davey  MRN: [id number]  Patient Class: IP- Inpatient  Admission Date: 6/29/2024  Attending Physician: David Mayorga MD   Primary Care Provider: Ivana Templeton MD         Patient information was obtained from patient, relative(s), nursing home, past medical records, and ER records.     Subjective:     Principal Problem:PEG tube malfunction    Chief Complaint:   Chief Complaint   Patient presents with    PEG Tube Problem     Arrived via [facility] from [facility] rehab for PEG tube inadvertently removed by patient        HPI: Amirah Davey is a 88 y.o. F with PMHx of recent CVA in May with residual L sided weakness, dysphagia and dysarthria, s/p PEG tube placement with recurrent inadvertent dislodgement, HTN, and Afib on eliquis who presents to [facility] for evaluation of PEG tube malfunction. Patient with recent admit for PEG dislogement on 6/12 c/b abdominal wall abscess on CT. IV vancomycin was given and general surgery replaced her PEG tube on 6/17 and no abscess or fluid collection was noted.     She presents today after her inpatient rehab noticed her PEG tube was dislodged. It is unknown when the last time of appropriate placement was. She is AAO to person, place, situation, and year. She denies intentionally removing her PEG tube. Per IPR nursing, the patient had an abdominal binder around her abdomen which was preventing her from removal, which was removed at some point yesterday.     In the ED: VSSAF. Labs grossly unremarkable acutely. CTH and CXR without acute process. Gen surg consulted in the ED and given recurrent inadvertent dislodgement is and unknown timing of dislosgement, recommend NGT placement for enteral nutrition vs long term TPN until patient's mental baseline is acceptable for management of another gastrostomy tube.    Past Medical History:   Diagnosis Date    A-fib     Cerebrovascular accident (CVA)  due to embolism of right middle cerebral artery 05/23/2024    Current use of long term anticoagulation     on Xarelto    Dysphagia due to recent stroke 05/23/2024    Hypertension     Stroke 03/20/2013       Past Surgical History:   Procedure Laterality Date    CARDIAC PACEMAKER PLACEMENT  09/25/2019    [employer] Model number [id number]  Serial number [id number] device MRI conditional for 1.5 Estela magnets    CATARACT EXTRACTION W/  INTRAOCULAR LENS IMPLANT Right 05/01/2017    Dr. Case    CATARACT EXTRACTION W/  INTRAOCULAR LENS IMPLANT Left 05/29/2017    Dr. Case    ESOPHAGOGASTRODUODENOSCOPY W/ PEG N/A 5/29/2024    Procedure: EGD, WITH PEG TUBE INSERTION;  Surgeon: Imer Minaya MD;  Location: [facility] OR 2ND FLR;  Service: General;  Laterality: N/A;    EYE SURGERY      HYSTERECTOMY      INSERTION, PEG TUBE N/A 5/27/2024    Procedure: INSERTION, PEG TUBE;  Surgeon: Ivette Love MD;  Location: [facility] ENDO (2ND FLR);  Service: Endoscopy;  Laterality: N/A;    INSERTION, PEG TUBE N/A 6/17/2024    Procedure: INSERTION, PEG TUBE;  Surgeon: Chloé Sher MD;  Location: [facility] OR 2ND FLR;  Service: General;  Laterality: N/A;       Review of patient's allergies indicates:  No Known Allergies    No current facility-administered medications on file prior to encounter.     Current Outpatient Medications on File Prior to Encounter   Medication Sig    acetaminophen (TYLENOL) 500 MG tablet 2 tablets (1,000 mg total) by Per G Tube route every 6 (six) hours as needed for Pain.    amLODIPine (NORVASC) 5 MG tablet 1 tablet (5 mg total) by Per G Tube route once daily.    apixaban (ELIQUIS) 5 mg Tab 1 tablet (5 mg total) by Per G Tube route 2 (two) times daily.    atorvastatin (LIPITOR) 40 MG tablet 1 tablet (40 mg total) by Per G Tube route once daily.    baclofen (LIORESAL) 5 mg Tab tablet 1 tablet (5 mg total) by Per G Tube route 3 (three) times daily.    famotidine (PEPCID) 40 MG tablet 1 tablet (40 mg total) by Per G  Tube route once daily.    LIDOcaine (LIDODERM) 5 % Place 1 patch onto the skin once daily. Remove & Discard patch within 12 hours or as directed by MD    lisinopriL (PRINIVIL,ZESTRIL) 20 MG tablet 1 tablet (20 mg total) by Per G Tube route once daily. DO NOT TAKE UNTIL YOU SEE YOUR PRIMARY DOCTOR. Rehab can add back if BP becomes elevated. Would start with this one over HCTZ    nut.tx.gluc intol,lf,soy/fiber (GLUCERNA 1.5 SHELLEY ORAL) Feed per tube at 55 ml/hr continuously     Family History    None       Tobacco Use    Smoking status: Never    Smokeless tobacco: Not on file   Substance and Sexual Activity    Alcohol use: No    Drug use: Not on file    Sexual activity: Not on file     Review of Systems   Constitutional:  Negative for activity change, chills and fever.   HENT:  Positive for trouble swallowing.    Respiratory:  Negative for cough, chest tightness and shortness of breath.    Cardiovascular:  Negative for chest pain, palpitations and leg swelling.   Gastrointestinal:  Negative for abdominal pain, constipation, diarrhea, nausea and vomiting.   Genitourinary:  Negative for dysuria, frequency and hematuria.   Musculoskeletal:  Positive for gait problem. Negative for back pain and neck pain.   Skin:  Positive for wound. Negative for rash.   Neurological:  Positive for speech difficulty (mild dysarthria post CVA) and weakness (L-sided hemiparesis at baseline). Negative for dizziness, syncope and light-headedness.   Psychiatric/Behavioral:  Positive for agitation and confusion (mild, at baseline). The patient is not nervous/anxious.      Objective:     Vital Signs (Most Recent):  Temp: 98.5 °F (36.9 °C) (06/29/24 0752)  Pulse: 70 (06/29/24 0926)  Resp: 17 (06/29/24 0735)  BP: (!) 155/74 (06/29/24 0926)  SpO2: 98 % (06/29/24 0926) Vital Signs (24h Range):  Temp:  [97.7 °F (36.5 °C)-98.5 °F (36.9 °C)] 98.5 °F (36.9 °C)  Pulse:  [62-71] 70  Resp:  [16-18] 17  SpO2:  [98 %-100 %] 98 %  BP: (145-174)/(62-77) 155/74      Weight: 67.6 kg (149 lb)  Body mass index is 30.09 kg/m².     Physical Exam  Vitals and nursing note reviewed.   Constitutional:       General: She is not in acute distress.     Appearance: She is well-developed. She is ill-appearing.   HENT:      Head: Normocephalic and atraumatic.      Mouth/Throat:      Mouth: Mucous membranes are dry.   Eyes:      Conjunctiva/sclera: Conjunctivae normal.      Pupils: Pupils are equal, round, and reactive to light.   Cardiovascular:      Rate and Rhythm: Normal rate and regular rhythm.      Heart sounds: Normal heart sounds.   Pulmonary:      Effort: Pulmonary effort is normal. No respiratory distress.      Breath sounds: Normal breath sounds.   Abdominal:      General: There is no distension.      Palpations: Abdomen is soft.      Tenderness: There is no abdominal tenderness.      Comments: PEG site covered with bandage, c/d/i   Musculoskeletal:         General: No tenderness. Normal range of motion.      Cervical back: Normal range of motion and neck supple.   Skin:     General: Skin is warm and dry.      Capillary Refill: Capillary refill takes less than 2 seconds.      Findings: Wound (mild altered skin integrity to sacrum) present. No rash.   Neurological:      Mental Status: She is alert.      Cranial Nerves: No cranial nerve deficit.      Sensory: No sensory deficit.      Motor: Weakness (L-sided, chronic) present.      Coordination: Coordination normal.      Comments: Oriented to person, place, and year, conversant but intermittently agitated   Contracture to LUE  Mild, dependant edema to L upper and lower extremities   Psychiatric:         Behavior: Behavior is agitated. Behavior is cooperative.         Thought Content: Thought content normal.         Cognition and Memory: Memory is impaired (mild).     PEG tube site:              CRANIAL NERVES     CN III, IV, VI   Pupils are equal, round, and reactive to light.       Significant Labs: All pertinent labs within the  past 24 hours have been reviewed.    Significant Imaging: I have reviewed all pertinent imaging results/findings within the past 24 hours.  Assessment/Plan:     * PEG tube malfunction  - 88 y.o. F with recent CVA resulting in L hemiplegia and dysphagia s/p PEG tube placement with recurrent inadvertent dislodgement presenting with PEG tub dislodgement  - Gen surg consulted in the ED and given recurrent issue & unknown timing of dislosgement, recommend NGT placement for enteral nutrition vs long term TPN until patient's mental baseline is acceptable for management of another gastrostomy tube  - Conitnue pureed diet with strict aspiration precautions  - Discussed at length with daughter, Kanchan, who plans to present to bedside with her sister, Ashlie, for ongoing GOC discussion prior to NGT placement  - Crush meds in puree  - Start continuous IVFs for 10 hrs  - SLP and RD consulted    Sacral ulcer, limited to breakdown of skin  - Wound care consulted, appreciate recs  - Barrier cream, q2h turns     Dysphagia due to recent stroke  - Chronic issue from recent CVA  - SLP notes from prior admission reviewed, recommend pureed diet and continued dysphagia therapy   - RD consulted for tube feed recommendations     Cerebrovascular accident (CVA) due to embolism of right middle cerebral artery  L hemiplegia  Reduced mobility  - Chronic issue  - Continue statin  - Tentative plan to return to [facility] at time of discharge  - PT/OT consulted    Chronic a-fib  Chronic anticoagulation  Patient is currently in sinus rhythm.[id number] Score: 4. Anticoagulation indicated. Anticoagulation done with eliquis .    Hypertension, benign  Chronic, controlled. Latest blood pressure and vitals reviewed-     Temp:  [97.7 °F (36.5 °C)-98.5 °F (36.9 °C)]   Pulse:  [62-71]   Resp:  [16-20]   BP: (132-174)/(62-77)   SpO2:  [98 %-100 %] .   Home meds for hypertension were reviewed and noted below.   Hypertension Medications               amLODIPine  (NORVASC) 5 MG tablet 1 tablet (5 mg total) by Per G Tube route once daily.    lisinopriL (PRINIVIL,ZESTRIL) 20 MG tablet 1 tablet (20 mg total) by Per G Tube route once daily. DO NOT TAKE UNTIL YOU SEE YOUR PRIMARY DOCTOR. Rehab can add back if BP becomes elevated. Would start with this one over HCTZ     While in the hospital, will manage blood pressure as follows; Continue home amlodipine, Hold lisinopril     Will utilize p.r.n. blood pressure medication only if patient's blood pressure greater than 180/110 and she develops symptoms such as worsening chest pain or shortness of breath.      VTE Risk Mitigation (From admission, onward)           Ordered     apixaban tablet 5 mg  2 times daily         06/29/24 1144                                    Maya Driscoll PA-C  Department of Hospital Medicine  [facility] - Emergency Dept

## 2024-06-29 NOTE — ED NOTES
Nurses Note -- 4 Eyes      6/29/2024   4:07 PM      Skin assessed during: Q Shift Change      [] No Altered Skin Integrity Present    []Prevention Measures Documented      [x] Yes- Altered Skin Integrity Present or Discovered   [] LDA Added if Not in Epic (Describe Wound)   [] New Altered Skin Integrity was Present on Admit and Documented in LDA   [x] Wound Image Taken    Wound Care Consulted? No    Attending Nurse:  Hilda Beckham RN/Staff Member:   Yessica BARTON

## 2024-06-29 NOTE — ASSESSMENT & PLAN NOTE
Chronic anticoagulation  Patient is currently in sinus rhythm.[id number] Score: 4. Anticoagulation indicated. Anticoagulation done with eliquis .

## 2024-06-29 NOTE — CONSULTS
General Surgery Care Update    In brief, this is an 88-year-old female with a past medical history of hypertension, obesity, AFib, and recent MCA stroke with subsequent hemiparesis and dysphagia requiring PEG tube placement originally on 5/29 with the General Surgery team. Since that time the patient has had multiple episodes of inadvertent dislodgement of her PEG tube. Most recently her tube was replaced endoscopically on 6/17/24.   Patient represented to the ED this morning with PEG tube dislodgement. It is unknown when the lats time of appropriate placement was. ED staff unable to replace tube. Patient remains hemodynamically stable. Other vital signs are all normal. Labs relatively unremarkable. General Surgery contacted for PEG replacement.   At this time the patient shows no sign of clinical decompensation and I believe that she is likely out of the window in regards to possibility of intraperitoneal spillage. Discussed with ED provider the dangers of replacement of this tube given the patient's repeated dislodgements. I do not believe that it is in the patient's best interest to replace this tube. Recommend NG placement for enteral nutrition vs long term TPN until patient's mental baseline is acceptable for management of another gastrostomy tube. If patient meets requirements for hospital admission for other reasons I recommend admission to hospital medicine team. General Surgery will continue to be available for questions or further discussion regarding enteral access in the future. Case discussed with staff.    Escobar Davis MD  [facility]  PGY - 4

## 2024-06-29 NOTE — ASSESSMENT & PLAN NOTE
Chronic, controlled. Latest blood pressure and vitals reviewed-     Temp:  [97.7 °F (36.5 °C)-98.5 °F (36.9 °C)]   Pulse:  [62-71]   Resp:  [16-20]   BP: (132-174)/(62-77)   SpO2:  [98 %-100 %] .   Home meds for hypertension were reviewed and noted below.   Hypertension Medications               amLODIPine (NORVASC) 5 MG tablet 1 tablet (5 mg total) by Per G Tube route once daily.    lisinopriL (PRINIVIL,ZESTRIL) 20 MG tablet 1 tablet (20 mg total) by Per G Tube route once daily. DO NOT TAKE UNTIL YOU SEE YOUR PRIMARY DOCTOR. Rehab can add back if BP becomes elevated. Would start with this one over HCTZ     While in the hospital, will manage blood pressure as follows; Continue home amlodipine, Hold lisinopril     Will utilize p.r.n. blood pressure medication only if patient's blood pressure greater than 180/110 and she develops symptoms such as worsening chest pain or shortness of breath.

## 2024-06-29 NOTE — ASSESSMENT & PLAN NOTE
- Chronic issue from recent CVA  - SLP notes from prior admission reviewed, recommend pureed diet and continued dysphagia therapy   - RD consulted for tube feed recommendations

## 2024-06-29 NOTE — HPI
Amirah Davey is a 88 y.o. F with PMHx of recent CVA in May with residual L sided weakness, dysphagia and dysarthria, s/p PEG tube placement with recurrent inadvertent dislodgement, HTN, and Afib on eliquis who presents to [facility] for evaluation of PEG tube malfunction. Patient with recent admit for PEG dislogement on 6/12 c/b abdominal wall abscess on CT. IV vancomycin was given and general surgery replaced her PEG tube on 6/17 and no abscess or fluid collection was noted.     She presents today after her inpatient rehab noticed her PEG tube was dislodged. It is unknown when the last time of appropriate placement was. She is AAO to person, place, situation, and year. She denies intentionally removing her PEG tube. Per IPR nursing, the patient had an abdominal binder around her abdomen which was preventing her from removal, which was removed at some point yesterday.     In the ED: VSSAF. Labs grossly unremarkable acutely. CTH and CXR without acute process. Gen surg consulted in the ED and given recurrent inadvertent dislodgement is and unknown timing of dislosgement, recommend NGT placement for enteral nutrition vs long term TPN until patient's mental baseline is acceptable for management of another gastrostomy tube.

## 2024-06-29 NOTE — SUBJECTIVE & OBJECTIVE
Past Medical History:   Diagnosis Date    A-fib     Cerebrovascular accident (CVA) due to embolism of right middle cerebral artery 05/23/2024    Current use of long term anticoagulation     on Xarelto    Dysphagia due to recent stroke 05/23/2024    Hypertension     Stroke 03/20/2013       Past Surgical History:   Procedure Laterality Date    CARDIAC PACEMAKER PLACEMENT  09/25/2019    [employer] Model number [id number]  Serial number [id number] device MRI conditional for 1.5 Estela magnets    CATARACT EXTRACTION W/  INTRAOCULAR LENS IMPLANT Right 05/01/2017    Dr. Case    CATARACT EXTRACTION W/  INTRAOCULAR LENS IMPLANT Left 05/29/2017    Dr. Case    ESOPHAGOGASTRODUODENOSCOPY W/ PEG N/A 5/29/2024    Procedure: EGD, WITH PEG TUBE INSERTION;  Surgeon: Imer Minaya MD;  [facility];  Service: General;  Laterality: N/A;    EYE SURGERY      HYSTERECTOMY      INSERTION, PEG TUBE N/A 5/27/2024    Procedure: INSERTION, PEG TUBE;  Surgeon: Ivette Love MD;  Location: [facility] ENDO (2ND FLR);  Service: Endoscopy;  Laterality: N/A;    INSERTION, PEG TUBE N/A 6/17/2024    Procedure: INSERTION, PEG TUBE;  Surgeon: Chloé Sher MD;  [facility];  Service: General;  Laterality: N/A;       Review of patient's allergies indicates:  No Known Allergies    No current facility-administered medications on file prior to encounter.     Current Outpatient Medications on File Prior to Encounter   Medication Sig    acetaminophen (TYLENOL) 500 MG tablet 2 tablets (1,000 mg total) by Per G Tube route every 6 (six) hours as needed for Pain.    amLODIPine (NORVASC) 5 MG tablet 1 tablet (5 mg total) by Per G Tube route once daily.    apixaban (ELIQUIS) 5 mg Tab 1 tablet (5 mg total) by Per G Tube route 2 (two) times daily.    atorvastatin (LIPITOR) 40 MG tablet 1 tablet (40 mg total) by Per G Tube route once daily.    baclofen (LIORESAL) 5 mg Tab tablet 1 tablet (5 mg total) by Per G Tube route 3 (three)  times daily.    famotidine (PEPCID) 40 MG tablet 1 tablet (40 mg total) by Per G Tube route once daily.    LIDOcaine (LIDODERM) 5 % Place 1 patch onto the skin once daily. Remove & Discard patch within 12 hours or as directed by MD    lisinopriL (PRINIVIL,ZESTRIL) 20 MG tablet 1 tablet (20 mg total) by Per G Tube route once daily. DO NOT TAKE UNTIL YOU SEE YOUR PRIMARY DOCTOR. Rehab can add back if BP becomes elevated. Would start with this one over HCTZ    nut.tx.gluc intol,lf,soy/fiber (GLUCERNA 1.5 SHELLEY ORAL) Feed per tube at 55 ml/hr continuously     Family History    None       Tobacco Use    Smoking status: Never    Smokeless tobacco: Not on file   Substance and Sexual Activity    Alcohol use: No    Drug use: Not on file    Sexual activity: Not on file     Review of Systems   Constitutional:  Negative for activity change, chills and fever.   HENT:  Positive for trouble swallowing.    Respiratory:  Negative for cough, chest tightness and shortness of breath.    Cardiovascular:  Negative for chest pain, palpitations and leg swelling.   Gastrointestinal:  Negative for abdominal pain, constipation, diarrhea, nausea and vomiting.   Genitourinary:  Negative for dysuria, frequency and hematuria.   Musculoskeletal:  Positive for gait problem. Negative for back pain and neck pain.   Skin:  Positive for wound. Negative for rash.   Neurological:  Positive for speech difficulty (mild dysarthria post CVA) and weakness (L-sided hemiparesis at baseline). Negative for dizziness, syncope and light-headedness.   Psychiatric/Behavioral:  Positive for agitation and confusion (mild, at baseline). The patient is not nervous/anxious.      Objective:     Vital Signs (Most Recent):  Temp: 98.5 °F (36.9 °C) (06/29/24 0752)  Pulse: 70 (06/29/24 0926)  Resp: 17 (06/29/24 0735)  BP: (!) 155/74 (06/29/24 0926)  SpO2: 98 % (06/29/24 0926) Vital Signs (24h Range):  Temp:  [97.7 °F (36.5 °C)-98.5 °F (36.9 °C)] 98.5 °F (36.9 °C)  Pulse:   [62-71] 70  Resp:  [16-18] 17  SpO2:  [98 %-100 %] 98 %  BP: (145-174)/(62-77) 155/74     Weight: 67.6 kg (149 lb)  Body mass index is 30.09 kg/m².     Physical Exam  Vitals and nursing note reviewed.   Constitutional:       General: She is not in acute distress.     Appearance: She is well-developed. She is ill-appearing.   HENT:      Head: Normocephalic and atraumatic.      Mouth/Throat:      Mouth: Mucous membranes are dry.   Eyes:      Conjunctiva/sclera: Conjunctivae normal.      Pupils: Pupils are equal, round, and reactive to light.   Cardiovascular:      Rate and Rhythm: Normal rate and regular rhythm.      Heart sounds: Normal heart sounds.   Pulmonary:      Effort: Pulmonary effort is normal. No respiratory distress.      Breath sounds: Normal breath sounds.   Abdominal:      General: There is no distension.      Palpations: Abdomen is soft.      Tenderness: There is no abdominal tenderness.      Comments: PEG site covered with bandage, c/d/i   Musculoskeletal:         General: No tenderness. Normal range of motion.      Cervical back: Normal range of motion and neck supple.   Skin:     General: Skin is warm and dry.      Capillary Refill: Capillary refill takes less than 2 seconds.      Findings: Wound (mild altered skin integrity to sacrum) present. No rash.   Neurological:      Mental Status: She is alert.      Cranial Nerves: No cranial nerve deficit.      Sensory: No sensory deficit.      Motor: Weakness (L-sided, chronic) present.      Coordination: Coordination normal.      Comments: Oriented to person, place, and year, conversant but intermittently agitated   Contracture to LUE  Mild, dependant edema to L upper and lower extremities   Psychiatric:         Behavior: Behavior is agitated. Behavior is cooperative.         Thought Content: Thought content normal.         Cognition and Memory: Memory is impaired (mild).     PEG tube site:              CRANIAL NERVES     CN III, IV, VI   Pupils are  equal, round, and reactive to light.       Significant Labs: All pertinent labs within the past 24 hours have been reviewed.    Significant Imaging: I have reviewed all pertinent imaging results/findings within the past 24 hours.

## 2024-06-29 NOTE — ASSESSMENT & PLAN NOTE
- 88 y.o. F with recent CVA resulting in L hemiplegia and dysphagia s/p PEG tube placement with recurrent inadvertent dislodgement presenting with PEG tub dislodgement  - Gen surg consulted in the ED and given recurrent issue & unknown timing of dislosgement, recommend NGT placement for enteral nutrition vs long term TPN until patient's mental baseline is acceptable for management of another gastrostomy tube  - Conitnue pureed diet with strict aspiration precautions  - Discussed at length with daughter, Kanchan, who plans to present to bedside with her sister, Ashlie, for ongoing GOC discussion prior to NGT placement  - Crush meds in puree  - Start continuous IVFs for 10 hrs  - SLP and RD consulted

## 2024-06-29 NOTE — CONSULTS
RD consulted for TF RECS. Pt currently in ED. Last seen by [facility] RD 6/18.     Recommendations     1. EN Recommendations: Glucerna 1.5 @ 45 ml/hr x 24 hours to provide 1620 kcals, 89g of protein, and 820 ml of fluid     - Start at trickle feeds, slowly advancing to goal.     Additional FW 30 mL/hr while on TF.     - Bolus feeds when medically stable 4.5 cartons providing 1602 kcals, 88g PRO and 810 mL fluid; additional FW ~100mL BEFORE and AFTER feedingg.     2. Monitor for s/s of intolerance such as residuals >500ml, n/v/d, or abdominal distension.     3. Monitor for refeeding syndrome, if labs show signs replete phosphorus, magnesium and potassium quickly.     4. Continue pureed diet, ADAT per MD/SLP  5. RD following     Goals: Meet % EEN/EPN by follow up date  Nutrition Goal Status: new  Communication of RD Recs: other (comment) (POC)    Thanks,   Kim Willard RD, LDN

## 2024-06-30 LAB
ALBUMIN SERPL BCP-MCNC: 2.9 G/DL (ref 3.5–5.2)
ALP SERPL-CCNC: 55 U/L (ref 55–135)
ALT SERPL W/O P-5'-P-CCNC: 17 U/L (ref 10–44)
ANION GAP SERPL CALC-SCNC: 12 MMOL/L (ref 8–16)
AST SERPL-CCNC: 26 U/L (ref 10–40)
BASOPHILS # BLD AUTO: 0.03 K/UL (ref 0–0.2)
BASOPHILS NFR BLD: 0.4 % (ref 0–1.9)
BILIRUB SERPL-MCNC: 0.5 MG/DL (ref 0.1–1)
BUN SERPL-MCNC: 16 MG/DL (ref 8–23)
CALCIUM SERPL-MCNC: 9.3 MG/DL (ref 8.7–10.5)
CHLORIDE SERPL-SCNC: 111 MMOL/L (ref 95–110)
CO2 SERPL-SCNC: 19 MMOL/L (ref 23–29)
CREAT SERPL-MCNC: 0.9 MG/DL (ref 0.5–1.4)
DIFFERENTIAL METHOD BLD: ABNORMAL
EOSINOPHIL # BLD AUTO: 0.2 K/UL (ref 0–0.5)
EOSINOPHIL NFR BLD: 2.5 % (ref 0–8)
ERYTHROCYTE [DISTWIDTH] IN BLOOD BY AUTOMATED COUNT: 16.7 % (ref 11.5–14.5)
EST. GFR  (NO RACE VARIABLE): >60 ML/MIN/1.73 M^2
GLUCOSE SERPL-MCNC: 81 MG/DL (ref 70–110)
HCT VFR BLD AUTO: 32 % (ref 37–48.5)
HGB BLD-MCNC: 9.9 G/DL (ref 12–16)
IMM GRANULOCYTES # BLD AUTO: 0.03 K/UL (ref 0–0.04)
IMM GRANULOCYTES NFR BLD AUTO: 0.4 % (ref 0–0.5)
LYMPHOCYTES # BLD AUTO: 1.7 K/UL (ref 1–4.8)
LYMPHOCYTES NFR BLD: 22.1 % (ref 18–48)
MAGNESIUM SERPL-MCNC: 2.1 MG/DL (ref 1.6–2.6)
MCH RBC QN AUTO: 25 PG (ref 27–31)
MCHC RBC AUTO-ENTMCNC: 30.9 G/DL (ref 32–36)
MCV RBC AUTO: 81 FL (ref 82–98)
MONOCYTES # BLD AUTO: 0.9 K/UL (ref 0.3–1)
MONOCYTES NFR BLD: 11.8 % (ref 4–15)
NEUTROPHILS # BLD AUTO: 4.7 K/UL (ref 1.8–7.7)
NEUTROPHILS NFR BLD: 62.8 % (ref 38–73)
NRBC BLD-RTO: 0 /100 WBC
PHOSPHATE SERPL-MCNC: 3.8 MG/DL (ref 2.7–4.5)
PLATELET # BLD AUTO: 222 K/UL (ref 150–450)
PMV BLD AUTO: 11.9 FL (ref 9.2–12.9)
POTASSIUM SERPL-SCNC: 3.9 MMOL/L (ref 3.5–5.1)
PROT SERPL-MCNC: 6.5 G/DL (ref 6–8.4)
RBC # BLD AUTO: 3.96 M/UL (ref 4–5.4)
SODIUM SERPL-SCNC: 142 MMOL/L (ref 136–145)
WBC # BLD AUTO: 7.46 K/UL (ref 3.9–12.7)

## 2024-06-30 PROCEDURE — 80053 COMPREHEN METABOLIC PANEL: CPT

## 2024-06-30 PROCEDURE — 97165 OT EVAL LOW COMPLEX 30 MIN: CPT

## 2024-06-30 PROCEDURE — 84100 ASSAY OF PHOSPHORUS: CPT

## 2024-06-30 PROCEDURE — 85025 COMPLETE CBC W/AUTO DIFF WBC: CPT

## 2024-06-30 PROCEDURE — 25000003 PHARM REV CODE 250

## 2024-06-30 PROCEDURE — 97535 SELF CARE MNGMENT TRAINING: CPT

## 2024-06-30 PROCEDURE — 97112 NEUROMUSCULAR REEDUCATION: CPT

## 2024-06-30 PROCEDURE — 63600175 PHARM REV CODE 636 W HCPCS: Performed by: INTERNAL MEDICINE

## 2024-06-30 PROCEDURE — 36415 COLL VENOUS BLD VENIPUNCTURE: CPT

## 2024-06-30 PROCEDURE — S0179 MEGESTROL 20 MG: HCPCS | Performed by: INTERNAL MEDICINE

## 2024-06-30 PROCEDURE — 21400001 HC TELEMETRY ROOM

## 2024-06-30 PROCEDURE — 97162 PT EVAL MOD COMPLEX 30 MIN: CPT

## 2024-06-30 PROCEDURE — 83735 ASSAY OF MAGNESIUM: CPT

## 2024-06-30 PROCEDURE — 25000003 PHARM REV CODE 250: Performed by: INTERNAL MEDICINE

## 2024-06-30 PROCEDURE — 97530 THERAPEUTIC ACTIVITIES: CPT

## 2024-06-30 RX ORDER — DICYCLOMINE HYDROCHLORIDE 10 MG/1
10 CAPSULE ORAL 4 TIMES DAILY
Status: DISCONTINUED | OUTPATIENT
Start: 2024-06-30 | End: 2024-07-09 | Stop reason: HOSPADM

## 2024-06-30 RX ORDER — DICYCLOMINE HYDROCHLORIDE 10 MG/ML
20 INJECTION INTRAMUSCULAR 3 TIMES DAILY
Status: DISCONTINUED | OUTPATIENT
Start: 2024-06-30 | End: 2024-06-30

## 2024-06-30 RX ORDER — OXYCODONE HYDROCHLORIDE 5 MG/1
5 TABLET ORAL ONCE
Status: COMPLETED | OUTPATIENT
Start: 2024-06-30 | End: 2024-06-30

## 2024-06-30 RX ORDER — AMLODIPINE BESYLATE 10 MG/1
10 TABLET ORAL DAILY
Status: DISCONTINUED | OUTPATIENT
Start: 2024-06-30 | End: 2024-07-09 | Stop reason: HOSPADM

## 2024-06-30 RX ORDER — OXYCODONE HYDROCHLORIDE 5 MG/1
5 TABLET ORAL EVERY 6 HOURS PRN
Status: DISCONTINUED | OUTPATIENT
Start: 2024-06-30 | End: 2024-07-09 | Stop reason: HOSPADM

## 2024-06-30 RX ORDER — MEGESTROL ACETATE 40 MG/1
400 TABLET ORAL 2 TIMES DAILY
Status: DISCONTINUED | OUTPATIENT
Start: 2024-06-30 | End: 2024-06-30

## 2024-06-30 RX ORDER — MEGESTROL ACETATE 40 MG/ML
400 SUSPENSION ORAL 2 TIMES DAILY
Status: DISCONTINUED | OUTPATIENT
Start: 2024-06-30 | End: 2024-07-09 | Stop reason: HOSPADM

## 2024-06-30 RX ORDER — QUETIAPINE FUMARATE 25 MG/1
50 TABLET, FILM COATED ORAL 2 TIMES DAILY
Status: DISCONTINUED | OUTPATIENT
Start: 2024-06-30 | End: 2024-07-02

## 2024-06-30 RX ORDER — DRONABINOL 2.5 MG/1
2.5 CAPSULE ORAL 2 TIMES DAILY
Status: DISCONTINUED | OUTPATIENT
Start: 2024-06-30 | End: 2024-07-01

## 2024-06-30 RX ADMIN — DRONABINOL 2.5 MG: 2.5 CAPSULE ORAL at 01:06

## 2024-06-30 RX ADMIN — DICYCLOMINE HYDROCHLORIDE 10 MG: 10 CAPSULE ORAL at 08:06

## 2024-06-30 RX ADMIN — QUETIAPINE FUMARATE 50 MG: 25 TABLET ORAL at 09:06

## 2024-06-30 RX ADMIN — OXYCODONE HYDROCHLORIDE 5 MG: 5 TABLET ORAL at 07:06

## 2024-06-30 RX ADMIN — BACLOFEN 5 MG: 5 TABLET ORAL at 05:06

## 2024-06-30 RX ADMIN — MEGESTROL ACETATE 400 MG: 400 SUSPENSION ORAL at 09:06

## 2024-06-30 RX ADMIN — APIXABAN 5 MG: 5 TABLET, FILM COATED ORAL at 09:06

## 2024-06-30 RX ADMIN — OXYCODONE 5 MG: 5 TABLET ORAL at 08:06

## 2024-06-30 RX ADMIN — AMLODIPINE BESYLATE 10 MG: 10 TABLET ORAL at 08:06

## 2024-06-30 RX ADMIN — DRONABINOL 2.5 MG: 2.5 CAPSULE ORAL at 09:06

## 2024-06-30 RX ADMIN — DICYCLOMINE HYDROCHLORIDE 10 MG: 10 CAPSULE ORAL at 09:06

## 2024-06-30 RX ADMIN — APIXABAN 5 MG: 5 TABLET, FILM COATED ORAL at 08:06

## 2024-06-30 RX ADMIN — FAMOTIDINE 40 MG: 20 TABLET ORAL at 08:06

## 2024-06-30 RX ADMIN — DICYCLOMINE HYDROCHLORIDE 10 MG: 10 CAPSULE ORAL at 01:06

## 2024-06-30 RX ADMIN — DICYCLOMINE HYDROCHLORIDE 10 MG: 10 CAPSULE ORAL at 05:06

## 2024-06-30 RX ADMIN — BACLOFEN 5 MG: 5 TABLET ORAL at 09:06

## 2024-06-30 RX ADMIN — BACLOFEN 5 MG: 5 TABLET ORAL at 08:06

## 2024-06-30 RX ADMIN — MEGESTROL ACETATE 400 MG: 400 SUSPENSION ORAL at 01:06

## 2024-06-30 NOTE — PLAN OF CARE
"  Problem: Adult Inpatient Plan of Care  Goal: Plan of Care Review  Outcome: Progressing  Goal: Patient-Specific Goal (Individualized)  Outcome: Progressing  Goal: Absence of Hospital-Acquired Illness or Injury  Outcome: Progressing  Goal: Optimal Comfort and Wellbeing  Outcome: Progressing  Goal: Readiness for Transition of Care  Outcome: Progressing     Problem: Infection  Goal: Absence of Infection Signs and Symptoms  Outcome: Progressing     Problem: Acute Kidney Injury/Impairment  Goal: Fluid and Electrolyte Balance  Outcome: Progressing  Goal: Improved Oral Intake  Outcome: Progressing  Goal: Effective Renal Function  Outcome: Progressing     Problem: Skin Injury Risk Increased  Goal: Skin Health and Integrity  Outcome: Progressing   [facility]  Extremely restless, and yelling out "my belly hurts." Pt alert and oriented to self only.  C/o abdominal pain. Patient pulled her PEG out (again).  General surgery consulted for PEG reinsertion. Daughters at the bedside & here to discuss plan with team.  Meds given crushed with a very small amount of applesauce.  Tolerated well.    Safety maintained.  Bed in low position,  call  light in reach.   "

## 2024-06-30 NOTE — SUBJECTIVE & OBJECTIVE
Interval History: Pt seen and examined this morning on snehal VELASQUEZ. Patient less confused this morning but had episodes of agitation overnight and did not sleep well. Daughters at bedside this morning and discussed goals of care moving forward. They would like to pursue appetite stimulants and pureed diet and avoiding NGT or TPN for nutrition. They understand that she will likely not be able to meet caloric goals given low appetite and dysphagia.      Care plan reviewed. Otherwise, doing well and with no further complaints at this time.        Objective:     Vital Signs (Most Recent):  Temp: 98 °F (36.7 °C) (06/30/24 0758)  Pulse: 82 (06/30/24 1051)  Resp: 16 (06/30/24 0758)  BP: (!) 164/74 (06/30/24 0758)  SpO2: 95 % (06/30/24 0758) Vital Signs (24h Range):  Temp:  [97.5 °F (36.4 °C)-98.2 °F (36.8 °C)] 98 °F (36.7 °C)  Pulse:  [69-86] 82  Resp:  [16-22] 16  SpO2:  [95 %-100 %] 95 %  BP: (133-195)/(64-80) 164/74     Weight: 67.6 kg (148 lb 15.8 oz)  Body mass index is 30.09 kg/m².    Intake/Output Summary (Last 24 hours) at 6/30/2024 1142  Last data filed at 6/30/2024 0504  Gross per 24 hour   Intake 90 ml   Output 600 ml   Net -510 ml         Physical Exam  Gen: in NAD, appears stated age  Neuro: Left sided weakness;chronic. AAOx3, CN2-12 grossly intact BL; motor, sensory, and strength grossly intact BL  HEENT: NTNC, EOMI, PERRLA, MMM; no thyromegaly or lymphadenopathy; no JVD appreciated  CVS: RRR, no m/r/g; S1/S2 auscultated with no S3 or S4; capillary refill < 2 sec  Resp: lungs CTAB, no w/r/r; no belabored breathing or accessory muscle use appreciated   Abd: PEG site bandaged. BS+ in all 4 quadrants; NTND, soft to palpation; no organomegaly appreciated   Extrem: pulses full, equal, and regular over all 4 extremities; no UE or LE edema BL          Significant Labs: All pertinent labs within the past 24 hours have been reviewed.    Significant Imaging: I have reviewed all pertinent imaging results/findings  within the past 24 hours.

## 2024-06-30 NOTE — PLAN OF CARE
Pt engaged fairly in evaluative session this date.     Problem: Occupational Therapy  Goal: Occupational Therapy Goal  Description: Goals to be met by: 7/30/24     Patient will increase functional independence with ADLs by performing:    UE Dressing with Maximum Assistance.  LE Dressing with Maximum Assistance.  Grooming while EOB with Moderate Assistance.  Toileting from bedside commode with Maximum Assistance for hygiene and clothing management.   Sitting at edge of bed x10 minutes with Minimal Assistance.  Rolling to Right with Minimal Assistance.   Rolling to Left with CGA  Supine to sit with Moderate Assistance.  Squat pivot transfers with Maximum Assistance.  Toilet transfer to bedside commode with Maximum Assistance.    DME justification  Patient has a mobility limitation that significantly impairs their ability to participate in one or more mobility related activities of daily living, including toileting. This deficit can be resolved by using a bedside commode. Patient demonstrates mobility limitations that will cause them to be confined to one room at home without bathroom access for up to 30 days. Using a bedside commode will greatly improve the patient's ability to participate in MRADLs.      Outcome: Progressing

## 2024-06-30 NOTE — PLAN OF CARE
Eval completed; POC established    Problem: Physical Therapy  Goal: Physical Therapy Goal  Description: Goals to be met by: 2024     Patient will increase functional independence with mobility by performin. Supine to sit with Moderate Assistance  2. Sit to supine with Moderate Assistance  3. Rolling to Left and Right with Stand-by Assistance.  4. Sit to stand transfer with Moderate Assistance using LRAD  5. Bed to chair transfer with Moderate Assistance using LRAD  6. Gait  x 10 feet with Maximum Assistance using LRAD.   7. Wheelchair propulsion x50 feet with Stand-by Assistance using  right upper and lower extremity  8. Sitting at edge of bed x5 minutes with Minimal Assistance    Outcome: Progressing

## 2024-06-30 NOTE — PT/OT/SLP EVAL
Physical Therapy Co-Evaluation    Patient Name:  Amirah Davey   MRN:  [id number]    Recommendations:     Discharge Recommendations: High Intensity Therapy   Discharge Equipment Recommendations: hospital bed, lift device, to be determined by next level of care, wheelchair   Barriers to discharge: Inaccessible home and current level of assist required    Co-eval performed to appropriately and safely assess patient's strength and endurance while facilitating functional tasks in addition to accommodating for patient's activity/pain tolerance.    Assessment:     Amirah Davey is a 88 y.o. female admitted with a medical diagnosis of PEG tube malfunction.  She presents with the following impairments/functional limitations: weakness, impaired self care skills, impaired balance, decreased coordination, decreased safety awareness, decreased ROM, impaired skin, impaired endurance, impaired functional mobility, decreased upper extremity function, pain, impaired coordination, impaired sensation, gait instability, decreased lower extremity function, impaired cognition, abnormal tone, impaired fine motor.    Pt requires Max A x2-Total A for all functional mobility. Pt tolerated sitting EOB [id number] with poor sitting balance and pushing to L. She required Max A x2 for stand from EOB; however, unable to achieve fully upright posture. Pt reporting pain in LUE/LLE and in buttocks 2/2 current sacral pressure injury. Prior to CVA, pt was independent with all mobility and driving. She is significantly below baseline level of mobility. No active movement of LUE or LLE noted. Patient presents with good participation and motivation to return to prior level of function with high intensity therapy.  The patient demonstrates appropriate endurance to participate in up to 3 hours or 15hrs of combined therapy post acute.      Rehab Prognosis: Good; patient would benefit from acute skilled PT services to address these deficits and reach maximum level  of function.    Recent Surgery: * No surgery found *      Plan:     During this hospitalization, patient to be seen 4 x/week to address the identified rehab impairments via gait training, therapeutic activities, therapeutic exercises, neuromuscular re-education and progress toward the following goals:    Plan of Care Expires:  07/28/24    Subjective     Chief Complaint: Pain with movement to LUE/LLE  Patient/Family Comments/goals: To maximize return to PLOF  Pain/Comfort:  Pain Rating 1:  (unrated)  Location - Side 1: Left  Location 1:  (arm & leg with movement)  Pain Addressed 1: Reposition, Distraction  Pain Rating Post-Intervention 1:  (unrated)    Patients cultural, spiritual, Restoration conflicts given the current situation: no    Living Environment:  Pt admitted to [facility] from [facility], where she received PT/OT/ST following recent CVA. She required 100% assistance for transfers while in SNF. Prior to CVA, pt lived with daughter in [facility]. She was independent and driving.  Prior to admission, patients level of function was independent, no AD.  Equipment used at home: none.  DME owned (not currently used): none.  Upon discharge, patient will have assistance from daughters.    Objective:     Communicated with nurse prior to session.  Patient found HOB elevated with peripheral IV, PureWick  upon PT entry to room.    General Precautions: Standard, fall  Orthopedic Precautions:N/A   Braces: N/A  Respiratory Status: Room air    Exams:  Cognitive Exam:  Patient is oriented to Person  Sensation: Unable to formally test 2/2 impaired cognition   Skin Integrity/Edema:      -       Skin integrity: Wound sacral  RLE ROM: WFL  RLE Strength: WFL; however, unable to formally test 2/2 impaired cognition  LLE ROM: Deficits: Hypertonicity into knee flexion and DF  LLE Strength: grossly 0/5    Functional Mobility:  Bed Mobility:     Rolling Left:  minimum assistance  Rolling Right: maximal assistance  Scooting to EOB: maximal assistance and  "of 2 persons  Scooting to HOB: total assistance and of 2 persons via draw sheet  Supine to Sit: maximal assistance and of 2 persons  Sit to Supine: maximal assistance and of 2 persons   Transfers:     Sit to Stand:  maximal assistance and of 2 persons with no AD  Unable to achieve fully upright stand; however, cleared buttocks from bed, poor hip extensor activation B  Increased weight-bearing onto RLE, minimal on L  Assist to block LLE to prevent buckling   Maintained ~15" prior to return to sit EOB  Balance:  Static Sitting: Poor, required Max A to maintain ~12mins   Pushing to L  L lateral lean, able to assist in return to midline with verbal and tactile cueing   Static Standing: Poor, required Max A x2       AM-PAC 6 CLICK MOBILITY  Total Score:10       Treatment & Education:  Patient educated on role of therapy, goals of session, and benefits of mobilizing.   Discussed PT plan of care during hospitalization.   Patient educated on calling for assistance.   All questions answered within PT scope of practice.     Patient left left sidelying with all lines intact, call button in reach, nurse notified, and daughters present.    GOALS:   Multidisciplinary Problems       Physical Therapy Goals          Problem: Physical Therapy    Goal Priority Disciplines Outcome Goal Variances Interventions   Physical Therapy Goal     PT, PT/OT Progressing     Description: Goals to be met by: 2024     Patient will increase functional independence with mobility by performin. Supine to sit with Moderate Assistance  2. Sit to supine with Moderate Assistance  3. Rolling to Left and Right with Stand-by Assistance.  4. Sit to stand transfer with Moderate Assistance using LRAD  5. Bed to chair transfer with Moderate Assistance using LRAD  6. Gait  x 10 feet with Maximum Assistance using LRAD.   7. Wheelchair propulsion x50 feet with Stand-by Assistance using  right upper and lower extremity  8. Sitting at edge of bed x5 minutes " with Minimal Assistance                         History:     Past Medical History:   Diagnosis Date    A-fib     Cerebrovascular accident (CVA) due to embolism of right middle cerebral artery 05/23/2024    Current use of long term anticoagulation     on Xarelto    Dysphagia due to recent stroke 05/23/2024    Hypertension     Stroke 03/20/2013       Past Surgical History:   Procedure Laterality Date    CARDIAC PACEMAKER PLACEMENT  09/25/2019    [employer] Model number [id number]  Serial number [id number] device MRI conditional for 1.5 Estela magnets    CATARACT EXTRACTION W/  INTRAOCULAR LENS IMPLANT Right 05/01/2017    Dr. aCse    CATARACT EXTRACTION W/  INTRAOCULAR LENS IMPLANT Left 05/29/2017    Dr. Case    ESOPHAGOGASTRODUODENOSCOPY W/ PEG N/A 5/29/2024    Procedure: EGD, WITH PEG TUBE INSERTION;  Surgeon: Imer Minaya MD;  Location: [facility] OR [address] [place] [address];  Service: General;  Laterality: N/A;    EYE SURGERY      HYSTERECTOMY      INSERTION, PEG TUBE N/A 5/27/2024    Procedure: INSERTION, PEG TUBE;  Surgeon: Ivette Love MD;  Location: [facility] ([address] [place] [address]);  Service: Endoscopy;  Laterality: N/A;    INSERTION, PEG TUBE N/A 6/17/2024    Procedure: INSERTION, PEG TUBE;  Surgeon: Chloé Sher MD;  Location: [facility] OR [address] [place] [address];  Service: General;  Laterality: N/A;       Time Tracking:     PT Received On: 06/30/24  PT Start Time: 1244     PT Stop Time: 1315  PT Total Time (min): 31 min     Billable Minutes: Evaluation 8 and Neuromuscular Re-education 23 06/30/2024

## 2024-06-30 NOTE — PROGRESS NOTES
[facility] - Med Surg ([address] Medicine  Progress Note    Patient Name: Amirah Davey  MRN: [id number]  Patient Class: IP- Inpatient   Admission Date: 6/29/2024  Length of Stay: 1 days  Attending Physician: David Mayorga MD  Primary Care Provider: Ivana Templeton MD        Subjective:     Principal Problem:PEG tube malfunction        HPI:  Amirah Davey is a 88 y.o. F with PMHx of recent CVA in May with residual L sided weakness, dysphagia and dysarthria, s/p PEG tube placement with recurrent inadvertent dislodgement, HTN, and Afib on eliquis who presents to [facility] for evaluation of PEG tube malfunction. Patient with recent admit for PEG dislogement on 6/12 c/b abdominal wall abscess on CT. IV vancomycin was given and general surgery replaced her PEG tube on 6/17 and no abscess or fluid collection was noted.     She presents today after her inpatient rehab noticed her PEG tube was dislodged. It is unknown when the last time of appropriate placement was. She is AAO to person, place, situation, and year. She denies intentionally removing her PEG tube. Per IPR nursing, the patient had an abdominal binder around her abdomen which was preventing her from removal, which was removed at some point yesterday.     In the ED: VSSAF. Labs grossly unremarkable acutely. CTH and CXR without acute process. Gen surg consulted in the ED and given recurrent inadvertent dislodgement is and unknown timing of dislosgement, recommend NGT placement for enteral nutrition vs long term TPN until patient's mental baseline is acceptable for management of another gastrostomy tube.    Overview/Hospital Course:  Patient is a 88 y.o. F with PMHx of recent CVA in May with residual L sided weakness, dysphagia and dysarthria, s/p PEG tube placement with recurrent inadvertent dislodgement, HTN, and Afib on eliquis who presented to [facility] after PEG tube dislodgement. Patient agitated and altered upon admission. No acute processes on CT-H.  Concern for progressive vascular dementia. This is unfortunately the 3rd time she has dislodged her PEG tube. General surgery consulted and given recurrent PEG dislodging they are recommending against replacement given risk. Discussed with daughters about goals of care moving forward. They would like to pursue appetite stimulants and avoiding NGT or TPN for nutrition.     Interval History: Pt seen and examined this morning on snehal VELASQUEZ. Patient less confused this morning but had episodes of agitation overnight and did not sleep well. Daughters at bedside this morning and discussed goals of care moving forward. They would like to pursue appetite stimulants and pureed diet and avoiding NGT or TPN for nutrition. They understand that she will likely not be able to meet caloric goals given low appetite and dysphagia.      Care plan reviewed. Otherwise, doing well and with no further complaints at this time.        Objective:     Vital Signs (Most Recent):  Temp: 98 °F (36.7 °C) (06/30/24 0758)  Pulse: 82 (06/30/24 1051)  Resp: 16 (06/30/24 0758)  BP: (!) 164/74 (06/30/24 0758)  SpO2: 95 % (06/30/24 0758) Vital Signs (24h Range):  Temp:  [97.5 °F (36.4 °C)-98.2 °F (36.8 °C)] 98 °F (36.7 °C)  Pulse:  [69-86] 82  Resp:  [16-22] 16  SpO2:  [95 %-100 %] 95 %  BP: (133-195)/(64-80) 164/74     Weight: 67.6 kg (148 lb 15.8 oz)  Body mass index is 30.09 kg/m².    Intake/Output Summary (Last 24 hours) at 6/30/2024 1142  Last data filed at 6/30/2024 0504  Gross per 24 hour   Intake 90 ml   Output 600 ml   Net -510 ml         Physical Exam  Gen: in NAD, appears stated age  Neuro: Left sided weakness;chronic. AAOx3, CN2-12 grossly intact BL; motor, sensory, and strength grossly intact BL  HEENT: NTNC, EOMI, PERRLA, MMM; no thyromegaly or lymphadenopathy; no JVD appreciated  CVS: RRR, no m/r/g; S1/S2 auscultated with no S3 or S4; capillary refill < 2 sec  Resp: lungs CTAB, no w/r/r; no belabored breathing or accessory muscle use  appreciated   Abd: PEG site bandaged. BS+ in all 4 quadrants; NTND, soft to palpation; no organomegaly appreciated   Extrem: pulses full, equal, and regular over all 4 extremities; no UE or LE edema BL          Significant Labs: All pertinent labs within the past 24 hours have been reviewed.    Significant Imaging: I have reviewed all pertinent imaging results/findings within the past 24 hours.    Assessment/Plan:      * PEG tube malfunction  - 88 y.o. F with recent CVA resulting in L hemiplegia and dysphagia s/p PEG tube placement with recurrent inadvertent dislodgement presenting with PEG tub dislodgement  - Gen surg consulted in the ED and given recurrent issue & unknown timing of dislosgement, recommend NGT placement for enteral nutrition vs long term TPN until patient's mental baseline is acceptable for management of another gastrostomy tube  - Conitnue pureed diet with strict aspiration precautions  - Discussed at length with daughter, Kanchan, who plans to present to bedside with her sister, Ashlie, for ongoing GOC discussion prior to NGT placement  - Crush meds in puree  - Start continuous IVFs for 10 hrs  - SLP and RD consulted    - 6/30: Discussed goals of care moving forward with daughters today. They would like to pursue appetite stimulants and pureed diet and avoiding NGT or TPN for nutrition. They understand that she will likely not be able to meet caloric goals given low appetite and dysphagia. Increase Megace to 400mg BID and start dronabinol     Sacral ulcer, limited to breakdown of skin  - Wound care consulted, appreciate recs  - Barrier cream, q2h turns     Dysphagia due to recent stroke  - Chronic issue from recent CVA  - SLP notes from prior admission reviewed, recommend pureed diet and continued dysphagia therapy   - RD consulted for tube feed recommendations     Cerebrovascular accident (CVA) due to embolism of right middle cerebral artery  L hemiplegia  Reduced mobility  - Chronic issue  -  Continue statin  - Tentative plan to return to [facility] at time of discharge  - PT/OT consulted    Chronic a-fib  Chronic anticoagulation  Patient is currently in sinus rhythm.[id number] Score: 4. Anticoagulation indicated. Anticoagulation done with eliquis .    Hypertension, benign  Chronic, controlled. Latest blood pressure and vitals reviewed-     Temp:  [97.7 °F (36.5 °C)-98.5 °F (36.9 °C)]   Pulse:  [62-71]   Resp:  [16-20]   BP: (132-174)/(62-77)   SpO2:  [98 %-100 %] .   Home meds for hypertension were reviewed and noted below.   Hypertension Medications               amLODIPine (NORVASC) 5 MG tablet 1 tablet (5 mg total) by Per G Tube route once daily.    lisinopriL (PRINIVIL,ZESTRIL) 20 MG tablet 1 tablet (20 mg total) by Per G Tube route once daily. DO NOT TAKE UNTIL YOU SEE YOUR PRIMARY DOCTOR. Rehab can add back if BP becomes elevated. Would start with this one over HCTZ     While in the hospital, will manage blood pressure as follows; Continue home amlodipine, Hold lisinopril     Will utilize p.r.n. blood pressure medication only if patient's blood pressure greater than 180/110 and she develops symptoms such as worsening chest pain or shortness of breath.      VTE Risk Mitigation (From admission, onward)           Ordered     apixaban tablet 5 mg  2 times daily         06/29/24 1144                    Discharge Planning   JOAN:      Code Status: Full Code   Is the patient medically ready for discharge?:     Reason for patient still in hospital (select all that apply): Treatment  Discharge Plan A: Rehab (Return to [facility])                  David Mayogra MD  Department of Hospital Medicine   [facility] - Med Surg ([place])

## 2024-06-30 NOTE — PT/OT/SLP EVAL
Occupational Therapy  Co -  Evaluation with PT    Co-evaluation/treatment performed due to patient's multiple deficits requiring two skilled therapists to appropriately and safely assess patient's strength and endurance while facilitating functional tasks in addition to accommodating for patient's activity tolerance.       Name: Amirah Davey  MRN: [id number]  Admitting Diagnosis: PEG tube malfunction  Recent Surgery: * No surgery found *      Recommendations:     Discharge Recommendations: High Intensity Therapy  Discharge Equipment Recommendations:  hospital bed, lift device, to be determined by next level of care, bedside commode, wheelchair  Barriers to discharge:  Decreased caregiver support (increased skilled A needed)    Assessment:     Amirah Davey is a 88 y.o. female with a medical diagnosis of PEG tube malfunction.  She presents with performance deficits affecting function: weakness, impaired sensation, impaired self care skills, impaired endurance, impaired functional mobility, gait instability, impaired balance, impaired cognition, decreased coordination, decreased upper extremity function, decreased lower extremity function, decreased safety awareness, pain, abnormal tone, decreased ROM, impaired coordination, impaired fine motor, impaired skin, impaired cardiopulmonary response to activity, impaired joint extensibility.      Pt engaged fairly in therapy this date, limited by pain, anxiety, and confusion.  Pt's confusion presented as pt attempting to take off gown 2* discomfort, and Pt's daughters report pt removing PEG tube ~2 times. Pt's daughters able to provide background information, reporting pt received from rehab facility after hospital stay from 5/18 stroke. Pt's L arm and L leg are impacted with pain in L hip and contracture in L arm/hand. This date, pt able to complete bed mobility with min-max A of 2 persons, EOB sitting with max A with L lean/pushing, and STS transfer with max A of 2 persons  "and BLE blocking and forward flexion. Patient presents with fair participation and motivation to return to prior level of function with high intensity therapy.  The patient demonstrates appropriate pain control to participate in up to 3 hours or 15hrs of combined therapy post acute.      Rehab Prognosis: Good; patient would benefit from acute skilled OT services to address these deficits and reach maximum level of function.       Plan:     Patient to be seen 4 x/week to address the above listed problems via self-care/home management, therapeutic activities, therapeutic exercises, neuromuscular re-education  Plan of Care Expires: 07/30/24  Plan of Care Reviewed with: patient, daughter    Subjective     Chief Complaint: "It hurts when you move my hip"   Patient/Family Comments/goals: Get better, regain some usage of LUE/LLE    Occupational Profile:  Living Environment: Pt received from Rehab, daughters report fair participation in therapy 2* pain. Prior to rehab, pt lived with daughter Kanchan in [facility] with tub/sh, 0 sc, 0 gb  Previous level of function: Prior to stroke, pt was independent in ADLs, functional mobility and drove self.  Since stroke, pt has needed significant assistance in mobility and ADLs.  Roles and Routines: mother  Equipment Used at Home: none  Assistance upon Discharge: Daughters    Pain/Comfort:  Pain Rating 1:  not rated but grimaced with mobility  Location - Side 1: Left  Location 1:  arm & leg with movement  Pain Addressed 1: Reposition, Distraction  Pain Rating Post-Intervention 1:  not rated    Patients cultural, spiritual, Catholic conflicts given the current situation: no    Objective:     Communicated with: RN prior to session.  Patient found supine with peripheral IV, PureWick upon OT entry to room.    General Precautions: Standard, fall  Orthopedic Precautions: N/A  Braces: N/A  Respiratory Status: Room air    Occupational Performance:    Bed Mobility:    Patient completed Rolling/Turning " to Left with  minimum assistance  Patient completed Rolling/Turning to Right with maximal assistance  Patient completed Scooting/Bridging:   To MOB with HOB raised, pt encountered at diagonal in bed, OT moved shoulders with max A to improve positioning  To EOB while sitting with maximal assistance and 2 persons  To HOB while supine with total A of 2 persons, pt bending R leg across 2 trials  To MOB while supine for good hip alignment, total A of 2 persons  Patient completed Supine to Sit with maximal assistance and 2 persons  Patient completed Sit to Supine with maximal assistance and 2 persons  Pt sat EOB ~12min with max A Lean to L side, with pushing from R, pt educated in sitting upright with verbal/visual cues but unable to follow through    Functional Mobility/Transfers:  Patient completed Sit <> Stand Transfer with maximal assistance and of 2 persons  with  hand-held assist , forward flexion with L lean and BLE knees blocked, fair hip clearance    Activities of Daily Living:  Grooming: moderate assistance washing face with warm washcloth, cues to reach specific areas at eyes  Bathing: maximal assistance washing under pt's R arm and total A to wash under pt's L arm upon daughter's request  Upper Body Dressing: total assistance managing ties at neck and back to maximize coverage   Lower Body Dressing: total assistance donning bilateral socks   Toileting: total assistance managing Purewick    Cognitive/Visual Perceptual:  Cognitive/Psychosocial Skills:     -       Oriented to: AOx2   -       Follows Commands/attention:Follows two-step commands  -       Communication: dysarthria  -       Memory: Impaired STM, Impaired LTM, and Poor immediate recall  -       Safety awareness/insight to disability: impaired   -       Mood/Affect/Coping skills/emotional control: Tearful, Agitated, and Pleasant  Visual/Perceptual:      -Intact and left deviation    Physical Exam:  Balance:    -       Sitting: Poor.  Standing:  Poor  Postural examination/scapula alignment:    -       Rounded shoulders  -       Forward head  -       Affected scapula depressed  Skin integrity: Visible skin intact  Edema:  None noted  Sensation:    -       Intact  Motor Planning:    -       Impaired R side  Dominant hand:    -       right  Upper Extremity Range of Motion:     -       Right Upper Extremity: Deficits: impaired, contracture at elbow  -       Left Upper Extremity: WFL  Upper Extremity Strength:    -       Right Upper Extremity: Deficits: contracture  -       Left Upper Extremity: Deficits: 4-/5   Strength:    -       Right Upper Extremity: Deficits: contracture  -       Left Upper Extremity: Deficits: 3/5  Fine Motor Coordination:    -       Impaired  r side  Neurological:    -       Impaired    AMPAC 6 Click ADL:  AMPAC Total Score: 12    Treatment & Education:  Pt educated on role of OT, POC, and goals for therapy.    POC was dicussed with patient/caregiver, who was included in its development and is in agreement with the identified goals and treatment plan.   Patient and family aware of patient's deficits and therapy progression.   Time provided for therapeutic counseling and discussion of health disposition.   Educated on importance of EOB/OOB mobility, maintaining routine, sitting up in chair, and maximizing independence with ADLs during admission   Pt completed ADLs and functional mobility for treatment session as noted above   Pt/caregiver verbalized understanding and expressed no further concerns/questions.  Updated communication board with level of assist required       Patient left HOB elevated with all lines intact, call button in reach, bed alarm on, RN notified, and daughters present    GOALS:   Multidisciplinary Problems       Occupational Therapy Goals          Problem: Occupational Therapy    Goal Priority Disciplines Outcome Interventions   Occupational Therapy Goal     OT, PT/OT Progressing    Description: Goals to be met  by: 7/30/24     Patient will increase functional independence with ADLs by performing:    UE Dressing with Maximum Assistance.  LE Dressing with Maximum Assistance.  Grooming while EOB with Moderate Assistance.  Toileting from bedside commode with Maximum Assistance for hygiene and clothing management.   Sitting at edge of bed x10 minutes with Minimal Assistance.  Rolling to Right with Minimal Assistance.   Rolling to Left with CGA  Supine to sit with Moderate Assistance.  Squat pivot transfers with Maximum Assistance.  Toilet transfer to bedside commode with Maximum Assistance.    DME justification  Patient has a mobility limitation that significantly impairs their ability to participate in one or more mobility related activities of daily living, including toileting. This deficit can be resolved by using a bedside commode. Patient demonstrates mobility limitations that will cause them to be confined to one room at home without bathroom access for up to 30 days. Using a bedside commode will greatly improve the patient's ability to participate in MRADLs.                           History:     Past Medical History:   Diagnosis Date    A-fib     Cerebrovascular accident (CVA) due to embolism of right middle cerebral artery 05/23/2024    Current use of long term anticoagulation     on Xarelto    Dysphagia due to recent stroke 05/23/2024    Hypertension     Stroke 03/20/2013         Past Surgical History:   Procedure Laterality Date    CARDIAC PACEMAKER PLACEMENT  09/25/2019    [employer] Model number [id number]  Serial number [id number] device MRI conditional for 1.5 Estela magnets    CATARACT EXTRACTION W/  INTRAOCULAR LENS IMPLANT Right 05/01/2017    Dr. Case    CATARACT EXTRACTION W/  INTRAOCULAR LENS IMPLANT Left 05/29/2017    Dr. Case    ESOPHAGOGASTRODUODENOSCOPY W/ PEG N/A 5/29/2024    Procedure: EGD, WITH PEG TUBE INSERTION;  Surgeon: Imer Minaya MD;  Location[facility] OR [address] [place] [address];  Service: General;   Laterality: N/A;    EYE SURGERY      HYSTERECTOMY      INSERTION, PEG TUBE N/A 5/27/2024    Procedure: INSERTION, PEG TUBE;  Surgeon: Ivette Love MD;  Location: [facility] ([address] [place] [address]);  Service: Endoscopy;  Laterality: N/A;    INSERTION, PEG TUBE N/A 6/17/2024    Procedure: INSERTION, PEG TUBE;  Surgeon: Chloé Sher MD;  Location: [address];  Service: General;  Laterality: N/A;       Time Tracking:     OT Date of Treatment: 06/30/24  OT Start Time: 1244  OT Stop Time: 1315  OT Total Time (min): 31 min    Billable Minutes:Evaluation 8  Self Care/Home Management 15  Therapeutic Activity 8    6/30/2024

## 2024-06-30 NOTE — PLAN OF CARE
Nurses Note -- 4 Eyes      6/29/2024   8:34 PM      Skin assessed during: Admit      [] No Altered Skin Integrity Present    []Prevention Measures Documented      [x] Yes- Altered Skin Integrity Present or Discovered   [] LDA Added if Not in Epic (Describe Wound)   [] New Altered Skin Integrity was Present on Admit and Documented in LDA   [] Wound Image Taken   LDA in chart is listed as perineum Skin tear is on buttock. Foam dressing intact   :LLQ PEG site with meplex intact. Scant dry drainage  Wound Care Consulted? Yes    Attending Nurse:  Mela GALLARDO    Second RN/Staff Member:   Atul BLANCHARD

## 2024-06-30 NOTE — HOSPITAL COURSE
Patient is a 88 y.o. F with PMHx of recent CVA in May with residual L sided weakness, dysphagia and dysarthria, s/p PEG tube placement with recurrent inadvertent dislodgement, HTN, and Afib on eliquis who presented to [facility] after PEG tube dislodgement. Patient agitated and altered upon admission. No acute processes on CT-H. Concern for progressive vascular dementia. This is unfortunately the 3rd time she has dislodged her PEG tube. General surgery consulted and given recurrent PEG dislodging they are recommending against replacement given risk. Discussed with daughters about goals of care moving forward. They would like to pursue appetite stimulants and avoiding NGT or TPN for nutrition. PT/OT recommending inpatient rehab.    Calorie count ordered. Started 7/6. IVF stopped on 7/5. Dispo pending calorie count and PMR eval.

## 2024-06-30 NOTE — ASSESSMENT & PLAN NOTE
- 88 y.o. F with recent CVA resulting in L hemiplegia and dysphagia s/p PEG tube placement with recurrent inadvertent dislodgement presenting with PEG tub dislodgement  - Gen surg consulted in the ED and given recurrent issue & unknown timing of dislosgement, recommend NGT placement for enteral nutrition vs long term TPN until patient's mental baseline is acceptable for management of another gastrostomy tube  - Conitnue pureed diet with strict aspiration precautions  - Discussed at length with daughter, Kanchan, who plans to present to bedside with her sister, Ashlie, for ongoing GOC discussion prior to NGT placement  - Crush meds in puree  - Start continuous IVFs for 10 hrs  - SLP and RD consulted    - 6/30: Discussed goals of care moving forward with daughters today. They would like to pursue appetite stimulants and pureed diet and avoiding NGT or TPN for nutrition. They understand that she will likely not be able to meet caloric goals given low appetite and dysphagia. Increase Megace to 400mg BID and start dronabinol

## 2024-07-01 LAB
ALBUMIN SERPL BCP-MCNC: 2.8 G/DL (ref 3.5–5.2)
ALP SERPL-CCNC: 48 U/L (ref 55–135)
ALT SERPL W/O P-5'-P-CCNC: 15 U/L (ref 10–44)
ANION GAP SERPL CALC-SCNC: 11 MMOL/L (ref 8–16)
AST SERPL-CCNC: 21 U/L (ref 10–40)
BASOPHILS # BLD AUTO: 0.04 K/UL (ref 0–0.2)
BASOPHILS NFR BLD: 0.6 % (ref 0–1.9)
BILIRUB SERPL-MCNC: 0.5 MG/DL (ref 0.1–1)
BUN SERPL-MCNC: 20 MG/DL (ref 8–23)
CALCIUM SERPL-MCNC: 9.1 MG/DL (ref 8.7–10.5)
CHLORIDE SERPL-SCNC: 110 MMOL/L (ref 95–110)
CO2 SERPL-SCNC: 18 MMOL/L (ref 23–29)
CREAT SERPL-MCNC: 0.9 MG/DL (ref 0.5–1.4)
DIFFERENTIAL METHOD BLD: ABNORMAL
EOSINOPHIL # BLD AUTO: 0.1 K/UL (ref 0–0.5)
EOSINOPHIL NFR BLD: 1.9 % (ref 0–8)
ERYTHROCYTE [DISTWIDTH] IN BLOOD BY AUTOMATED COUNT: 16.9 % (ref 11.5–14.5)
EST. GFR  (NO RACE VARIABLE): >60 ML/MIN/1.73 M^2
GLUCOSE SERPL-MCNC: 83 MG/DL (ref 70–110)
HCT VFR BLD AUTO: 33.7 % (ref 37–48.5)
HGB BLD-MCNC: 10.1 G/DL (ref 12–16)
IMM GRANULOCYTES # BLD AUTO: 0.08 K/UL (ref 0–0.04)
IMM GRANULOCYTES NFR BLD AUTO: 1.3 % (ref 0–0.5)
LYMPHOCYTES # BLD AUTO: 1.2 K/UL (ref 1–4.8)
LYMPHOCYTES NFR BLD: 19.3 % (ref 18–48)
MAGNESIUM SERPL-MCNC: 2 MG/DL (ref 1.6–2.6)
MCH RBC QN AUTO: 25.3 PG (ref 27–31)
MCHC RBC AUTO-ENTMCNC: 30 G/DL (ref 32–36)
MCV RBC AUTO: 84 FL (ref 82–98)
MONOCYTES # BLD AUTO: 0.7 K/UL (ref 0.3–1)
MONOCYTES NFR BLD: 10.5 % (ref 4–15)
NEUTROPHILS # BLD AUTO: 4.1 K/UL (ref 1.8–7.7)
NEUTROPHILS NFR BLD: 66.4 % (ref 38–73)
NRBC BLD-RTO: 0 /100 WBC
PHOSPHATE SERPL-MCNC: 3.6 MG/DL (ref 2.7–4.5)
PLATELET # BLD AUTO: 162 K/UL (ref 150–450)
PMV BLD AUTO: 11.7 FL (ref 9.2–12.9)
POTASSIUM SERPL-SCNC: 3.6 MMOL/L (ref 3.5–5.1)
PROT SERPL-MCNC: 6.3 G/DL (ref 6–8.4)
RBC # BLD AUTO: 4 M/UL (ref 4–5.4)
SODIUM SERPL-SCNC: 139 MMOL/L (ref 136–145)
WBC # BLD AUTO: 6.17 K/UL (ref 3.9–12.7)

## 2024-07-01 PROCEDURE — 97530 THERAPEUTIC ACTIVITIES: CPT | Mod: CQ

## 2024-07-01 PROCEDURE — 84100 ASSAY OF PHOSPHORUS: CPT

## 2024-07-01 PROCEDURE — 97110 THERAPEUTIC EXERCISES: CPT | Mod: CQ

## 2024-07-01 PROCEDURE — 80053 COMPREHEN METABOLIC PANEL: CPT

## 2024-07-01 PROCEDURE — 85025 COMPLETE CBC W/AUTO DIFF WBC: CPT

## 2024-07-01 PROCEDURE — S0179 MEGESTROL 20 MG: HCPCS | Performed by: INTERNAL MEDICINE

## 2024-07-01 PROCEDURE — 83735 ASSAY OF MAGNESIUM: CPT

## 2024-07-01 PROCEDURE — 25000003 PHARM REV CODE 250

## 2024-07-01 PROCEDURE — 11000001 HC ACUTE MED/SURG PRIVATE ROOM

## 2024-07-01 PROCEDURE — 63600175 PHARM REV CODE 636 W HCPCS: Performed by: INTERNAL MEDICINE

## 2024-07-01 PROCEDURE — 36415 COLL VENOUS BLD VENIPUNCTURE: CPT

## 2024-07-01 PROCEDURE — 25000003 PHARM REV CODE 250: Performed by: INTERNAL MEDICINE

## 2024-07-01 PROCEDURE — 97112 NEUROMUSCULAR REEDUCATION: CPT

## 2024-07-01 RX ORDER — DRONABINOL 2.5 MG/1
5 CAPSULE ORAL 2 TIMES DAILY
Status: DISCONTINUED | OUTPATIENT
Start: 2024-07-01 | End: 2024-07-02

## 2024-07-01 RX ORDER — DEXTROSE MONOHYDRATE AND SODIUM CHLORIDE 5; .9 G/100ML; G/100ML
INJECTION, SOLUTION INTRAVENOUS CONTINUOUS
Status: DISCONTINUED | OUTPATIENT
Start: 2024-07-01 | End: 2024-07-05

## 2024-07-01 RX ADMIN — DICYCLOMINE HYDROCHLORIDE 10 MG: 10 CAPSULE ORAL at 09:07

## 2024-07-01 RX ADMIN — AMLODIPINE BESYLATE 10 MG: 10 TABLET ORAL at 08:07

## 2024-07-01 RX ADMIN — MEGESTROL ACETATE 400 MG: 400 SUSPENSION ORAL at 09:07

## 2024-07-01 RX ADMIN — DRONABINOL 2.5 MG: 2.5 CAPSULE ORAL at 10:07

## 2024-07-01 RX ADMIN — DICYCLOMINE HYDROCHLORIDE 10 MG: 10 CAPSULE ORAL at 05:07

## 2024-07-01 RX ADMIN — DICYCLOMINE HYDROCHLORIDE 10 MG: 10 CAPSULE ORAL at 08:07

## 2024-07-01 RX ADMIN — MEGESTROL ACETATE 400 MG: 400 SUSPENSION ORAL at 08:07

## 2024-07-01 RX ADMIN — BACLOFEN 5 MG: 5 TABLET ORAL at 09:07

## 2024-07-01 RX ADMIN — FAMOTIDINE 40 MG: 20 TABLET ORAL at 08:07

## 2024-07-01 RX ADMIN — APIXABAN 5 MG: 5 TABLET, FILM COATED ORAL at 09:07

## 2024-07-01 RX ADMIN — APIXABAN 5 MG: 5 TABLET, FILM COATED ORAL at 08:07

## 2024-07-01 RX ADMIN — DRONABINOL 5 MG: 2.5 CAPSULE ORAL at 09:07

## 2024-07-01 RX ADMIN — DEXTROSE AND SODIUM CHLORIDE: 5; 900 INJECTION, SOLUTION INTRAVENOUS at 11:07

## 2024-07-01 RX ADMIN — MICONAZOLE NITRATE: 20 OINTMENT TOPICAL at 09:07

## 2024-07-01 RX ADMIN — ATORVASTATIN CALCIUM 40 MG: 40 TABLET, FILM COATED ORAL at 08:07

## 2024-07-01 RX ADMIN — QUETIAPINE FUMARATE 50 MG: 25 TABLET ORAL at 09:07

## 2024-07-01 RX ADMIN — BACLOFEN 5 MG: 5 TABLET ORAL at 05:07

## 2024-07-01 RX ADMIN — QUETIAPINE FUMARATE 50 MG: 25 TABLET ORAL at 08:07

## 2024-07-01 NOTE — PLAN OF CARE
Problem: Occupational Therapy  Goal: Occupational Therapy Goal  Description: Goals to be met by: 7/30/24     Patient will increase functional independence with ADLs by performing:    UE Dressing with Maximum Assistance.  LE Dressing with Maximum Assistance.  Grooming while EOB with Moderate Assistance.  Toileting from bedside commode with Maximum Assistance for hygiene and clothing management.   Sitting at edge of bed x10 minutes with Minimal Assistance.  Rolling to Right with Minimal Assistance.   Rolling to Left with CGA  Supine to sit with Moderate Assistance.  Squat pivot transfers with Maximum Assistance.  Toilet transfer to bedside commode with Maximum Assistance.    DME justification  Patient has a mobility limitation that significantly impairs their ability to participate in one or more mobility related activities of daily living, including toileting. This deficit can be resolved by using a bedside commode. Patient demonstrates mobility limitations that will cause them to be confined to one room at home without bathroom access for up to 30 days. Using a bedside commode will greatly improve the patient's ability to participate in MRADLs.      Outcome: Progressing     Goals remain appropriate

## 2024-07-01 NOTE — ASSESSMENT & PLAN NOTE
- BMI 30  - Poor PO intake 2/2 dysphagia and poor appetite  - No longer with PEG tube and not a candidate for replacement at this time  - Started on appetite stimulants and will continue to monitor PO intake  - Calorie counting

## 2024-07-01 NOTE — PROGRESS NOTES
[facility] - Med Surg ([address] Medicine  Progress Note    Patient Name: Amirah Davey  MRN: [id number]  Patient Class: IP- Inpatient   Admission Date: 6/29/2024  Length of Stay: 2 days  Attending Physician: David Mayorga MD  Primary Care Provider: Ivana Templeton MD        Subjective:     Principal Problem:PEG tube malfunction        HPI:  Amirah Davey is a 88 y.o. F with PMHx of recent CVA in May with residual L sided weakness, dysphagia and dysarthria, s/p PEG tube placement with recurrent inadvertent dislodgement, HTN, and Afib on eliquis who presents to [facility] for evaluation of PEG tube malfunction. Patient with recent admit for PEG dislogement on 6/12 c/b abdominal wall abscess on CT. IV vancomycin was given and general surgery replaced her PEG tube on 6/17 and no abscess or fluid collection was noted.     She presents today after her inpatient rehab noticed her PEG tube was dislodged. It is unknown when the last time of appropriate placement was. She is AAO to person, place, situation, and year. She denies intentionally removing her PEG tube. Per IPR nursing, the patient had an abdominal binder around her abdomen which was preventing her from removal, which was removed at some point yesterday.     In the ED: VSSAF. Labs grossly unremarkable acutely. CTH and CXR without acute process. Gen surg consulted in the ED and given recurrent inadvertent dislodgement is and unknown timing of dislosgement, recommend NGT placement for enteral nutrition vs long term TPN until patient's mental baseline is acceptable for management of another gastrostomy tube.    Overview/Hospital Course:  Patient is a 88 y.o. F with PMHx of recent CVA in May with residual L sided weakness, dysphagia and dysarthria, s/p PEG tube placement with recurrent inadvertent dislodgement, HTN, and Afib on eliquis who presented to [facility] after PEG tube dislodgement. Patient agitated and altered upon admission. No acute processes on CT-H.  Concern for progressive vascular dementia. This is unfortunately the 3rd time she has dislodged her PEG tube. General surgery consulted and given recurrent PEG dislodging they are recommending against replacement given risk. Discussed with daughters about goals of care moving forward. They would like to pursue appetite stimulants and avoiding NGT or TPN for nutrition.     Interval History: Pt seen and examined this morning on paolo. EVA. Patient with few bites/sips of food and water yesterday. Started on appetite stimulants yesterday with hopes of improvement in PO intake. Will start mIVF for hydration.    Care plan reviewed. Otherwise, doing well and with no further complaints at this time.        Objective:     Vital Signs (Most Recent):  Temp: 97.6 °F (36.4 °C) (07/01/24 0737)  Pulse: 74 (07/01/24 0737)  Resp: 18 (07/01/24 0737)  BP: (!) 171/73 (07/01/24 0737)  SpO2: (!) 93 % (07/01/24 0737) Vital Signs (24h Range):  Temp:  [97.6 °F (36.4 °C)-98.1 °F (36.7 °C)] 97.6 °F (36.4 °C)  Pulse:  [69-82] 74  Resp:  [16-18] 18  SpO2:  [93 %-98 %] 93 %  BP: (137-171)/(65-74) 171/73     Weight: 67.6 kg (148 lb 15.8 oz)  Body mass index is 30.09 kg/m².    Intake/Output Summary (Last 24 hours) at 7/1/2024 1041  Last data filed at 7/1/2024 0619  Gross per 24 hour   Intake 60 ml   Output 750 ml   Net -690 ml         Physical Exam  Gen: in NAD, appears stated age  Neuro: Left sided weakness;chronic. AAOx3, CN2-12 grossly intact BL; motor, sensory, and strength grossly intact BL  HEENT: NTNC, EOMI, PERRLA, MMM; no thyromegaly or lymphadenopathy; no JVD appreciated  CVS: RRR, no m/r/g; S1/S2 auscultated with no S3 or S4; capillary refill < 2 sec  Resp: lungs CTAB, no w/r/r; no belabored breathing or accessory muscle use appreciated   Abd: PEG site bandaged. BS+ in all 4 quadrants; NTND, soft to palpation; no organomegaly appreciated   Extrem: pulses full, equal, and regular over all 4 extremities; no UE or LE edema BL           Significant Labs: All pertinent labs within the past 24 hours have been reviewed.    Significant Imaging: I have reviewed all pertinent imaging results/findings within the past 24 hours.    Assessment/Plan:      * PEG tube malfunction  - 88 y.o. F with recent CVA resulting in L hemiplegia and dysphagia s/p PEG tube placement with recurrent inadvertent dislodgement presenting with PEG tub dislodgement  - Gen surg consulted in the ED and given recurrent issue & unknown timing of dislosgement, recommend NGT placement for enteral nutrition vs long term TPN until patient's mental baseline is acceptable for management of another gastrostomy tube  - Conitnue pureed diet with strict aspiration precautions  - Discussed at length with daughter, Kanchan, who plans to present to bedside with her sister, Ashlie, for ongoing GOC discussion prior to NGT placement  - Crush meds in puree  - Continue mIVF  - SLP and RD consulted    - 6/30: Discussed goals of care moving forward with daughters today. They would like to pursue appetite stimulants and pureed diet and avoiding NGT or TPN for nutrition. They understand that she will likely not be able to meet caloric goals given low appetite and dysphagia. Increase Megace to 400mg BID and start dronabinol   - Continue to monitor PO intake / calorie counting    Malnutrition of mild degree  - BMI 30  - Poor PO intake 2/2 dysphagia and poor appetite  - No longer with PEG tube and not a candidate for replacement at this time  - Started on appetite stimulants and will continue to monitor PO intake  - Calorie counting    Sacral ulcer, limited to breakdown of skin  - Wound care consulted, appreciate recs  - Barrier cream, q2h turns     Dysphagia due to recent stroke  - Chronic issue from recent CVA  - SLP notes from prior admission reviewed, recommend pureed diet and continued dysphagia therapy   - RD consulted for tube feed recommendations     Cerebrovascular accident (CVA) due to embolism of  right middle cerebral artery  L hemiplegia  Reduced mobility  - Chronic issue  - Continue statin  - Tentative plan to [facility] at time of discharge  - PT/OT consulted    Chronic a-fib  Chronic anticoagulation  Patient is currently in sinus rhythm.[id number] Score: 4. Anticoagulation indicated. Anticoagulation done with eliquis .    Hypertension, benign  Chronic, controlled. Latest blood pressure and vitals reviewed-     Temp:  [97.7 °F (36.5 °C)-98.5 °F (36.9 °C)]   Pulse:  [62-71]   Resp:  [16-20]   BP: (132-174)/(62-77)   SpO2:  [98 %-100 %] .   Home meds for hypertension were reviewed and noted below.   Hypertension Medications               amLODIPine (NORVASC) 5 MG tablet 1 tablet (5 mg total) by Per G Tube route once daily.    lisinopriL (PRINIVIL,ZESTRIL) 20 MG tablet 1 tablet (20 mg total) by Per G Tube route once daily. DO NOT TAKE UNTIL YOU SEE YOUR PRIMARY DOCTOR. Rehab can add back if BP becomes elevated. Would start with this one over HCTZ     While in the hospital, will manage blood pressure as follows; Continue home amlodipine, Hold lisinopril     Will utilize p.r.n. blood pressure medication only if patient's blood pressure greater than 180/110 and she develops symptoms such as worsening chest pain or shortness of breath.      VTE Risk Mitigation (From admission, onward)           Ordered     apixaban tablet 5 mg  2 times daily         06/29/24 1144                    Discharge Planning   JOAN: 7/3/2024     Code Status: Full Code   Is the patient medically ready for discharge?:     Reason for patient still in hospital (select all that apply): Treatment  Discharge Plan A: Rehab (Return to [facility])                  David Mayorga MD  Department of Hospital Medicine   [facility] - Med Surg ([place])

## 2024-07-01 NOTE — ASSESSMENT & PLAN NOTE
- 88 y.o. F with recent CVA resulting in L hemiplegia and dysphagia s/p PEG tube placement with recurrent inadvertent dislodgement presenting with PEG tub dislodgement  - Gen surg consulted in the ED and given recurrent issue & unknown timing of dislosgement, recommend NGT placement for enteral nutrition vs long term TPN until patient's mental baseline is acceptable for management of another gastrostomy tube  - Conitnue pureed diet with strict aspiration precautions  - Discussed at length with daughter, Kanchan, who plans to present to bedside with her sister, Ashlie, for ongoing GOC discussion prior to NGT placement  - Crush meds in puree  - Continue mIVF  - SLP and RD consulted    - 6/30: Discussed goals of care moving forward with daughters today. They would like to pursue appetite stimulants and pureed diet and avoiding NGT or TPN for nutrition. They understand that she will likely not be able to meet caloric goals given low appetite and dysphagia. Increase Megace to 400mg BID and start dronabinol   - Continue to monitor PO intake / calorie counting

## 2024-07-01 NOTE — CONSULTS
[facility] - [facility] ([place] [address])  Wound Care    Patient Name:  Amirah Davey   MRN:  [id number]  Date: 7/1/2024  Diagnosis: PEG tube malfunction    History:     Past Medical History:   Diagnosis Date    A-fib     Cerebrovascular accident (CVA) due to embolism of right middle cerebral artery 05/23/2024    Current use of long term anticoagulation     on Xarelto    Dysphagia due to recent stroke 05/23/2024    Hypertension     Stroke 03/20/2013       Social History     Socioeconomic History    Marital status:    Tobacco Use    Smoking status: Never   Substance and Sexual Activity    Alcohol use: No     Social Determinants of Health     Financial Resource Strain: Patient Declined (6/30/2024)    Overall Financial Resource Strain (CARDIA)     Difficulty of Paying Living Expenses: Patient declined   Food Insecurity: Patient Declined (6/30/2024)    Hunger Vital Sign     Worried About Running Out of Food in the Last Year: Patient declined     Ran Out of Food in the Last Year: Patient declined   Transportation Needs: Patient Declined (6/30/2024)    TRANSPORTATION NEEDS     Transportation : Patient declined   Physical Activity: Patient Declined (5/24/2024)    Exercise Vital Sign     Days of Exercise per Week: Patient declined     Minutes of Exercise per Session: Patient declined   Stress: Patient Declined (6/30/2024)    [employer] [place] [employer] - Occupational Stress Questionnaire     Feeling of Stress : Patient declined   Housing Stability: Patient Declined (6/30/2024)    Housing Stability Vital Sign     Unable to Pay for Housing in the Last Year: Patient declined     Homeless in the Last Year: Patient declined       Precautions:     Allergies as of 06/29/2024    (No Known Allergies)       [facility] Assessment Details/Treatment     Patient seen for wound care consult.She has 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 Photo documentation was unavailable at this time but below picture is from 2 days  ago on admission. The open areas are epithelialized now but her inner thighs and groin area are macerated and peeling. The patient reports it is itchy and burns. Unable to discern satellite lesions but feel she likely has a candida component/ Discussed with MD and antifungal ointment ordered.  Discussed POC with patient and family and nurse at the bedside. Will follow up with patient as needed. Nursing to continue care.        07/01/24 1647   WOCN Assessment   WOCN Total Time (mins) 45   Visit Date 07/01/24   Visit Time 1645   Consult Type New   WOCN Speciality Wound   Intervention assessed;chart review;coordination of care;orders   Teaching on-going   Skin Interventions   Device Skin Pressure Protection absorbent pad utilized/changed;positioning supports utilized   Pressure Reduction Devices pressure-redistributing mattress utilized   Pressure Reduction Techniques heels elevated off bed   Skin Protection incontinence pads utilized;skin sealant/moisture barrier applied        Wound 06/28/24 0956 Incontinence associated dermatitis Perineum   Date First Assessed/Time First Assessed: 06/28/24 0956   Present on Original Admission: Yes  Primary Wound Type: Incontinence associated dermatitis  Location: Perineum   Wound Image    Dressing Appearance Open to air   Drainage Amount None   Appearance Intact;Moist  (peeling, macerated)   Tissue loss description Not applicable   Periwound Area Indurated   Periwound Care Dry periwound area maintained

## 2024-07-01 NOTE — PT/OT/SLP PROGRESS
Physical Therapy Co Treatment    Patient Name:  Amirah Davey   MRN:  [id number]    Recommendations:     Discharge Recommendations: High Intensity Therapy  Discharge Equipment Recommendations: hospital bed, lift device, to be determined by next level of care, wheelchair  Barriers to discharge: Inaccessible home and current level of assist required    Assessment:     Amirah Davey is a 88 y.o. female admitted with a medical diagnosis of PEG tube malfunction.  She presents with the following impairments/functional limitations: impaired muscle length, impaired cardiopulmonary response to activity, impaired fine motor, impaired coordination, decreased ROM, abnormal tone, pain, decreased lower extremity function, decreased upper extremity function, decreased coordination, impaired cognition, impaired balance, gait instability, impaired functional mobility, impaired self care skills, impaired endurance, weakness Pt was agreeable to participate in therapy with encouragement from daughter and therapist. Pt was having increased pain with palpation to R upper trap. But tolerated PROM to LLE without increased symptoms. Pt has decreased muscular flexibility which is contributing to pain. Pt will benefit from further care.       Co-treatment performed due to patient's multiple deficits requiring two skilled therapists to appropriately and safely assess patient's strength and endurance while facilitating functional tasks in addition to accommodating for patient's activity tolerance.    Rehab Prognosis: Good; patient would benefit from acute skilled PT services to address these deficits and reach maximum level of function.    Recent Surgery: * No surgery found *      Plan:     During this hospitalization, patient to be seen 4 x/week to address the identified rehab impairments via gait training, neuromuscular re-education, therapeutic exercises, therapeutic activities and progress toward the following goals:    Plan of Care Expires:   "07/28/24    Subjective     Chief Complaint: "I'm hurting so bad in my R neck. I hurts just to touch."  Patient/Family Comments/goals: To decreased pain and improve QoL  Pain/Comfort:  Pain Rating 1: other (see comments) (not rated)  Location - Side 1: Right  Location 1:  (R neck/shoulder, LUE and LLE, abdomen)  Pain Addressed 1: Reposition, Distraction      Objective:     Communicated with nursing prior to session.  Patient found HOB elevated with PureWick, pulse ox (continuous), telemetry upon PT entry to room.     General Precautions: Standard, fall, aspiration  Orthopedic Precautions: N/A  Braces: N/A  Respiratory Status: Room air     Functional Mobility:  Bed Mobility:     Rolling Left:  maximal assistance  Rolling Right: total assistance  Scooting: total assistance and of 2 persons  Supine to Sit: total assistance and of 2 persons  Sit to Supine: maximal assistance and of 2 persons  Balance: EOB sitting ~10-15 mins with Mod to Max A. Pt often used RUE to push to L side but improved when daughter held R hand in sitting.       AM-PAC 6 CLICK MOBILITY  Turning over in bed (including adjusting bedclothes, sheets and blankets)?: 2  Sitting down on and standing up from a chair with arms (e.g., wheelchair, bedside commode, etc.): 2  Moving from lying on back to sitting on the side of the bed?: 2  Moving to and from a bed to a chair (including a wheelchair)?: 2  Need to walk in hospital room?: 1  Climbing 3-5 steps with a railing?: 1  Basic Mobility Total Score: 10       Treatment & Education:  EDUCATION  Patient provided with daily orientation and goals of this PT session.  Encouraged patient to perform daily exercises & mobility to increase endurance and decrease effects of bedrest. Time provided for therapeutic counseling and discussion of health disposition. All questions answered to patient's satisfaction, within scope of PT practice; voiced no other concerns. White board updated in patient's room, RN notified of " session.    Patient left HOB elevated with all lines intact, call button in reach, and daughter present..    GOALS:   Multidisciplinary Problems       Physical Therapy Goals          Problem: Physical Therapy    Goal Priority Disciplines Outcome Goal Variances Interventions   Physical Therapy Goal     PT, PT/OT Progressing     Description: Goals to be met by: 2024     Patient will increase functional independence with mobility by performin. Supine to sit with Moderate Assistance  2. Sit to supine with Moderate Assistance  3. Rolling to Left and Right with Stand-by Assistance.  4. Sit to stand transfer with Moderate Assistance using LRAD  5. Bed to chair transfer with Moderate Assistance using LRAD  6. Gait  x 10 feet with Maximum Assistance using LRAD.   7. Wheelchair propulsion x50 feet with Stand-by Assistance using  right upper and lower extremity  8. Sitting at edge of bed x5 minutes with Minimal Assistance                         Time Tracking:     PT Received On: 24  PT Start Time: 1346     PT Stop Time: 1420  PT Total Time (min): 34 min     Billable Minutes: Therapeutic Activity 16 and Therapeutic Exercise 18    Treatment Type: Treatment  PT/PTA: PTA     Number of PTA visits since last PT visit: 2024

## 2024-07-01 NOTE — SUBJECTIVE & OBJECTIVE
Interval History: Pt seen and examined this morning on snehal VELASQUEZ. Patient with few bites/sips of food and water yesterday. Started on appetite stimulants yesterday with hopes of improvement in PO intake. Will start mIVF for hydration.    Care plan reviewed. Otherwise, doing well and with no further complaints at this time.        Objective:     Vital Signs (Most Recent):  Temp: 97.6 °F (36.4 °C) (07/01/24 0737)  Pulse: 74 (07/01/24 0737)  Resp: 18 (07/01/24 0737)  BP: (!) 171/73 (07/01/24 0737)  SpO2: (!) 93 % (07/01/24 0737) Vital Signs (24h Range):  Temp:  [97.6 °F (36.4 °C)-98.1 °F (36.7 °C)] 97.6 °F (36.4 °C)  Pulse:  [69-82] 74  Resp:  [16-18] 18  SpO2:  [93 %-98 %] 93 %  BP: (137-171)/(65-74) 171/73     Weight: 67.6 kg (148 lb 15.8 oz)  Body mass index is 30.09 kg/m².    Intake/Output Summary (Last 24 hours) at 7/1/2024 1041  Last data filed at 7/1/2024 0619  Gross per 24 hour   Intake 60 ml   Output 750 ml   Net -690 ml         Physical Exam  Gen: in NAD, appears stated age  Neuro: Left sided weakness;chronic. AAOx3, CN2-12 grossly intact BL; motor, sensory, and strength grossly intact BL  HEENT: NTNC, EOMI, PERRLA, MMM; no thyromegaly or lymphadenopathy; no JVD appreciated  CVS: RRR, no m/r/g; S1/S2 auscultated with no S3 or S4; capillary refill < 2 sec  Resp: lungs CTAB, no w/r/r; no belabored breathing or accessory muscle use appreciated   Abd: PEG site bandaged. BS+ in all 4 quadrants; NTND, soft to palpation; no organomegaly appreciated   Extrem: pulses full, equal, and regular over all 4 extremities; no UE or LE edema BL          Significant Labs: All pertinent labs within the past 24 hours have been reviewed.    Significant Imaging: I have reviewed all pertinent imaging results/findings within the past 24 hours.

## 2024-07-01 NOTE — PLAN OF CARE
Problem: Adult Inpatient Plan of Care  Goal: Plan of Care Review  Outcome: Progressing  Goal: Patient-Specific Goal (Individualized)  Outcome: Progressing  Goal: Absence of Hospital-Acquired Illness or Injury  Outcome: Progressing  Goal: Optimal Comfort and Wellbeing  Outcome: Progressing  Goal: Readiness for Transition of Care  Outcome: Progressing     Problem: Infection  Goal: Absence of Infection Signs and Symptoms  Outcome: Progressing     Problem: Acute Kidney Injury/Impairment  Goal: Fluid and Electrolyte Balance  Outcome: Progressing  Goal: Improved Oral Intake  Outcome: Progressing  Goal: Effective Renal Function  Outcome: Progressing     Problem: Wound  Goal: Optimal Coping  Outcome: Progressing  Goal: Optimal Functional Ability  Outcome: Progressing  Goal: Absence of Infection Signs and Symptoms  Outcome: Progressing  Goal: Improved Oral Intake  Outcome: Progressing  Goal: Optimal Pain Control and Function  Outcome: Progressing  Goal: Skin Health and Integrity  Outcome: Progressing  Goal: Optimal Wound Healing  Outcome: Progressing     Problem: Skin Injury Risk Increased  Goal: Skin Health and Integrity  Outcome: Progressing   Pt alert and  able to express needs.  Anxious at start of shift.  Meds given as ordered. Taking very small bites and sips of water.   Resting quietly during the night.  General surgery consulted for PEG re-placement/ follow up with daughters for  plan of care regarding tube placement.  Wound care consult pending.  Barrier cream applied on this shift to sacrum.    Safety maintained.  Bed in low position,  call  light in reach.

## 2024-07-01 NOTE — PT/OT/SLP PROGRESS
Occupational Therapy   Co-Treatment    Name: Amirah Davey  MRN: [id number]  Admitting Diagnosis:  PEG tube malfunction       Recommendations:     Discharge Recommendations: High Intensity Therapy  Discharge Equipment Recommendations:  hospital bed, lift device, wheelchair, bedside commode  Barriers to discharge:   (increased (A) required)    Assessment:     Amirah Davey is a 88 y.o. female with a medical diagnosis of PEG tube malfunction.  She presents with fair participation and motivation.  Pt is severely affected by pain due to very high muscle tone & tightness in LUE & right shoulder & neck regions. Performance deficits affecting function are weakness, impaired endurance, impaired self care skills, impaired functional mobility, impaired balance, decreased upper extremity function, decreased lower extremity function, decreased safety awareness, decreased coordination, abnormal tone, impaired fine motor, pain, impaired coordination, decreased ROM. Co-treatment performed due to patient's multiple deficits requiring two skilled therapists to safely address patient's ability to complete ADLs and functional mobility in addition to accommodating for patient's activity tolerance while in acute care setting.      Rehab Prognosis:  Fair; patient would benefit from acute skilled OT services to address these deficits and reach maximum level of function.       Plan:     Patient to be seen 4 x/week to address the above listed problems via self-care/home management, neuromuscular re-education, cognitive retraining, therapeutic exercises, therapeutic activities  Plan of Care Expires: 07/30/24  Plan of Care Reviewed with: patient, daughter    Subjective     Chief Complaint: pain due to muscle tightness  Patient/Family Comments/goals: Pt reported that even light touch hurts her on the shoulders.  Pain/Comfort:  Pain Rating 1:  (did not rate)  Location 1:  (abdomen, LUE, LLE, & right neck & shoulder)  Pain Addressed 1: Reposition,  Distraction, Cessation of Activity, Nurse notified  Pain Rating Post-Intervention 1:  (did not rate)    Objective:     Communicated with: RN prior to session.  Patient found supine with PureWick, pulse ox (continuous), telemetry (daughter present during session) upon OT entry to room.    General Precautions: Standard, fall, aspiration    Orthopedic Precautions:N/A  Braces: N/A     Occupational Performance:     Bed Mobility:    Patient completed Rolling/Turning to Left with  maximal assistance  Patient completed Rolling/Turning to Right with total assistance  Patient completed Scooting/Bridging with total assistance scooting forward on EOB & up HOB while supine  Patient completed Supine to Sit with total assistance and 2 persons  Patient completed Sit to Supine with total assistance and 2 persons     Activities of Daily Living:  Grooming: total assistance to wipe mouth while seated EOB with (A) for balance      [facility] 6 Click ADL: 8    Treatment & Education:  Pt required Mod-Max (A) for postural control while seated EOB due to leaning posterior & to the left.  Provided verbal & physical cues to facilitate postural control. Provided verbal & physical cues to decrease pushing with RUE while seated EOB. Provided cues for cervical extension.  Provided PROM to LUE in all planes available with focus on gentle stretch at achievable end ranges due to severe high flexor tone in arm & extensor tone in fingers while seated EOB.  Provided gentle cervical rotation to the right x ~ 4-5 reps while supine to facilitate neck ROM, attempted cervical flexion to the right however pt unable to tolerate due to pain.  Notified MD of pt's pain & high muscle tone.    Pt participated in reaching with RUE to facilitate increased balance while seated EOB during dynamic activities.  Pt had no further questions & when asked whether there were any concerns pt reported none.          Patient left supine with all lines intact, call button in reach,  RN notified, and daughter present    GOALS:   Multidisciplinary Problems       Occupational Therapy Goals          Problem: Occupational Therapy    Goal Priority Disciplines Outcome Interventions   Occupational Therapy Goal     OT, PT/OT Progressing    Description: Goals to be met by: 7/30/24     Patient will increase functional independence with ADLs by performing:    UE Dressing with Maximum Assistance.  LE Dressing with Maximum Assistance.  Grooming while EOB with Moderate Assistance.  Toileting from bedside commode with Maximum Assistance for hygiene and clothing management.   Sitting at edge of bed x10 minutes with Minimal Assistance.  Rolling to Right with Minimal Assistance.   Rolling to Left with CGA  Supine to sit with Moderate Assistance.  Squat pivot transfers with Maximum Assistance.  Toilet transfer to bedside commode with Maximum Assistance.    DME justification  Patient has a mobility limitation that significantly impairs their ability to participate in one or more mobility related activities of daily living, including toileting. This deficit can be resolved by using a bedside commode. Patient demonstrates mobility limitations that will cause them to be confined to one room at home without bathroom access for up to 30 days. Using a bedside commode will greatly improve the patient's ability to participate in MRADLs.                           Time Tracking:     OT Date of Treatment: 07/01/24  OT Start Time: 1346  OT Stop Time: 1419  OT Total Time (min): 33 min    Billable Minutes:Neuromuscular Re-education 33    OT/PHILIP: OT          7/1/2024

## 2024-07-02 LAB
ALBUMIN SERPL BCP-MCNC: 2.6 G/DL (ref 3.5–5.2)
ALP SERPL-CCNC: 49 U/L (ref 55–135)
ALT SERPL W/O P-5'-P-CCNC: 13 U/L (ref 10–44)
ANION GAP SERPL CALC-SCNC: 9 MMOL/L (ref 8–16)
AST SERPL-CCNC: 17 U/L (ref 10–40)
BASOPHILS # BLD AUTO: 0.02 K/UL (ref 0–0.2)
BASOPHILS NFR BLD: 0.3 % (ref 0–1.9)
BILIRUB SERPL-MCNC: 0.5 MG/DL (ref 0.1–1)
BUN SERPL-MCNC: 14 MG/DL (ref 8–23)
CALCIUM SERPL-MCNC: 8.7 MG/DL (ref 8.7–10.5)
CHLORIDE SERPL-SCNC: 113 MMOL/L (ref 95–110)
CO2 SERPL-SCNC: 19 MMOL/L (ref 23–29)
CREAT SERPL-MCNC: 0.8 MG/DL (ref 0.5–1.4)
DIFFERENTIAL METHOD BLD: ABNORMAL
EOSINOPHIL # BLD AUTO: 0.1 K/UL (ref 0–0.5)
EOSINOPHIL NFR BLD: 1.3 % (ref 0–8)
ERYTHROCYTE [DISTWIDTH] IN BLOOD BY AUTOMATED COUNT: 16.5 % (ref 11.5–14.5)
EST. GFR  (NO RACE VARIABLE): >60 ML/MIN/1.73 M^2
GLUCOSE SERPL-MCNC: 144 MG/DL (ref 70–110)
HCT VFR BLD AUTO: 31.3 % (ref 37–48.5)
HGB BLD-MCNC: 9.3 G/DL (ref 12–16)
IMM GRANULOCYTES # BLD AUTO: 0.03 K/UL (ref 0–0.04)
IMM GRANULOCYTES NFR BLD AUTO: 0.4 % (ref 0–0.5)
LYMPHOCYTES # BLD AUTO: 1 K/UL (ref 1–4.8)
LYMPHOCYTES NFR BLD: 14.8 % (ref 18–48)
MAGNESIUM SERPL-MCNC: 1.9 MG/DL (ref 1.6–2.6)
MCH RBC QN AUTO: 24.5 PG (ref 27–31)
MCHC RBC AUTO-ENTMCNC: 29.7 G/DL (ref 32–36)
MCV RBC AUTO: 83 FL (ref 82–98)
MONOCYTES # BLD AUTO: 0.9 K/UL (ref 0.3–1)
MONOCYTES NFR BLD: 13.1 % (ref 4–15)
NEUTROPHILS # BLD AUTO: 4.9 K/UL (ref 1.8–7.7)
NEUTROPHILS NFR BLD: 70.1 % (ref 38–73)
NRBC BLD-RTO: 0 /100 WBC
PHOSPHATE SERPL-MCNC: 2.7 MG/DL (ref 2.7–4.5)
PLATELET # BLD AUTO: 210 K/UL (ref 150–450)
PMV BLD AUTO: 11.8 FL (ref 9.2–12.9)
POTASSIUM SERPL-SCNC: 3.4 MMOL/L (ref 3.5–5.1)
PROT SERPL-MCNC: 6.3 G/DL (ref 6–8.4)
RBC # BLD AUTO: 3.79 M/UL (ref 4–5.4)
SODIUM SERPL-SCNC: 141 MMOL/L (ref 136–145)
WBC # BLD AUTO: 6.94 K/UL (ref 3.9–12.7)

## 2024-07-02 PROCEDURE — 84100 ASSAY OF PHOSPHORUS: CPT

## 2024-07-02 PROCEDURE — 36415 COLL VENOUS BLD VENIPUNCTURE: CPT

## 2024-07-02 PROCEDURE — 80053 COMPREHEN METABOLIC PANEL: CPT

## 2024-07-02 PROCEDURE — 83735 ASSAY OF MAGNESIUM: CPT

## 2024-07-02 PROCEDURE — 25000003 PHARM REV CODE 250: Performed by: INTERNAL MEDICINE

## 2024-07-02 PROCEDURE — 25000003 PHARM REV CODE 250

## 2024-07-02 PROCEDURE — 97112 NEUROMUSCULAR REEDUCATION: CPT | Mod: CQ

## 2024-07-02 PROCEDURE — 11000001 HC ACUTE MED/SURG PRIVATE ROOM

## 2024-07-02 PROCEDURE — 85025 COMPLETE CBC W/AUTO DIFF WBC: CPT

## 2024-07-02 PROCEDURE — 63600175 PHARM REV CODE 636 W HCPCS: Performed by: INTERNAL MEDICINE

## 2024-07-02 PROCEDURE — 97530 THERAPEUTIC ACTIVITIES: CPT

## 2024-07-02 PROCEDURE — S0179 MEGESTROL 20 MG: HCPCS | Performed by: INTERNAL MEDICINE

## 2024-07-02 PROCEDURE — 97110 THERAPEUTIC EXERCISES: CPT | Mod: CQ

## 2024-07-02 RX ORDER — LOSARTAN POTASSIUM 25 MG/1
25 TABLET ORAL DAILY
Status: DISCONTINUED | OUTPATIENT
Start: 2024-07-02 | End: 2024-07-05

## 2024-07-02 RX ORDER — BACLOFEN 10 MG/1
10 TABLET ORAL 3 TIMES DAILY
Status: DISCONTINUED | OUTPATIENT
Start: 2024-07-02 | End: 2024-07-03

## 2024-07-02 RX ORDER — POTASSIUM CHLORIDE 7.45 MG/ML
10 INJECTION INTRAVENOUS
Status: COMPLETED | OUTPATIENT
Start: 2024-07-02 | End: 2024-07-02

## 2024-07-02 RX ORDER — POTASSIUM CHLORIDE 20 MEQ/1
40 TABLET, EXTENDED RELEASE ORAL ONCE
Status: DISCONTINUED | OUTPATIENT
Start: 2024-07-02 | End: 2024-07-02

## 2024-07-02 RX ORDER — DRONABINOL 2.5 MG/1
10 CAPSULE ORAL 2 TIMES DAILY
Status: DISCONTINUED | OUTPATIENT
Start: 2024-07-02 | End: 2024-07-09 | Stop reason: HOSPADM

## 2024-07-02 RX ADMIN — DRONABINOL 10 MG: 2.5 CAPSULE ORAL at 11:07

## 2024-07-02 RX ADMIN — LOSARTAN POTASSIUM 25 MG: 25 TABLET, FILM COATED ORAL at 10:07

## 2024-07-02 RX ADMIN — DEXTROSE AND SODIUM CHLORIDE: 5; 900 INJECTION, SOLUTION INTRAVENOUS at 01:07

## 2024-07-02 RX ADMIN — MICONAZOLE NITRATE: 20 OINTMENT TOPICAL at 10:07

## 2024-07-02 RX ADMIN — QUETIAPINE FUMARATE 50 MG: 25 TABLET ORAL at 10:07

## 2024-07-02 RX ADMIN — FAMOTIDINE 40 MG: 20 TABLET ORAL at 10:07

## 2024-07-02 RX ADMIN — DICYCLOMINE HYDROCHLORIDE 10 MG: 10 CAPSULE ORAL at 10:07

## 2024-07-02 RX ADMIN — POTASSIUM CHLORIDE 10 MEQ: 7.46 INJECTION, SOLUTION INTRAVENOUS at 01:07

## 2024-07-02 RX ADMIN — BACLOFEN 10 MG: 10 TABLET ORAL at 10:07

## 2024-07-02 RX ADMIN — ATORVASTATIN CALCIUM 40 MG: 40 TABLET, FILM COATED ORAL at 10:07

## 2024-07-02 RX ADMIN — POTASSIUM CHLORIDE 10 MEQ: 7.46 INJECTION, SOLUTION INTRAVENOUS at 10:07

## 2024-07-02 RX ADMIN — MEGESTROL ACETATE 400 MG: 400 SUSPENSION ORAL at 10:07

## 2024-07-02 RX ADMIN — DEXTROSE AND SODIUM CHLORIDE: 5; 900 INJECTION, SOLUTION INTRAVENOUS at 10:07

## 2024-07-02 RX ADMIN — OXYCODONE HYDROCHLORIDE 5 MG: 5 TABLET ORAL at 05:07

## 2024-07-02 RX ADMIN — AMLODIPINE BESYLATE 10 MG: 10 TABLET ORAL at 10:07

## 2024-07-02 RX ADMIN — APIXABAN 5 MG: 5 TABLET, FILM COATED ORAL at 10:07

## 2024-07-02 NOTE — SUBJECTIVE & OBJECTIVE
Interval History: Pt seen and examined this morning on rounds. EVA. Ate a quarter of her lunch yesterday. Patient still with low appetite. Worked well with PT/OT. Continues on mIVF. Persistently hypertensive, started on losartan. Patient without any other complaints.        Objective:     Vital Signs (Most Recent):  Temp: 98.6 °F (37 °C) (07/02/24 1225)  Pulse: 73 (07/02/24 1225)  Resp: 18 (07/02/24 1225)  BP: (!) 184/78 (07/02/24 1225)  SpO2: 98 % (07/02/24 1225) Vital Signs (24h Range):  Temp:  [97.6 °F (36.4 °C)-98.6 °F (37 °C)] 98.6 °F (37 °C)  Pulse:  [70-73] 73  Resp:  [17-18] 18  SpO2:  [92 %-100 %] 98 %  BP: (138-192)/(63-86) 184/78     Weight: 67.6 kg (148 lb 15.8 oz)  Body mass index is 30.09 kg/m².    Intake/Output Summary (Last 24 hours) at 7/2/2024 1327  Last data filed at 7/2/2024 0507  Gross per 24 hour   Intake 1375.3 ml   Output 700 ml   Net 675.3 ml         Physical Exam  Gen: in NAD, appears stated age  Neuro: Left sided weakness;chronic. AAOx3, CN2-12 grossly intact BL; motor, sensory, and strength grossly intact BL  HEENT: NTNC, EOMI, PERRLA, MMM; no thyromegaly or lymphadenopathy; no JVD appreciated  CVS: RRR, no m/r/g; S1/S2 auscultated with no S3 or S4; capillary refill < 2 sec  Resp: lungs CTAB, no w/r/r; no belabored breathing or accessory muscle use appreciated   Abd: PEG site bandaged. BS+ in all 4 quadrants; NTND, soft to palpation; no organomegaly appreciated   Extrem: pulses full, equal, and regular over all 4 extremities; no UE or LE edema BL          Significant Labs: All pertinent labs within the past 24 hours have been reviewed.    Significant Imaging: I have reviewed all pertinent imaging results/findings within the past 24 hours.

## 2024-07-02 NOTE — PT/OT/SLP PROGRESS
"Physical Therapy Treatment    Patient Name:  Amirah Davey   MRN:  [id number]    Recommendations:     Discharge Recommendations: High Intensity Therapy  Discharge Equipment Recommendations: hospital bed, lift device, to be determined by next level of care, wheelchair  Barriers to discharge: Decreased caregiver support    Assessment:     Amirah Davey is a 88 y.o. female admitted with a medical diagnosis of PEG tube malfunction.  She presents with the following impairments/functional limitations: weakness, impaired endurance, impaired self care skills, impaired functional mobility, impaired balance, impaired cognition, decreased upper extremity function, decreased lower extremity function, decreased safety awareness, pain, abnormal tone, decreased ROM. Pt with moderate tolerance to therapy.     Rehab Prognosis: Fair; patient would benefit from acute skilled PT services to address these deficits and reach maximum level of function.    Recent Surgery: * No surgery found *      Plan:     During this hospitalization, patient to be seen 4 x/week to address the identified rehab impairments via gait training, neuromuscular re-education, therapeutic exercises, therapeutic activities and progress toward the following goals:    Plan of Care Expires:  07/28/24    Subjective     Chief Complaint: "OW" "lay me back down"   Patient/Family Comments/goals: daughter -- for pt to walk again   Pain/Comfort:  Pain Rating 1:  (unrated, yelling out)      Objective:     Communicated with NSG prior to session.  Patient found HOB elevated with mccarthy catheter, peripheral IV, PureWick, telemetry upon PT entry to room.     General Precautions: Standard, fall, aspiration  Orthopedic Precautions: N/A  Braces: N/A  Respiratory Status: Room air     Functional Mobility:  Bed Mobility:     Supine to Sit: total assistance and of 2 persons  Sit to Supine: total assistance and of 2 persons      AM-PAC 6 CLICK MOBILITY  Turning over in bed (including " adjusting bedclothes, sheets and blankets)?: 2  Sitting down on and standing up from a chair with arms (e.g., wheelchair, bedside commode, etc.): 2  Moving from lying on back to sitting on the side of the bed?: 2  Moving to and from a bed to a chair (including a wheelchair)?: 1  Need to walk in hospital room?: 1  Climbing 3-5 steps with a railing?: 1  Basic Mobility Total Score: 9       Treatment & Education:  Pt sat EOB and performed ~6 LAQ on RLE, attempted cervical ROM, limited functional reaching performed with RUE. All of these activities were limited by pt yelling out in pain, but could not report where pain was.   Bedside table in front of patient and area set up for function, convenience, and safety. RN aware of patient's mobility needs and status. Questions/concerns addressed within PTA scope of practice; patient  with no further questions. Time was provided for active listening, discussion of health disposition, and discussion of safe discharge.    Patient left HOB elevated with all lines intact, call button in reach, and daughter present..    GOALS:   Multidisciplinary Problems       Physical Therapy Goals          Problem: Physical Therapy    Goal Priority Disciplines Outcome Goal Variances Interventions   Physical Therapy Goal     PT, PT/OT Progressing     Description: Goals to be met by: 2024     Patient will increase functional independence with mobility by performin. Supine to sit with Moderate Assistance  2. Sit to supine with Moderate Assistance  3. Rolling to Left and Right with Stand-by Assistance.  4. Sit to stand transfer with Moderate Assistance using LRAD  5. Bed to chair transfer with Moderate Assistance using LRAD  6. Gait  x 10 feet with Maximum Assistance using LRAD.   7. Wheelchair propulsion x50 feet with Stand-by Assistance using  right upper and lower extremity  8. Sitting at edge of bed x5 minutes with Minimal Assistance                         Time Tracking:     PT  Received On: 07/02/24  PT Start Time: 1022     PT Stop Time: 1045  PT Total Time (min): 23 min     Billable Minutes: Therapeutic Exercise 13 and Neuromuscular Re-education 10    Treatment Type: Treatment  PT/PTA: PTA     Number of PTA visits since last PT visit: 2     07/02/2024

## 2024-07-02 NOTE — PROGRESS NOTES
[facility] - Med Surg ([address] Medicine  Progress Note    Patient Name: Amirah Davey  MRN: [id number]  Patient Class: IP- Inpatient   Admission Date: 6/29/2024  Length of Stay: 3 days  Attending Physician: David Mayorga MD  Primary Care Provider: Ivana Templeton MD        Subjective:     Principal Problem:PEG tube malfunction        HPI:  Amirah Davey is a 88 y.o. F with PMHx of recent CVA in May with residual L sided weakness, dysphagia and dysarthria, s/p PEG tube placement with recurrent inadvertent dislodgement, HTN, and Afib on eliquis who presents to [facility] for evaluation of PEG tube malfunction. Patient with recent admit for PEG dislogement on 6/12 c/b abdominal wall abscess on CT. IV vancomycin was given and general surgery replaced her PEG tube on 6/17 and no abscess or fluid collection was noted.     She presents today after her inpatient rehab noticed her PEG tube was dislodged. It is unknown when the last time of appropriate placement was. She is AAO to person, place, situation, and year. She denies intentionally removing her PEG tube. Per IPR nursing, the patient had an abdominal binder around her abdomen which was preventing her from removal, which was removed at some point yesterday.     In the ED: VSSAF. Labs grossly unremarkable acutely. CTH and CXR without acute process. Gen surg consulted in the ED and given recurrent inadvertent dislodgement is and unknown timing of dislosgement, recommend NGT placement for enteral nutrition vs long term TPN until patient's mental baseline is acceptable for management of another gastrostomy tube.    Overview/Hospital Course:  Patient is a 88 y.o. F with PMHx of recent CVA in May with residual L sided weakness, dysphagia and dysarthria, s/p PEG tube placement with recurrent inadvertent dislodgement, HTN, and Afib on eliquis who presented to [facility] after PEG tube dislodgement. Patient agitated and altered upon admission. No acute processes on CT-H.  Concern for progressive vascular dementia. This is unfortunately the 3rd time she has dislodged her PEG tube. General surgery consulted and given recurrent PEG dislodging they are recommending against replacement given risk. Discussed with daughters about goals of care moving forward. They would like to pursue appetite stimulants and avoiding NGT or TPN for nutrition. PT/OT recommending inpatient rehab.    Interval History: Pt seen and examined this morning on rounds. EVA. Ate a quarter of her lunch yesterday. Patient still with low appetite. Worked well with PT/OT. Continues on mIVF. Persistently hypertensive, started on losartan. Patient without any other complaints.        Objective:     Vital Signs (Most Recent):  Temp: 98.6 °F (37 °C) (07/02/24 1225)  Pulse: 73 (07/02/24 1225)  Resp: 18 (07/02/24 1225)  BP: (!) 184/78 (07/02/24 1225)  SpO2: 98 % (07/02/24 1225) Vital Signs (24h Range):  Temp:  [97.6 °F (36.4 °C)-98.6 °F (37 °C)] 98.6 °F (37 °C)  Pulse:  [70-73] 73  Resp:  [17-18] 18  SpO2:  [92 %-100 %] 98 %  BP: (138-192)/(63-86) 184/78     Weight: 67.6 kg (148 lb 15.8 oz)  Body mass index is 30.09 kg/m².    Intake/Output Summary (Last 24 hours) at 7/2/2024 1327  Last data filed at 7/2/2024 0507  Gross per 24 hour   Intake 1375.3 ml   Output 700 ml   Net 675.3 ml         Physical Exam  Gen: in NAD, appears stated age  Neuro: Left sided weakness;chronic. AAOx3, CN2-12 grossly intact BL; motor, sensory, and strength grossly intact BL  HEENT: NTNC, EOMI, PERRLA, MMM; no thyromegaly or lymphadenopathy; no JVD appreciated  CVS: RRR, no m/r/g; S1/S2 auscultated with no S3 or S4; capillary refill < 2 sec  Resp: lungs CTAB, no w/r/r; no belabored breathing or accessory muscle use appreciated   Abd: PEG site bandaged. BS+ in all 4 quadrants; NTND, soft to palpation; no organomegaly appreciated   Extrem: pulses full, equal, and regular over all 4 extremities; no UE or LE edema BL          Significant Labs: All  pertinent labs within the past 24 hours have been reviewed.    Significant Imaging: I have reviewed all pertinent imaging results/findings within the past 24 hours.    Assessment/Plan:      * PEG tube malfunction  - 88 y.o. F with recent CVA resulting in L hemiplegia and dysphagia s/p PEG tube placement with recurrent inadvertent dislodgement presenting with PEG tub dislodgement  - Gen surg consulted in the ED and given recurrent issue & unknown timing of dislosgement, recommend NGT placement for enteral nutrition vs long term TPN until patient's mental baseline is acceptable for management of another gastrostomy tube  - Conitnue pureed diet with strict aspiration precautions  - Discussed at length with daughter, Kanchan, who plans to present to bedside with her sister, Ashlie, for ongoing GOC discussion prior to NGT placement  - Crush meds in puree  - Continue mIVF  - SLP and RD consulted    - 6/30: Discussed goals of care moving forward with daughters today. They would like to pursue appetite stimulants and pureed diet and avoiding NGT or TPN for nutrition. They understand that she will likely not be able to meet caloric goals given low appetite and dysphagia. Increase Megace to 400mg BID and start dronabinol   - Continue to monitor PO intake / calorie counting    Malnutrition of mild degree  - BMI 30  - Poor PO intake 2/2 dysphagia and poor appetite  - No longer with PEG tube and not a candidate for replacement at this time  - Started on appetite stimulants and will continue to monitor PO intake  - Calorie counting    Sacral ulcer, limited to breakdown of skin  - Wound care consulted, appreciate recs  - Barrier cream, q2h turns     Dysphagia due to recent stroke  - Chronic issue from recent CVA  - SLP notes from prior admission reviewed, recommend pureed diet and continued dysphagia therapy   - RD consulted for tube feed recommendations     Cerebrovascular accident (CVA) due to embolism of right middle cerebral  artery  L hemiplegia  Reduced mobility  - Chronic issue  - Continue statin  - Tentative plan to return to [facility] at time of discharge  - PT/OT consulted    Chronic a-fib  Chronic anticoagulation  Patient is currently in sinus rhythm.[id number] Score: 4. Anticoagulation indicated. Anticoagulation done with eliquis .    Hypertension, benign  Chronic, controlled. Latest blood pressure and vitals reviewed-     Temp:  [97.6 °F (36.4 °C)-98.6 °F (37 °C)]   Pulse:  [70-73]   Resp:  [17-18]   BP: (138-192)/(63-86)   SpO2:  [92 %-100 %] .   Home meds for hypertension were reviewed and noted below.   Hypertension Medications               amLODIPine (NORVASC) 5 MG tablet 1 tablet (5 mg total) by Per G Tube route once daily.    lisinopriL (PRINIVIL,ZESTRIL) 20 MG tablet 1 tablet (20 mg total) by Per G Tube route once daily. DO NOT TAKE UNTIL YOU SEE YOUR PRIMARY DOCTOR. Rehab can add back if BP becomes elevated. Would start with this one over HCTZ     While in the hospital, will manage blood pressure as follows; Continue home amlodipine, Hold lisinopril     Will utilize p.r.n. blood pressure medication only if patient's blood pressure greater than 180/110 and she develops symptoms such as worsening chest pain or shortness of breath.    - Add losartan 25 for persistently elevated BP's      VTE Risk Mitigation (From admission, onward)           Ordered     apixaban tablet 5 mg  2 times daily         06/29/24 1144                    Discharge Planning   JOAN: 7/3/2024     Code Status: Full Code   Is the patient medically ready for discharge?:     Reason for patient still in hospital (select all that apply): Treatment  Discharge Plan A: Rehab (Return to [facility])                  David Mayorga MD  Department of Hospital Medicine   [facility] - Med Surg ([place]-[facility])

## 2024-07-02 NOTE — PLAN OF CARE
Problem: Adult Inpatient Plan of Care  Goal: Plan of Care Review  Outcome: Progressing  Goal: Patient-Specific Goal (Individualized)  Outcome: Progressing  Goal: Absence of Hospital-Acquired Illness or Injury  Outcome: Progressing  Goal: Optimal Comfort and Wellbeing  Outcome: Progressing  Goal: Readiness for Transition of Care  Outcome: Progressing     Problem: Infection  Goal: Absence of Infection Signs and Symptoms  Outcome: Progressing     Problem: Acute Kidney Injury/Impairment  Goal: Fluid and Electrolyte Balance  Outcome: Progressing  Goal: Improved Oral Intake  Outcome: Progressing  Goal: Effective Renal Function  Outcome: Progressing     Problem: Skin Injury Risk Increased  Goal: Skin Health and Integrity  Outcome: Progressing     Problem: Fall Injury Risk  Goal: Absence of Fall and Fall-Related Injury  Outcome: Progressing   Pt Alert &  able to express needs.   Wound care completed during night med pass.  Repositioned.  Pillows used to reposition patient.  Continues to yell out.  Meds given as ordered.  Slept during the night.  BP remains elevated.  Family at the bedside to assist with care.    Safety maintained.  Bed in low position,  call  light in reach.

## 2024-07-02 NOTE — PT/OT/SLP PROGRESS
"Occupational Therapy  Co- Treatment    Name: Amirah Davey  MRN: [id number]  Admitting Diagnosis:  PEG tube malfunction       Recommendations:     Discharge Recommendations: Moderate Intensity Therapy  Discharge Equipment Recommendations:  wheelchair, lift device, hospital bed, bedside commode  Barriers to discharge:  None    Assessment:     Amirah Davey is a 88 y.o. female with a medical diagnosis of PEG tube malfunction.  She presents with screaming to any tactile sensation and throughout physical assist to help mobilize the patient. Discharge recommendation adjusted appropriately based on participation in session. Performance deficits affecting function are weakness, impaired endurance, impaired self care skills, gait instability, impaired functional mobility, impaired sensation, impaired balance, decreased safety awareness, decreased lower extremity function, decreased upper extremity function, decreased coordination, impaired cognition, impaired fine motor. Patient currently demonstrates a need for moderate intensity therapy on a daily basis post acute secondary to a decline in functional status due to injury     Rehab Prognosis:  Good; patient would benefit from acute skilled OT services to address these deficits and reach maximum level of function.       Plan:     Patient to be seen 4 x/week to address the above listed problems via self-care/home management, therapeutic activities, therapeutic exercises, neuromuscular re-education  Plan of Care Expires: 07/30/24  Plan of Care Reviewed with: patient, daughter    Subjective     Chief Complaint: "you're hurting me"  Patient/Family Comments/goals: patient's daughter reports that she would like mother to walk again  Pain/Comfort:  Pain Rating 1: other (see comments) (unrated, but yelling out in pain during any movements)  Pain Rating Post-Intervention 1: other (see comments)    Objective:   Co-treatment with PT performed for patient safety, education, and " facilitation of treatment to maximize activity tolerance and progression towards goals from two skilled therapy disciplines.    Communicated with: nursing prior to session.  Patient found right sidelying with PureWick, pulse ox (continuous), telemetry, peripheral IV upon OT entry to room. Patient's daughter present in room during session.     General Precautions: Standard, fall    Orthopedic Precautions:N/A  Braces: N/A  Respiratory Status: Room air     Occupational Performance:     Bed Mobility:    Patient completed Supine to Sit with total assistance and 2 persons  Patient completed Sit to Supine with total assistance and 2 persons   Patient sat EOB for ~ 10 minutes with Maximum Assistance    Functional Mobility/Transfers: unsafe at current time    [facility] 6 Click ADL: 8    Treatment & Education:  Patient educated on:   -purpose of OT and OT POC  -facilitation and education on proper body mechanics, energy conservation, and safety  -importance of early mobility and out of bed activities with staff assist  -overall benefits of therapy     All questions answered within OT scope and to patient's satisfaction    Patient left HOB elevated with all lines intact, call button in reach, and nursing and daughter present    GOALS:   Multidisciplinary Problems       Occupational Therapy Goals          Problem: Occupational Therapy    Goal Priority Disciplines Outcome Interventions   Occupational Therapy Goal     OT, PT/OT Progressing    Description: Goals to be met by: 7/30/24     Patient will increase functional independence with ADLs by performing:    UE Dressing with Maximum Assistance.  LE Dressing with Maximum Assistance.  Grooming while EOB with Moderate Assistance.  Toileting from bedside commode with Maximum Assistance for hygiene and clothing management.   Sitting at edge of bed x10 minutes with Minimal Assistance.  Rolling to Right with Minimal Assistance.   Rolling to Left with CGA  Supine to sit with Moderate  Assistance.  Squat pivot transfers with Maximum Assistance.  Toilet transfer to bedside commode with Maximum Assistance.    DME justification  Patient has a mobility limitation that significantly impairs their ability to participate in one or more mobility related activities of daily living, including toileting. This deficit can be resolved by using a bedside commode. Patient demonstrates mobility limitations that will cause them to be confined to one room at home without bathroom access for up to 30 days. Using a bedside commode will greatly improve the patient's ability to participate in MRADLs.                           Time Tracking:     OT Date of Treatment: 07/02/24  OT Start Time: 1022  OT Stop Time: 1045  OT Total Time (min): 23 min    Billable Minutes:Therapeutic Activity 23    OT/PHILIP: OT          7/2/2024

## 2024-07-02 NOTE — ASSESSMENT & PLAN NOTE
Chronic, controlled. Latest blood pressure and vitals reviewed-     Temp:  [97.6 °F (36.4 °C)-98.6 °F (37 °C)]   Pulse:  [70-73]   Resp:  [17-18]   BP: (138-192)/(63-86)   SpO2:  [92 %-100 %] .   Home meds for hypertension were reviewed and noted below.   Hypertension Medications               amLODIPine (NORVASC) 5 MG tablet 1 tablet (5 mg total) by Per G Tube route once daily.    lisinopriL (PRINIVIL,ZESTRIL) 20 MG tablet 1 tablet (20 mg total) by Per G Tube route once daily. DO NOT TAKE UNTIL YOU SEE YOUR PRIMARY DOCTOR. Rehab can add back if BP becomes elevated. Would start with this one over HCTZ     While in the hospital, will manage blood pressure as follows; Continue home amlodipine, Hold lisinopril     Will utilize p.r.n. blood pressure medication only if patient's blood pressure greater than 180/110 and she develops symptoms such as worsening chest pain or shortness of breath.    - Add losartan 25 for persistently elevated BP's

## 2024-07-03 PROCEDURE — 63600175 PHARM REV CODE 636 W HCPCS: Performed by: INTERNAL MEDICINE

## 2024-07-03 PROCEDURE — S0179 MEGESTROL 20 MG: HCPCS | Performed by: INTERNAL MEDICINE

## 2024-07-03 PROCEDURE — 11000001 HC ACUTE MED/SURG PRIVATE ROOM

## 2024-07-03 PROCEDURE — 99222 1ST HOSP IP/OBS MODERATE 55: CPT | Mod: ,,, | Performed by: NURSE PRACTITIONER

## 2024-07-03 PROCEDURE — 25000003 PHARM REV CODE 250: Performed by: INTERNAL MEDICINE

## 2024-07-03 PROCEDURE — 25000003 PHARM REV CODE 250

## 2024-07-03 RX ORDER — BACLOFEN 5 MG/1
5 TABLET ORAL 2 TIMES DAILY
Status: DISCONTINUED | OUTPATIENT
Start: 2024-07-03 | End: 2024-07-09 | Stop reason: HOSPADM

## 2024-07-03 RX ADMIN — BACLOFEN 5 MG: 5 TABLET ORAL at 09:07

## 2024-07-03 RX ADMIN — LOSARTAN POTASSIUM 25 MG: 25 TABLET, FILM COATED ORAL at 09:07

## 2024-07-03 RX ADMIN — DRONABINOL 10 MG: 2.5 CAPSULE ORAL at 09:07

## 2024-07-03 RX ADMIN — MEGESTROL ACETATE 400 MG: 400 SUSPENSION ORAL at 09:07

## 2024-07-03 RX ADMIN — OXYCODONE HYDROCHLORIDE 5 MG: 5 TABLET ORAL at 06:07

## 2024-07-03 RX ADMIN — BACLOFEN 10 MG: 10 TABLET ORAL at 09:07

## 2024-07-03 RX ADMIN — DICYCLOMINE HYDROCHLORIDE 10 MG: 10 CAPSULE ORAL at 01:07

## 2024-07-03 RX ADMIN — BISACODYL 10 MG: 10 SUPPOSITORY RECTAL at 10:07

## 2024-07-03 RX ADMIN — DEXTROSE AND SODIUM CHLORIDE: 5; 900 INJECTION, SOLUTION INTRAVENOUS at 01:07

## 2024-07-03 RX ADMIN — APIXABAN 5 MG: 5 TABLET, FILM COATED ORAL at 09:07

## 2024-07-03 RX ADMIN — DICYCLOMINE HYDROCHLORIDE 10 MG: 10 CAPSULE ORAL at 09:07

## 2024-07-03 RX ADMIN — OXYCODONE HYDROCHLORIDE 5 MG: 5 TABLET ORAL at 10:07

## 2024-07-03 RX ADMIN — MICONAZOLE NITRATE: 20 OINTMENT TOPICAL at 10:07

## 2024-07-03 RX ADMIN — DICYCLOMINE HYDROCHLORIDE 10 MG: 10 CAPSULE ORAL at 04:07

## 2024-07-03 RX ADMIN — ATORVASTATIN CALCIUM 40 MG: 40 TABLET, FILM COATED ORAL at 09:07

## 2024-07-03 RX ADMIN — MICONAZOLE NITRATE: 20 OINTMENT TOPICAL at 09:07

## 2024-07-03 RX ADMIN — FAMOTIDINE 40 MG: 20 TABLET ORAL at 09:07

## 2024-07-03 RX ADMIN — AMLODIPINE BESYLATE 10 MG: 10 TABLET ORAL at 09:07

## 2024-07-03 NOTE — HPI
Per chart review, Amirah Davey is a 88 y.o. F with PMHx of recent CVA in May with residual L sided weakness, dysphagia and dysarthria, s/p PEG tube placement with recurrent inadvertent dislodgement, HTN, and Afib on eliquis who presents to [facility] for evaluation of PEG tube malfunction. Patient with recent admit for PEG dislogement on 6/12 c/b abdominal wall abscess on CT. IV vancomycin was given and general surgery replaced her PEG tube on 6/17 and no abscess or fluid collection was noted. Pt presented again this admit for PEG tube dislodgement. CTH and CXR were w/o acute process. Pt was evaluated by general surgery this admit and recommended NGT and or TPN and not replacing PEG given pt's repeated dislodgement of PEG. GOC was discussed with pt's family per HM and they would like to avoid NGT and PEG. PM &R was consulted to evaluate pt for post acute placement.       Functional History: Patient  as living with one of her daughters prior to CVA in May, 2024..  Prior to admission, Pt was at [facility] rehab prior to this admission. However, pt was I before initial hospital admission in May.  DME: None.

## 2024-07-03 NOTE — CONSULTS
[facility] - [facility] ([place] [id number])  Physical Medicine & Rehab  Consult Note    Patient Name: Amirah Davey  MRN: [id number]  Admission Date: 6/29/2024  Hospital Length of Stay: 4 days  Attending Physician: David Mayorga MD   Consults  Subjective:     Principal Problem: PEG tube malfunction    HPI: Per chart review, Amirah Davey is a 88 y.o. F with PMHx of recent CVA in May with residual L sided weakness, dysphagia and dysarthria, s/p PEG tube placement with recurrent inadvertent dislodgement, HTN, and Afib on eliquis who [place] [facility] [place] tube malfunction. Patient with recent admit for PEG dislogement on 6/12 c/b abdominal wall abscess on CT. IV vancomycin was given and general surgery replaced her PEG tube on 6/17 and no abscess or fluid collection was noted. Pt presented again this admit for PEG tube dislodgement. CTH and CXR were w/o acute process. Pt was evaluated by general surgery this admit and recommended NGT and or TPN and not replacing PEG given pt's repeated dislodgement of PEG. GOC was discussed with pt's family per HM and they would like to avoid NGT and PEG. PM &R was consulted to evaluate pt for post acute placement.       Functional History: Patient  as living with one of her daughters prior to CBA in May, 2024..  Prior to admission, Pt was at [facility] rehab prior to this admission. However, pt was I before initial hospital admission in May.  DME: None.      Hospital Course: Per chart review,    PT- 07/01    Functional Mobility:  Bed Mobility:     Rolling Left:  maximal assistance  Rolling Right: total assistance  Scooting: total assistance and of 2 persons  Supine to Sit: total assistance and of 2 persons  Sit to Supine: maximal assistance and of 2 persons    OT- 07/02    Bed Mobility:    Patient completed Supine to Sit with total assistance and 2 persons  Patient completed Sit to Supine with total assistance and 2 persons   Patient sat EOB for ~ 10 minutes with Maximum  Assistance       Past Medical History:   Diagnosis Date    A-fib     Cerebrovascular accident (CVA) due to embolism of right middle cerebral artery 05/23/2024    Current use of long term anticoagulation     on Xarelto    Dysphagia due to recent stroke 05/23/2024    Hypertension     Stroke 03/20/2013     Past Surgical History:   Procedure Laterality Date    CARDIAC PACEMAKER PLACEMENT  09/25/2019    [employer] Model number [id number]  Serial number [id number] device MRI conditional for 1.5 Estela magnets    CATARACT EXTRACTION W/  INTRAOCULAR LENS IMPLANT Right 05/01/2017    Dr. Case    CATARACT EXTRACTION W/  INTRAOCULAR LENS IMPLANT Left 05/29/2017    Dr. Case    ESOPHAGOGASTRODUODENOSCOPY W/ PEG N/A 5/29/2024    Procedure: EGD, WITH PEG TUBE INSERTION;  Surgeon: Imer Minaya MD;  [facility];  Service: General;  Laterality: N/A;    EYE SURGERY      HYSTERECTOMY      INSERTION, PEG TUBE N/A 5/27/2024    Procedure: INSERTION, PEG TUBE;  Surgeon: Ivette Love MD;  Location: [facility] ENDO (2ND FLR);  Service: Endoscopy;  Laterality: N/A;    INSERTION, PEG TUBE N/A 6/17/2024    Procedure: INSERTION, PEG TUBE;  Surgeon: Chloé Sher MD;  [facility];  Service: General;  Laterality: N/A;     Review of patient's allergies indicates:  No Known Allergies    Scheduled Medications:    amLODIPine  10 mg Oral Daily    apixaban  5 mg Oral BID    atorvastatin  40 mg Oral Daily    baclofen  5 mg Oral BID    dicyclomine  10 mg Oral QID    droNABinol  10 mg Oral BID    famotidine  40 mg Oral Daily    losartan  25 mg Oral Daily    megestroL  400 mg Oral BID    miconazole nitrate 2%   Topical (Top) BID       PRN Medications:   Current Facility-Administered Medications:     aluminum-magnesium hydroxide-simethicone, 30 mL, Oral, QID PRN    bisacodyL, 10 mg, Rectal, Daily PRN    dextrose 10%, 12.5 g, Intravenous, PRN    dextrose 10%, 25 g, Intravenous, PRN    glucagon (human recombinant), 1 mg,  Intramuscular, PRN    glucose, 16 g, Oral, PRN    glucose, 24 g, Oral, PRN    melatonin, 6 mg, Oral, Nightly PRN    naloxone, 0.02 mg, Intravenous, PRN    ondansetron, 8 mg, Per NG tube, Q8H PRN    oxyCODONE, 5 mg, Oral, Q6H PRN    polyethylene glycol, 17 g, Oral, BID PRN    prochlorperazine, 5 mg, Intravenous, Q6H PRN    simethicone, 1 tablet, Oral, QID PRN    sodium chloride 0.9%, 10 mL, Intravenous, Q12H PRN    Family History    None       Tobacco Use    Smoking status: Never    Smokeless tobacco: Not on file   Substance and Sexual Activity    Alcohol use: No    Drug use: Not on file    Sexual activity: Not on file     Review of Systems   Constitutional:  Positive for activity change.   Musculoskeletal:  Positive for gait problem.   Neurological:  Positive for weakness.   Psychiatric/Behavioral:  Positive for confusion and decreased concentration.      Objective:     Vital Signs (Most Recent):  Temp: 97.4 °F (36.3 °C) (07/03/24 1141)  Pulse: 81 (07/03/24 1141)  Resp: 18 (07/03/24 1141)  BP: (!) 147/81 (07/03/24 1141)  SpO2: 99 % (07/03/24 1141)    Vital Signs (24h Range):  Temp:  [97.4 °F (36.3 °C)-98.9 °F (37.2 °C)] 97.4 °F (36.3 °C)  Pulse:  [70-82] 81  Resp:  [16-18] 18  SpO2:  [96 %-100 %] 99 %  BP: (132-179)/(63-81) 147/81     Body mass index is 30.09 kg/m².     Physical Exam  Vitals and nursing note reviewed.   Constitutional:       Appearance: She is ill-appearing.   Eyes:      Extraocular Movements: Extraocular movements intact.   Pulmonary:      Effort: Pulmonary effort is normal. No respiratory distress.   Abdominal:      General: There is no distension.      Palpations: Abdomen is soft.   Musculoskeletal:      Cervical back: Normal range of motion and neck supple.      Comments: Left hemiplegia. LUE appears contracted.    Skin:     Capillary Refill: Capillary refill takes 2 to 3 seconds.   Neurological:      General: No focal deficit present.      Mental Status: She is alert.      GCS: GCS eye subscore  is 4. GCS verbal subscore is 5. GCS motor subscore is 6.      Motor: Weakness and abnormal muscle tone present.      Coordination: Coordination abnormal. Finger-Nose-Finger Test abnormal and Heel to Shin Test abnormal. Impaired rapid alternating movements.      Gait: Gait abnormal.   Psychiatric:         Mood and Affect: Mood normal.         Behavior: Behavior normal. Behavior is cooperative.          NEUROLOGICAL EXAMINATION:     GAIT AND COORDINATION      Coordination   Finger to nose coordination: abnormal      Diagnostic Results: Labs: Reviewed  Assessment/Plan:     * PEG tube malfunction  -Pt had Hx of OEG dislodgement 3 times since placed in May after CVA.  -Per gen Sx not a candidate for PEG replacement.  -Gen Surgery rec for NGT/ TPN. Family does not want to pursue these.  -Now on Pureed diet. Oral intake is decreased. Per daughter, sometimes good intake, sometimes not.   -PT/OT rec for high intensity therapies. Daughter would like a chance at therapies [facility]. Feels consistent therapies would help. Asked for SLP eval. Will re-assess again.     Malnutrition of mild degree  -Recommend RD eval.     Sacral ulcer, limited to breakdown of skin  -Recommend frequent turning.     Reduced mobility  See hospital course for functional status.    Recommendations  -  Encourage mobility, OOB in chair, and early ambulation as appropriate  -  PT/OT evaluate and treat  -  Pain management  -  DVT prophylaxis if appropriate   -  Monitor for and prevent skin breakdown and pressure ulcers  Early mobility, repositioning/weight shifting every 20-30 minutes when sitting, turn patient every 2 hours, proper mattress/overlay and chair cushioning, pressure relief/heel protector boots     Dysphagia due to recent stroke  -Currently on IVF and pureed diet.   -Recommend SLP eval.   -Encourage oral intake with supervision.  -Will need aspiration precaution.     Hemiplegia  -Will need aggressive PT/OT.     Cerebrovascular accident  (CVA) due to embolism of right middle cerebral artery  -Had CVA in May of 2024.  -Was at [facility] for therapies.  -Now admitted after PEG dislodgement.   -Will need diet plan. Currently on Pureed diet. Will need encouragement for oral intake and supervision.  -Recommend palliative care involvement.     Chronic a-fib  -Continue Eliquis.     PM&R Recommendation:     At this time, the PM&R team has reviewed this patient's ongoing medical case including inpatient diagnosis, medical history, clinical examination, labs, vitals, current social and functional history.  We will continue to follow pt for a potential rehab candidate pending plan for oral intake. Currently on IVF and Pureed diet.        Thank you for your consult.     Alina Maynard NP  Department of Physical Medicine & Rehab  [facility] ([place])

## 2024-07-03 NOTE — ASSESSMENT & PLAN NOTE
-Currently on IVF and pureed diet.   -Recommend SLP eval.   -Encourage oral intake with supervision.  -Will need aspiration precaution.

## 2024-07-03 NOTE — ASSESSMENT & PLAN NOTE
-Had CVA in May of 2024.  -Was at [facility] for therapies.  -Now admitted after PEG dislodgement.   -Will need diet plan. Currently on Pureed diet. Will need encouragement for oral intake and supervision.  -Recommend palliative care involvement.

## 2024-07-03 NOTE — HOSPITAL COURSE
Per chart review,    PT- 07/08    Bed Mobility:     Rolling Left:  moderate assistance and of 2 persons  Rolling Right: moderate assistance and of 2 persons  Scooting: Supine to HOB: Total x2; Seated on EOB: Max x2  Supine to Sit: maximal assistance and of 2 persons  Sit to Supine: maximal assistance and of 2 persons      PT- 07/01    Functional Mobility:  Bed Mobility:     Rolling Left:  maximal assistance  Rolling Right: total assistance  Scooting: total assistance and of 2 persons  Supine to Sit: total assistance and of 2 persons  Sit to Supine: maximal assistance and of 2 persons    OT- 07/02    Bed Mobility:    Patient completed Supine to Sit with total assistance and 2 persons  Patient completed Sit to Supine with total assistance and 2 persons   Patient sat EOB for ~ 10 minutes with Maximum Assistance

## 2024-07-03 NOTE — SUBJECTIVE & OBJECTIVE
Past Medical History:   Diagnosis Date    A-fib     Cerebrovascular accident (CVA) due to embolism of right middle cerebral artery 05/23/2024    Current use of long term anticoagulation     on Xarelto    Dysphagia due to recent stroke 05/23/2024    Hypertension     Stroke 03/20/2013     Past Surgical History:   Procedure Laterality Date    CARDIAC PACEMAKER PLACEMENT  09/25/2019    [employer] Model number [id number]  Serial number [id number] device MRI conditional for 1.5 Estela magnets    CATARACT EXTRACTION W/  INTRAOCULAR LENS IMPLANT Right 05/01/2017    Dr. Case    CATARACT EXTRACTION W/  INTRAOCULAR LENS IMPLANT Left 05/29/2017    Dr. aCse    ESOPHAGOGASTRODUODENOSCOPY W/ PEG N/A 5/29/2024    Procedure: EGD, WITH PEG TUBE INSERTION;  Surgeon: Imer Minaya MD;  [facility];  Service: General;  Laterality: N/A;    EYE SURGERY      HYSTERECTOMY      INSERTION, PEG TUBE N/A 5/27/2024    Procedure: INSERTION, PEG TUBE;  Surgeon: Ivette Love MD;  Location: [facility] (2ND FLR);  Service: Endoscopy;  Laterality: N/A;    INSERTION, PEG TUBE N/A 6/17/2024    Procedure: INSERTION, PEG TUBE;  Surgeon: Chloé Sher MD;  [facility];  Service: General;  Laterality: N/A;     Review of patient's allergies indicates:  No Known Allergies    Scheduled Medications:    amLODIPine  10 mg Oral Daily    apixaban  5 mg Oral BID    atorvastatin  40 mg Oral Daily    baclofen  5 mg Oral BID    dicyclomine  10 mg Oral QID    droNABinol  10 mg Oral BID    famotidine  40 mg Oral Daily    losartan  25 mg Oral Daily    megestroL  400 mg Oral BID    miconazole nitrate 2%   Topical (Top) BID       PRN Medications:   Current Facility-Administered Medications:     aluminum-magnesium hydroxide-simethicone, 30 mL, Oral, QID PRN    bisacodyL, 10 mg, Rectal, Daily PRN    dextrose 10%, 12.5 g, Intravenous, PRN    dextrose 10%, 25 g, Intravenous, PRN    glucagon (human recombinant), 1 mg, Intramuscular,  PRN    glucose, 16 g, Oral, PRN    glucose, 24 g, Oral, PRN    melatonin, 6 mg, Oral, Nightly PRN    naloxone, 0.02 mg, Intravenous, PRN    ondansetron, 8 mg, Per NG tube, Q8H PRN    oxyCODONE, 5 mg, Oral, Q6H PRN    polyethylene glycol, 17 g, Oral, BID PRN    prochlorperazine, 5 mg, Intravenous, Q6H PRN    simethicone, 1 tablet, Oral, QID PRN    sodium chloride 0.9%, 10 mL, Intravenous, Q12H PRN    Family History    None       Tobacco Use    Smoking status: Never    Smokeless tobacco: Not on file   Substance and Sexual Activity    Alcohol use: No    Drug use: Not on file    Sexual activity: Not on file     Review of Systems   Constitutional:  Positive for activity change.   Musculoskeletal:  Positive for gait problem.   Neurological:  Positive for weakness.   Psychiatric/Behavioral:  Positive for confusion and decreased concentration.      Objective:     Vital Signs (Most Recent):  Temp: 97.4 °F (36.3 °C) (07/03/24 1141)  Pulse: 81 (07/03/24 1141)  Resp: 18 (07/03/24 1141)  BP: (!) 147/81 (07/03/24 1141)  SpO2: 99 % (07/03/24 1141)    Vital Signs (24h Range):  Temp:  [97.4 °F (36.3 °C)-98.9 °F (37.2 °C)] 97.4 °F (36.3 °C)  Pulse:  [70-82] 81  Resp:  [16-18] 18  SpO2:  [96 %-100 %] 99 %  BP: (132-179)/(63-81) 147/81     Body mass index is 30.09 kg/m².     Physical Exam  Vitals and nursing note reviewed.   Constitutional:       Appearance: She is ill-appearing.   Eyes:      Extraocular Movements: Extraocular movements intact.   Pulmonary:      Effort: Pulmonary effort is normal. No respiratory distress.   Abdominal:      General: There is no distension.      Palpations: Abdomen is soft.   Musculoskeletal:      Cervical back: Normal range of motion and neck supple.      Comments: Left hemiplegia. LUE appears contracted.    Skin:     Capillary Refill: Capillary refill takes 2 to 3 seconds.   Neurological:      General: No focal deficit present.      Mental Status: She is alert.      GCS: GCS eye subscore is 4. GCS  verbal subscore is 5. GCS motor subscore is 6.      Motor: Weakness and abnormal muscle tone present.      Coordination: Coordination abnormal. Finger-Nose-Finger Test abnormal and Heel to Shin Test abnormal. Impaired rapid alternating movements.      Gait: Gait abnormal.   Psychiatric:         Mood and Affect: Mood normal.         Behavior: Behavior normal. Behavior is cooperative.          NEUROLOGICAL EXAMINATION:     GAIT AND COORDINATION      Coordination   Finger to nose coordination: abnormal      Diagnostic Results: Labs: Reviewed

## 2024-07-03 NOTE — PLAN OF CARE
Problem: Adult Inpatient Plan of Care  Goal: Plan of Care Review  Outcome: Progressing  Goal: Patient-Specific Goal (Individualized)  Outcome: Progressing  Goal: Absence of Hospital-Acquired Illness or Injury  Outcome: Progressing  Goal: Optimal Comfort and Wellbeing  Outcome: Progressing  Goal: Readiness for Transition of Care  Outcome: Progressing     Problem: Infection  Goal: Absence of Infection Signs and Symptoms  Outcome: Progressing     Problem: Acute Kidney Injury/Impairment  Goal: Fluid and Electrolyte Balance  Outcome: Progressing  Goal: Improved Oral Intake  Outcome: Progressing  Goal: Effective Renal Function  Outcome: Progressing     Problem: Wound  Goal: Optimal Coping  Outcome: Progressing  Goal: Optimal Functional Ability  Outcome: Progressing  Goal: Absence of Infection Signs and Symptoms  Outcome: Progressing  Goal: Improved Oral Intake  Outcome: Progressing  Goal: Optimal Pain Control and Function  Outcome: Progressing  Goal: Skin Health and Integrity  Outcome: Progressing  Goal: Optimal Wound Healing  Outcome: Progressing     Problem: Skin Injury Risk Increased  Goal: Skin Health and Integrity  Outcome: Progressing     Problem: Fall Injury Risk  Goal: Absence of Fall and Fall-Related Injury  Outcome: Progressing

## 2024-07-03 NOTE — PROGRESS NOTES
[facility] - Med Surg ([address] Medicine  Progress Note    Patient Name: Amirah Davey  MRN: [id number]  Patient Class: IP- Inpatient   Admission Date: 6/29/2024  Length of Stay: 4 days  Attending Physician: David Mayorga MD  Primary Care Provider: Ivana Templeton MD        Subjective:     Principal Problem:PEG tube malfunction        HPI:  Amirah Davey is a 88 y.o. F with PMHx of recent CVA in May with residual L sided weakness, dysphagia and dysarthria, s/p PEG tube placement with recurrent inadvertent dislodgement, HTN, and Afib on eliquis who presents to [facility] for evaluation of PEG tube malfunction. Patient with recent admit for PEG dislogement on 6/12 c/b abdominal wall abscess on CT. IV vancomycin was given and general surgery replaced her PEG tube on 6/17 and no abscess or fluid collection was noted.     She presents today after her inpatient rehab noticed her PEG tube was dislodged. It is unknown when the last time of appropriate placement was. She is AAO to person, place, situation, and year. She denies intentionally removing her PEG tube. Per IPR nursing, the patient had an abdominal binder around her abdomen which was preventing her from removal, which was removed at some point yesterday.     In the ED: VSSAF. Labs grossly unremarkable acutely. CTH and CXR without acute process. Gen surg consulted in the ED and given recurrent inadvertent dislodgement is and unknown timing of dislosgement, recommend NGT placement for enteral nutrition vs long term TPN until patient's mental baseline is acceptable for management of another gastrostomy tube.    Overview/Hospital Course:  Patient is a 88 y.o. F with PMHx of recent CVA in May with residual L sided weakness, dysphagia and dysarthria, s/p PEG tube placement with recurrent inadvertent dislodgement, HTN, and Afib on eliquis who presented to [facility] after PEG tube dislodgement. Patient agitated and altered upon admission. No acute processes on CT-H.  Concern for progressive vascular dementia. This is unfortunately the 3rd time she has dislodged her PEG tube. General surgery consulted and given recurrent PEG dislodging they are recommending against replacement given risk. Discussed with daughters about goals of care moving forward. They would like to pursue appetite stimulants and avoiding NGT or TPN for nutrition. PT/OT recommending inpatient rehab.    Interval History: Pt seen and examined this morning on rounds. EVA. Ate half of breakfast yesterday per daughter but was too lethargic to eat lunch or dinner. Will decrease baclofen back to 5mg as likely culprit of lethargy.      Objective:     Vital Signs (Most Recent):  Temp: 97.4 °F (36.3 °C) (07/03/24 1141)  Pulse: 81 (07/03/24 1141)  Resp: 18 (07/03/24 1141)  BP: (!) 147/81 (07/03/24 1141)  SpO2: 99 % (07/03/24 1141) Vital Signs (24h Range):  Temp:  [97.4 °F (36.3 °C)-98.9 °F (37.2 °C)] 97.4 °F (36.3 °C)  Pulse:  [70-82] 81  Resp:  [16-18] 18  SpO2:  [96 %-100 %] 99 %  BP: (132-179)/(63-81) 147/81     Weight: 67.6 kg (148 lb 15.8 oz)  Body mass index is 30.09 kg/m².    Intake/Output Summary (Last 24 hours) at 7/3/2024 1305  Last data filed at 7/3/2024 1047  Gross per 24 hour   Intake 30 ml   Output 200 ml   Net -170 ml         Physical Exam  Gen: in NAD, appears stated age  Neuro: Left sided weakness;chronic. AAOx3, CN2-12 grossly intact BL; motor, sensory, and strength grossly intact BL  HEENT: NTNC, EOMI, PERRLA, MMM; no thyromegaly or lymphadenopathy; no JVD appreciated  CVS: RRR, no m/r/g; S1/S2 auscultated with no S3 or S4; capillary refill < 2 sec  Resp: lungs CTAB, no w/r/r; no belabored breathing or accessory muscle use appreciated   Abd: PEG site bandaged. BS+ in all 4 quadrants; NTND, soft to palpation; no organomegaly appreciated   Extrem: pulses full, equal, and regular over all 4 extremities; no UE or LE edema BL          Significant Labs: All pertinent labs within the past 24 hours have been  reviewed.    Significant Imaging: I have reviewed all pertinent imaging results/findings within the past 24 hours.    Assessment/Plan:      * PEG tube malfunction  - 88 y.o. F with recent CVA resulting in L hemiplegia and dysphagia s/p PEG tube placement with recurrent inadvertent dislodgement presenting with PEG tub dislodgement  - Gen surg consulted in the ED and given recurrent issue & unknown timing of dislosgement, recommend NGT placement for enteral nutrition vs long term TPN until patient's mental baseline is acceptable for management of another gastrostomy tube  - Conitnue pureed diet with strict aspiration precautions  - Discussed at length with daughter, Kanchan, who plans to present to bedside with her sister, Ashlie, for ongoing GOC discussion prior to NGT placement  - Crush meds in puree  - Continue mIVF  - SLP and RD consulted  - Discussed goals of care moving forward with daughters. They would like to pursue appetite stimulants and pureed diet and avoiding NGT or TPN for nutrition. They understand that she will likely not be able to meet caloric goals given low appetite and dysphagia.   - Increase Megace to 400mg BID  - Increase dronabinol   - Continue to monitor PO intake / calorie counting    Malnutrition of mild degree  - BMI 30  - Poor PO intake 2/2 dysphagia and poor appetite  - No longer with PEG tube and not a candidate for replacement at this time  - Started on appetite stimulants and will continue to monitor PO intake  - Calorie counting    Sacral ulcer, limited to breakdown of skin  - Wound care consulted, appreciate recs  - Barrier cream, q2h turns     Dysphagia due to recent stroke  - Chronic issue from recent CVA  - SLP notes from prior admission reviewed, recommend pureed diet and continued dysphagia therapy   - RD consulted for tube feed recommendations     Cerebrovascular accident (CVA) due to embolism of right middle cerebral artery  L hemiplegia  Reduced mobility  - Chronic issue  -  Continue statin  - Tentative plan to return to [facility] at time of discharge  - PT/OT consulted    Chronic a-fib  Chronic anticoagulation  Patient is currently in sinus rhythm.[id number] Score: 4. Anticoagulation indicated. Anticoagulation done with eliquis .    Hypertension, benign  Chronic, controlled. Latest blood pressure and vitals reviewed-     Temp:  [97.6 °F (36.4 °C)-98.6 °F (37 °C)]   Pulse:  [70-73]   Resp:  [17-18]   BP: (138-192)/(63-86)   SpO2:  [92 %-100 %] .   Home meds for hypertension were reviewed and noted below.   Hypertension Medications               amLODIPine (NORVASC) 5 MG tablet 1 tablet (5 mg total) by Per G Tube route once daily.    lisinopriL (PRINIVIL,ZESTRIL) 20 MG tablet 1 tablet (20 mg total) by Per G Tube route once daily. DO NOT TAKE UNTIL YOU SEE YOUR PRIMARY DOCTOR. Rehab can add back if BP becomes elevated. Would start with this one over HCTZ     While in the hospital, will manage blood pressure as follows; Continue home amlodipine, Hold lisinopril     Will utilize p.r.n. blood pressure medication only if patient's blood pressure greater than 180/110 and she develops symptoms such as worsening chest pain or shortness of breath.    - Add losartan 25 for persistently elevated BP's      VTE Risk Mitigation (From admission, onward)           Ordered     apixaban tablet 5 mg  2 times daily         06/29/24 1144                    Discharge Planning   JOAN: 7/5/2024     Code Status: Full Code   Is the patient medically ready for discharge?:     Reason for patient still in hospital (select all that apply): Treatment  Discharge Plan A: Rehab   Discharge Delays: (!) Post-Acute Set-up              David Mayorga MD  Department of Hospital Medicine   [facility] - Med Surg ([place]-[facility])

## 2024-07-03 NOTE — ASSESSMENT & PLAN NOTE
-Pt had Hx of OEG dislodgement 3 times since placed in May after CVA.  -Per gen Sx not a candidate for PEG replacement.  -Gen Surgery rec for NGT/ TPN. Family does not want to pursue these.  -Now on Pureed diet. Oral intake is decreased. Per daughter, sometimes good intake, sometimes not.   -PT/OT rec for high intensity therapies. Daughter would like a chance at therapies at [facility]. Feels consistent therapies would help. Asked for SLP maryam. Will re-assess again.

## 2024-07-03 NOTE — PLAN OF CARE
[facility] - [facility] S[facility] ([facility] [place] [facility])  Discharge Reassessment    Primary Care Provider: Ivana Templeton MD    Expected Discharge Date: 7/5/2024    Reassessment (most recent)       Discharge Reassessment - 07/03/24 1037          Discharge Reassessment    Assessment Type Discharge Planning Reassessment (P)      Did the patient's condition or plan change since previous assessment? Yes (P)      Discharge Plan discussed with: Adult children (P)      Communicated JOAN with patient/caregiver Date not available/Unable to determine (P)      Discharge Plan A Rehab (P)      Discharge Plan B Skilled Nursing Facility (P)      DME Needed Upon Discharge  none (P)      Transition of Care Barriers Mobility (P)      Why the patient remains in the hospital Requires continued medical care (P)         Post-Acute Status    Post-Acute Authorization Placement (P)      Post-Acute Placement Status Referrals Sent (P)      Coverage [facility] (P)      Discharge Delays Post-Acute Set-up (P)                  CM spoke with pt's daughter Ashlie at bedside.  Discussed rehab vs SNF vs home with family vs NH placement.  Ashlie states they can't care for pt at home, they don't want NH placement.  Ashlie wants to pursue rehab, wasn't happy with [facility], wants to pursue [facility].  CM discussed that pt may not qualify for rehab LOC.  CG wants to pursue anyway.  CM ordered PMR consult, sent referral to [facility] via CP.    SHIRA FoxN, BS, RN, CCM      Discharge Plan A and Plan B have been determined by review of patient's clinical status, future medical and therapeutic needs, and coverage/benefits for post-acute care in coordination with multidisciplinary team members.

## 2024-07-03 NOTE — SUBJECTIVE & OBJECTIVE
Interval History: Pt seen and examined this morning on rounds. VEA. Ate half of breakfast yesterday per daughter but was too lethargic to eat lunch or dinner. Will decrease baclofen back to 5mg as likely culprit of lethargy.      Objective:     Vital Signs (Most Recent):  Temp: 97.4 °F (36.3 °C) (07/03/24 1141)  Pulse: 81 (07/03/24 1141)  Resp: 18 (07/03/24 1141)  BP: (!) 147/81 (07/03/24 1141)  SpO2: 99 % (07/03/24 1141) Vital Signs (24h Range):  Temp:  [97.4 °F (36.3 °C)-98.9 °F (37.2 °C)] 97.4 °F (36.3 °C)  Pulse:  [70-82] 81  Resp:  [16-18] 18  SpO2:  [96 %-100 %] 99 %  BP: (132-179)/(63-81) 147/81     Weight: 67.6 kg (148 lb 15.8 oz)  Body mass index is 30.09 kg/m².    Intake/Output Summary (Last 24 hours) at 7/3/2024 1305  Last data filed at 7/3/2024 1047  Gross per 24 hour   Intake 30 ml   Output 200 ml   Net -170 ml         Physical Exam  Gen: in NAD, appears stated age  Neuro: Left sided weakness;chronic. AAOx3, CN2-12 grossly intact BL; motor, sensory, and strength grossly intact BL  HEENT: NTNC, EOMI, PERRLA, MMM; no thyromegaly or lymphadenopathy; no JVD appreciated  CVS: RRR, no m/r/g; S1/S2 auscultated with no S3 or S4; capillary refill < 2 sec  Resp: lungs CTAB, no w/r/r; no belabored breathing or accessory muscle use appreciated   Abd: PEG site bandaged. BS+ in all 4 quadrants; NTND, soft to palpation; no organomegaly appreciated   Extrem: pulses full, equal, and regular over all 4 extremities; no UE or LE edema BL          Significant Labs: All pertinent labs within the past 24 hours have been reviewed.    Significant Imaging: I have reviewed all pertinent imaging results/findings within the past 24 hours.

## 2024-07-03 NOTE — ASSESSMENT & PLAN NOTE
- 88 y.o. F with recent CVA resulting in L hemiplegia and dysphagia s/p PEG tube placement with recurrent inadvertent dislodgement presenting with PEG tub dislodgement  - Gen surg consulted in the ED and given recurrent issue & unknown timing of dislosgement, recommend NGT placement for enteral nutrition vs long term TPN until patient's mental baseline is acceptable for management of another gastrostomy tube  - Conitnue pureed diet with strict aspiration precautions  - Discussed at length with daughter, Kanchan, who plans to present to bedside with her sister, Ashlie, for ongoing GOC discussion prior to NGT placement  - Crush meds in puree  - Continue mIVF  - SLP and RD consulted  - Discussed goals of care moving forward with daughters. They would like to pursue appetite stimulants and pureed diet and avoiding NGT or TPN for nutrition. They understand that she will likely not be able to meet caloric goals given low appetite and dysphagia.   - Increase Megace to 400mg BID  - Increase dronabinol   - Continue to monitor PO intake / calorie counting

## 2024-07-03 NOTE — CONSULTS
Inpatient consult to Physical Medicine Rehab  Consult performed by: Alina Maynard NP  Consult ordered by: David Mayorga MD      Consult received.      Alina Maynard NP  Physical Medicine & Rehabilitation   07/03/2024

## 2024-07-04 LAB
ANION GAP SERPL CALC-SCNC: 9 MMOL/L (ref 8–16)
BUN SERPL-MCNC: 16 MG/DL (ref 8–23)
CALCIUM SERPL-MCNC: 8.9 MG/DL (ref 8.7–10.5)
CHLORIDE SERPL-SCNC: 118 MMOL/L (ref 95–110)
CO2 SERPL-SCNC: 16 MMOL/L (ref 23–29)
CREAT SERPL-MCNC: 1 MG/DL (ref 0.5–1.4)
EST. GFR  (NO RACE VARIABLE): 54.2 ML/MIN/1.73 M^2
GLUCOSE SERPL-MCNC: 132 MG/DL (ref 70–110)
MAGNESIUM SERPL-MCNC: 1.9 MG/DL (ref 1.6–2.6)
POTASSIUM SERPL-SCNC: 3.5 MMOL/L (ref 3.5–5.1)
SODIUM SERPL-SCNC: 143 MMOL/L (ref 136–145)

## 2024-07-04 PROCEDURE — 97535 SELF CARE MNGMENT TRAINING: CPT

## 2024-07-04 PROCEDURE — 63600175 PHARM REV CODE 636 W HCPCS: Mod: JZ,JG

## 2024-07-04 PROCEDURE — 92610 EVALUATE SWALLOWING FUNCTION: CPT

## 2024-07-04 PROCEDURE — 36415 COLL VENOUS BLD VENIPUNCTURE: CPT | Performed by: INTERNAL MEDICINE

## 2024-07-04 PROCEDURE — 83735 ASSAY OF MAGNESIUM: CPT | Performed by: INTERNAL MEDICINE

## 2024-07-04 PROCEDURE — 11000001 HC ACUTE MED/SURG PRIVATE ROOM

## 2024-07-04 PROCEDURE — S0179 MEGESTROL 20 MG: HCPCS | Performed by: INTERNAL MEDICINE

## 2024-07-04 PROCEDURE — 80048 BASIC METABOLIC PNL TOTAL CA: CPT | Performed by: INTERNAL MEDICINE

## 2024-07-04 PROCEDURE — 25000003 PHARM REV CODE 250

## 2024-07-04 PROCEDURE — 63600175 PHARM REV CODE 636 W HCPCS: Performed by: INTERNAL MEDICINE

## 2024-07-04 PROCEDURE — 25000003 PHARM REV CODE 250: Performed by: INTERNAL MEDICINE

## 2024-07-04 RX ORDER — HYDRALAZINE HYDROCHLORIDE 25 MG/1
25 TABLET, FILM COATED ORAL EVERY 6 HOURS PRN
Status: DISCONTINUED | OUTPATIENT
Start: 2024-07-04 | End: 2024-07-09 | Stop reason: HOSPADM

## 2024-07-04 RX ORDER — OLANZAPINE 10 MG/2ML
2.5 INJECTION, POWDER, FOR SOLUTION INTRAMUSCULAR ONCE AS NEEDED
Status: COMPLETED | OUTPATIENT
Start: 2024-07-04 | End: 2024-07-04

## 2024-07-04 RX ORDER — OXYCODONE HYDROCHLORIDE 5 MG/1
5 TABLET ORAL ONCE
Status: DISCONTINUED | OUTPATIENT
Start: 2024-07-04 | End: 2024-07-04

## 2024-07-04 RX ORDER — ACETAMINOPHEN 500 MG
1000 TABLET ORAL ONCE
Status: COMPLETED | OUTPATIENT
Start: 2024-07-04 | End: 2024-07-04

## 2024-07-04 RX ADMIN — DICYCLOMINE HYDROCHLORIDE 10 MG: 10 CAPSULE ORAL at 04:07

## 2024-07-04 RX ADMIN — DRONABINOL 10 MG: 2.5 CAPSULE ORAL at 08:07

## 2024-07-04 RX ADMIN — LOSARTAN POTASSIUM 25 MG: 25 TABLET, FILM COATED ORAL at 06:07

## 2024-07-04 RX ADMIN — Medication 6 MG: at 08:07

## 2024-07-04 RX ADMIN — OXYCODONE HYDROCHLORIDE 5 MG: 5 TABLET ORAL at 05:07

## 2024-07-04 RX ADMIN — BACLOFEN 5 MG: 5 TABLET ORAL at 08:07

## 2024-07-04 RX ADMIN — DICYCLOMINE HYDROCHLORIDE 10 MG: 10 CAPSULE ORAL at 01:07

## 2024-07-04 RX ADMIN — BACLOFEN 5 MG: 5 TABLET ORAL at 09:07

## 2024-07-04 RX ADMIN — MEGESTROL ACETATE 400 MG: 400 SUSPENSION ORAL at 08:07

## 2024-07-04 RX ADMIN — APIXABAN 5 MG: 5 TABLET, FILM COATED ORAL at 08:07

## 2024-07-04 RX ADMIN — APIXABAN 5 MG: 5 TABLET, FILM COATED ORAL at 09:07

## 2024-07-04 RX ADMIN — FAMOTIDINE 40 MG: 20 TABLET ORAL at 09:07

## 2024-07-04 RX ADMIN — MICONAZOLE NITRATE: 20 OINTMENT TOPICAL at 08:07

## 2024-07-04 RX ADMIN — OXYCODONE HYDROCHLORIDE 5 MG: 5 TABLET ORAL at 03:07

## 2024-07-04 RX ADMIN — DEXTROSE AND SODIUM CHLORIDE: 5; 900 INJECTION, SOLUTION INTRAVENOUS at 02:07

## 2024-07-04 RX ADMIN — AMLODIPINE BESYLATE 10 MG: 10 TABLET ORAL at 09:07

## 2024-07-04 RX ADMIN — OXYCODONE HYDROCHLORIDE 5 MG: 5 TABLET ORAL at 10:07

## 2024-07-04 RX ADMIN — DICYCLOMINE HYDROCHLORIDE 10 MG: 10 CAPSULE ORAL at 08:07

## 2024-07-04 RX ADMIN — OXYCODONE HYDROCHLORIDE 5 MG: 5 TABLET ORAL at 09:07

## 2024-07-04 RX ADMIN — MEGESTROL ACETATE 400 MG: 400 SUSPENSION ORAL at 09:07

## 2024-07-04 RX ADMIN — ACETAMINOPHEN 1000 MG: 500 TABLET ORAL at 06:07

## 2024-07-04 RX ADMIN — OLANZAPINE 2.5 MG: 10 INJECTION, POWDER, FOR SOLUTION INTRAMUSCULAR at 02:07

## 2024-07-04 RX ADMIN — MICONAZOLE NITRATE: 20 OINTMENT TOPICAL at 09:07

## 2024-07-04 RX ADMIN — DICYCLOMINE HYDROCHLORIDE 10 MG: 10 CAPSULE ORAL at 09:07

## 2024-07-04 RX ADMIN — ATORVASTATIN CALCIUM 40 MG: 40 TABLET, FILM COATED ORAL at 09:07

## 2024-07-04 RX ADMIN — DRONABINOL 10 MG: 2.5 CAPSULE ORAL at 09:07

## 2024-07-04 NOTE — ASSESSMENT & PLAN NOTE
88 y.o. F with recent CVA resulting in L hemiplegia and dysphagia s/p PEG tube placement with recurrent inadvertent dislodgement presenting with PEG tub dislodgement. Gen surg consulted in the ED and given recurrent issue & unknown timing of dislosgement, recommend NGT placement for enteral nutrition vs long term TPN until patient's mental baseline is acceptable for management of another gastrostomy tube  - Conitnue pureed diet with strict aspiration precautions  - Crush meds in puree  - Continue mIVF  - SLP and RD consulted  - Discussed goals of care moving forward with daughters. They would like to pursue appetite stimulants and pureed diet and avoiding NGT or TPN for nutrition. They understand that she will likely not be able to meet caloric goals given low appetite and dysphagia.   - Increase Megace to 400mg BID  - Increase dronabinol   - Continue to monitor PO intake / calorie counting

## 2024-07-04 NOTE — ASSESSMENT & PLAN NOTE
Chronic issue from recent CVA  - SLP notes from prior admission reviewed, recommend pureed diet and continued dysphagia therapy   - RD consulted for tube feed recommendations

## 2024-07-04 NOTE — ASSESSMENT & PLAN NOTE
Chronic anticoagulation  Patient is currently in sinus rhythm.[id number] Score: 4. Anticoagulation indicated. Anticoagulation done with eliquis .

## 2024-07-04 NOTE — PT/OT/SLP EVAL
Speech Language Pathology Evaluation  Bedside Swallow/Discharge Summary     Patient Name:  Amirah Davey   MRN:  [id number]  Admitting Diagnosis: PEG tube malfunction    Recommendations:                 General Recommendations:  Dysphagia therapy  Diet recommendations:  Puree Diet - IDDSI Level 4, Thin liquids - IDDSI Level 0   Aspiration Precautions:    1 bite/sip at a time, Check for pocketing/oral residue, Continue alternate means of nutrition, Double swallow with each bite/sip, Eliminate distractions, Feed only when awake/alert, HOB to 90 degrees, Meds crushed in puree, Small bites/sips, and Strict aspiration precautions   General Precautions: Standard,    Communication strategies:  none    Assessment:     Amirah Davey is a 88 y.o. female with an SLP diagnosis of Dysphagia as new baseline and remains safe to continue her previously recommended and tolerated diet of purees and thin liquids. No further skilled speech service follow up warranted at this time.   History:     Past Medical History:   Diagnosis Date    A-fib     Cerebrovascular accident (CVA) due to embolism of right middle cerebral artery 05/23/2024    Current use of long term anticoagulation     on Xarelto    Dysphagia due to recent stroke 05/23/2024    Hypertension     Stroke 03/20/2013       Past Surgical History:   Procedure Laterality Date    CARDIAC PACEMAKER PLACEMENT  09/25/2019    [employer] Model number [id number]  Serial number [id number] device MRI conditional for 1.5 Estela magnets    CATARACT EXTRACTION W/  INTRAOCULAR LENS IMPLANT Right 05/01/2017    Dr. Case    CATARACT EXTRACTION W/  INTRAOCULAR LENS IMPLANT Left 05/29/2017    Dr. Case    ESOPHAGOGASTRODUODENOSCOPY W/ PEG N/A 5/29/2024    Procedure: EGD, WITH PEG TUBE INSERTION;  Surgeon: Imer Minaya MD;  Location: [facility] OR [address] [place] [address];  Service: General;  Laterality: N/A;    EYE SURGERY      HYSTERECTOMY      INSERTION, PEG TUBE N/A 5/27/2024    Procedure: INSERTION, PEG TUBE;   Surgeon: Ivette Lvoe MD;  Location: [facility] ENDO (2ND FLR);  Service: Endoscopy;  Laterality: N/A;    INSERTION, PEG TUBE N/A 6/17/2024    Procedure: INSERTION, PEG TUBE;  Surgeon: Chloé Sher MD;  Location: [facility] OR 2ND FLR;  Service: General;  Laterality: N/A;       HPI: Per chart review, Amirah Davey is a 88 y.o. F with PMHx of recent CVA in May with residual L sided weakness, dysphagia and dysarthria, s/p PEG tube placement with recurrent inadvertent dislodgement, HTN, and Afib on eliquis who [place] [facility] [place] malfunction. Patient with recent admit for PEG dislogement on 6/12 c/b abdominal wall abscess on CT. IV vancomycin was given and general surgery replaced her PEG tube on 6/17 and no abscess or fluid collection was noted. Pt presented again this admit for PEG tube dislodgement. CTH and CXR were w/o acute process. Pt was evaluated by general surgery this admit and recommended NGT and or TPN and not replacing PEG given pt's repeated dislodgement of PEG. GOC was discussed with pt's family per HM and they would like to avoid NGT and PEG.       Prior Intubation HX: none this admit    Modified Barium Swallow: 5/22/24  IMPRESSIONS: Pt demonstrates severe oropharyngeal dysphagia c/b dcr'd labial seal with anterior spillage, dcr'd lingual strength with uncoordinated bolus control/formation and AP transit with premature spillage, and dcr'd oral clearance noted across all consistencies. Delayed swallow with dcr'd laryngeal elevation and excursion, reduced epiglottic inversion, and dcr'd laryngeal closure across all consistencies -- pt with frequent episodes of laryngeal penetration and/or aspiration during the swallow (occasionally silent aspiration), despite swallowing maneuvers. However, SLP unsure if pt performed supraglottic swallow consistently/appropriately given inconsistent results noted. Dcr'd tongue base retraction, dcr'd pharyngeal constriction, and reduced PES opening  "(all of which could be in part 2/2 posterior pharyngeal wall edema noted) with mild-moderate residue spread throughout pharyngeal cavity, though mostly in vallecula. Episodes of retrograde movement and backflow observed with x2 episodes of backflow from UES into pharynx for mildly thick liquids and puree textures.     RECOMMENDATIONS: SLP will continue to follow and treat pt to address dysphagia, as well as cognitive deficits and dysarthria. Continue NPO with consideration for PEG placement for primary means of nutrition, hydration and medication. Pt may have x2-3 ice chips/hr for comfort/pleasure when given by RN and/or family. Must be sitting upright and perform several swallows per ice chip. Discontinue if overt s/s of aspiration noted. Continue frequent oral care.     FEES: Per discussion with pt and family, pt underwent FEES on 6/12/24 and was cleared for puree/thin textures per family report. Full FEES assessment not present within EMR or Care Everywhere at this time       Subjective     Pt well known to speech service from prior admissions and assessments.Pt most recenlty seen by our speech service 6/19 and discharged on her current diet pureed solids and thin liquids.        Pt awake and pleasant "I'm cold"   Daughter at the bedside     Pain/Comfort:  Pain Rating 1: 0/10  Pain Rating Post-Intervention 1: 0/10    Respiratory Status: Room air    Objective:     Oral Musculature Evaluation  Oral Musculature: general weakness  Dentition: edentulous  Volitional Cough: strong  Volitional Swallow: prompt  Voice Prior to PO Intake: strong and clear    Bedside Swallow Eval:   Consistencies Assessed:  Thin liquids: clinician-fed by open cup x2  Puree: clinician-fed by the 1/2 tsp of yogurt x3  Pt refusing all additional trials /2 to feeling "too cold" SLP provided pt with blanket however continued to refuse AM meal at the bedside     Oral Phase:   WFL  Slow oral transit time     Pharyngeal Phase:   No overt or subtle " clinical signs of aspiration appreciated with PO trials this date     Compensatory Strategies  None    Treatment:  Education provided to Pt re: SLP role in acute care setting, overall impressions and therapeutic goals. Whiteboard updated.    Pt PO intake largely dependent on interest and decreased overall appetite. Pt well known to speech service from prior admissions and she is safe to continue with her new baseline diet of purees and thin liquids as interested. Given limited intake and interest in PO as well as deficits post stroke she does not appear ready for any progression beyond her current diet. Daughter aware and in agreement with plan. Following further discussion with daughter reporting interest in supplemental nutritious beverages and SLP communicated concerns for caloric intake with medical team. No further skilled speech services warranted at this time.     Goals:   Multidisciplinary Problems       SLP Goals       Not on file                    Plan:     Patient to be seen:      Plan of Care expires:     Plan of Care reviewed with:      SLP Follow-Up:  No       Discharge recommendations:      Barriers to Discharge:  Level of Skilled Assistance Needed      Time Tracking:     SLP Treatment Date:   07/04/24  Speech Start Time:  0812  Speech Stop Time:  0826     Speech Total Time (min):  14 min    Billable Minutes: Eval Swallow and Oral Function 10 and Self Care/Home Management Training 14      07/04/2024

## 2024-07-04 NOTE — PLAN OF CARE
Problem: Adult Inpatient Plan of Care  Goal: Absence of Hospital-Acquired Illness or Injury  Outcome: Progressing  Intervention: Identify and Manage Fall Risk  Flowsheets (Taken 7/4/2024 1851)  Safety Promotion/Fall Prevention:   assistive device/personal item within reach   bed alarm set   bedside commode chair   commode/urinal/bedpan at bedside   diversional activities provided   Fall Risk reviewed with patient/family   Fall Risk signage in place   family to remain at bedside   high risk medications identified   in recliner, wheels locked   lighting adjusted   nonskid shoes/socks when out of bed   muscle strengthening facilitated   pulse ox   /camera at bedside   room near unit station   instructed to call staff for mobility   supervised activity  Intervention: Prevent Skin Injury  Flowsheets (Taken 7/4/2024 1851)  Body Position:   sitting up in bed   position changed independently   turned  Skin Protection:   hydrocolloids used   incontinence pads utilized   pulse oximeter probe site changed  Device Skin Pressure Protection:   absorbent pad utilized/changed   adhesive use limited   pressure points protected   positioning supports utilized  Intervention: Prevent and Manage VTE (Venous Thromboembolism) Risk  Flowsheets (Taken 7/4/2024 1851)  VTE Prevention/Management:   remove, assess skin, and reapply sequential compression device   ambulation promoted   bleeding precations maintained   bleeding risk assessed   bleeding risk factor(s) identified, provider notified   dorsiflexion/plantar flexion performed   fluids promoted   ROM (active) performed  Intervention: Prevent Infection  Flowsheets (Taken 7/4/2024 1851)  Infection Prevention:   cohorting utilized   hand hygiene promoted   single patient room provided   environmental surveillance performed   equipment surfaces disinfected   rest/sleep promoted  Goal: Optimal Comfort and Wellbeing  Outcome: Progressing  Intervention: Monitor Pain and  Promote Comfort  Flowsheets (Taken 7/4/2024 1851)  Pain Management Interventions:   around-the-clock dosing utilized   awakened for pain meds per patient request   breathing exercises utilized   care clustered   cold applied   diversional activity provided   heat applied   medication offered   medicated cooling pads   pain management plan reviewed with patient/caregiver   pillow support provided   position adjusted   premedicated for activity   quiet environment facilitated   relaxation techniques promoted   warm blanket provided  Intervention: Provide Person-Centered Care  Flowsheets (Taken 7/4/2024 1851)  Trust Relationship/Rapport:   care explained   questions answered   choices provided   questions encouraged   reassurance provided   empathic listening provided   emotional support provided   thoughts/feelings acknowledged     Problem: Infection  Goal: Absence of Infection Signs and Symptoms  Outcome: Progressing  Intervention: Prevent or Manage Infection  Flowsheets (Taken 7/4/2024 1851)  Fever Reduction/Comfort Measures:   aerosol temperature decreased   fluid intake increased   lightweight clothing   lightweight bedding  Infection Management: aseptic technique maintained  Isolation Precautions: protective     Problem: Acute Kidney Injury/Impairment  Goal: Fluid and Electrolyte Balance  Outcome: Progressing  Intervention: Monitor and Manage Fluid and Electrolyte Balance  Flowsheets (Taken 7/4/2024 1851)  Fluid/Electrolyte Management:   electrolyte-binding therapy initiated   fluids adjusted   fluids provided   intravenous fluids adjusted   intravenous fluid replacement initiated  Goal: Improved Oral Intake  Outcome: Progressing  Intervention: Promote and Optimize Oral Intake  Flowsheets (Taken 7/4/2024 1851)  Oral Nutrition Promotion:   adaptive equipment use encouraged   nutrition counseling provided   social interaction promoted   physical activity promoted   rest periods promoted   calorie-dense foods  provided   calorie-dense liquids provided  Nutrition Interventions:   diet advanced   food preferences provided   frequent small meals provided   supplemental foods provided   supplemental drinks provided   meals from home/family encouraged   meal set-up provided   referred to nutrition assistant/tech   referred to dietitian     Problem: Wound  Goal: Optimal Pain Control and Function  Outcome: Progressing  Intervention: Prevent or Manage Pain  Flowsheets (Taken 7/4/2024 1851)  Sleep/Rest Enhancement:   awakenings minimized   consistent schedule promoted   natural light exposure provided   regular sleep/rest pattern promoted   music provided   noise level reduced   relaxation techniques promoted   reading promoted   room darkened   family presence promoted   massage given   therapeutic touch utilized  Pain Management Interventions:   around-the-clock dosing utilized   awakened for pain meds per patient request   breathing exercises utilized   care clustered   cold applied   diversional activity provided   heat applied   medication offered   medicated cooling pads   pain management plan reviewed with patient/caregiver   pillow support provided   position adjusted   premedicated for activity   quiet environment facilitated   relaxation techniques promoted   warm blanket provided     Problem: Fall Injury Risk  Goal: Absence of Fall and Fall-Related Injury  Outcome: Progressing  Intervention: Identify and Manage Contributors  Flowsheets (Taken 7/4/2024 1851)  Self-Care Promotion:   independence encouraged   meal set-up provided   adaptive equipment use encouraged   BADL personal objects within reach   BADL personal routines maintained  Medication Review/Management:   medications reviewed   high-risk medications identified   pharmacy consulted   provider consulted     Problem: Skin Injury Risk Increased  Goal: Skin Health and Integrity  Intervention: Optimize Skin Protection  Flowsheets (Taken 7/4/2024 1851)  Pressure  Reduction Techniques:   frequent weight shift encouraged   heels elevated off bed   positioned off wounds   pressure points protected   weight shift assistance provided  Pressure Reduction Devices:   foam padding utilized   positioning supports utilized   specialty bed utilized   pressure-redistributing mattress utilized  Skin Protection:   hydrocolloids used   incontinence pads utilized   pulse oximeter probe site changed  Activity Management:   Rolling - L1   Arm raise - L1   Straight leg raise - L1   Sitting at edge of bed - L2  Head of Bed (HOB) Positioning: HOB elevated  Intervention: Promote and Optimize Oral Intake  Flowsheets (Taken 7/4/2024 1851)  Oral Nutrition Promotion:   adaptive equipment use encouraged   nutrition counseling provided   social interaction promoted   physical activity promoted   rest periods promoted   calorie-dense foods provided   calorie-dense liquids provided  Nutrition Interventions:   diet advanced   food preferences provided   frequent small meals provided   supplemental foods provided   supplemental drinks provided   meals from home/family encouraged   meal set-up provided   referred to nutrition assistant/tech   referred to dietitian

## 2024-07-04 NOTE — ASSESSMENT & PLAN NOTE
Chronic, controlled. Latest blood pressure and vitals reviewed-     Temp:  [97.5 °F (36.4 °C)-98.9 °F (37.2 °C)]   Pulse:  [69-82]   Resp:  [16-20]   BP: (150-178)/(65-82)   SpO2:  [95 %-99 %] .   Home meds for hypertension were reviewed and noted below.   Hypertension Medications               amLODIPine (NORVASC) 5 MG tablet 1 tablet (5 mg total) by Per G Tube route once daily.    lisinopriL (PRINIVIL,ZESTRIL) 20 MG tablet 1 tablet (20 mg total) by Per G Tube route once daily. DO NOT TAKE UNTIL YOU SEE YOUR PRIMARY DOCTOR. Rehab can add back if BP becomes elevated. Would start with this one over HCTZ     While in the hospital, will manage blood pressure as follows; Continue home amlodipine, Hold lisinopril     Will utilize p.r.n. blood pressure medication only if patient's blood pressure greater than 180/110 and she develops symptoms such as worsening chest pain or shortness of breath.    - Add losartan 25 for persistently elevated BP's

## 2024-07-04 NOTE — SUBJECTIVE & OBJECTIVE
Interval History: patient agitated overnight requiring IV prn and crying out in pain. This morning she is sedated, likely secondary to lack of sleep and medication.     Review of Systems  Objective:     Vital Signs (Most Recent):  Temp: 97.5 °F (36.4 °C) (07/04/24 1146)  Pulse: 69 (07/04/24 1146)  Resp: 18 (07/04/24 1146)  BP: (!) 155/82 (07/04/24 1146)  SpO2: 99 % (07/04/24 1146) Vital Signs (24h Range):  Temp:  [97.5 °F (36.4 °C)-98.9 °F (37.2 °C)] 97.5 °F (36.4 °C)  Pulse:  [69-82] 69  Resp:  [16-20] 18  SpO2:  [95 %-99 %] 99 %  BP: (150-178)/(65-82) 155/82     Weight: 67.6 kg (148 lb 15.8 oz)  Body mass index is 30.09 kg/m².    Intake/Output Summary (Last 24 hours) at 7/4/2024 1414  Last data filed at 7/4/2024 1330  Gross per 24 hour   Intake 30 ml   Output 600 ml   Net -570 ml         Physical Exam  Vitals and nursing note reviewed.   Constitutional:       General: She is not in acute distress.     Appearance: She is ill-appearing.   HENT:      Head: Normocephalic.   Eyes:      General: No scleral icterus.  Cardiovascular:      Rate and Rhythm: Normal rate and regular rhythm.      Heart sounds: Normal heart sounds. No murmur heard.  Pulmonary:      Effort: No respiratory distress.      Breath sounds: Normal breath sounds. No wheezing.   Abdominal:      General: There is no distension.      Palpations: Abdomen is soft.      Tenderness: There is no abdominal tenderness.      Comments: PEG site bandaged   Musculoskeletal:         General: No tenderness.      Right lower leg: No edema.      Left lower leg: No edema.   Skin:     General: Skin is warm and dry.   Neurological:      Mental Status: She is lethargic.      GCS: GCS eye subscore is 3. GCS verbal subscore is 4. GCS motor subscore is 5.      Comments: Chronic left sided weakness   Psychiatric:      Comments: Unable to evaluate             Significant Labs: All pertinent labs within the past 24 hours have been reviewed.    Significant Imaging: I have reviewed  all pertinent imaging results/findings within the past 24 hours.

## 2024-07-04 NOTE — ASSESSMENT & PLAN NOTE
BMI 30. Poor PO intake 2/2 dysphagia and poor appetite. No longer with PEG tube and not a candidate for replacement at this time  - Started on appetite stimulants and will continue to monitor PO intake  - Calorie counting

## 2024-07-04 NOTE — PROGRESS NOTES
[facility] ([address] Medicine  Progress Note    Patient Name: Amirah Davey  MRN: [id number]  Patient Class: IP- Inpatient   Admission Date: 6/29/2024  Length of Stay: 5 days  Attending Physician: Leann Barton MD  Primary Care Provider: Ivana Templeton MD        Subjective:     Principal Problem:PEG tube malfunction        HPI:  Amirah Davey is a 88 y.o. F with PMHx of recent CVA in May with residual L sided weakness, dysphagia and dysarthria, s/p PEG tube placement with recurrent inadvertent dislodgement, HTN, and Afib on eliquis who presents to [facility] for evaluation of PEG tube malfunction. Patient with recent admit for PEG dislogement on 6/12 c/b abdominal wall abscess on CT. IV vancomycin was given and general surgery replaced her PEG tube on 6/17 and no abscess or fluid collection was noted.     She presents today after her inpatient rehab noticed her PEG tube was dislodged. It is unknown when the last time of appropriate placement was. She is AAO to person, place, situation, and year. She denies intentionally removing her PEG tube. Per IPR nursing, the patient had an abdominal binder around her abdomen which was preventing her from removal, which was removed at some point yesterday.     In the ED: VSSAF. Labs grossly unremarkable acutely. CTH and CXR without acute process. Gen surg consulted in the ED and given recurrent inadvertent dislodgement is and unknown timing of dislosgement, recommend NGT placement for enteral nutrition vs long term TPN until patient's mental baseline is acceptable for management of another gastrostomy tube.    Overview/Hospital Course:  Patient is a 88 y.o. F with PMHx of recent CVA in May with residual L sided weakness, dysphagia and dysarthria, s/p PEG tube placement with recurrent inadvertent dislodgement, HTN, and Afib on eliquis who presented to [facility] after PEG tube dislodgement. Patient agitated and altered upon admission. No acute processes on CT-H.  Concern for progressive vascular dementia. This is unfortunately the 3rd time she has dislodged her PEG tube. General surgery consulted and given recurrent PEG dislodging they are recommending against replacement given risk. Discussed with daughters about goals of care moving forward. They would like to pursue appetite stimulants and avoiding NGT or TPN for nutrition. PT/OT recommending inpatient rehab.    Interval History: patient agitated overnight requiring IV prn and crying out in pain. This morning she is sedated, likely secondary to lack of sleep and medication.     Review of Systems  Objective:     Vital Signs (Most Recent):  Temp: 97.5 °F (36.4 °C) (07/04/24 1146)  Pulse: 69 (07/04/24 1146)  Resp: 18 (07/04/24 1146)  BP: (!) 155/82 (07/04/24 1146)  SpO2: 99 % (07/04/24 1146) Vital Signs (24h Range):  Temp:  [97.5 °F (36.4 °C)-98.9 °F (37.2 °C)] 97.5 °F (36.4 °C)  Pulse:  [69-82] 69  Resp:  [16-20] 18  SpO2:  [95 %-99 %] 99 %  BP: (150-178)/(65-82) 155/82     Weight: 67.6 kg (148 lb 15.8 oz)  Body mass index is 30.09 kg/m².    Intake/Output Summary (Last 24 hours) at 7/4/2024 1414  Last data filed at 7/4/2024 1330  Gross per 24 hour   Intake 30 ml   Output 600 ml   Net -570 ml         Physical Exam  Vitals and nursing note reviewed.   Constitutional:       General: She is not in acute distress.     Appearance: She is ill-appearing.   HENT:      Head: Normocephalic.   Eyes:      General: No scleral icterus.  Cardiovascular:      Rate and Rhythm: Normal rate and regular rhythm.      Heart sounds: Normal heart sounds. No murmur heard.  Pulmonary:      Effort: No respiratory distress.      Breath sounds: Normal breath sounds. No wheezing.   Abdominal:      General: There is no distension.      Palpations: Abdomen is soft.      Tenderness: There is no abdominal tenderness.      Comments: PEG site bandaged   Musculoskeletal:         General: No tenderness.      Right lower leg: No edema.      Left lower leg: No  edema.   Skin:     General: Skin is warm and dry.   Neurological:      Mental Status: She is lethargic.      GCS: GCS eye subscore is 3. GCS verbal subscore is 4. GCS motor subscore is 5.      Comments: Chronic left sided weakness   Psychiatric:      Comments: Unable to evaluate             Significant Labs: All pertinent labs within the past 24 hours have been reviewed.    Significant Imaging: I have reviewed all pertinent imaging results/findings within the past 24 hours.    Assessment/Plan:      * PEG tube malfunction  88 y.o. F with recent CVA resulting in L hemiplegia and dysphagia s/p PEG tube placement with recurrent inadvertent dislodgement presenting with PEG tub dislodgement. Gen surg consulted in the ED and given recurrent issue & unknown timing of dislosgement, recommend NGT placement for enteral nutrition vs long term TPN until patient's mental baseline is acceptable for management of another gastrostomy tube  - Conitnue pureed diet with strict aspiration precautions  - Crush meds in puree  - Continue mIVF  - SLP and RD consulted  - Discussed goals of care moving forward with daughters. They would like to pursue appetite stimulants and pureed diet and avoiding NGT or TPN for nutrition. They understand that she will likely not be able to meet caloric goals given low appetite and dysphagia.   - Increase Megace to 400mg BID  - Increase dronabinol   - Continue to monitor PO intake / calorie counting    Malnutrition of mild degree   BMI 30. Poor PO intake 2/2 dysphagia and poor appetite. No longer with PEG tube and not a candidate for replacement at this time  - Started on appetite stimulants and will continue to monitor PO intake  - Calorie counting    Sacral ulcer, limited to breakdown of skin   Wound care consulted, appreciate recs  - Barrier cream, q2h turns     Reduced mobility        Dysphagia due to recent stroke  Chronic issue from recent CVA  - SLP notes from prior admission reviewed, recommend  pureed diet and continued dysphagia therapy   - RD consulted for tube feed recommendations     Cerebrovascular accident (CVA) due to embolism of right middle cerebral artery  L hemiplegia  Reduced mobility  - Chronic issue  - Continue statin  - Tentative plan to return to [facility] at time of discharge  - PT/OT consulted    Chronic a-fib  Chronic anticoagulation  Patient is currently in sinus rhythm.[id number] Score: 4. Anticoagulation indicated. Anticoagulation done with eliquis .    Hypertension, benign  Chronic, controlled. Latest blood pressure and vitals reviewed-     Temp:  [97.5 °F (36.4 °C)-98.9 °F (37.2 °C)]   Pulse:  [69-82]   Resp:  [16-20]   BP: (150-178)/(65-82)   SpO2:  [95 %-99 %] .   Home meds for hypertension were reviewed and noted below.   Hypertension Medications               amLODIPine (NORVASC) 5 MG tablet 1 tablet (5 mg total) by Per G Tube route once daily.    lisinopriL (PRINIVIL,ZESTRIL) 20 MG tablet 1 tablet (20 mg total) by Per G Tube route once daily. DO NOT TAKE UNTIL YOU SEE YOUR PRIMARY DOCTOR. Rehab can add back if BP becomes elevated. Would start with this one over HCTZ     While in the hospital, will manage blood pressure as follows; Continue home amlodipine, Hold lisinopril     Will utilize p.r.n. blood pressure medication only if patient's blood pressure greater than 180/110 and she develops symptoms such as worsening chest pain or shortness of breath.    - Add losartan 25 for persistently elevated BP's      VTE Risk Mitigation (From admission, onward)           Ordered     apixaban tablet 5 mg  2 times daily         06/29/24 1144                    Discharge Planning   JOAN: 7/15/2024     Code Status: Full Code   Is the patient medically ready for discharge?:     Reason for patient still in hospital (select all that apply): Patient trending condition  Discharge Plan A: Rehab   Discharge Delays: (!) Post-Acute Set-up              Leann Barton MD  Department of Hospital Medicine    [facility] ([facility])

## 2024-07-05 PROBLEM — E87.29 HYPERCHLOREMIC METABOLIC ACIDOSIS: Status: ACTIVE | Noted: 2024-07-05

## 2024-07-05 LAB
ANION GAP SERPL CALC-SCNC: 10 MMOL/L (ref 8–16)
BUN SERPL-MCNC: 10 MG/DL (ref 8–23)
CALCIUM SERPL-MCNC: 9.1 MG/DL (ref 8.7–10.5)
CHLORIDE SERPL-SCNC: 118 MMOL/L (ref 95–110)
CO2 SERPL-SCNC: 15 MMOL/L (ref 23–29)
CREAT SERPL-MCNC: 0.8 MG/DL (ref 0.5–1.4)
ERYTHROCYTE [DISTWIDTH] IN BLOOD BY AUTOMATED COUNT: 16.3 % (ref 11.5–14.5)
EST. GFR  (NO RACE VARIABLE): >60 ML/MIN/1.73 M^2
GLUCOSE SERPL-MCNC: 113 MG/DL (ref 70–110)
HCT VFR BLD AUTO: 28.6 % (ref 37–48.5)
HGB BLD-MCNC: 9 G/DL (ref 12–16)
MCH RBC QN AUTO: 25.4 PG (ref 27–31)
MCHC RBC AUTO-ENTMCNC: 31.5 G/DL (ref 32–36)
MCV RBC AUTO: 81 FL (ref 82–98)
PLATELET # BLD AUTO: 280 K/UL (ref 150–450)
PMV BLD AUTO: 11.5 FL (ref 9.2–12.9)
POTASSIUM SERPL-SCNC: 3.7 MMOL/L (ref 3.5–5.1)
RBC # BLD AUTO: 3.54 M/UL (ref 4–5.4)
SODIUM SERPL-SCNC: 143 MMOL/L (ref 136–145)
WBC # BLD AUTO: 6.78 K/UL (ref 3.9–12.7)

## 2024-07-05 PROCEDURE — 80048 BASIC METABOLIC PNL TOTAL CA: CPT | Performed by: STUDENT IN AN ORGANIZED HEALTH CARE EDUCATION/TRAINING PROGRAM

## 2024-07-05 PROCEDURE — 63600175 PHARM REV CODE 636 W HCPCS: Performed by: INTERNAL MEDICINE

## 2024-07-05 PROCEDURE — 97112 NEUROMUSCULAR REEDUCATION: CPT | Mod: CQ

## 2024-07-05 PROCEDURE — 25000003 PHARM REV CODE 250: Performed by: STUDENT IN AN ORGANIZED HEALTH CARE EDUCATION/TRAINING PROGRAM

## 2024-07-05 PROCEDURE — 97535 SELF CARE MNGMENT TRAINING: CPT | Mod: CO

## 2024-07-05 PROCEDURE — 80048 BASIC METABOLIC PNL TOTAL CA: CPT | Mod: 91 | Performed by: INTERNAL MEDICINE

## 2024-07-05 PROCEDURE — 85027 COMPLETE CBC AUTOMATED: CPT | Performed by: STUDENT IN AN ORGANIZED HEALTH CARE EDUCATION/TRAINING PROGRAM

## 2024-07-05 PROCEDURE — 97530 THERAPEUTIC ACTIVITIES: CPT | Mod: CO

## 2024-07-05 PROCEDURE — 99232 SBSQ HOSP IP/OBS MODERATE 35: CPT | Mod: ,,, | Performed by: NURSE PRACTITIONER

## 2024-07-05 PROCEDURE — 25000003 PHARM REV CODE 250: Performed by: INTERNAL MEDICINE

## 2024-07-05 PROCEDURE — 97110 THERAPEUTIC EXERCISES: CPT | Mod: CQ

## 2024-07-05 PROCEDURE — 25000003 PHARM REV CODE 250: Performed by: HOSPITALIST

## 2024-07-05 PROCEDURE — 83735 ASSAY OF MAGNESIUM: CPT | Performed by: INTERNAL MEDICINE

## 2024-07-05 PROCEDURE — S0179 MEGESTROL 20 MG: HCPCS | Performed by: INTERNAL MEDICINE

## 2024-07-05 PROCEDURE — 36415 COLL VENOUS BLD VENIPUNCTURE: CPT | Mod: XB | Performed by: INTERNAL MEDICINE

## 2024-07-05 PROCEDURE — 11000001 HC ACUTE MED/SURG PRIVATE ROOM

## 2024-07-05 PROCEDURE — 25000003 PHARM REV CODE 250

## 2024-07-05 PROCEDURE — 36415 COLL VENOUS BLD VENIPUNCTURE: CPT | Performed by: STUDENT IN AN ORGANIZED HEALTH CARE EDUCATION/TRAINING PROGRAM

## 2024-07-05 RX ORDER — LOSARTAN POTASSIUM 50 MG/1
50 TABLET ORAL DAILY
Status: DISCONTINUED | OUTPATIENT
Start: 2024-07-06 | End: 2024-07-09 | Stop reason: HOSPADM

## 2024-07-05 RX ORDER — OLANZAPINE 5 MG/1
5 TABLET, ORALLY DISINTEGRATING ORAL 2 TIMES DAILY PRN
Status: DISCONTINUED | OUTPATIENT
Start: 2024-07-05 | End: 2024-07-09 | Stop reason: HOSPADM

## 2024-07-05 RX ORDER — SODIUM BICARBONATE 650 MG/1
650 TABLET ORAL 2 TIMES DAILY
Status: DISCONTINUED | OUTPATIENT
Start: 2024-07-05 | End: 2024-07-06

## 2024-07-05 RX ORDER — LOSARTAN POTASSIUM 25 MG/1
25 TABLET ORAL ONCE
Status: COMPLETED | OUTPATIENT
Start: 2024-07-05 | End: 2024-07-05

## 2024-07-05 RX ADMIN — OXYCODONE HYDROCHLORIDE 5 MG: 5 TABLET ORAL at 11:07

## 2024-07-05 RX ADMIN — DICYCLOMINE HYDROCHLORIDE 10 MG: 10 CAPSULE ORAL at 04:07

## 2024-07-05 RX ADMIN — OXYCODONE HYDROCHLORIDE 5 MG: 5 TABLET ORAL at 05:07

## 2024-07-05 RX ADMIN — OXYCODONE HYDROCHLORIDE 5 MG: 5 TABLET ORAL at 03:07

## 2024-07-05 RX ADMIN — DICYCLOMINE HYDROCHLORIDE 10 MG: 10 CAPSULE ORAL at 12:07

## 2024-07-05 RX ADMIN — LOSARTAN POTASSIUM 25 MG: 25 TABLET, FILM COATED ORAL at 03:07

## 2024-07-05 RX ADMIN — MEGESTROL ACETATE 400 MG: 400 SUSPENSION ORAL at 08:07

## 2024-07-05 RX ADMIN — MICONAZOLE NITRATE: 20 OINTMENT TOPICAL at 08:07

## 2024-07-05 RX ADMIN — DRONABINOL 10 MG: 2.5 CAPSULE ORAL at 08:07

## 2024-07-05 RX ADMIN — MICONAZOLE NITRATE: 20 OINTMENT TOPICAL at 09:07

## 2024-07-05 RX ADMIN — AMLODIPINE BESYLATE 10 MG: 10 TABLET ORAL at 09:07

## 2024-07-05 RX ADMIN — APIXABAN 5 MG: 5 TABLET, FILM COATED ORAL at 09:07

## 2024-07-05 RX ADMIN — FAMOTIDINE 40 MG: 20 TABLET ORAL at 09:07

## 2024-07-05 RX ADMIN — Medication 6 MG: at 08:07

## 2024-07-05 RX ADMIN — DRONABINOL 10 MG: 2.5 CAPSULE ORAL at 09:07

## 2024-07-05 RX ADMIN — OLANZAPINE 5 MG: 5 TABLET, ORALLY DISINTEGRATING ORAL at 09:07

## 2024-07-05 RX ADMIN — DICYCLOMINE HYDROCHLORIDE 10 MG: 10 CAPSULE ORAL at 08:07

## 2024-07-05 RX ADMIN — SODIUM BICARBONATE 650 MG TABLET 650 MG: at 08:07

## 2024-07-05 RX ADMIN — ATORVASTATIN CALCIUM 40 MG: 40 TABLET, FILM COATED ORAL at 09:07

## 2024-07-05 RX ADMIN — BACLOFEN 5 MG: 5 TABLET ORAL at 09:07

## 2024-07-05 RX ADMIN — APIXABAN 5 MG: 5 TABLET, FILM COATED ORAL at 08:07

## 2024-07-05 RX ADMIN — DICYCLOMINE HYDROCHLORIDE 10 MG: 10 CAPSULE ORAL at 09:07

## 2024-07-05 RX ADMIN — MEGESTROL ACETATE 400 MG: 400 SUSPENSION ORAL at 09:07

## 2024-07-05 RX ADMIN — LOSARTAN POTASSIUM 25 MG: 25 TABLET, FILM COATED ORAL at 09:07

## 2024-07-05 RX ADMIN — BACLOFEN 5 MG: 5 TABLET ORAL at 08:07

## 2024-07-05 NOTE — PROGRESS NOTES
[facility] - Med S[facility] ([address] Medicine  Progress Note    Patient Name: Amirah Davey  MRN: [id number]  Patient Class: IP- Inpatient   Admission Date: 6/29/2024  Length of Stay: 6 days  Attending Physician: Leann Barton MD  Primary Care Provider: Ivana Templeton MD        Subjective:     Principal Problem:PEG tube malfunction        HPI:  Amirah Davey is a 88 y.o. F with PMHx of recent CVA in May with residual L sided weakness, dysphagia and dysarthria, s/p PEG tube placement with recurrent inadvertent dislodgement, HTN, and Afib on eliquis who presents to [facility] for evaluation of PEG tube malfunction. Patient with recent admit for PEG dislogement on 6/12 c/b abdominal wall abscess on CT. IV vancomycin was given and general surgery replaced her PEG tube on 6/17 and no abscess or fluid collection was noted.     She presents today after her inpatient rehab noticed her PEG tube was dislodged. It is unknown when the last time of appropriate placement was. She is AAO to person, place, situation, and year. She denies intentionally removing her PEG tube. Per IPR nursing, the patient had an abdominal binder around her abdomen which was preventing her from removal, which was removed at some point yesterday.     In the ED: VSSAF. Labs grossly unremarkable acutely. CTH and CXR without acute process. Gen surg consulted in the ED and given recurrent inadvertent dislodgement is and unknown timing of dislosgement, recommend NGT placement for enteral nutrition vs long term TPN until patient's mental baseline is acceptable for management of another gastrostomy tube.    Overview/Hospital Course:  Patient is a 88 y.o. F with PMHx of recent CVA in May with residual L sided weakness, dysphagia and dysarthria, s/p PEG tube placement with recurrent inadvertent dislodgement, HTN, and Afib on eliquis who presented to [facility] after PEG tube dislodgement. Patient agitated and altered upon admission. No acute processes on CT-H.  Concern for progressive vascular dementia. This is unfortunately the 3rd time she has dislodged her PEG tube. General surgery consulted and given recurrent PEG dislodging they are recommending against replacement given risk. Discussed with daughters about goals of care moving forward. They would like to pursue appetite stimulants and avoiding NGT or TPN for nutrition. PT/OT recommending inpatient rehab.    Interval History: patient much more alert this morning compared to yesterday. Did require prn pain medication overnight. Per nursing, patient did well with eating lunch and dinner. This morning patient denies all symptoms. Will trial patient off IVF as she has developed worsening acidosis. Continue to encourage PO intake.     Review of Systems  Objective:     Vital Signs (Most Recent):  Temp: 97.8 °F (36.6 °C) (07/05/24 1123)  Pulse: 91 (07/05/24 1123)  Resp: 18 (07/05/24 1515)  BP: (!) 179/93 (07/05/24 1515)  SpO2: 97 % (07/05/24 1123) Vital Signs (24h Range):  Temp:  [97.7 °F (36.5 °C)-98.1 °F (36.7 °C)] 97.8 °F (36.6 °C)  Pulse:  [69-91] 91  Resp:  [17-19] 18  SpO2:  [95 %-99 %] 97 %  BP: (145-179)/(68-93) 179/93     Weight: 67.6 kg (148 lb 15.8 oz)  Body mass index is 30.09 kg/m².    Intake/Output Summary (Last 24 hours) at 7/5/2024 1528  Last data filed at 7/5/2024 1348  Gross per 24 hour   Intake 480 ml   Output 200 ml   Net 280 ml         Physical Exam  Vitals and nursing note reviewed.   Constitutional:       General: She is not in acute distress.     Appearance: She is ill-appearing.   HENT:      Head: Normocephalic.   Eyes:      General: No scleral icterus.  Cardiovascular:      Rate and Rhythm: Normal rate and regular rhythm.      Heart sounds: Normal heart sounds. No murmur heard.  Pulmonary:      Effort: No respiratory distress.      Breath sounds: Normal breath sounds. No wheezing.   Abdominal:      General: There is no distension.      Palpations: Abdomen is soft.      Tenderness: There is no  abdominal tenderness.      Comments: PEG site bandaged   Musculoskeletal:         General: No tenderness.      Right lower leg: No edema.      Left lower leg: No edema.   Skin:     General: Skin is warm and dry.   Neurological:      Mental Status: She is alert.      GCS: GCS eye subscore is 4. GCS verbal subscore is 5. GCS motor subscore is 6.      Comments: Chronic left sided weakness   Psychiatric:         Mood and Affect: Mood normal.         Behavior: Behavior normal. Behavior is cooperative.             Significant Labs: All pertinent labs within the past 24 hours have been reviewed.    Significant Imaging: I have reviewed all pertinent imaging results/findings within the past 24 hours.    Assessment/Plan:      * PEG tube malfunction  88 y.o. F with recent CVA resulting in L hemiplegia and dysphagia s/p PEG tube placement with recurrent inadvertent dislodgement presenting with PEG tub dislodgement. Gen surg consulted in the ED and given recurrent issue & unknown timing of dislosgement, recommend NGT placement for enteral nutrition vs long term TPN until patient's mental baseline is acceptable for management of another gastrostomy tube  - Conitnue pureed diet with strict aspiration precautions  - Crush meds in puree  - s/p mIVF from 7/1-7/5  - SLP and RD consulted  - Discussed goals of care moving forward with daughters. They would like to pursue appetite stimulants and pureed diet and avoiding NGT or TPN for nutrition. They understand that she will likely not be able to meet caloric goals given low appetite and dysphagia.   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 Poor PO intake 2/2 dysphagia and poor appetite. No longer with PEG tube and not a candidate for replacement at this time  - Started  on appetite stimulants and will continue to monitor PO intake  - Calorie counting    Sacral ulcer, limited to breakdown of skin   Wound care consulted, appreciate recs  - Barrier cream, q2h turns     Reduced mobility        Dysphagia due to recent stroke  Chronic issue from recent CVA  - SLP notes from prior admission reviewed, recommend pureed diet and continued dysphagia therapy   - RD consulted for tube feed recommendations     Cerebrovascular accident (CVA) due to embolism of right middle cerebral artery  L hemiplegia  Reduced mobility  - Chronic issue  - Continue statin  - Tentative plan to [facility] at time of discharge  - PT/OT consulted    Chronic a-fib  Chronic anticoagulation  Patient is currently in sinus rhythm.[id number] Score: 4. Anticoagulation indicated. Anticoagulation done with eliquis .    Hypertension, benign  Chronic, controlled. Latest blood pressure and vitals reviewed-     Temp:  [97.5 °F (36.4 °C)-98.9 °F (37.2 °C)]   Pulse:  [69-82]   Resp:  [16-20]   BP: (150-178)/(65-82)   SpO2:  [95 %-99 %] .   Home meds for hypertension were reviewed and noted below.   Hypertension Medications               amLODIPine (NORVASC) 5 MG tablet 1 tablet (5 mg total) by Per G Tube route once daily.    lisinopriL (PRINIVIL,ZESTRIL) 20 MG tablet 1 tablet (20 mg total) by Per G Tube route once daily. DO NOT TAKE UNTIL YOU SEE YOUR PRIMARY DOCTOR. Rehab can add back if BP becomes elevated. Would start with this one over HCTZ     While in the hospital, will manage blood pressure as follows; Continue home amlodipine, Hold lisinopril     Will utilize p.r.n. blood pressure medication only if patient's blood pressure greater than 180/110 and she develops symptoms such as worsening chest pain or shortness of breath.    - Add losartan 25 for persistently elevated BP's      VTE Risk Mitigation (From admission, onward)           Ordered     apixaban tablet 5 mg  2 times daily         06/29/24 9128                     Discharge Planning   JOAN: 7/8/2024     Code Status: Full Code   Is the patient medically ready for discharge?:     Reason for patient still in hospital (select all that apply): Patient trending condition and Pending disposition  Discharge Plan A: Rehab   Discharge Delays: (!) Post-Acute Set-up              Leann Barton MD  Department of Hospital Medicine   [facility] ([place])

## 2024-07-05 NOTE — NURSING
Patient is agitated and restless this morning. Constantly shouting and yelling out. Stating she's in pain along with other things. Prn oxycodone given this morning at 0527. Notified AMINAH Cool DO via secure chat.

## 2024-07-05 NOTE — ASSESSMENT & PLAN NOTE
-Pt had Hx of OEG dislodgement 3 times since placed in May after CVA.  -Per gen Sx not a candidate for PEG replacement.  -Gen Surgery rec for NGT/ TPN. Family does not want to pursue these.  -Now on Pureed diet. Oral intake is decreased. Per daughter, sometimes good intake, sometimes not.   -PT/OT rec for high intensity therapies. Daughter would like a chance at therapies at [facility]. Feels consistent therapies would help. Asked for SLP maryam. Will re-assess again.   -07/05- Continues to depend on IVF. Oral intake is decreased. Family encouraging and assisting pt to help with oral intake. Appears with some agitation at night. Definitely does better with family.

## 2024-07-05 NOTE — SUBJECTIVE & OBJECTIVE
Interval History: patient much more alert this morning compared to yesterday. Did require prn pain medication overnight. Per nursing, patient did well with eating lunch and dinner. This morning patient denies all symptoms. Will trial patient off IVF as she has developed worsening acidosis. Continue to encourage PO intake.     Review of Systems  Objective:     Vital Signs (Most Recent):  Temp: 97.8 °F (36.6 °C) (07/05/24 1123)  Pulse: 91 (07/05/24 1123)  Resp: 18 (07/05/24 1515)  BP: (!) 179/93 (07/05/24 1515)  SpO2: 97 % (07/05/24 1123) Vital Signs (24h Range):  Temp:  [97.7 °F (36.5 °C)-98.1 °F (36.7 °C)] 97.8 °F (36.6 °C)  Pulse:  [69-91] 91  Resp:  [17-19] 18  SpO2:  [95 %-99 %] 97 %  BP: (145-179)/(68-93) 179/93     Weight: 67.6 kg (148 lb 15.8 oz)  Body mass index is 30.09 kg/m².    Intake/Output Summary (Last 24 hours) at 7/5/2024 1528  Last data filed at 7/5/2024 1348  Gross per 24 hour   Intake 480 ml   Output 200 ml   Net 280 ml         Physical Exam  Vitals and nursing note reviewed.   Constitutional:       General: She is not in acute distress.     Appearance: She is ill-appearing.   HENT:      Head: Normocephalic.   Eyes:      General: No scleral icterus.  Cardiovascular:      Rate and Rhythm: Normal rate and regular rhythm.      Heart sounds: Normal heart sounds. No murmur heard.  Pulmonary:      Effort: No respiratory distress.      Breath sounds: Normal breath sounds. No wheezing.   Abdominal:      General: There is no distension.      Palpations: Abdomen is soft.      Tenderness: There is no abdominal tenderness.      Comments: PEG site bandaged   Musculoskeletal:         General: No tenderness.      Right lower leg: No edema.      Left lower leg: No edema.   Skin:     General: Skin is warm and dry.   Neurological:      Mental Status: She is alert.      GCS: GCS eye subscore is 4. GCS verbal subscore is 5. GCS motor subscore is 6.      Comments: Chronic left sided weakness   Psychiatric:         Mood  and Affect: Mood normal.         Behavior: Behavior normal. Behavior is cooperative.             Significant Labs: All pertinent labs within the past 24 hours have been reviewed.    Significant Imaging: I have reviewed all pertinent imaging results/findings within the past 24 hours.

## 2024-07-05 NOTE — PLAN OF CARE
Problem: Adult Inpatient Plan of Care  Goal: Plan of Care Review  Outcome: Progressing     Problem: Acute Kidney Injury/Impairment  Goal: Fluid and Electrolyte Balance  Outcome: Progressing  Goal: Effective Renal Function  Outcome: Progressing     Problem: Wound  Goal: Optimal Wound Healing  Outcome: Progressing     Problem: Fall Injury Risk  Goal: Absence of Fall and Fall-Related Injury  Outcome: Progressing     Problem: Confusion Chronic  Goal: Optimal Cognitive Function  Outcome: Progressing

## 2024-07-05 NOTE — ASSESSMENT & PLAN NOTE
-Currently on IVF and pureed diet.   -Recommend SLP eval.   -Encourage oral intake with supervision.  -Will need aspiration precaution.   -07/05- Seen SLP rec. Pt to continue Puree diet.

## 2024-07-05 NOTE — PT/OT/SLP PROGRESS
"Physical Therapy Treatment  Co-treatment with OT due to acuity of condition, level of skilled assist needed for assessment of safety with mobility and potential of not tolerating a second treatment session.     Patient Name:  Amirah Davey   MRN:  [id number]    Recommendations:     Discharge Recommendations: High Intensity Therapy  Discharge Equipment Recommendations: hospital bed, lift device, to be determined by next level of care, wheelchair  Barriers to discharge:  current level of assistance required     Assessment:     Amirah Davey is a 88 y.o. female admitted with a medical diagnosis of PEG tube malfunction.  She presents with the following impairments/functional limitations: weakness, impaired endurance, pain, impaired self care skills, impaired functional mobility, impaired balance, decreased safety awareness, decreased lower extremity function, decreased upper extremity function, abnormal tone, decreased ROM, impaired cardiopulmonary response to activity Pt reporting pain throughout session. Pt requiring maximal assistance for bed mobility, and EOB balancing. Patient remains appropriate for continued skilled services within the acute environment and goals remain appropriate.   .    Rehab Prognosis: Fair; patient would benefit from acute skilled PT services to address these deficits and reach maximum level of function.    Recent Surgery: * No surgery found *      Plan:     During this hospitalization, patient to be seen 4 x/week to address the identified rehab impairments via gait training, neuromuscular re-education, therapeutic exercises, therapeutic activities and progress toward the following goals:    Plan of Care Expires:  07/28/24    Subjective     Chief Complaint: Pain in L arm, neck, leg   Patient/Family Comments/goals: "you said you would work with them." -daughter talking to pt   Pain/Comfort:  Pain Rating 1:  (not rated)  Location - Side 1: Bilateral  Location - Orientation 1: " generalized  Location 1:  (shoulder, neck, arm, hand)  Pain Addressed 1: Reposition, Distraction, Cessation of Activity  Pain Rating Post-Intervention 1:  (not rated)      Objective:     Communicated with Rn prior to session.  Patient found supine with telemetry, [place] upon PT entry to room.     General Precautions: Standard, fall, aspiration  Orthopedic Precautions: N/A  Braces: N/A  Respiratory Status: Room air     Functional Mobility:  Bed Mobility:     Supine to Sit: maximal assistance and of 2 persons  Pt sat EOB ~ 15 minutes requiring maxA due to posterior and L lateral lean   Pt pushing with R UE   Pt requiring verbal and tactile cueing for upright sitting posture and balance   Sit to Supine: maximal assistance and of 2 persons    Pt performed 10 repetitions of seated R LE AAROM exercises consisting of: KATRIN Payne   Pt performed 10 repetitions of seated L LE PROM exercises consisting of: KATRIN Payne      AM-PAC 6 CLICK MOBILITY  Turning over in bed (including adjusting bedclothes, sheets and blankets)?: 2  Sitting down on and standing up from a chair with arms (e.g., wheelchair, bedside commode, etc.): 2  Moving from lying on back to sitting on the side of the bed?: 2  Moving to and from a bed to a chair (including a wheelchair)?: 1  Need to walk in hospital room?: 1  Climbing 3-5 steps with a railing?: 1  Basic Mobility Total Score: 9       Treatment & Education:  Therapist provided instruction and educated for safety during bed mobility, and EOB balancing. As well as proper body mechanics, energy conservation, and fall prevention strategies during tasks listed above, and the effects of prolonged immobility and the importance of performing EOB/OOB activity and exercises to promote healing and reduce recovery time. ]      Patient left supine with all lines intact, call button in reach, Rn notified, and family present..    GOALS:   Multidisciplinary Problems       Physical Therapy Goals           Problem: Physical Therapy    Goal Priority Disciplines Outcome Goal Variances Interventions   Physical Therapy Goal     PT, PT/OT Progressing     Description: Goals to be met by: 2024     Patient will increase functional independence with mobility by performin. Supine to sit with Moderate Assistance  2. Sit to supine with Moderate Assistance  3. Rolling to Left and Right with Stand-by Assistance.  4. Sit to stand transfer with Moderate Assistance using LRAD  5. Bed to chair transfer with Moderate Assistance using LRAD  6. Gait  x 10 feet with Maximum Assistance using LRAD.   7. Wheelchair propulsion x50 feet with Stand-by Assistance using  right upper and lower extremity  8. Sitting at edge of bed x5 minutes with Minimal Assistance                         Time Tracking:     PT Received On: 24  PT Start Time: 1320     PT Stop Time: 1346  PT Total Time (min): 26 min     Billable Minutes: Therapeutic Exercise 8 and Neuromuscular Re-education 15    Treatment Type: Treatment  PT/PTA: PTA     Number of PTA visits since last PT visit: 3     2024

## 2024-07-05 NOTE — SUBJECTIVE & OBJECTIVE
Interval History 7/5/2024:  Patient is seen for follow-up PM&R evaluation and recommendations: Pt seen at bedside with daughter present. She was able to answer simple orientation questions. Seems she has pain to LUE. Appears contracted. Receiving IVF. Daughter working on feeding pt.   HPI, Past Medical, Family, and Social History remains the same as documented in the initial encounter.    Scheduled Medications:    amLODIPine  10 mg Oral Daily    apixaban  5 mg Oral BID    atorvastatin  40 mg Oral Daily    baclofen  5 mg Oral BID    dicyclomine  10 mg Oral QID    droNABinol  10 mg Oral BID    famotidine  40 mg Oral Daily    losartan  25 mg Oral Once    [START ON 7/6/2024] losartan  50 mg Oral Daily    megestroL  400 mg Oral BID    miconazole nitrate 2%   Topical (Top) BID    sodium bicarbonate  650 mg Oral BID       Diagnostic Results: Labs: Reviewed    PRN Medications:   Current Facility-Administered Medications:     aluminum-magnesium hydroxide-simethicone, 30 mL, Oral, QID PRN    bisacodyL, 10 mg, Rectal, Daily PRN    dextrose 10%, 12.5 g, Intravenous, PRN    dextrose 10%, 25 g, Intravenous, PRN    glucagon (human recombinant), 1 mg, Intramuscular, PRN    glucose, 16 g, Oral, PRN    glucose, 24 g, Oral, PRN    hydrALAZINE, 25 mg, Oral, Q6H PRN    melatonin, 6 mg, Oral, Nightly PRN    naloxone, 0.02 mg, Intravenous, PRN    OLANZapine zydis, 5 mg, Oral, BID PRN    ondansetron, 8 mg, Per NG tube, Q8H PRN    oxyCODONE, 5 mg, Oral, Q6H PRN    polyethylene glycol, 17 g, Oral, BID PRN    simethicone, 1 tablet, Oral, QID PRN    sodium chloride 0.9%, 10 mL, Intravenous, Q12H PRN    Review of Systems   Constitutional:  Positive for activity change.   Respiratory:  Negative for cough and shortness of breath.    Musculoskeletal:  Positive for gait problem.   Psychiatric/Behavioral:  Positive for agitation.         Reports of agitation at night.     Objective:     Vital Signs (Most Recent):  Temp: 97.8 °F (36.6 °C) (07/05/24  1123)  Pulse: 91 (07/05/24 1123)  Resp: 18 (07/05/24 1149)  BP: (!) 179/93 (07/05/24 1123)  SpO2: 97 % (07/05/24 1123)    Vital Signs (24h Range):  Temp:  [97.7 °F (36.5 °C)-98.1 °F (36.7 °C)] 97.8 °F (36.6 °C)  Pulse:  [69-91] 91  Resp:  [17-19] 18  SpO2:  [95 %-99 %] 97 %  BP: (145-179)/(68-93) 179/93         Physical Exam  Vitals and nursing note reviewed.   Pulmonary:      Effort: Pulmonary effort is normal.   Musculoskeletal:      Cervical back: Normal range of motion and neck supple.   Neurological:      General: No focal deficit present.      Mental Status: She is oriented to person, place, and time.      GCS: GCS eye subscore is 4. GCS verbal subscore is 5. GCS motor subscore is 5.      Motor: Weakness present.      Coordination: Coordination abnormal. Finger-Nose-Finger Test abnormal and Heel to Shin Test abnormal. Impaired rapid alternating movements.      Gait: Gait abnormal.          NEUROLOGICAL EXAMINATION:     MENTAL STATUS   Oriented to person, place, and time.     GAIT AND COORDINATION      Coordination   Finger to nose coordination: abnormal

## 2024-07-05 NOTE — PT/OT/SLP PROGRESS
Occupational Therapy   Treatment    Name: Amirah Davey  MRN: [id number]  Admitting Diagnosis:  PEG tube malfunction       Recommendations:     Discharge Recommendations: Moderate Intensity Therapy  Discharge Equipment Recommendations:  wheelchair, lift device, hospital bed, bedside commode  Barriers to discharge:       Assessment:     Amirah Davey is a 88 y.o. female with a medical diagnosis of PEG tube malfunction.  She presents with the following performance deficits affecting function: weakness, impaired endurance, impaired self care skills, impaired functional mobility, abnormal tone, decreased ROM, impaired joint extensibility, impaired muscle length, decreased safety awareness, impaired balance, decreased upper extremity function, decreased lower extremity function.     Rehab Prognosis:  Good; patient would benefit from acute skilled OT services to address these deficits and reach maximum level of function.       Plan:     Patient to be seen 4 x/week to address the above listed problems via self-care/home management, therapeutic activities, therapeutic exercises, neuromuscular re-education  Plan of Care Expires: 07/30/24  Plan of Care Reviewed with: patient, daughter    Subjective     Chief Complaint: pain in L arm, neck, and leg  Patient/Family Comments/goals: to improve function  Pain/Comfort:  Pain Rating 1: 0/10  Pain Rating Post-Intervention 1: 0/10    Objective:     Communicated with: RN prior to session.  Patient found HOB elevated with telemetry, PureWick upon OT entry to room.  A client care conference was completed by the OTR and the HOPPER prior to treatment by the HOPPER to discuss the patient's POC and current status.    General Precautions: Standard, fall    Orthopedic Precautions:N/A  Braces: N/A  Respiratory Status: Room air     Occupational Performance:     Bed Mobility:    Patient completed Scooting/Bridging with total assistance  Patient completed Supine to Sit with total assistance and 2  persons  Patient completed Sit to Supine with total assistance and 2 persons     Functional Mobility/Transfers:      Activities of Daily Living:  Grooming: stand by assistance to wipe face with towel  Lower Body Dressing: total assistance to don socks      AMPAC 6 Click ADL:      Treatment & Education:  Pt educated on OT POC and frequency during hospital stay.   Pt educated on importance of OOB activity to improve function and activity tolerance.  Pt sat EOB with Mod-Max A sitting balance for ~15min  PROM and gentle stretching performed on LUE and neck to improve ROM.   Addressed all patient questions/concerns within HOPPER scope of practice.     Patient left HOB elevated with all lines intact, call button in reach, and RN notified    GOALS:   Multidisciplinary Problems       Occupational Therapy Goals          Problem: Occupational Therapy    Goal Priority Disciplines Outcome Interventions   Occupational Therapy Goal     OT, PT/OT Progressing    Description: Goals to be met by: 7/30/24     Patient will increase functional independence with ADLs by performing:    UE Dressing with Maximum Assistance.  LE Dressing with Maximum Assistance.  Grooming while EOB with Moderate Assistance.  Toileting from bedside commode with Maximum Assistance for hygiene and clothing management.   Sitting at edge of bed x10 minutes with Minimal Assistance.  Rolling to Right with Minimal Assistance.   Rolling to Left with CGA  Supine to sit with Moderate Assistance.  Squat pivot transfers with Maximum Assistance.  Toilet transfer to bedside commode with Maximum Assistance.    DME justification  Patient has a mobility limitation that significantly impairs their ability to participate in one or more mobility related activities of daily living, including toileting. This deficit can be resolved by using a bedside commode. Patient demonstrates mobility limitations that will cause them to be confined to one room at home without bathroom access for  up to 30 days. Using a bedside commode will greatly improve the patient's ability to participate in MRADLs.                           Time Tracking:     OT Date of Treatment: 07/05/24  OT Start Time: 1320  OT Stop Time: 1346  OT Total Time (min): 26 min    Billable Minutes:Self Care/Home Management 10  Therapeutic Activity 16    OT/PHILIP: PHILIP     Number of PHILIP visits since last OT visit: 1    7/5/2024

## 2024-07-05 NOTE — PLAN OF CARE
Per Alina Maynard with PMR, she hasn't made rehab recommendation yet.    CM called Sonya Sylvester (Daughter) [phone] to ask about SNF as a back up plan if rehab denied.  LVM requesting call back.    Met with wiliam Dailey yesterday to review discharge recommendation of rehab or SNF and is agreeable to plan    Patient/family provided list at bedside of facilities in-network with patient's payor plan. Providers that are owned, operated, or affiliated with [facility] are included on the list.     Notified that referral sent to below listed facilities from in-network list based on proximity to home/family support:   [facility], [facility], [facility]    Patient/family instructed to identify preference.    Preferred Facility: (if more than 1, listed in order of descending preference)  [facility]    If an additional preferred facility not listed above is identified, additional referral to be sent. If above facilities unable to accept, will send additional referrals to in-network providers.       Debbie Coles, SHIRAN, BS, RN, CCM

## 2024-07-05 NOTE — PLAN OF CARE
Problem: Adult Inpatient Plan of Care  Goal: Absence of Hospital-Acquired Illness or Injury  Outcome: Progressing  Intervention: Identify and Manage Fall Risk  Flowsheets (Taken 7/5/2024 1646)  Safety Promotion/Fall Prevention:   assistive device/personal item within reach   bed alarm set   bedside commode chair   commode/urinal/bedpan at bedside   diversional activities provided   Fall Risk reviewed with patient/family   Fall Risk signage in place   family to remain at bedside   high risk medications identified   in recliner, wheels locked   lighting adjusted   medications reviewed   muscle strengthening facilitated   pulse ox   room near unit station   /camera at bedside   supervised activity   instructed to call staff for mobility   nonskid shoes/socks when out of bed   chair alarm set  Intervention: Prevent Skin Injury  Flowsheets (Taken 7/5/2024 1646)  Body Position:   turned   right   weight shifting  Skin Protection:   hydrocolloids used   incontinence pads utilized   pulse oximeter probe site changed  Device Skin Pressure Protection:   absorbent pad utilized/changed   adhesive use limited   positioning supports utilized   pressure points protected  Intervention: Prevent and Manage VTE (Venous Thromboembolism) Risk  Flowsheets (Taken 7/5/2024 1646)  VTE Prevention/Management:   remove, assess skin, and reapply sequential compression device   ambulation promoted   bleeding precations maintained   bleeding risk assessed   bleeding risk factor(s) identified, provider notified   dorsiflexion/plantar flexion performed   fluids promoted   prepared for procedure/surgery   ROM (active) performed  Intervention: Prevent Infection  Flowsheets (Taken 7/5/2024 1646)  Infection Prevention:   cohorting utilized   hand hygiene promoted   single patient room provided   environmental surveillance performed   equipment surfaces disinfected   rest/sleep promoted  Goal: Optimal Comfort and Wellbeing  Outcome:  Progressing  Intervention: Monitor Pain and Promote Comfort  Flowsheets (Taken 7/5/2024 1646)  Pain Management Interventions:   around-the-clock dosing utilized   awakened for pain meds per patient request   breathing exercises utilized   care clustered   cold applied   diversional activity provided   heat applied   medication offered   pain management plan reviewed with patient/caregiver   position adjusted   premedicated for activity   pillow support provided   quiet environment facilitated   relaxation techniques promoted   warm blanket provided  Intervention: Provide Person-Centered Care  Flowsheets (Taken 7/5/2024 1646)  Trust Relationship/Rapport:   care explained   questions answered   choices provided   questions encouraged   emotional support provided   reassurance provided   empathic listening provided   thoughts/feelings acknowledged     Problem: Infection  Goal: Absence of Infection Signs and Symptoms  Outcome: Progressing  Intervention: Prevent or Manage Infection  Flowsheets (Taken 7/5/2024 1646)  Fever Reduction/Comfort Measures:   aerosol temperature decreased   fluid intake increased   lightweight clothing   lightweight bedding  Infection Management: aseptic technique maintained  Isolation Precautions: protective     Problem: Acute Kidney Injury/Impairment  Goal: Improved Oral Intake  Outcome: Progressing  Intervention: Promote and Optimize Oral Intake  Flowsheets (Taken 7/5/2024 1646)  Oral Nutrition Promotion:   adaptive equipment use encouraged   referred to outpatient services   rest periods promoted   physical activity promoted   nutrition counseling provided   social interaction promoted  Nutrition Interventions:   frequent small meals provided   food preferences provided   diet advanced   meal set-up provided   referred to nutrition assistant/tech   supplemental drinks provided   supplemental foods provided  Goal: Effective Renal Function  Outcome: Progressing  Intervention: Monitor and  Support Renal Function  Flowsheets (Taken 7/5/2024 1646)  Stabilization Measures:   legs elevated   provider notified   verbal stimulation provided  Medication Review/Management:   medications reviewed   high-risk medications identified   provider consulted   pharmacy consulted     Problem: Wound  Goal: Absence of Infection Signs and Symptoms  Outcome: Progressing  Intervention: Prevent or Manage Infection  Flowsheets (Taken 7/5/2024 1646)  Fever Reduction/Comfort Measures:   aerosol temperature decreased   fluid intake increased   lightweight clothing   lightweight bedding  Infection Management: aseptic technique maintained  Isolation Precautions: protective  Goal: Improved Oral Intake  Outcome: Progressing  Intervention: Promote and Optimize Oral Intake  Flowsheets (Taken 7/5/2024 1646)  Oral Nutrition Promotion:   adaptive equipment use encouraged   referred to outpatient services   rest periods promoted   physical activity promoted   nutrition counseling provided   social interaction promoted  Nutrition Interventions:   frequent small meals provided   food preferences provided   diet advanced   meal set-up provided   referred to nutrition assistant/tech   supplemental drinks provided   supplemental foods provided     Problem: Skin Injury Risk Increased  Goal: Skin Health and Integrity  Outcome: Progressing  Intervention: Optimize Skin Protection  Flowsheets (Taken 7/5/2024 1646)  Pressure Reduction Techniques:   frequent weight shift encouraged   heels elevated off bed   positioned off wounds   pressure points protected   rest period provided between sit times   weight shift assistance provided  Pressure Reduction Devices:   pressure-redistributing mattress utilized   specialty bed utilized   positioning supports utilized   foam padding utilized  Skin Protection:   hydrocolloids used   incontinence pads utilized   pulse oximeter probe site changed  Activity Management:   Rolling - L1   Arm raise - L1  Head of  Bed (HOB) Positioning: HOB elevated  Intervention: Promote and Optimize Oral Intake  Flowsheets (Taken 7/5/2024 1646)  Oral Nutrition Promotion:   adaptive equipment use encouraged   referred to outpatient services   rest periods promoted   physical activity promoted   nutrition counseling provided   social interaction promoted  Nutrition Interventions:   frequent small meals provided   food preferences provided   diet advanced   meal set-up provided   referred to nutrition assistant/tech   supplemental drinks provided   supplemental foods provided     Problem: Wound  Goal: Optimal Pain Control and Function  Outcome: Not Progressing  Intervention: Prevent or Manage Pain  Flowsheets (Taken 7/5/2024 1646)  Sleep/Rest Enhancement:   awakenings minimized   consistent schedule promoted   family presence promoted   reading promoted   noise level reduced   natural light exposure provided   therapeutic touch utilized   room darkened   relaxation techniques promoted   regular sleep/rest pattern promoted  Pain Management Interventions:   around-the-clock dosing utilized   awakened for pain meds per patient request   breathing exercises utilized   care clustered   cold applied   diversional activity provided   heat applied   medication offered   pain management plan reviewed with patient/caregiver   position adjusted   premedicated for activity   pillow support provided   quiet environment facilitated   relaxation techniques promoted   warm blanket provided     Problem: Confusion Chronic  Goal: Optimal Cognitive Function  Outcome: Not Progressing  Intervention: Minimize Injury Risk and Provide Safety  Flowsheets (Taken 7/5/2024 1646)  Enhanced Safety Measures:   bed alarm set   family to remain at bedside   monitored by video   room near unit station  Intervention: Minimize and Manage Confusion  Flowsheets (Taken 7/5/2024 1646)  Environment Familiarity/Consistency:   daily routine followed   familiar objects from home  provided   personal clothing/items utilized  Reorientation Measures:   clock in view   calendar in view   reorientation provided   familiar social contact encouraged   glasses use encouraged  Self-Care Promotion:   independence encouraged   BADL personal objects within reach   BADL personal routines maintained   adaptive equipment use encouraged   meal set-up provided  Sensory Stimulation Regulation:   visual stimulation provided   quiet environment promoted   television on   care clustered   auditory stimulation provided   tactile stimulation minimized  Family/Support System Care:   self-care encouraged   support provided   involvement promoted   presence promoted   caregiver stress acknowledged  Environmental Support:   calm environment promoted   distractions minimized   rest periods encouraged   rooming-in facilitated   environmental consistency promoted   caregiver consistency promoted   personal routine supported

## 2024-07-05 NOTE — PROGRESS NOTES
[facility] ([place] [id number])  Physical Medicine & Rehab  Progress Note    Patient Name: Amirah Davey  MRN: [id number]  Admission Date: 6/29/2024  Length of Stay: 6 days  Attending Physician: Leann Barton MD    Subjective:     Principal Problem:PEG tube malfunction    Hospital Course:   Per chart review,    PT- 07/01    Functional Mobility:  Bed Mobility:     Rolling Left:  maximal assistance  Rolling Right: total assistance  Scooting: total assistance and of 2 persons  Supine to Sit: total assistance and of 2 persons  Sit to Supine: maximal assistance and of 2 persons    OT- 07/02    Bed Mobility:    Patient completed Supine to Sit with total assistance and 2 persons  Patient completed Sit to Supine with total assistance and 2 persons   Patient sat EOB for ~ 10 minutes with Maximum Assistance       Interval History 7/5/2024:  Patient is seen for follow-up PM&R evaluation and recommendations: Pt seen at bedside with daughter present. She was able to answer simple orientation questions. Seems she has pain to LUE. Appears contracted. Receiving IVF. Daughter working on feeding pt.   HPI, Past Medical, Family, and Social History remains the same as documented in the initial encounter.    Scheduled Medications:    amLODIPine  10 mg Oral Daily    apixaban  5 mg Oral BID    atorvastatin  40 mg Oral Daily    baclofen  5 mg Oral BID    dicyclomine  10 mg Oral QID    droNABinol  10 mg Oral BID    famotidine  40 mg Oral Daily    losartan  25 mg Oral Once    [START ON 7/6/2024] losartan  50 mg Oral Daily    megestroL  400 mg Oral BID    miconazole nitrate 2%   Topical (Top) BID    sodium bicarbonate  650 mg Oral BID       Diagnostic Results: Labs: Reviewed    PRN Medications:   Current Facility-Administered Medications:     aluminum-magnesium hydroxide-simethicone, 30 mL, Oral, QID PRN    bisacodyL, 10 mg, Rectal, Daily PRN    dextrose 10%, 12.5 g, Intravenous, PRN    dextrose 10%, 25 g, Intravenous, PRN    glucagon  (human recombinant), 1 mg, Intramuscular, PRN    glucose, 16 g, Oral, PRN    glucose, 24 g, Oral, PRN    hydrALAZINE, 25 mg, Oral, Q6H PRN    melatonin, 6 mg, Oral, Nightly PRN    naloxone, 0.02 mg, Intravenous, PRN    OLANZapine zydis, 5 mg, Oral, BID PRN    ondansetron, 8 mg, Per NG tube, Q8H PRN    oxyCODONE, 5 mg, Oral, Q6H PRN    polyethylene glycol, 17 g, Oral, BID PRN    simethicone, 1 tablet, Oral, QID PRN    sodium chloride 0.9%, 10 mL, Intravenous, Q12H PRN    Review of Systems   Constitutional:  Positive for activity change.   Respiratory:  Negative for cough and shortness of breath.    Musculoskeletal:  Positive for gait problem.   Psychiatric/Behavioral:  Positive for agitation.         Reports of agitation at night.     Objective:     Vital Signs (Most Recent):  Temp: 97.8 °F (36.6 °C) (07/05/24 1123)  Pulse: 91 (07/05/24 1123)  Resp: 18 (07/05/24 1149)  BP: (!) 179/93 (07/05/24 1123)  SpO2: 97 % (07/05/24 1123)    Vital Signs (24h Range):  Temp:  [97.7 °F (36.5 °C)-98.1 °F (36.7 °C)] 97.8 °F (36.6 °C)  Pulse:  [69-91] 91  Resp:  [17-19] 18  SpO2:  [95 %-99 %] 97 %  BP: (145-179)/(68-93) 179/93         Physical Exam  Vitals and nursing note reviewed.   Pulmonary:      Effort: Pulmonary effort is normal.   Musculoskeletal:      Cervical back: Normal range of motion and neck supple.   Neurological:      General: No focal deficit present.      Mental Status: She is oriented to person, place, and time.      GCS: GCS eye subscore is 4. GCS verbal subscore is 5. GCS motor subscore is 5.      Motor: Weakness present.      Coordination: Coordination abnormal. Finger-Nose-Finger Test abnormal and Heel to Shin Test abnormal. Impaired rapid alternating movements.      Gait: Gait abnormal.          NEUROLOGICAL EXAMINATION:     MENTAL STATUS   Oriented to person, place, and time.     GAIT AND COORDINATION      Coordination   Finger to nose coordination: abnormal      Assessment/Plan:      * PEG tube  malfunction  -Pt had Hx of OEG dislodgement 3 times since placed in May after CVA.  -Per gen Sx not a candidate for PEG replacement.  -Gen Surgery rec for NGT/ TPN. Family does not want to pursue these.  -Now on Pureed diet. Oral intake is decreased. Per daughter, sometimes good intake, sometimes not.   -PT/OT rec for high intensity therapies. Daughter would like a chance at therapies at [facility]. Feels consistent therapies would help. Asked for SLP maryma. Will re-assess again.   -07/05- Continues to depend on IVF. Oral intake is decreased. Family encouraging and assisting pt to help with oral intake. Appears with some agitation at night. Definitely does better with family.    Malnutrition of mild degree  -Recommend RD eval.     Sacral ulcer, limited to breakdown of skin  -Recommend frequent turning.     Reduced mobility  See hospital course for functional status.    Recommendations  -  Encourage mobility, OOB in chair, and early ambulation as appropriate  -  PT/OT evaluate and treat  -  Pain management  -  DVT prophylaxis if appropriate   -  Monitor for and prevent skin breakdown and pressure ulcers  Early mobility, repositioning/weight shifting every 20-30 minutes when sitting, turn patient every 2 hours, proper mattress/overlay and chair cushioning, pressure relief/heel protector boots     Dysphagia due to recent stroke  -Currently on IVF and pureed diet.   -Recommend SLP maryam.   -Encourage oral intake with supervision.  -Will need aspiration precaution.   -07/05- Seen SLP rec. Pt to continue Puree diet.     Hemiplegia  -Will need aggressive PT/OT.     Cerebrovascular accident (CVA) due to embolism of right middle cerebral artery  -Had CVA in May of 2024.  -Was at [facility] for therapies.  -Now admitted after PEG dislodgement.   -Will need diet plan. Currently on Pureed diet. Will need encouragement for oral intake and supervision.  -Recommend palliative care involvement.     Chronic a-fib  -Continue Eliquis.      PM&R Recommendation:     At this time, the PM&R team has reviewed this patient's ongoing medical case including inpatient diagnosis, medical history, clinical examination, labs, vitals, current social and functional history. We will continue to follow pt. Will continue for improved oral intake, not requiring IVF, therapy progress.        Alina Maynard NP  Department of Physical Medicine & Rehab   [facility] ([place]-[facility])

## 2024-07-05 NOTE — ASSESSMENT & PLAN NOTE
88 y.o. F with recent CVA resulting in L hemiplegia and dysphagia s/p PEG tube placement with recurrent inadvertent dislodgement presenting with PEG tub dislodgement. Gen surg consulted in the ED and given recurrent issue & unknown timing of dislosgement, recommend NGT placement for enteral nutrition vs long term TPN until patient's mental baseline is acceptable for management of another gastrostomy tube  - Conitnue pureed diet with strict aspiration precautions  - Crush meds in puree  - s/p mIVF from 7/1-7/5  - SLP and RD consulted  - Discussed goals of care moving forward with daughters. They would like to pursue appetite stimulants and pureed diet and avoiding NGT or TPN for nutrition. They understand that she will likely not be able to meet caloric goals given low appetite and dysphagia.   - Increase Megace to 400mg BID  - Increase dronabinol   - Continue to monitor PO intake / calorie counting

## 2024-07-06 LAB
ANION GAP SERPL CALC-SCNC: 11 MMOL/L (ref 8–16)
BUN SERPL-MCNC: 10 MG/DL (ref 8–23)
CALCIUM SERPL-MCNC: 9.1 MG/DL (ref 8.7–10.5)
CHLORIDE SERPL-SCNC: 118 MMOL/L (ref 95–110)
CO2 SERPL-SCNC: 17 MMOL/L (ref 23–29)
CREAT SERPL-MCNC: 0.8 MG/DL (ref 0.5–1.4)
EST. GFR  (NO RACE VARIABLE): >60 ML/MIN/1.73 M^2
GLUCOSE SERPL-MCNC: 95 MG/DL (ref 70–110)
MAGNESIUM SERPL-MCNC: 1.7 MG/DL (ref 1.6–2.6)
POTASSIUM SERPL-SCNC: 3.4 MMOL/L (ref 3.5–5.1)
SODIUM SERPL-SCNC: 146 MMOL/L (ref 136–145)

## 2024-07-06 PROCEDURE — 25000003 PHARM REV CODE 250: Performed by: INTERNAL MEDICINE

## 2024-07-06 PROCEDURE — 25000003 PHARM REV CODE 250: Performed by: STUDENT IN AN ORGANIZED HEALTH CARE EDUCATION/TRAINING PROGRAM

## 2024-07-06 PROCEDURE — 63600175 PHARM REV CODE 636 W HCPCS: Performed by: INTERNAL MEDICINE

## 2024-07-06 PROCEDURE — S0179 MEGESTROL 20 MG: HCPCS | Performed by: INTERNAL MEDICINE

## 2024-07-06 PROCEDURE — 11000001 HC ACUTE MED/SURG PRIVATE ROOM

## 2024-07-06 PROCEDURE — 25000003 PHARM REV CODE 250

## 2024-07-06 RX ORDER — FAMOTIDINE 20 MG/1
20 TABLET, FILM COATED ORAL DAILY
Status: DISCONTINUED | OUTPATIENT
Start: 2024-07-07 | End: 2024-07-09 | Stop reason: HOSPADM

## 2024-07-06 RX ORDER — POTASSIUM CHLORIDE 20 MEQ/1
40 TABLET, EXTENDED RELEASE ORAL ONCE
Status: COMPLETED | OUTPATIENT
Start: 2024-07-06 | End: 2024-07-06

## 2024-07-06 RX ADMIN — DICYCLOMINE HYDROCHLORIDE 10 MG: 10 CAPSULE ORAL at 04:07

## 2024-07-06 RX ADMIN — DRONABINOL 10 MG: 2.5 CAPSULE ORAL at 08:07

## 2024-07-06 RX ADMIN — DICYCLOMINE HYDROCHLORIDE 10 MG: 10 CAPSULE ORAL at 08:07

## 2024-07-06 RX ADMIN — BACLOFEN 5 MG: 5 TABLET ORAL at 08:07

## 2024-07-06 RX ADMIN — ATORVASTATIN CALCIUM 40 MG: 40 TABLET, FILM COATED ORAL at 08:07

## 2024-07-06 RX ADMIN — SODIUM BICARBONATE 650 MG TABLET 650 MG: at 08:07

## 2024-07-06 RX ADMIN — AMLODIPINE BESYLATE 10 MG: 10 TABLET ORAL at 08:07

## 2024-07-06 RX ADMIN — FAMOTIDINE 40 MG: 20 TABLET ORAL at 08:07

## 2024-07-06 RX ADMIN — MICONAZOLE NITRATE: 20 OINTMENT TOPICAL at 09:07

## 2024-07-06 RX ADMIN — OXYCODONE HYDROCHLORIDE 5 MG: 5 TABLET ORAL at 08:07

## 2024-07-06 RX ADMIN — APIXABAN 5 MG: 5 TABLET, FILM COATED ORAL at 08:07

## 2024-07-06 RX ADMIN — DICYCLOMINE HYDROCHLORIDE 10 MG: 10 CAPSULE ORAL at 12:07

## 2024-07-06 RX ADMIN — MEGESTROL ACETATE 400 MG: 400 SUSPENSION ORAL at 08:07

## 2024-07-06 RX ADMIN — MICONAZOLE NITRATE: 20 OINTMENT TOPICAL at 12:07

## 2024-07-06 RX ADMIN — POTASSIUM CHLORIDE 40 MEQ: 1500 TABLET, EXTENDED RELEASE ORAL at 08:07

## 2024-07-06 RX ADMIN — LOSARTAN POTASSIUM 50 MG: 50 TABLET, FILM COATED ORAL at 08:07

## 2024-07-06 NOTE — CONSULTS
Calorie count forms left in room for patient, or nurse to document all foods/beverages consumed.   Notification sign placed on door and papers left in room to document intakes.  Please document all food and beverages consumed as accurately as possible.  Calorie count will start 7/6 & end 7/8 - RD will document results when completed.      RD to follow back up in 72 hours (7/9) to evaluate PO intake.      Please reach out with any questions/concerns. Thanks!     Kim Willard RD, LDN

## 2024-07-06 NOTE — PLAN OF CARE
Problem: Infection  Goal: Absence of Infection Signs and Symptoms  Outcome: Progressing     Problem: Wound  Goal: Optimal Functional Ability  Outcome: Progressing   Pain meds given accordingly. Bed locked and in lowest position. Call light in reach.

## 2024-07-06 NOTE — PROGRESS NOTES
Pharmacist Renal Dose Adjustment Note    Amirah Davey is a 88 y.o. female being treated with the medication famotidine    Patient Data:    Vital Signs (Most Recent):  Temp: 99.2 °F (37.3 °C) (07/06/24 0806)  Pulse: 85 (07/06/24 0806)  Resp: 18 (07/06/24 0844)  BP: (!) 174/95 (07/06/24 0806)  SpO2: 100 % (07/06/24 0806) Vital Signs (72h Range):  Temp:  [97.4 °F (36.3 °C)-99.2 °F (37.3 °C)]   Pulse:  [69-91]   Resp:  [16-20]   BP: (143-179)/(65-95)   SpO2:  [95 %-100 %]      Recent Labs   Lab 07/04/24  0024 07/05/24  1401 07/05/24  2356   CREATININE 1.0 0.8 0.8     Serum creatinine: 0.8 mg/dL 07/05/24 2356  Estimated creatinine clearance: 43.2 mL/min    Change famotidine to 20 mg daily for CrCl < 50 mL/min    Pharmacist's Name: Azar Holley  Pharmacist's Extension: [id number]

## 2024-07-06 NOTE — SUBJECTIVE & OBJECTIVE
Interval History: No acute events overnight. Patient received PO pain medication shortly before rounds so less alert than yesterday. Denies chest pain, SOB, n/v, c/d.       Review of Systems  Objective:     Vital Signs (Most Recent):  Temp: 98.4 °F (36.9 °C) (07/06/24 1147)  Pulse: 80 (07/06/24 1147)  Resp: 17 (07/06/24 1147)  BP: (!) 174/95 (07/06/24 1147)  SpO2: 98 % (07/06/24 1147) Vital Signs (24h Range):  Temp:  [98.1 °F (36.7 °C)-99.2 °F (37.3 °C)] 98.4 °F (36.9 °C)  Pulse:  [69-87] 80  Resp:  [17-18] 17  SpO2:  [98 %-100 %] 98 %  BP: (143-179)/(68-95) 174/95     Weight: 67.6 kg (148 lb 15.8 oz)  Body mass index is 30.09 kg/m².    Intake/Output Summary (Last 24 hours) at 7/6/2024 1335  Last data filed at 7/6/2024 0908  Gross per 24 hour   Intake 170 ml   Output 800 ml   Net -630 ml         Physical Exam  Vitals and nursing note reviewed.   Constitutional:       General: She is not in acute distress.     Appearance: She is ill-appearing.   HENT:      Head: Normocephalic.   Eyes:      General: No scleral icterus.  Cardiovascular:      Rate and Rhythm: Normal rate and regular rhythm.      Heart sounds: Normal heart sounds. No murmur heard.  Pulmonary:      Effort: No respiratory distress.      Breath sounds: Normal breath sounds. No wheezing.   Abdominal:      General: There is no distension.      Palpations: Abdomen is soft.      Tenderness: There is no abdominal tenderness.      Comments: PEG site bandaged   Musculoskeletal:         General: No tenderness.      Right lower leg: No edema.      Left lower leg: No edema.   Skin:     General: Skin is warm and dry.   Neurological:      Mental Status: She is easily aroused.      GCS: GCS eye subscore is 3. GCS verbal subscore is 5. GCS motor subscore is 6.      Comments: Chronic left sided weakness   Psychiatric:         Mood and Affect: Mood normal.         Behavior: Behavior normal. Behavior is cooperative.             Significant Labs: All pertinent labs within  the past 24 hours have been reviewed.    Significant Imaging: I have reviewed all pertinent imaging results/findings within the past 24 hours.

## 2024-07-06 NOTE — PROGRESS NOTES
[facility] ([address] Medicine  Progress Note    Patient Name: Amirah Davey  MRN: [id number]  Patient Class: IP- Inpatient   Admission Date: 6/29/2024  Length of Stay: 7 days  Attending Physician: Leann Barton MD  Primary Care Provider: Ivana Templeton MD        Subjective:     Principal Problem:PEG tube malfunction        HPI:  Amirah Davey is a 88 y.o. F with PMHx of recent CVA in May with residual L sided weakness, dysphagia and dysarthria, s/p PEG tube placement with recurrent inadvertent dislodgement, HTN, and Afib on eliquis who [place] [facility] [place] malfunction. Patient with recent admit for PEG dislogement on 6/12 c/b abdominal wall abscess on CT. IV vancomycin was given and general surgery replaced her PEG tube on 6/17 and no abscess or fluid collection was noted.     She presents today after her inpatient rehab noticed her PEG tube was dislodged. It is unknown when the last time of appropriate placement was. She is AAO to person, place, situation, and year. She denies intentionally removing her PEG tube. Per IPR nursing, the patient had an abdominal binder around her abdomen which was preventing her from removal, which was removed at some point yesterday.     In the ED: VSSAF. Labs grossly unremarkable acutely. CTH and CXR without acute process. Gen surg consulted in the ED and given recurrent inadvertent dislodgement is and unknown timing of dislosgement, recommend NGT placement for enteral nutrition vs long term TPN until patient's mental baseline is acceptable for management of another gastrostomy tube.    Overview/Hospital Course:  Patient is a 88 y.o. F with PMHx of recent CVA in May with residual L sided weakness, dysphagia and dysarthria, s/p PEG tube placement with recurrent inadvertent dislodgement, HTN, and Afib on eliquis who [place] [facility] [place] tube dislodgement. Patient agitated and altered upon admission. No acute processes on CT-H.  Concern for progressive vascular dementia. This is unfortunately the 3rd time she has dislodged her PEG tube. General surgery consulted and given recurrent PEG dislodging they are recommending against replacement given risk. Discussed with daughters about goals of care moving forward. They would like to pursue appetite stimulants and avoiding NGT or TPN for nutrition. PT/OT recommending inpatient rehab.    Calorie count ordered. Started 7/6. IVF stopped on 7/5.     Interval History: No acute events overnight. Patient received PO pain medication shortly before rounds so less alert than yesterday. Denies chest pain, SOB, n/v, c/d.       Review of Systems  Objective:     Vital Signs (Most Recent):  Temp: 98.4 °F (36.9 °C) (07/06/24 1147)  Pulse: 80 (07/06/24 1147)  Resp: 17 (07/06/24 1147)  BP: (!) 174/95 (07/06/24 1147)  SpO2: 98 % (07/06/24 1147) Vital Signs (24h Range):  Temp:  [98.1 °F (36.7 °C)-99.2 °F (37.3 °C)] 98.4 °F (36.9 °C)  Pulse:  [69-87] 80  Resp:  [17-18] 17  SpO2:  [98 %-100 %] 98 %  BP: (143-179)/(68-95) 174/95     Weight: 67.6 kg (148 lb 15.8 oz)  Body mass index is 30.09 kg/m².    Intake/Output Summary (Last 24 hours) at 7/6/2024 1335  Last data filed at 7/6/2024 0908  Gross per 24 hour   Intake 170 ml   Output 800 ml   Net -630 ml         Physical Exam  Vitals and nursing note reviewed.   Constitutional:       General: She is not in acute distress.     Appearance: She is ill-appearing.   HENT:      Head: Normocephalic.   Eyes:      General: No scleral icterus.  Cardiovascular:      Rate and Rhythm: Normal rate and regular rhythm.      Heart sounds: Normal heart sounds. No murmur heard.  Pulmonary:      Effort: No respiratory distress.      Breath sounds: Normal breath sounds. No wheezing.   Abdominal:      General: There is no distension.      Palpations: Abdomen is soft.      Tenderness: There is no abdominal tenderness.      Comments: PEG site bandaged   Musculoskeletal:         General: No  tenderness.      Right lower leg: No edema.      Left lower leg: No edema.   Skin:     General: Skin is warm and dry.   Neurological:      Mental Status: She is easily aroused.      GCS: GCS eye subscore is 3. GCS verbal subscore is 5. GCS motor subscore is 6.      Comments: Chronic left sided weakness   Psychiatric:         Mood and Affect: Mood normal.         Behavior: Behavior normal. Behavior is cooperative.             Significant Labs: All pertinent labs within the past 24 hours have been reviewed.    Significant Imaging: I have reviewed all pertinent imaging results/findings within the past 24 hours.    Assessment/Plan:      * PEG tube malfunction  88 y.o. F with recent CVA resulting in L hemiplegia and dysphagia s/p PEG tube placement with recurrent inadvertent dislodgement presenting with PEG tub dislodgement. Gen surg consulted in the ED and given recurrent issue & unknown timing of dislosgement, recommend NGT placement for enteral nutrition vs long term TPN until patient's mental baseline is acceptable for management of another gastrostomy tube  - Conitnue pureed diet with strict aspiration precautions  - Crush meds in puree  - s/p mIVF from 7/1-7/5  - SLP and RD consulted  - Discussed goals of care moving forward with daughters. They would like to pursue appetite stimulants and pureed diet and avoiding NGT or TPN for nutrition. They understand that she will likely not be able to meet caloric goals given low appetite and dysphagia.   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 Poor PO intake 2/2 dysphagia and poor appetite. No longer with PEG tube and not a candidate for replacement at this time  - Started on appetite stimulants and will continue to monitor PO intake  - Calorie  counting    Sacral ulcer, limited to breakdown of skin   Wound care consulted, appreciate recs  - Barrier cream, q2h turns     Reduced mobility        Dysphagia due to recent stroke  Chronic issue from recent CVA  - SLP notes from prior admission reviewed, recommend pureed diet and continued dysphagia therapy   - RD consulted     Cerebrovascular accident (CVA) due to embolism of right middle cerebral artery  L hemiplegia  Reduced mobility  - Chronic issue  - Continue statin  - Tentative plan to return to [facility] at time of discharge  - PT/OT consulted    Chronic a-fib  Chronic anticoagulation  Patient is currently in sinus rhythm.[id number] Score: 4. Anticoagulation indicated. Anticoagulation done with eliquis .    Hypertension, benign  Chronic, controlled. Latest blood pressure and vitals reviewed-     Temp:  [97.5 °F (36.4 °C)-98.9 °F (37.2 °C)]   Pulse:  [69-82]   Resp:  [16-20]   BP: (150-178)/(65-82)   SpO2:  [95 %-99 %] .   Home meds for hypertension were reviewed and noted below.   Hypertension Medications               amLODIPine (NORVASC) 5 MG tablet 1 tablet (5 mg total) by Per G Tube route once daily.    lisinopriL (PRINIVIL,ZESTRIL) 20 MG tablet 1 tablet (20 mg total) by Per G Tube route once daily. DO NOT TAKE UNTIL YOU SEE YOUR PRIMARY DOCTOR. Rehab can add back if BP becomes elevated. Would start with this one over HCTZ     While in the hospital, will manage blood pressure as follows; Continue home amlodipine, Hold lisinopril     Will utilize p.r.n. blood pressure medication only if patient's blood pressure greater than 180/110 and she develops symptoms such as worsening chest pain or shortness of breath.    - Add losartan 25 for persistently elevated BP's      VTE Risk Mitigation (From admission, onward)           Ordered     apixaban tablet 5 mg  2 times daily         06/29/24 1144                    Discharge Planning   JOAN: 7/8/2024     Code Status: Full Code   Is the patient medically ready for  discharge?:     Reason for patient still in hospital (select all that apply): Patient trending condition  Discharge Plan A: Rehab   Discharge Delays: (!) Post-Acute Set-up              Leann Barton MD  Department of Hospital Medicine   [facility] Surg ([place]-[facility])

## 2024-07-06 NOTE — ASSESSMENT & PLAN NOTE
Chronic issue from recent CVA  - SLP notes from prior admission reviewed, recommend pureed diet and continued dysphagia therapy   - RD consulted

## 2024-07-07 LAB
ANION GAP SERPL CALC-SCNC: 10 MMOL/L (ref 8–16)
BUN SERPL-MCNC: 12 MG/DL (ref 8–23)
CALCIUM SERPL-MCNC: 9.4 MG/DL (ref 8.7–10.5)
CHLORIDE SERPL-SCNC: 118 MMOL/L (ref 95–110)
CO2 SERPL-SCNC: 17 MMOL/L (ref 23–29)
CREAT SERPL-MCNC: 0.9 MG/DL (ref 0.5–1.4)
EST. GFR  (NO RACE VARIABLE): >60 ML/MIN/1.73 M^2
GLUCOSE SERPL-MCNC: 74 MG/DL (ref 70–110)
POTASSIUM SERPL-SCNC: 3.7 MMOL/L (ref 3.5–5.1)
SODIUM SERPL-SCNC: 145 MMOL/L (ref 136–145)

## 2024-07-07 PROCEDURE — 63600175 PHARM REV CODE 636 W HCPCS: Performed by: INTERNAL MEDICINE

## 2024-07-07 PROCEDURE — S0179 MEGESTROL 20 MG: HCPCS | Performed by: INTERNAL MEDICINE

## 2024-07-07 PROCEDURE — 36415 COLL VENOUS BLD VENIPUNCTURE: CPT | Performed by: STUDENT IN AN ORGANIZED HEALTH CARE EDUCATION/TRAINING PROGRAM

## 2024-07-07 PROCEDURE — 11000001 HC ACUTE MED/SURG PRIVATE ROOM

## 2024-07-07 PROCEDURE — 25000003 PHARM REV CODE 250: Performed by: INTERNAL MEDICINE

## 2024-07-07 PROCEDURE — 25000003 PHARM REV CODE 250: Performed by: HOSPITALIST

## 2024-07-07 PROCEDURE — 80048 BASIC METABOLIC PNL TOTAL CA: CPT | Performed by: STUDENT IN AN ORGANIZED HEALTH CARE EDUCATION/TRAINING PROGRAM

## 2024-07-07 PROCEDURE — 25000003 PHARM REV CODE 250: Performed by: STUDENT IN AN ORGANIZED HEALTH CARE EDUCATION/TRAINING PROGRAM

## 2024-07-07 PROCEDURE — 25000003 PHARM REV CODE 250

## 2024-07-07 RX ORDER — ACETAMINOPHEN 325 MG/1
650 TABLET ORAL EVERY 6 HOURS PRN
Status: DISCONTINUED | OUTPATIENT
Start: 2024-07-07 | End: 2024-07-09 | Stop reason: HOSPADM

## 2024-07-07 RX ADMIN — MICONAZOLE NITRATE: 20 OINTMENT TOPICAL at 09:07

## 2024-07-07 RX ADMIN — DRONABINOL 10 MG: 2.5 CAPSULE ORAL at 09:07

## 2024-07-07 RX ADMIN — OLANZAPINE 5 MG: 5 TABLET, ORALLY DISINTEGRATING ORAL at 09:07

## 2024-07-07 RX ADMIN — APIXABAN 5 MG: 5 TABLET, FILM COATED ORAL at 09:07

## 2024-07-07 RX ADMIN — MEGESTROL ACETATE 400 MG: 400 SUSPENSION ORAL at 09:07

## 2024-07-07 RX ADMIN — OXYCODONE HYDROCHLORIDE 5 MG: 5 TABLET ORAL at 07:07

## 2024-07-07 RX ADMIN — LOSARTAN POTASSIUM 50 MG: 50 TABLET, FILM COATED ORAL at 09:07

## 2024-07-07 RX ADMIN — BACLOFEN 5 MG: 5 TABLET ORAL at 09:07

## 2024-07-07 RX ADMIN — OXYCODONE HYDROCHLORIDE 5 MG: 5 TABLET ORAL at 01:07

## 2024-07-07 RX ADMIN — AMLODIPINE BESYLATE 10 MG: 10 TABLET ORAL at 09:07

## 2024-07-07 RX ADMIN — ACETAMINOPHEN 650 MG: 325 TABLET ORAL at 01:07

## 2024-07-07 RX ADMIN — ACETAMINOPHEN 650 MG: 325 TABLET ORAL at 09:07

## 2024-07-07 RX ADMIN — DICYCLOMINE HYDROCHLORIDE 10 MG: 10 CAPSULE ORAL at 09:07

## 2024-07-07 RX ADMIN — ATORVASTATIN CALCIUM 40 MG: 40 TABLET, FILM COATED ORAL at 09:07

## 2024-07-07 RX ADMIN — DICYCLOMINE HYDROCHLORIDE 10 MG: 10 CAPSULE ORAL at 05:07

## 2024-07-07 RX ADMIN — DICYCLOMINE HYDROCHLORIDE 10 MG: 10 CAPSULE ORAL at 01:07

## 2024-07-07 RX ADMIN — Medication 6 MG: at 09:07

## 2024-07-07 RX ADMIN — FAMOTIDINE 20 MG: 20 TABLET ORAL at 09:07

## 2024-07-07 NOTE — SUBJECTIVE & OBJECTIVE
Interval History: No acute events overnight. Patient complaining of R shoulder pain and left hip pain.     Review of Systems  Objective:     Vital Signs (Most Recent):  Temp: 98.1 °F (36.7 °C) (07/07/24 0800)  Pulse: 68 (07/07/24 0800)  Resp: 17 (07/07/24 0800)  BP: (!) 163/84 (07/07/24 0800)  SpO2: 98 % (07/07/24 0800) Vital Signs (24h Range):  Temp:  [98.1 °F (36.7 °C)-99 °F (37.2 °C)] 98.1 °F (36.7 °C)  Pulse:  [68-83] 68  Resp:  [17-18] 17  SpO2:  [95 %-99 %] 98 %  BP: (151-174)/(78-95) 163/84     Weight: 67.6 kg (148 lb 15.8 oz)  Body mass index is 30.09 kg/m².    Intake/Output Summary (Last 24 hours) at 7/7/2024 1026  Last data filed at 7/7/2024 0630  Gross per 24 hour   Intake --   Output 600 ml   Net -600 ml         Physical Exam  Vitals and nursing note reviewed.   Constitutional:       General: She is not in acute distress.     Appearance: She is ill-appearing.   HENT:      Head: Normocephalic.   Eyes:      General: No scleral icterus.  Cardiovascular:      Rate and Rhythm: Normal rate and regular rhythm.      Heart sounds: Normal heart sounds. No murmur heard.  Pulmonary:      Effort: No respiratory distress.      Breath sounds: Normal breath sounds. No wheezing.   Abdominal:      General: There is no distension.      Palpations: Abdomen is soft.      Tenderness: There is no abdominal tenderness.      Comments: PEG site bandaged   Musculoskeletal:         General: No tenderness.      Right lower leg: No edema.      Left lower leg: No edema.   Skin:     General: Skin is warm and dry.   Neurological:      Mental Status: She is alert and easily aroused.      GCS: GCS eye subscore is 3. GCS verbal subscore is 5. GCS motor subscore is 6.      Comments: Chronic left sided weakness   Psychiatric:         Mood and Affect: Mood normal.         Behavior: Behavior normal. Behavior is cooperative.             Significant Labs: All pertinent labs within the past 24 hours have been reviewed.    Significant Imaging: I  have reviewed all pertinent imaging results/findings within the past 24 hours.

## 2024-07-07 NOTE — PROGRESS NOTES
[facility] ([address] Medicine  Progress Note    Patient Name: Amirah Davey  MRN: [id number]  Patient Class: IP- Inpatient   Admission Date: 6/29/2024  Length of Stay: 8 days  Attending Physician: Leann Barton MD  Primary Care Provider: Ivana Templeton MD        Subjective:     Principal Problem:PEG tube malfunction        HPI:  Amirah Davey is a 88 y.o. F with PMHx of recent CVA in May with residual L sided weakness, dysphagia and dysarthria, s/p PEG tube placement with recurrent inadvertent dislodgement, HTN, and Afib on eliquis who presents to [facility] for evaluation of PEG tube malfunction. Patient with recent admit for PEG dislogement on 6/12 c/b abdominal wall abscess on CT. IV vancomycin was given and general surgery replaced her PEG tube on 6/17 and no abscess or fluid collection was noted.     She presents today after her inpatient rehab noticed her PEG tube was dislodged. It is unknown when the last time of appropriate placement was. She is AAO to person, place, situation, and year. She denies intentionally removing her PEG tube. Per IPR nursing, the patient had an abdominal binder around her abdomen which was preventing her from removal, which was removed at some point yesterday.     In the ED: VSSAF. Labs grossly unremarkable acutely. CTH and CXR without acute process. Gen surg consulted in the ED and given recurrent inadvertent dislodgement is and unknown timing of dislosgement, recommend NGT placement for enteral nutrition vs long term TPN until patient's mental baseline is acceptable for management of another gastrostomy tube.    Overview/Hospital Course:  Patient is a 88 y.o. F with PMHx of recent CVA in May with residual L sided weakness, dysphagia and dysarthria, s/p PEG tube placement with recurrent inadvertent dislodgement, HTN, and Afib on eliquis who presented to [facility] after PEG tube dislodgement. Patient agitated and altered upon admission. No acute processes on CT-H.  Concern for progressive vascular dementia. This is unfortunately the 3rd time she has dislodged her PEG tube. General surgery consulted and given recurrent PEG dislodging they are recommending against replacement given risk. Discussed with daughters about goals of care moving forward. They would like to pursue appetite stimulants and avoiding NGT or TPN for nutrition. PT/OT recommending inpatient rehab.    Calorie count ordered. Started 7/6. IVF stopped on 7/5.     Interval History: No acute events overnight. Patient complaining of R shoulder pain and left hip pain.     Review of Systems  Objective:     Vital Signs (Most Recent):  Temp: 98.1 °F (36.7 °C) (07/07/24 0800)  Pulse: 68 (07/07/24 0800)  Resp: 17 (07/07/24 0800)  BP: (!) 163/84 (07/07/24 0800)  SpO2: 98 % (07/07/24 0800) Vital Signs (24h Range):  Temp:  [98.1 °F (36.7 °C)-99 °F (37.2 °C)] 98.1 °F (36.7 °C)  Pulse:  [68-83] 68  Resp:  [17-18] 17  SpO2:  [95 %-99 %] 98 %  BP: (151-174)/(78-95) 163/84     Weight: 67.6 kg (148 lb 15.8 oz)  Body mass index is 30.09 kg/m².    Intake/Output Summary (Last 24 hours) at 7/7/2024 1026  Last data filed at 7/7/2024 0630  Gross per 24 hour   Intake --   Output 600 ml   Net -600 ml         Physical Exam  Vitals and nursing note reviewed.   Constitutional:       General: She is not in acute distress.     Appearance: She is ill-appearing.   HENT:      Head: Normocephalic.   Eyes:      General: No scleral icterus.  Cardiovascular:      Rate and Rhythm: Normal rate and regular rhythm.      Heart sounds: Normal heart sounds. No murmur heard.  Pulmonary:      Effort: No respiratory distress.      Breath sounds: Normal breath sounds. No wheezing.   Abdominal:      General: There is no distension.      Palpations: Abdomen is soft.      Tenderness: There is no abdominal tenderness.      Comments: PEG site bandaged   Musculoskeletal:         General: No tenderness.      Right lower leg: No edema.      Left lower leg: No edema.    Skin:     General: Skin is warm and dry.   Neurological:      Mental Status: She is alert and easily aroused.      GCS: GCS eye subscore is 3. GCS verbal subscore is 5. GCS motor subscore is 6.      Comments: Chronic left sided weakness   Psychiatric:         Mood and Affect: Mood normal.         Behavior: Behavior normal. Behavior is cooperative.             Significant Labs: All pertinent labs within the past 24 hours have been reviewed.    Significant Imaging: I have reviewed all pertinent imaging results/findings within the past 24 hours.    Assessment/Plan:      * PEG tube malfunction  88 y.o. F with recent CVA resulting in L hemiplegia and dysphagia s/p PEG tube placement with recurrent inadvertent dislodgement presenting with PEG tub dislodgement. Gen surg consulted in the ED and given recurrent issue & unknown timing of dislosgement, recommend NGT placement for enteral nutrition vs long term TPN until patient's mental baseline is acceptable for management of another gastrostomy tube  - Conitnue pureed diet with strict aspiration precautions  - Crush meds in puree  - s/p mIVF from 7/1-7/5  - SLP and RD consulted  - Discussed goals of care moving forward with daughters. They would like to pursue appetite stimulants and pureed diet and avoiding NGT or TPN for nutrition. They understand that she will likely not be able to meet caloric goals given low appetite and dysphagia.   - Increase Megace to 400mg BID  - Increase dronabinol   - Continue to monitor PO intake / calorie counting    Dysphagia due to recent stroke  Chronic issue from recent CVA  - SLP notes from prior admission reviewed, recommend pureed diet and continued dysphagia therapy   - RD consulted     Malnutrition of mild degree   BMI 30. Poor PO intake 2/2 dysphagia and poor appetite. 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 Minimal improvement with stopping IVF  - stop IVF  - monitor daily       Sacral ulcer, limited to breakdown of skin   Wound care consulted, appreciate recs  - Barrier cream, q2h turns     Reduced mobility        Cerebrovascular accident (CVA) due to embolism of right middle cerebral artery  L hemiplegia  Reduced mobility  - Chronic issue  - Continue statin  - Tentative plan to return to [facility] at time of discharge  - PT/OT consulted    Chronic a-fib  Chronic anticoagulation  Patient is currently in sinus rhythm.[id number] Score: 4. Anticoagulation indicated. Anticoagulation done with eliquis .    Hypertension, benign  Chronic, controlled. Latest blood pressure and vitals reviewed-     Temp:  [97.5 °F (36.4 °C)-98.9 °F (37.2 °C)]   Pulse:  [69-82]   Resp:  [16-20]   BP: (150-178)/(65-82)   SpO2:  [95 %-99 %] .   Home meds for hypertension were reviewed and noted below.   Hypertension Medications               amLODIPine (NORVASC) 5 MG tablet 1 tablet (5 mg total) by Per G Tube route once daily.    lisinopriL (PRINIVIL,ZESTRIL) 20 MG tablet 1 tablet (20 mg total) by Per G Tube route once daily. DO NOT TAKE UNTIL YOU SEE YOUR PRIMARY DOCTOR. Rehab can add back if BP becomes elevated. Would start with this one over HCTZ     While in the hospital, will manage blood pressure as follows; Continue home amlodipine, Hold lisinopril     Will utilize p.r.n. blood pressure medication only if patient's blood pressure greater than 180/110 and she develops symptoms such as worsening chest pain or shortness of breath.    - Add losartan 25 for persistently elevated BP's      VTE Risk Mitigation (From admission, onward)           Ordered     apixaban tablet 5 mg  2 times daily         06/29/24 1144                    Discharge Planning   JOAN: 7/9/2024     Code Status: Full Code   Is the patient medically ready for discharge?:     Reason for  patient still in hospital (select all that apply): Patient trending condition and Consult recommendations  Discharge Plan A: Rehab   Discharge Delays: (!) Post-Acute Set-up              Leann Barton MD  Department of Hospital Medicine   [facility] ([place])

## 2024-07-07 NOTE — ASSESSMENT & PLAN NOTE
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 Minimal improvement with stopping IVF  - stop IVF  - monitor daily

## 2024-07-07 NOTE — PLAN OF CARE
Problem: Adult Inpatient Plan of Care  Goal: Absence of Hospital-Acquired Illness or Injury  Outcome: Progressing  Intervention: Identify and Manage Fall Risk  Flowsheets (Taken 7/7/2024 1806)  Safety Promotion/Fall Prevention:   assistive device/personal item within reach   bed alarm set   bedside commode chair   commode/urinal/bedpan at bedside   diversional activities provided   Fall Risk reviewed with patient/family   Fall Risk signage in place   family to remain at bedside   high risk medications identified   in recliner, wheels locked   lighting adjusted   medications reviewed   muscle strengthening facilitated   nonskid shoes/socks when out of bed   pulse ox   room near unit station   side rails raised x 3   supervised activity   instructed to call staff for mobility  Intervention: Prevent Skin Injury  Flowsheets (Taken 7/7/2024 1806)  Body Position:   turned   supine   weight shifting  Skin Protection:   hydrocolloids used   incontinence pads utilized   pulse oximeter probe site changed  Device Skin Pressure Protection:   absorbent pad utilized/changed   adhesive use limited   positioning supports utilized   pressure points protected   skin-to-skin areas padded   tubing/devices free from skin contact  Intervention: Prevent and Manage VTE (Venous Thromboembolism) Risk  Flowsheets (Taken 7/7/2024 1806)  VTE Prevention/Management:   remove, assess skin, and reapply sequential compression device   bleeding precations maintained   bleeding risk assessed   bleeding risk factor(s) identified, provider notified   dorsiflexion/plantar flexion performed   fluids promoted   ROM (active) performed  Intervention: Prevent Infection  Flowsheets (Taken 7/7/2024 1806)  Infection Prevention:   cohorting utilized   environmental surveillance performed   equipment surfaces disinfected   rest/sleep promoted   hand hygiene promoted   single patient room provided  Goal: Optimal Comfort and Wellbeing  Outcome:  Progressing  Intervention: Monitor Pain and Promote Comfort  Flowsheets (Taken 7/7/2024 1806)  Pain Management Interventions:   around-the-clock dosing utilized   awakened for pain meds per patient request   breathing exercises utilized   care clustered   heat applied   diversional activity provided   medication offered   pain management plan reviewed with patient/caregiver   pillow support provided   position adjusted   premedicated for activity   quiet environment facilitated   relaxation techniques promoted   warm blanket provided  Intervention: Provide Person-Centered Care  Flowsheets (Taken 7/7/2024 1806)  Trust Relationship/Rapport:   care explained   questions answered   choices provided   questions encouraged   emotional support provided   reassurance provided   empathic listening provided   thoughts/feelings acknowledged     Problem: Infection  Goal: Absence of Infection Signs and Symptoms  Outcome: Progressing  Intervention: Prevent or Manage Infection  Flowsheets (Taken 7/7/2024 1806)  Fever Reduction/Comfort Measures:   fluid intake increased   lightweight clothing   lightweight bedding  Infection Management: aseptic technique maintained     Problem: Acute Kidney Injury/Impairment  Goal: Improved Oral Intake  Outcome: Progressing  Intervention: Promote and Optimize Oral Intake  Flowsheets (Taken 7/7/2024 1806)  Oral Nutrition Promotion:   adaptive equipment use encouraged   social interaction promoted   nutrition counseling provided   physical activity promoted   referred to outpatient services   rest periods promoted  Nutrition Interventions:   calorie count initiated   food preferences provided   diet adjusted   frequent small meals provided   referred to nutrition assistant/tech   meal set-up provided   meals from home/family encouraged   supplemental drinks provided   referred to dietitian   supplemental foods provided     Problem: Wound  Goal: Absence of Infection Signs and Symptoms  Outcome:  Progressing  Intervention: Prevent or Manage Infection  Flowsheets (Taken 7/7/2024 1806)  Fever Reduction/Comfort Measures:   fluid intake increased   lightweight clothing   lightweight bedding  Infection Management: aseptic technique maintained  Goal: Optimal Pain Control and Function  Outcome: Progressing  Intervention: Prevent or Manage Pain  Flowsheets (Taken 7/7/2024 1806)  Sleep/Rest Enhancement:   awakenings minimized   consistent schedule promoted   family presence promoted   natural light exposure provided   regular sleep/rest pattern promoted   relaxation techniques promoted   therapeutic touch utilized   room darkened  Pain Management Interventions:   around-the-clock dosing utilized   awakened for pain meds per patient request   breathing exercises utilized   care clustered   heat applied   diversional activity provided   medication offered   pain management plan reviewed with patient/caregiver   pillow support provided   position adjusted   premedicated for activity   quiet environment facilitated   relaxation techniques promoted   warm blanket provided  Goal: Skin Health and Integrity  Outcome: Progressing  Intervention: Optimize Skin Protection  Flowsheets (Taken 7/7/2024 1806)  Pressure Reduction Techniques:   frequent weight shift encouraged   positioned off wounds   weight shift assistance provided   pressure points protected  Pressure Reduction Devices:   foam padding utilized   positioning supports utilized  Skin Protection:   hydrocolloids used   incontinence pads utilized   pulse oximeter probe site changed  Head of Bed (HOB) Positioning: HOB at 45 degrees     Problem: Confusion Chronic  Goal: Optimal Cognitive Function  Outcome: Not Progressing  Intervention: Minimize Injury Risk and Provide Safety  Flowsheets (Taken 7/7/2024 1806)  Enhanced Safety Measures:   bed alarm set   family to remain at bedside   room near unit station  Intervention: Minimize and Manage Confusion  Flowsheets (Taken  7/7/2024 1806)  Environment Familiarity/Consistency:   daily routine followed   personal clothing/items utilized   familiar objects from home provided  Reorientation Measures:   clock in view   familiar social contact encouraged   reorientation provided  Self-Care Promotion:   independence encouraged   BADL personal objects within reach   meal set-up provided   adaptive equipment use encouraged   BADL personal routines maintained  Sensory Stimulation Regulation:   care clustered   television on   visual stimulation provided  Family/Support System Care:   caregiver stress acknowledged   family care conference arranged   involvement promoted   presence promoted   self-care encouraged   support provided  Environmental Support:   calm environment promoted   caregiver consistency promoted   comfort object encouraged   distractions minimized   rest periods encouraged   rooming-in facilitated   environmental consistency promoted   personal routine supported

## 2024-07-08 LAB
ANION GAP SERPL CALC-SCNC: 11 MMOL/L (ref 8–16)
BUN SERPL-MCNC: 12 MG/DL (ref 8–23)
CALCIUM SERPL-MCNC: 9.2 MG/DL (ref 8.7–10.5)
CHLORIDE SERPL-SCNC: 117 MMOL/L (ref 95–110)
CO2 SERPL-SCNC: 18 MMOL/L (ref 23–29)
CREAT SERPL-MCNC: 0.9 MG/DL (ref 0.5–1.4)
ERYTHROCYTE [DISTWIDTH] IN BLOOD BY AUTOMATED COUNT: 16.3 % (ref 11.5–14.5)
EST. GFR  (NO RACE VARIABLE): >60 ML/MIN/1.73 M^2
GLUCOSE SERPL-MCNC: 81 MG/DL (ref 70–110)
HCT VFR BLD AUTO: 26.1 % (ref 37–48.5)
HGB BLD-MCNC: 8 G/DL (ref 12–16)
MAGNESIUM SERPL-MCNC: 1.7 MG/DL (ref 1.6–2.6)
MCH RBC QN AUTO: 24.4 PG (ref 27–31)
MCHC RBC AUTO-ENTMCNC: 30.7 G/DL (ref 32–36)
MCV RBC AUTO: 80 FL (ref 82–98)
PLATELET # BLD AUTO: 330 K/UL (ref 150–450)
PMV BLD AUTO: 10.4 FL (ref 9.2–12.9)
POTASSIUM SERPL-SCNC: 3.2 MMOL/L (ref 3.5–5.1)
RBC # BLD AUTO: 3.28 M/UL (ref 4–5.4)
SODIUM SERPL-SCNC: 146 MMOL/L (ref 136–145)
WBC # BLD AUTO: 6 K/UL (ref 3.9–12.7)

## 2024-07-08 PROCEDURE — 97530 THERAPEUTIC ACTIVITIES: CPT | Mod: CQ

## 2024-07-08 PROCEDURE — 97530 THERAPEUTIC ACTIVITIES: CPT | Mod: CO

## 2024-07-08 PROCEDURE — 80048 BASIC METABOLIC PNL TOTAL CA: CPT | Performed by: STUDENT IN AN ORGANIZED HEALTH CARE EDUCATION/TRAINING PROGRAM

## 2024-07-08 PROCEDURE — 85027 COMPLETE CBC AUTOMATED: CPT | Performed by: STUDENT IN AN ORGANIZED HEALTH CARE EDUCATION/TRAINING PROGRAM

## 2024-07-08 PROCEDURE — 97112 NEUROMUSCULAR REEDUCATION: CPT | Mod: CQ

## 2024-07-08 PROCEDURE — 97535 SELF CARE MNGMENT TRAINING: CPT | Mod: CO

## 2024-07-08 PROCEDURE — 36415 COLL VENOUS BLD VENIPUNCTURE: CPT | Performed by: STUDENT IN AN ORGANIZED HEALTH CARE EDUCATION/TRAINING PROGRAM

## 2024-07-08 PROCEDURE — 63600175 PHARM REV CODE 636 W HCPCS: Performed by: INTERNAL MEDICINE

## 2024-07-08 PROCEDURE — 25000003 PHARM REV CODE 250: Performed by: HOSPITALIST

## 2024-07-08 PROCEDURE — 25000003 PHARM REV CODE 250: Performed by: STUDENT IN AN ORGANIZED HEALTH CARE EDUCATION/TRAINING PROGRAM

## 2024-07-08 PROCEDURE — 11000001 HC ACUTE MED/SURG PRIVATE ROOM

## 2024-07-08 PROCEDURE — 25000003 PHARM REV CODE 250

## 2024-07-08 PROCEDURE — 25000003 PHARM REV CODE 250: Performed by: INTERNAL MEDICINE

## 2024-07-08 PROCEDURE — S0179 MEGESTROL 20 MG: HCPCS | Performed by: INTERNAL MEDICINE

## 2024-07-08 PROCEDURE — 83735 ASSAY OF MAGNESIUM: CPT | Performed by: STUDENT IN AN ORGANIZED HEALTH CARE EDUCATION/TRAINING PROGRAM

## 2024-07-08 RX ORDER — POTASSIUM CHLORIDE 20 MEQ/1
60 TABLET, EXTENDED RELEASE ORAL ONCE
Status: COMPLETED | OUTPATIENT
Start: 2024-07-08 | End: 2024-07-08

## 2024-07-08 RX ADMIN — DICYCLOMINE HYDROCHLORIDE 10 MG: 10 CAPSULE ORAL at 04:07

## 2024-07-08 RX ADMIN — DICYCLOMINE HYDROCHLORIDE 10 MG: 10 CAPSULE ORAL at 10:07

## 2024-07-08 RX ADMIN — MEGESTROL ACETATE 400 MG: 400 SUSPENSION ORAL at 10:07

## 2024-07-08 RX ADMIN — MEGESTROL ACETATE 400 MG: 400 SUSPENSION ORAL at 08:07

## 2024-07-08 RX ADMIN — BACLOFEN 5 MG: 5 TABLET ORAL at 10:07

## 2024-07-08 RX ADMIN — OXYCODONE HYDROCHLORIDE 5 MG: 5 TABLET ORAL at 08:07

## 2024-07-08 RX ADMIN — HYDRALAZINE HYDROCHLORIDE 25 MG: 25 TABLET ORAL at 10:07

## 2024-07-08 RX ADMIN — LOSARTAN POTASSIUM 50 MG: 50 TABLET, FILM COATED ORAL at 08:07

## 2024-07-08 RX ADMIN — BACLOFEN 5 MG: 5 TABLET ORAL at 08:07

## 2024-07-08 RX ADMIN — AMLODIPINE BESYLATE 10 MG: 10 TABLET ORAL at 08:07

## 2024-07-08 RX ADMIN — DRONABINOL 10 MG: 2.5 CAPSULE ORAL at 10:07

## 2024-07-08 RX ADMIN — FAMOTIDINE 20 MG: 20 TABLET ORAL at 08:07

## 2024-07-08 RX ADMIN — DICYCLOMINE HYDROCHLORIDE 10 MG: 10 CAPSULE ORAL at 12:07

## 2024-07-08 RX ADMIN — POTASSIUM CHLORIDE 60 MEQ: 1500 TABLET, EXTENDED RELEASE ORAL at 08:07

## 2024-07-08 RX ADMIN — MICONAZOLE NITRATE: 20 OINTMENT TOPICAL at 09:07

## 2024-07-08 RX ADMIN — APIXABAN 5 MG: 5 TABLET, FILM COATED ORAL at 10:07

## 2024-07-08 RX ADMIN — DICYCLOMINE HYDROCHLORIDE 10 MG: 10 CAPSULE ORAL at 08:07

## 2024-07-08 RX ADMIN — APIXABAN 5 MG: 5 TABLET, FILM COATED ORAL at 08:07

## 2024-07-08 RX ADMIN — DRONABINOL 10 MG: 2.5 CAPSULE ORAL at 08:07

## 2024-07-08 RX ADMIN — OLANZAPINE 5 MG: 5 TABLET, ORALLY DISINTEGRATING ORAL at 12:07

## 2024-07-08 RX ADMIN — ACETAMINOPHEN 650 MG: 325 TABLET ORAL at 12:07

## 2024-07-08 RX ADMIN — ATORVASTATIN CALCIUM 40 MG: 40 TABLET, FILM COATED ORAL at 08:07

## 2024-07-08 NOTE — SUBJECTIVE & OBJECTIVE
Interval History: Patient slept more last night. No acute events. Per daughter, did not eat as much yesterday as days prior. Patient complains of shoulder pain this morning. Denies chest pain, SOB, n/v, c/d.       Review of Systems  Objective:     Vital Signs (Most Recent):  Temp: 97.6 °F (36.4 °C) (07/08/24 0810)  Pulse: 70 (07/08/24 0810)  Resp: 18 (07/08/24 0857)  BP: 128/71 (07/08/24 0810)  SpO2: 99 % (07/08/24 0810) Vital Signs (24h Range):  Temp:  [97.6 °F (36.4 °C)-99 °F (37.2 °C)] 97.6 °F (36.4 °C)  Pulse:  [69-74] 70  Resp:  [16-18] 18  SpO2:  [97 %-100 %] 99 %  BP: (128-178)/(66-84) 128/71     Weight: 67.6 kg (148 lb 15.8 oz)  Body mass index is 30.09 kg/m².    Intake/Output Summary (Last 24 hours) at 7/8/2024 1002  Last data filed at 7/8/2024 0007  Gross per 24 hour   Intake 50 ml   Output 800 ml   Net -750 ml         Physical Exam  Vitals and nursing note reviewed.   Constitutional:       General: She is not in acute distress.     Appearance: She is ill-appearing.   HENT:      Head: Normocephalic.   Eyes:      General: No scleral icterus.  Cardiovascular:      Rate and Rhythm: Normal rate and regular rhythm.      Heart sounds: Normal heart sounds. No murmur heard.  Pulmonary:      Effort: No respiratory distress.      Breath sounds: Normal breath sounds. No wheezing.   Abdominal:      General: There is no distension.      Palpations: Abdomen is soft.      Tenderness: There is no abdominal tenderness.      Comments: PEG site bandaged   Musculoskeletal:         General: No tenderness.      Right lower leg: No edema.      Left lower leg: No edema.   Skin:     General: Skin is warm and dry.   Neurological:      Mental Status: She is alert and easily aroused.      GCS: GCS eye subscore is 3. GCS verbal subscore is 5. GCS motor subscore is 6.      Comments: Chronic left sided weakness   Psychiatric:         Mood and Affect: Mood normal.         Behavior: Behavior normal. Behavior is cooperative.              Significant Labs: All pertinent labs within the past 24 hours have been reviewed.    Significant Imaging: I have reviewed all pertinent imaging results/findings within the past 24 hours.

## 2024-07-08 NOTE — PT/OT/SLP PROGRESS
Occupational Therapy   Treatment    Name: Amirah Davey  MRN: [id number]  Admitting Diagnosis:  PEG tube malfunction       Recommendations:     Discharge Recommendations: Moderate Intensity Therapy  Discharge Equipment Recommendations:  wheelchair, lift device, hospital bed, bedside commode  Barriers to discharge:  Other (Comment), None (patient requires sinificant assistance with mobility and self-care task)    Assessment:     Amirah Davey is a 88 y.o. female with a medical diagnosis of PEG tube malfunction.  She presents with the fallowing performance deficits affecting function are weakness, impaired self care skills, gait instability, impaired balance, pain, impaired cardiopulmonary response to activity, decreased safety awareness, impaired endurance, decreased upper extremity function. Patient agreeable to tx session, today session focused on tolerance to upright positioning for postural musculature to performed dynamic sitting to prepare for functional UB/LB dressing task sitting up at EOB. Patient required significant assistance with  bed mobility task and sitting balance with patient requiring Max A and R UE support,  patient with significant posterior and lateral lean, patient required Mod A to return and maintain midline and  required both tactile and verbal cues. Patient also required verbal cues , tactile cues, and visual target to assist with maintaining upright posture and balance. Patient noticed with posterior pelvic tilt kyphosis,cervical flexion, rounded shoulders, and difficultly maintaining midline. Patient is still well below baseline function and is not safe to return to home at this time. Patient continue to demonstrate that need for Moderate intensity therapy intervention on daily basis post acute setting.      Rehab Prognosis:  Good and Fair; patient would benefit from acute skilled OT services to address these deficits and reach maximum level of function.       Plan:     Patient to be seen 4  x/week to address the above listed problems via self-care/home management, therapeutic activities, therapeutic exercises, neuromuscular re-education  Plan of Care Expires: 07/30/24  Plan of Care Reviewed with: patient    Subjective     Chief Complaint: pain   Patient/Family Comments/goals: to return to PLOF   Pain/Comfort:  Pain Rating 1:  (patient did not rate)  Location - Side 1: Bilateral  Location - Orientation 1: generalized  Location 1:  (all over)  Pain Addressed 1: Reposition, Distraction  Pain Rating Post-Intervention 1:  (patient did not complained of further pain)    Objective:     Communicated with: Nurse prior to session.  Patient found HOB elevated with telemetry, PureWick upon OT entry to room.  A client care conference was completed by the OTR and the HOPPER prior to treatment by the HOPPER to discuss the patient's POC and current status.   Co-treated with PT due to patient complexity deficits requiring two skilled therapist to appropriately and safely mobilized patient while facilitating functional task in addition to accommodating for patient's activity tolerance.      General Precautions: Standard, fall    Orthopedic Precautions:N/A  Braces: N/A  Respiratory Status: Room air     Occupational Performance:     Bed Mobility:    Patient completed Rolling/Turning to Left with  moderate assistance of 2 persons   Patient completed Rolling/Turning to Right with maximal assistance of 2 persons   Patient completed Scooting/Bridging with maximal assistance of 2 persons   Patient required total assistance to scoot to HOB and for reposition   Patient completed Supine to Sit with maximal assistance of 2 persons   Patient completed Sit to Supine with maximal assistance of 2 persons   Patient was able to sit up at the EOB for 10 mins with max assistance of 2 persons and one episode of patient being able to sit up with CGA for a few seconds.     Functional Mobility/Transfers:  Deferred mobility task due to poor  sitting balance and tolerance     Activities of Daily Living:  Lower Body Dressing: total assistance to don socks       AMPAC 6 Click ADL: 8    Treatment & Education:  Discussed OT POC and progress  Educated patient on the importance to continue to perform exercises in order to reduce stiffness and promote joint mobility and blood flow  Addressed patient's questions and concerns within HOPPER scope of practice      Patient left left sidelying with all lines intact and call button in reach with daughter present and nurse notified     GOALS:   Multidisciplinary Problems       Occupational Therapy Goals          Problem: Occupational Therapy    Goal Priority Disciplines Outcome Interventions   Occupational Therapy Goal     OT, PT/OT Progressing    Description: Goals to be met by: 7/30/24     Patient will increase functional independence with ADLs by performing:    UE Dressing with Maximum Assistance.  LE Dressing with Maximum Assistance.  Grooming while EOB with Moderate Assistance.  Toileting from bedside commode with Maximum Assistance for hygiene and clothing management.   Sitting at edge of bed x10 minutes with Minimal Assistance.  Rolling to Right with Minimal Assistance.   Rolling to Left with CGA  Supine to sit with Moderate Assistance.  Squat pivot transfers with Maximum Assistance.  Toilet transfer to bedside commode with Maximum Assistance.    DME justification  Patient has a mobility limitation that significantly impairs their ability to participate in one or more mobility related activities of daily living, including toileting. This deficit can be resolved by using a bedside commode. Patient demonstrates mobility limitations that will cause them to be confined to one room at home without bathroom access for up to 30 days. Using a bedside commode will greatly improve the patient's ability to participate in MRADLs.                           Time Tracking:     OT Date of Treatment: 07/08/24  OT Start Time:  1028  OT Stop Time: 1052  OT Total Time (min): 24 min    Billable Minutes:Self Care/Home Management 12  Therapeutic Activity 12    OT/PHILIP: PHILIP     Number of PHILIP visits since last OT visit: 1 7/8/2024

## 2024-07-08 NOTE — PROGRESS NOTES
[facility] ([address] Medicine  Progress Note    Patient Name: Amirah Davey  MRN: [id number]  Patient Class: IP- Inpatient   Admission Date: 6/29/2024  Length of Stay: 9 days  Attending Physician: Leann Barton MD  Primary Care Provider: Ivana Templeton MD        Subjective:     Principal Problem:PEG tube malfunction        HPI:  Amirah Davey is a 88 y.o. F with PMHx of recent CVA in May with residual L sided weakness, dysphagia and dysarthria, s/p PEG tube placement with recurrent inadvertent dislodgement, HTN, and Afib on eliquis who presents to [facility] for evaluation of PEG tube malfunction. Patient with recent admit for PEG dislogement on 6/12 c/b abdominal wall abscess on CT. IV vancomycin was given and general surgery replaced her PEG tube on 6/17 and no abscess or fluid collection was noted.     She presents today after her inpatient rehab noticed her PEG tube was dislodged. It is unknown when the last time of appropriate placement was. She is AAO to person, place, situation, and year. She denies intentionally removing her PEG tube. Per IPR nursing, the patient had an abdominal binder around her abdomen which was preventing her from removal, which was removed at some point yesterday.     In the ED: VSSAF. Labs grossly unremarkable acutely. CTH and CXR without acute process. Gen surg consulted in the ED and given recurrent inadvertent dislodgement is and unknown timing of dislosgement, recommend NGT placement for enteral nutrition vs long term TPN until patient's mental baseline is acceptable for management of another gastrostomy tube.    Overview/Hospital Course:  Patient is a 88 y.o. F with PMHx of recent CVA in May with residual L sided weakness, dysphagia and dysarthria, s/p PEG tube placement with recurrent inadvertent dislodgement, HTN, and Afib on eliquis who presented to [facility] after PEG tube dislodgement. Patient agitated and altered upon admission. No acute processes on CT-H.  Concern for progressive vascular dementia. This is unfortunately the 3rd time she has dislodged her PEG tube. General surgery consulted and given recurrent PEG dislodging they are recommending against replacement given risk. Discussed with daughters about goals of care moving forward. They would like to pursue appetite stimulants and avoiding NGT or TPN for nutrition. PT/OT recommending inpatient rehab.    Calorie count ordered. Started 7/6. IVF stopped on 7/5. Dispo pending calorie count and PMR eval.     Interval History: Patient slept more last night. No acute events. Per daughter, did not eat as much yesterday as days prior. Patient complains of shoulder pain this morning. Denies chest pain, SOB, n/v, c/d.       Review of Systems  Objective:     Vital Signs (Most Recent):  Temp: 97.6 °F (36.4 °C) (07/08/24 0810)  Pulse: 70 (07/08/24 0810)  Resp: 18 (07/08/24 0857)  BP: 128/71 (07/08/24 0810)  SpO2: 99 % (07/08/24 0810) Vital Signs (24h Range):  Temp:  [97.6 °F (36.4 °C)-99 °F (37.2 °C)] 97.6 °F (36.4 °C)  Pulse:  [69-74] 70  Resp:  [16-18] 18  SpO2:  [97 %-100 %] 99 %  BP: (128-178)/(66-84) 128/71     Weight: 67.6 kg (148 lb 15.8 oz)  Body mass index is 30.09 kg/m².    Intake/Output Summary (Last 24 hours) at 7/8/2024 1002  Last data filed at 7/8/2024 0007  Gross per 24 hour   Intake 50 ml   Output 800 ml   Net -750 ml         Physical Exam  Vitals and nursing note reviewed.   Constitutional:       General: She is not in acute distress.     Appearance: She is ill-appearing.   HENT:      Head: Normocephalic.   Eyes:      General: No scleral icterus.  Cardiovascular:      Rate and Rhythm: Normal rate and regular rhythm.      Heart sounds: Normal heart sounds. No murmur heard.  Pulmonary:      Effort: No respiratory distress.      Breath sounds: Normal breath sounds. No wheezing.   Abdominal:      General: There is no distension.      Palpations: Abdomen is soft.      Tenderness: There is no abdominal tenderness.       Comments: PEG site bandaged   Musculoskeletal:         General: No tenderness.      Right lower leg: No edema.      Left lower leg: No edema.   Skin:     General: Skin is warm and dry.   Neurological:      Mental Status: She is alert and easily aroused.      GCS: GCS eye subscore is 3. GCS verbal subscore is 5. GCS motor subscore is 6.      Comments: Chronic left sided weakness   Psychiatric:         Mood and Affect: Mood normal.         Behavior: Behavior normal. Behavior is cooperative.             Significant Labs: All pertinent labs within the past 24 hours have been reviewed.    Significant Imaging: I have reviewed all pertinent imaging results/findings within the past 24 hours.    Assessment/Plan:      * PEG tube malfunction  88 y.o. F with recent CVA resulting in L hemiplegia and dysphagia s/p PEG tube placement with recurrent inadvertent dislodgement presenting with PEG tub dislodgement. Gen surg consulted in the ED and given recurrent issue & unknown timing of dislosgement, recommend NGT placement for enteral nutrition vs long term TPN until patient's mental baseline is acceptable for management of another gastrostomy tube  - Conitnue pureed diet with strict aspiration precautions  - Crush meds in puree  - s/p mIVF from 7/1-7/5  - SLP and RD consulted  - Discussed goals of care moving forward with daughters. They would like to pursue appetite stimulants and pureed diet and avoiding NGT or TPN for nutrition. They understand that she will likely not be able to meet caloric goals given low appetite and dysphagia.   - Increase Megace to 400mg BID  - Increase dronabinol   - Continue to monitor PO intake / calorie counting    Dysphagia due to recent stroke  Chronic issue from recent CVA  - SLP notes from prior admission reviewed, recommend pureed diet and continued dysphagia therapy   - RD consulted     Malnutrition of mild degree   BMI 30. Poor PO intake 2/2 dysphagia and poor appetite. 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 Minimal improvement with stopping IVF  - stop IVF  - monitor daily       Sacral ulcer, limited to breakdown of skin   Wound care consulted, appreciate recs  - Barrier cream, q2h turns     Reduced mobility        Cerebrovascular accident (CVA) due to embolism of right middle cerebral artery  L hemiplegia  Reduced mobility  - Chronic issue  - Continue statin  - Tentative plan to return to [facility] at time of discharge  - PT/OT consulted    Chronic a-fib  Chronic anticoagulation  Patient is currently in sinus rhythm.[id number] Score: 4. Anticoagulation indicated. Anticoagulation done with eliquis .    Hypertension, benign  Chronic, controlled. Latest blood pressure and vitals reviewed-     Temp:  [97.5 °F (36.4 °C)-98.9 °F (37.2 °C)]   Pulse:  [69-82]   Resp:  [16-20]   BP: (150-178)/(65-82)   SpO2:  [95 %-99 %] .   Home meds for hypertension were reviewed and noted below.   Hypertension Medications               amLODIPine (NORVASC) 5 MG tablet 1 tablet (5 mg total) by Per G Tube route once daily.    lisinopriL (PRINIVIL,ZESTRIL) 20 MG tablet 1 tablet (20 mg total) by Per G Tube route once daily. DO NOT TAKE UNTIL YOU SEE YOUR PRIMARY DOCTOR. Rehab can add back if BP becomes elevated. Would start with this one over HCTZ     While in the hospital, will manage blood pressure as follows; Continue home amlodipine, Hold lisinopril     Will utilize p.r.n. blood pressure medication only if patient's blood pressure greater than 180/110 and she develops symptoms such as worsening chest pain or shortness of breath.    - Add losartan 25 for persistently elevated BP's      VTE Risk Mitigation (From admission, onward)           Ordered     apixaban tablet 5 mg  2 times daily         06/29/24 0177                     Discharge Planning   JOAN: 7/9/2024     Code Status: Full Code   Is the patient medically ready for discharge?:     Reason for patient still in hospital (select all that apply): Patient trending condition, PT / OT recommendations, and Pending disposition  Discharge Plan A: Rehab   Discharge Delays: (!) Post-Acute Set-up              Leann Barton MD  Department of Hospital Medicine   [facility] ([place])

## 2024-07-08 NOTE — PT/OT/SLP PROGRESS
Physical Therapy Co Treatment    Patient Name:  Amirah Davey   MRN:  [id number]    Recommendations:     Discharge Recommendations: Moderate Intensity Therapy  Discharge Equipment Recommendations: hospital bed, lift device, to be determined by next level of care, wheelchair  Barriers to discharge:  current level of assist needed    Assessment:     Amirah Davey is a 88 y.o. female admitted with a medical diagnosis of PEG tube malfunction.  She presents with the following impairments/functional limitations: pain, decreased safety awareness, decreased lower extremity function, decreased upper extremity function, decreased coordination, impaired cognition, impaired balance, gait instability, impaired functional mobility, impaired self care skills, impaired endurance, weakness, abnormal tone, impaired muscle length, impaired joint extensibility, impaired cardiopulmonary response to activity, impaired skin, decreased ROM Pt was in bed and agreeable to therapy. Pt has decreased cognitive awareness and is has hypersensitivity to palpation. Pt has difficulty participating in skilled PT services secondary to previously stated deficits. Pt does however require need for further care to improve current level of function.     Co-treatment performed due to patient's multiple deficits requiring two skilled therapists to appropriately and safely assess patient's strength and endurance while facilitating functional tasks in addition to accommodating for patient's activity tolerance.    Rehab Prognosis: Fair; patient would benefit from acute skilled PT services to address these deficits and reach maximum level of function.    Recent Surgery: * No surgery found *      Plan:     During this hospitalization, patient to be seen 4 x/week to address the identified rehab impairments via gait training, neuromuscular re-education, therapeutic activities, therapeutic exercises and progress toward the following goals:    Plan of Care Expires:   "07/28/24    Subjective     Chief Complaint: "I'm hurting, It's so sore"  Patient/Family Comments/goals: Per daughter:Return to PLOF  Pain/Comfort:  Pain Rating 1: other (see comments) (not-rated)  Location - Side 1: Bilateral  Location - Orientation 1: generalized  Location 1: other (see comments) (shoulder, arm, neck, back, side (flank))  Pain Addressed 1: Reposition, Distraction, Cessation of Activity      Objective:     Communicated with nursing (Jean-Paul) prior to session.  Patient found supine with telemetry upon PT entry to room.     General Precautions: Standard, fall  Orthopedic Precautions: N/A  Braces: N/A  Respiratory Status: Room air     Functional Mobility:  VC's for sequencing and encouragement given throughout entirety of session.    Bed Mobility:     Rolling Left:  moderate assistance and of 2 persons  Rolling Right: moderate assistance and of 2 persons  Scooting: Supine to HOB: Total x2; Seated on EOB: Max x2  Supine to Sit: maximal assistance and of 2 persons  Sit to Supine: maximal assistance and of 2 persons  Balance: Static EOB sitting ~10 minutes Max A x2 (~ 10 seconds of Min A)  Posterior lateral lean to L with RUE  pushing  Pt goes into full trunk extension when fearful of falling or in pain  Decreased leaning with R hand being held and BLE's on garbage can for flat feet  Pt given cue's to promote core activation and increase anterior leaning in sitting      AM-PAC 6 CLICK MOBILITY  Turning over in bed (including adjusting bedclothes, sheets and blankets)?: 2  Sitting down on and standing up from a chair with arms (e.g., wheelchair, bedside commode, etc.): 2  Moving from lying on back to sitting on the side of the bed?: 2  Moving to and from a bed to a chair (including a wheelchair)?: 1  Need to walk in hospital room?: 1  Climbing 3-5 steps with a railing?: 1  Basic Mobility Total Score: 9       Treatment & Education:  Patient/ Family provided with daily orientation and goals of this PT " session. Also, pt was educated on the effects of prolonged immobility and the importance of performing activity and exercises to promote healing and reduce recovery time.    Patient left HOB elevated with all lines intact, call button in reach, RN notified, and daughter present..    GOALS:   Multidisciplinary Problems       Physical Therapy Goals          Problem: Physical Therapy    Goal Priority Disciplines Outcome Goal Variances Interventions   Physical Therapy Goal     PT, PT/OT Progressing     Description: Goals to be met by: 2024     Patient will increase functional independence with mobility by performin. Supine to sit with Moderate Assistance  2. Sit to supine with Moderate Assistance  3. Rolling to Left and Right with Stand-by Assistance.  4. Sit to stand transfer with Moderate Assistance using LRAD  5. Bed to chair transfer with Moderate Assistance using LRAD  6. Gait  x 10 feet with Maximum Assistance using LRAD.   7. Wheelchair propulsion x50 feet with Stand-by Assistance using  right upper and lower extremity  8. Sitting at edge of bed x5 minutes with Minimal Assistance                         Time Tracking:     PT Received On: 24  PT Start Time: 1028     PT Stop Time: 1052  PT Total Time (min): 24 min     Billable Minutes: Therapeutic Activity 12 and Neuromuscular Re-education 12    Treatment Type: Treatment  PT/PTA: PTA     Number of PTA visits since last PT visit: 4     2024

## 2024-07-08 NOTE — PLAN OF CARE
Problem: Adult Inpatient Plan of Care  Goal: Absence of Hospital-Acquired Illness or Injury  Outcome: Progressing  Intervention: Identify and Manage Fall Risk  Flowsheets (Taken 7/8/2024 1839)  Safety Promotion/Fall Prevention:   assistive device/personal item within reach   bed alarm set   bedside commode chair   commode/urinal/bedpan at bedside   diversional activities provided   Fall Risk reviewed with patient/family   Fall Risk signage in place   high risk medications identified   in recliner, wheels locked   lighting adjusted   medications reviewed   muscle strengthening facilitated   nonskid shoes/socks when out of bed   family to remain at bedside   pulse ox   Roll belt self-releasing   room near unit station   side rails raised x 3   supervised activity   instructed to call staff for mobility  Intervention: Prevent Skin Injury  Flowsheets (Taken 7/8/2024 1839)  Body Position:   turned   left   weight shifting  Skin Protection:   hydrocolloids used   incontinence pads utilized   pulse oximeter probe site changed  Device Skin Pressure Protection:   absorbent pad utilized/changed   adhesive use limited   positioning supports utilized  Intervention: Prevent and Manage VTE (Venous Thromboembolism) Risk  Flowsheets (Taken 7/8/2024 1839)  VTE Prevention/Management:   remove, assess skin, and reapply sequential compression device   ambulation promoted   bleeding precations maintained   bleeding risk assessed   bleeding risk factor(s) identified, provider notified   dorsiflexion/plantar flexion performed   fluids promoted   ROM (active) performed  Intervention: Prevent Infection  Flowsheets (Taken 7/8/2024 1839)  Infection Prevention:   cohorting utilized   hand hygiene promoted   single patient room provided   environmental surveillance performed   equipment surfaces disinfected   rest/sleep promoted  Goal: Optimal Comfort and Wellbeing  Outcome: Progressing  Intervention: Monitor Pain and Promote  Comfort  Flowsheets (Taken 7/8/2024 1839)  Pain Management Interventions:   around-the-clock dosing utilized   awakened for pain meds per patient request   breathing exercises utilized   care clustered   diversional activity provided   medication offered   pain management plan reviewed with patient/caregiver   pillow support provided   position adjusted   premedicated for activity   relaxation techniques promoted   quiet environment facilitated   warm blanket provided  Intervention: Provide Person-Centered Care  Flowsheets (Taken 7/8/2024 1839)  Trust Relationship/Rapport:   care explained   questions answered   choices provided   questions encouraged   emotional support provided   reassurance provided   thoughts/feelings acknowledged     Problem: Infection  Goal: Absence of Infection Signs and Symptoms  Outcome: Progressing  Intervention: Prevent or Manage Infection  Flowsheets (Taken 7/8/2024 1839)  Fever Reduction/Comfort Measures:   fluid intake increased   lightweight clothing   lightweight bedding  Infection Management: aseptic technique maintained     Problem: Wound  Goal: Absence of Infection Signs and Symptoms  Outcome: Progressing  Intervention: Prevent or Manage Infection  Flowsheets (Taken 7/8/2024 1839)  Fever Reduction/Comfort Measures:   fluid intake increased   lightweight clothing   lightweight bedding  Infection Management: aseptic technique maintained  Goal: Skin Health and Integrity  Outcome: Progressing  Intervention: Optimize Skin Protection  Flowsheets (Taken 7/8/2024 1839)  Pressure Reduction Techniques:   frequent weight shift encouraged   heels elevated off bed   positioned off wounds   pressure points protected   weight shift assistance provided  Pressure Reduction Devices:   foam padding utilized   positioning supports utilized  Skin Protection:   hydrocolloids used   incontinence pads utilized   pulse oximeter probe site changed  Activity Management:   Rolling - L1   Sitting at edge of  bed - L2  Head of Bed (HOB) Positioning: HOB elevated  Goal: Optimal Wound Healing  Outcome: Progressing  Intervention: Promote Wound Healing  Flowsheets (Taken 7/8/2024 1839)  Sleep/Rest Enhancement:   awakenings minimized   consistent schedule promoted   family presence promoted   natural light exposure provided   noise level reduced   room darkened   therapeutic touch utilized   relaxation techniques promoted   regular sleep/rest pattern promoted     Problem: Wound  Goal: Optimal Functional Ability  Outcome: Not Progressing  Intervention: Optimize Functional Ability  Flowsheets (Taken 7/8/2024 1839)  Activity Management:   Rolling - L1   Sitting at edge of bed - L2  Activity Assistance Provided: assistance, 1 person  Goal: Optimal Pain Control and Function  Outcome: Not Progressing  Intervention: Prevent or Manage Pain  Flowsheets (Taken 7/8/2024 1839)  Sleep/Rest Enhancement:   awakenings minimized   consistent schedule promoted   family presence promoted   natural light exposure provided   noise level reduced   room darkened   therapeutic touch utilized   relaxation techniques promoted   regular sleep/rest pattern promoted  Pain Management Interventions:   around-the-clock dosing utilized   awakened for pain meds per patient request   breathing exercises utilized   care clustered   diversional activity provided   medication offered   pain management plan reviewed with patient/caregiver   pillow support provided   position adjusted   premedicated for activity   relaxation techniques promoted   quiet environment facilitated   warm blanket provided     Problem: Fall Injury Risk  Goal: Absence of Fall and Fall-Related Injury  Outcome: Not Progressing  Intervention: Identify and Manage Contributors  Flowsheets (Taken 7/8/2024 1839)  Self-Care Promotion:   independence encouraged   meal set-up provided   BADL personal objects within reach   adaptive equipment use encouraged   BADL personal routines  maintained  Medication Review/Management:   medications reviewed   high-risk medications identified   provider consulted   pharmacy consulted  Intervention: Promote Injury-Free Environment  Flowsheets (Taken 7/8/2024 1839)  Safety Promotion/Fall Prevention:   assistive device/personal item within reach   bed alarm set   bedside commode chair   commode/urinal/bedpan at bedside   diversional activities provided   Fall Risk reviewed with patient/family   Fall Risk signage in place   high risk medications identified   in recliner, wheels locked   lighting adjusted   medications reviewed   muscle strengthening facilitated   nonskid shoes/socks when out of bed   family to remain at bedside   pulse ox   Roll belt self-releasing   room near unit station   side rails raised x 3   supervised activity   instructed to call staff for mobility     Problem: Confusion Chronic  Goal: Optimal Cognitive Function  Outcome: Not Progressing  Intervention: Minimize Injury Risk and Provide Safety  Flowsheets (Taken 7/8/2024 1839)  Enhanced Safety Measures:   bed alarm set   family to remain at bedside   room near unit station  Intervention: Minimize and Manage Confusion  Flowsheets (Taken 7/8/2024 1839)  Environment Familiarity/Consistency: daily routine followed  Reorientation Measures:   clock in view   familiar social contact encouraged   reorientation provided  Self-Care Promotion:   independence encouraged   meal set-up provided   BADL personal objects within reach   adaptive equipment use encouraged   BADL personal routines maintained  Sensory Stimulation Regulation:   lighting decreased   care clustered   television on   visual stimulation provided   quiet environment promoted  Family/Support System Care:   caregiver stress acknowledged   support provided   self-care encouraged  Environmental Support: calm environment promoted

## 2024-07-08 NOTE — PLAN OF CARE
[facility] ([facility])  Discharge Reassessment    Primary Care Provider: Ivana Templeton MD    Expected Discharge Date: 7/9/2024    Reassessment (most recent)       Discharge Reassessment - 07/08/24 1120          Discharge Reassessment    Assessment Type Discharge Planning Reassessment (P)      Did the patient's condition or plan change since previous assessment? Yes (P)      Discharge Plan discussed with: Adult children (P)      Communicated JOAN with patient/caregiver Date not available/Unable to determine (P)      Discharge Plan A Skilled Nursing Facility (P)      Discharge Plan B Rehab (P)      DME Needed Upon Discharge  none (P)      Transition of Care Barriers Mobility (P)      Why the patient remains in the hospital Requires continued medical care (P)         Post-Acute Status    Post-Acute Authorization Placement (P)      Post-Acute Placement Status Referrals Sent (P)      Coverage [facility] (P)      Discharge Delays Post-Acute Set-up (P)                  CM spoke with pt's daughter Ashlie in room.  Instructed that pt not qualifying for Rehab at this point, would have to have higher level of function, we are pursuing SNF at this time. CG v/u.  CG states she has the following choices for SNF:  [facility], [facility], [facility], [facility].  CM sent referrals to all but [facility] via CP; sent referral to [facility] via fax [phone].  CM instructed that if no accepting first choice facilities, we will have to expand search.  CG v/u.     1:01 PM  Per CP, no accepting facilities.   CM expanded search, [facility], [facility]     2:01 PM  CM uploaded 142 to .    3:44 PM  CM spoke with Christina at [facility] [phone], she states they don't accept PEG tube pts.  CM informed that pt no longer has PEG; PEG has been removed.  Christina states she needs wound care note; she will  consult with [facility] to see if they can accept pt. CM sent wound care note via CP.    Debbie Coles,  SHIRAN, BS, RN, CCM      Discharge Plan A and Plan B have been determined by review of patient's clinical status, future medical and therapeutic needs, and coverage/benefits for post-acute care in coordination with multidisciplinary team members.

## 2024-07-09 VITALS
WEIGHT: 149 LBS | SYSTOLIC BLOOD PRESSURE: 142 MMHG | RESPIRATION RATE: 18 BRPM | OXYGEN SATURATION: 98 % | HEIGHT: 59 IN | BODY MASS INDEX: 30.04 KG/M2 | HEART RATE: 92 BPM | TEMPERATURE: 98 F | DIASTOLIC BLOOD PRESSURE: 70 MMHG

## 2024-07-09 LAB
ALBUMIN SERPL BCP-MCNC: 2.2 G/DL (ref 3.5–5.2)
ALP SERPL-CCNC: 60 U/L (ref 55–135)
ALT SERPL W/O P-5'-P-CCNC: 21 U/L (ref 10–44)
ANION GAP SERPL CALC-SCNC: 11 MMOL/L (ref 8–16)
AST SERPL-CCNC: 24 U/L (ref 10–40)
BASOPHILS # BLD AUTO: 0.02 K/UL (ref 0–0.2)
BASOPHILS NFR BLD: 0.3 % (ref 0–1.9)
BILIRUB SERPL-MCNC: 0.4 MG/DL (ref 0.1–1)
BUN SERPL-MCNC: 16 MG/DL (ref 8–23)
CALCIUM SERPL-MCNC: 9.2 MG/DL (ref 8.7–10.5)
CHLORIDE SERPL-SCNC: 114 MMOL/L (ref 95–110)
CO2 SERPL-SCNC: 19 MMOL/L (ref 23–29)
CREAT SERPL-MCNC: 0.8 MG/DL (ref 0.5–1.4)
DIFFERENTIAL METHOD BLD: ABNORMAL
EOSINOPHIL # BLD AUTO: 0.1 K/UL (ref 0–0.5)
EOSINOPHIL NFR BLD: 1.9 % (ref 0–8)
ERYTHROCYTE [DISTWIDTH] IN BLOOD BY AUTOMATED COUNT: 16.3 % (ref 11.5–14.5)
EST. GFR  (NO RACE VARIABLE): >60 ML/MIN/1.73 M^2
GLUCOSE SERPL-MCNC: 80 MG/DL (ref 70–110)
HCT VFR BLD AUTO: 31.4 % (ref 37–48.5)
HGB BLD-MCNC: 9.5 G/DL (ref 12–16)
IMM GRANULOCYTES # BLD AUTO: 0.03 K/UL (ref 0–0.04)
IMM GRANULOCYTES NFR BLD AUTO: 0.5 % (ref 0–0.5)
LYMPHOCYTES # BLD AUTO: 1.2 K/UL (ref 1–4.8)
LYMPHOCYTES NFR BLD: 21.4 % (ref 18–48)
MAGNESIUM SERPL-MCNC: 1.7 MG/DL (ref 1.6–2.6)
MCH RBC QN AUTO: 24.5 PG (ref 27–31)
MCHC RBC AUTO-ENTMCNC: 30.3 G/DL (ref 32–36)
MCV RBC AUTO: 81 FL (ref 82–98)
MONOCYTES # BLD AUTO: 0.7 K/UL (ref 0.3–1)
MONOCYTES NFR BLD: 12.4 % (ref 4–15)
NEUTROPHILS # BLD AUTO: 3.7 K/UL (ref 1.8–7.7)
NEUTROPHILS NFR BLD: 63.5 % (ref 38–73)
NRBC BLD-RTO: 0 /100 WBC
PHOSPHATE SERPL-MCNC: 3 MG/DL (ref 2.7–4.5)
PLATELET # BLD AUTO: 367 K/UL (ref 150–450)
PMV BLD AUTO: 10.2 FL (ref 9.2–12.9)
POTASSIUM SERPL-SCNC: 3.6 MMOL/L (ref 3.5–5.1)
PROT SERPL-MCNC: 6.2 G/DL (ref 6–8.4)
RBC # BLD AUTO: 3.88 M/UL (ref 4–5.4)
SODIUM SERPL-SCNC: 144 MMOL/L (ref 136–145)
WBC # BLD AUTO: 5.8 K/UL (ref 3.9–12.7)

## 2024-07-09 PROCEDURE — 80053 COMPREHEN METABOLIC PANEL: CPT | Performed by: STUDENT IN AN ORGANIZED HEALTH CARE EDUCATION/TRAINING PROGRAM

## 2024-07-09 PROCEDURE — 97112 NEUROMUSCULAR REEDUCATION: CPT | Mod: CO

## 2024-07-09 PROCEDURE — 84100 ASSAY OF PHOSPHORUS: CPT | Performed by: STUDENT IN AN ORGANIZED HEALTH CARE EDUCATION/TRAINING PROGRAM

## 2024-07-09 PROCEDURE — 63600175 PHARM REV CODE 636 W HCPCS: Performed by: INTERNAL MEDICINE

## 2024-07-09 PROCEDURE — 99232 SBSQ HOSP IP/OBS MODERATE 35: CPT | Mod: ,,, | Performed by: NURSE PRACTITIONER

## 2024-07-09 PROCEDURE — 25000003 PHARM REV CODE 250: Performed by: STUDENT IN AN ORGANIZED HEALTH CARE EDUCATION/TRAINING PROGRAM

## 2024-07-09 PROCEDURE — S0179 MEGESTROL 20 MG: HCPCS | Performed by: INTERNAL MEDICINE

## 2024-07-09 PROCEDURE — 97530 THERAPEUTIC ACTIVITIES: CPT | Mod: CO

## 2024-07-09 PROCEDURE — 36415 COLL VENOUS BLD VENIPUNCTURE: CPT | Performed by: STUDENT IN AN ORGANIZED HEALTH CARE EDUCATION/TRAINING PROGRAM

## 2024-07-09 PROCEDURE — 25000003 PHARM REV CODE 250: Performed by: INTERNAL MEDICINE

## 2024-07-09 PROCEDURE — 25000003 PHARM REV CODE 250

## 2024-07-09 PROCEDURE — 85025 COMPLETE CBC W/AUTO DIFF WBC: CPT | Performed by: STUDENT IN AN ORGANIZED HEALTH CARE EDUCATION/TRAINING PROGRAM

## 2024-07-09 PROCEDURE — 83735 ASSAY OF MAGNESIUM: CPT | Performed by: STUDENT IN AN ORGANIZED HEALTH CARE EDUCATION/TRAINING PROGRAM

## 2024-07-09 RX ORDER — AMLODIPINE BESYLATE 10 MG/1
10 TABLET ORAL DAILY
Qty: 90 TABLET | Refills: 3 | Status: SHIPPED | OUTPATIENT
Start: 2024-07-10 | End: 2025-07-10

## 2024-07-09 RX ORDER — LOSARTAN POTASSIUM 50 MG/1
50 TABLET ORAL DAILY
Qty: 90 TABLET | Refills: 3 | Status: SHIPPED | OUTPATIENT
Start: 2024-07-10 | End: 2025-07-10

## 2024-07-09 RX ORDER — POLYETHYLENE GLYCOL 3350 17 G/17G
17 POWDER, FOR SOLUTION ORAL 2 TIMES DAILY PRN
Start: 2024-07-09

## 2024-07-09 RX ORDER — TALC
6 POWDER (GRAM) TOPICAL NIGHTLY PRN
Start: 2024-07-09

## 2024-07-09 RX ORDER — ACETAMINOPHEN 325 MG/1
650 TABLET ORAL EVERY 6 HOURS PRN
Start: 2024-07-09

## 2024-07-09 RX ORDER — ATORVASTATIN CALCIUM 40 MG/1
40 TABLET, FILM COATED ORAL DAILY
Qty: 90 TABLET | Refills: 3 | Status: SHIPPED | OUTPATIENT
Start: 2024-07-10 | End: 2025-07-10

## 2024-07-09 RX ORDER — ALUMINUM HYDROXIDE, MAGNESIUM HYDROXIDE, AND SIMETHICONE 1200; 120; 1200 MG/30ML; MG/30ML; MG/30ML
30 SUSPENSION ORAL 4 TIMES DAILY PRN
Start: 2024-07-09 | End: 2025-07-09

## 2024-07-09 RX ORDER — BACLOFEN 5 MG/1
5 TABLET ORAL 2 TIMES DAILY
Start: 2024-07-09

## 2024-07-09 RX ORDER — SIMETHICONE 80 MG
80 TABLET,CHEWABLE ORAL 4 TIMES DAILY PRN
Start: 2024-07-09

## 2024-07-09 RX ORDER — DRONABINOL 10 MG/1
10 CAPSULE ORAL 2 TIMES DAILY
Start: 2024-07-09

## 2024-07-09 RX ORDER — OLANZAPINE 5 MG/1
5 TABLET, ORALLY DISINTEGRATING ORAL 2 TIMES DAILY PRN
Start: 2024-07-09 | End: 2025-07-09

## 2024-07-09 RX ORDER — MEGESTROL ACETATE 40 MG/ML
400 SUSPENSION ORAL 2 TIMES DAILY
Start: 2024-07-09

## 2024-07-09 RX ORDER — FAMOTIDINE 20 MG/1
20 TABLET, FILM COATED ORAL DAILY
Qty: 30 TABLET | Refills: 11 | Status: SHIPPED | OUTPATIENT
Start: 2024-07-10 | End: 2025-07-10

## 2024-07-09 RX ORDER — OXYCODONE HYDROCHLORIDE 5 MG/1
5 TABLET ORAL EVERY 6 HOURS PRN
Start: 2024-07-09

## 2024-07-09 RX ORDER — DICYCLOMINE HYDROCHLORIDE 10 MG/1
10 CAPSULE ORAL 4 TIMES DAILY
Start: 2024-07-09 | End: 2024-08-08

## 2024-07-09 RX ADMIN — DRONABINOL 10 MG: 2.5 CAPSULE ORAL at 08:07

## 2024-07-09 RX ADMIN — APIXABAN 5 MG: 5 TABLET, FILM COATED ORAL at 08:07

## 2024-07-09 RX ADMIN — MICONAZOLE NITRATE: 20 OINTMENT TOPICAL at 08:07

## 2024-07-09 RX ADMIN — DICYCLOMINE HYDROCHLORIDE 10 MG: 10 CAPSULE ORAL at 08:07

## 2024-07-09 RX ADMIN — ATORVASTATIN CALCIUM 40 MG: 40 TABLET, FILM COATED ORAL at 08:07

## 2024-07-09 RX ADMIN — DICYCLOMINE HYDROCHLORIDE 10 MG: 10 CAPSULE ORAL at 01:07

## 2024-07-09 RX ADMIN — AMLODIPINE BESYLATE 10 MG: 10 TABLET ORAL at 08:07

## 2024-07-09 RX ADMIN — LOSARTAN POTASSIUM 50 MG: 50 TABLET, FILM COATED ORAL at 08:07

## 2024-07-09 RX ADMIN — BACLOFEN 5 MG: 5 TABLET ORAL at 08:07

## 2024-07-09 RX ADMIN — MEGESTROL ACETATE 400 MG: 400 SUSPENSION ORAL at 08:07

## 2024-07-09 RX ADMIN — OXYCODONE HYDROCHLORIDE 5 MG: 5 TABLET ORAL at 08:07

## 2024-07-09 RX ADMIN — FAMOTIDINE 20 MG: 20 TABLET ORAL at 08:07

## 2024-07-09 NOTE — DISCHARGE SUMMARY
[facility] - Med Surg ([place] [address])  [facility] Medicine  Discharge Summary      Patient Name: Amirah Davey  MRN: [id number]  BHANU: [id number]  Patient Class: IP- Inpatient  Admission Date: 6/29/2024  Hospital Length of Stay: 10 days  Discharge Date and Time: 7/9/2024  2:37 PM  Attending Physician: Leann Barton MD   Discharging Provider: Leann Barton MD  Primary Care Provider: Ivana Templeton MD  [facility] Medicine Team: [facility]P MED Q Leann Barton MD  Primary Care Team: [facility] MED Q    HPI:   Amirah Davey is a 88 y.o. F with PMHx of recent CVA in May with residual L sided weakness, dysphagia and dysarthria, s/p PEG tube placement with recurrent inadvertent dislodgement, HTN, and Afib on eliquis who presents to [facility] for evaluation of PEG tube malfunction. Patient with recent admit for PEG dislogement on 6/12 c/b abdominal wall abscess on CT. IV vancomycin was given and general surgery replaced her PEG tube on 6/17 and no abscess or fluid collection was noted.     She presents today after her inpatient rehab noticed her PEG tube was dislodged. It is unknown when the last time of appropriate placement was. She is AAO to person, place, situation, and year. She denies intentionally removing her PEG tube. Per IPR nursing, the patient had an abdominal binder around her abdomen which was preventing her from removal, which was removed at some point yesterday.     In the ED: VSSAF. Labs grossly unremarkable acutely. CTH and CXR without acute process. Gen surg consulted in the ED and given recurrent inadvertent dislodgement is and unknown timing of dislosgement, recommend NGT placement for enteral nutrition vs long term TPN until patient's mental baseline is acceptable for management of another gastrostomy tube.    * No surgery found *      Hospital Course:   Patient is a 88 y.o. F with PMHx of recent CVA in May with residual L sided weakness, dysphagia and dysarthria, s/p PEG tube placement with recurrent  inadvertent dislodgement, HTN, and Afib on eliquis who presented to [facility] after PEG tube dislodgement. Patient agitated and altered upon admission. No acute processes on CT-H. Concern for progressive vascular dementia. This is unfortunately the 3rd time she has dislodged her PEG tube. General surgery consulted and given recurrent PEG dislodging they are recommending against replacement given risk. Discussed with daughters about goals of care moving forward. They would like to pursue appetite stimulants and avoiding NGT or TPN for nutrition. PT/OT recommending inpatient rehab.    Calorie count started 7/6. IVF stopped on 7/5. Per calorie count, patient consuming 50% of meals & drinking 1-2 ONS/day. This is adequate intake for IPR trial. Patient accept to [facility].     Patient seen and examined on day of discharge and deemed appropriate for discharge. Plan discussed with patient, who was agreeable and amenable. Medications were discussed and reviewed, outpatient follow-up scheduled, ER precautions were given, all questions were answered to the patient's satisfaction, and patient was subsequently discharged.    Goals of Care Treatment Preferences:  Code Status: Full Code      Consults:   Consults (From admission, onward)          Status Ordering Provider     [facility]  Once        Provider:  (Not yet assigned)    Completed CHARLEE HUMPHRIES     Inpatient consult to Registered Dietitian/Nutritionist  Once        Provider:  (Not yet assigned)    Completed CHARLEE HUMPHRIES     Inpatient consult to Physical Medicine Rehab  Once        Provider:  (Not yet assigned)    Completed DENISE MARTI     Inpatient [facility]  Once        Provider:  (Not yet assigned)    Completed DENISE MARTI     Inpatient consult to Registered Dietitian/Nutritionist  Once        Provider:  (Not yet assigned)    Completed LARRY MEJIA     Inpatient consult to General surgery  Once         Provider:  (Not yet assigned)    YAQUELIN Colvin          Physical Exam  Vitals and nursing note reviewed.   Constitutional:       General: She is not in acute distress.     Appearance: She is ill-appearing.   HENT:      Head: Normocephalic.   Eyes:      General: No scleral icterus.  Cardiovascular:      Rate and Rhythm: Normal rate and regular rhythm.      Heart sounds: Normal heart sounds. No murmur heard.  Pulmonary:      Effort: No respiratory distress.      Breath sounds: Normal breath sounds. No wheezing.   Abdominal:      General: There is no distension.      Palpations: Abdomen is soft.      Tenderness: There is no abdominal tenderness.   Musculoskeletal:         General: No tenderness.      Right lower leg: No edema.      Left lower leg: No edema.   Skin:     General: Skin is warm and dry.   Neurological:      Mental Status: She is alert and easily aroused.      GCS: GCS eye subscore is 3. GCS verbal subscore is 5. GCS motor subscore is 6.      Comments: Chronic left sided weakness   Psychiatric:         Mood and Affect: Mood normal.         Behavior: Behavior normal. Behavior is cooperative.        Neuro  Cerebrovascular accident (CVA) due to embolism of right middle cerebral artery  L hemiplegia  Reduced mobility  - Chronic issue  - Continue statin  - Tentative plan to return to [facility] at time of discharge  - PT/OT consulted    Cardiac/Vascular  Chronic a-fib  Chronic anticoagulation  Patient is currently in sinus rhythm.[id number] Score: 4. Anticoagulation indicated. Anticoagulation done with eliquis .    Hypertension, benign  Chronic, controlled. Latest blood pressure and vitals reviewed-     Temp:  [96.4 °F (35.8 °C)-98.7 °F (37.1 °C)]   Pulse:  [69-73]   Resp:  [18]   BP: (142-193)/(65-91)   SpO2:  [96 %-99 %] .   Home meds for hypertension were reviewed and noted below.   Hypertension Medications               amLODIPine (NORVASC) 5 MG tablet 1 tablet (5 mg total) by Per G Tube route  once daily.    lisinopriL (PRINIVIL,ZESTRIL) 20 MG tablet 1 tablet (20 mg total) by Per G Tube route once daily. DO NOT TAKE UNTIL YOU SEE YOUR PRIMARY DOCTOR. Rehab can add back if BP becomes elevated. Would start with this one over HCTZ     While in the hospital, will manage blood pressure as follows; Continue home amlodipine, Hold lisinopril     Will utilize p.r.n. blood pressure medication only if patient's blood pressure greater than 180/110 and she develops symptoms such as worsening chest pain or shortness of breath.    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 Minimal improvement with stopping IVF  - stop IVF  - monitor daily       Hematology  Chronic anticoagulation        Endocrine  Malnutrition of mild degree   BMI 30. Poor PO intake 2/2 dysphagia and poor appetite. No longer with PEG tube and not a candidate for replacement at this time  - Started on appetite stimulants and will continue to monitor PO intake  - Calorie counting    GI  * PEG tube malfunction  88 y.o. F with recent CVA resulting in L hemiplegia and dysphagia s/p PEG tube placement with recurrent inadvertent dislodgement presenting with PEG tub dislodgement. Gen surg consulted in the ED and given recurrent issue & unknown timing of dislosgement, recommend NGT placement for enteral nutrition vs long term TPN until patient's mental baseline is acceptable for management of another gastrostomy tube  - Conitnue pureed diet with strict aspiration precautions  - Crush meds in puree  - s/p mIVF from 7/1-7/5  - SLP and RD consulted  - Discussed goals of care moving forward with daughters. They would like to pursue appetite stimulants and pureed diet and avoiding NGT or TPN for nutrition. They understand that she will likely not be able to meet caloric goals given low appetite and dysphagia. >> patient PO intake improved with these measures. Will  trial IPR.   - Increase Megace to 400mg BID  - Increase dronabinol   - Continue to monitor PO intake / calorie counting    Dysphagia due to recent stroke  Chronic issue from recent CVA  - SLP notes from prior admission reviewed, recommend pureed diet and continued dysphagia therapy   - RD consulted     Orthopedic  Sacral ulcer, limited to breakdown of skin   Wound care consulted, appreciate recs  - Barrier cream, q2h turns     Other  Reduced mobility          Final Active Diagnoses:    Diagnosis Date Noted POA    PRINCIPAL PROBLEM:  PEG tube malfunction [K94.23] 06/06/2024 Yes    Dysphagia due to recent stroke [I69.391] 05/23/2024 Not Applicable    Malnutrition of mild degree [E44.1] 06/29/2024 Yes    Hyperchloremic metabolic acidosis [E87.29] 07/05/2024 No    Sacral ulcer, limited to breakdown of skin [L98.421] 05/31/2024 Yes    Chronic anticoagulation [Z79.01] 05/24/2024 Not Applicable    Reduced mobility [Z74.09] 05/24/2024 Yes    Cerebrovascular accident (CVA) due to embolism of right middle cerebral artery [I63.411] 05/23/2024 Yes    Hemiplegia [G81.90] 05/23/2024 Yes    Hypertension, benign [I10]  Yes    Chronic a-fib [I48.20]  Yes      Problems Resolved During this Admission:       Discharged Condition: stable    Disposition: Rehab Facility    Follow Up:    Patient Instructions:   No discharge procedures on file.    Significant Diagnostic Studies: Labs: BMP:   Recent Labs   Lab 07/08/24  0253 07/09/24  1027   GLU 81 80   * 144   K 3.2* 3.6   * 114*   CO2 18* 19*   BUN 12 16   CREATININE 0.9 0.8   CALCIUM 9.2 9.2   MG 1.7 1.7       Pending Diagnostic Studies:       None           Medications:  Reconciled Home Medications:      Medication List        START taking these medications      aluminum-magnesium hydroxide-simethicone 200-200-20 mg/5 mL Susp  Commonly known as: MAALOX  Take 30 mLs by mouth 4 (four) times daily as needed.     dicyclomine 10 MG capsule  Commonly known as: BENTYL  Take 1  capsule (10 mg total) by mouth 4 (four) times daily.     droNABinol 10 MG capsule  Commonly known as: MARINOL  Take 1 capsule (10 mg total) by mouth 2 (two) times daily.     losartan 50 MG tablet  Commonly known as: COZAAR  Take 1 tablet (50 mg total) by mouth once daily.  Start taking on: July 10, 2024     megestroL 400 mg/10 mL (10 mL) Susp  Commonly known as: MEGACE  Take 10 mLs (400 mg total) by mouth 2 (two) times daily.     melatonin 3 mg tablet  Commonly known as: MELATIN  Take 2 tablets (6 mg total) by mouth nightly as needed for Insomnia.     OLANZapine zydis 5 MG Tbdl  Commonly known as: ZyPREXA  Take 1 tablet (5 mg total) by mouth 2 (two) times daily as needed (agitation).     oxyCODONE 5 MG immediate release tablet  Commonly known as: ROXICODONE  Take 1 tablet (5 mg total) by mouth every 6 (six) hours as needed for Pain.     polyethylene glycol 17 gram Pwpk  Commonly known as: GLYCOLAX  Take 17 g by mouth 2 (two) times daily as needed for Constipation.     simethicone 80 MG chewable tablet  Commonly known as: MYLICON  Take 1 tablet (80 mg total) by mouth 4 (four) times daily as needed for Flatulence.            CHANGE how you take these medications      acetaminophen 325 MG tablet  Commonly known as: TYLENOL  Take 2 tablets (650 mg total) by mouth every 6 (six) hours as needed for Pain.  What changed:   medication strength  how much to take  how to take this     amLODIPine 10 MG tablet  Commonly known as: NORVASC  Take 1 tablet (10 mg total) by mouth once daily.  Start taking on: July 10, 2024  What changed:   medication strength  how much to take  how to take this     apixaban 5 mg Tab  Commonly known as: ELIQUIS  Take 1 tablet (5 mg total) by mouth 2 (two) times daily.  What changed: how to take this     atorvastatin 40 MG tablet  Commonly known as: LIPITOR  Take 1 tablet (40 mg total) by mouth once daily.  Start taking on: July 10, 2024  What changed: how to take this     baclofen 5 mg Tab  tablet  Commonly known as: LIORESAL  Take 1 tablet (5 mg total) by mouth 2 (two) times daily.  What changed:   how to take this  when to take this     famotidine 20 MG tablet  Commonly known as: PEPCID  Take 1 tablet (20 mg total) by mouth once daily.  Start taking on: July 10, 2024  What changed:   medication strength  how much to take  how to take this            CONTINUE taking these medications      LIDOcaine 5 %  Commonly known as: LIDODERM  Place 1 patch onto the skin once daily. Remove & Discard patch within 12 hours or as directed by MD            STOP taking these medications      GLUCERNA 1.5 SHELLEY ORAL     lisinopriL 20 MG tablet  Commonly known as: PRINIVIL,ZESTRIL              Indwelling Lines/Drains at time of discharge:   Lines/Drains/Airways       Drain  Duration                       Female External Urinary Catheter w/ Suction 06/29/24 2030 9 days                    Time spent on the discharge of patient: 35 minutes         Leann Barton MD  Department of Hospital Medicine  [facility] Surg ([place]-16)

## 2024-07-09 NOTE — PROGRESS NOTES
Pt scheduled to discharge today.     Diet per SLP. Per calorie count results, pt w/ improving appetite, consuming 50% of meals & drinking 1-2 ONS/day.    Thanks!  Zhane MS, RD, LDN

## 2024-07-09 NOTE — ASSESSMENT & PLAN NOTE
Chronic anticoagulation  Patient is currently in sinus rhythm.[id number] Score: 4. Anticoagulation indicated. Anticoagulation done with eliquis .

## 2024-07-09 NOTE — PT/OT/SLP PROGRESS
Occupational Therapy   Treatment    Name: Amirah Davey  MRN: [id number]  Admitting Diagnosis:  PEG tube malfunction       Recommendations:     Discharge Recommendations: Moderate Intensity Therapy  Discharge Equipment Recommendations:  wheelchair, lift device, hospital bed, bedside commode  Barriers to discharge:  Other (Comment) (patient requires significant level of assistance)    Assessment:     Amirah Davey is a 88 y.o. female with a medical diagnosis of PEG tube malfunction.  She presents with the fallowing performance deficits affecting function are weakness, impaired endurance, impaired self care skills, impaired functional mobility, gait instability, impaired balance, pain, decreased safety awareness, decreased lower extremity function, decreased upper extremity function, decreased coordination, impaired cognition, impaired coordination, decreased ROM. Patient agreeable to tx session, today session focused on tolerance to upright positioning for postural musculature to performed dynamic sitting to prepare for functional UB/LB dressing task sitting up at EOB.  Patient required Max A and R UE support,  patient with significant posterior/anterior lean, patient required Mod A to Max  to return and maintain midline and  required both tactile and verbal cues. Patient also required Mod A of 2 persons for supine to sit transfer from EOB with verbal cues. Tactile cues, and visual target to assist with maintaining upright posture and balance. Patient noticed with ,cervical flexion, rounded shoulders, and difficultly maintaining midline. Patient is still well below baseline function and is not safe to return to home at this time. Patient continue to demonstrate that need for Moderate intensity therapy intervention on daily basis post acute setting.      Rehab Prognosis:  Good and Fair; patient would benefit from acute skilled OT services to address these deficits and reach maximum level of function.       Plan:      Patient to be seen 4 x/week to address the above listed problems via self-care/home management, therapeutic activities, therapeutic exercises, neuromuscular re-education  Plan of Care Expires: 07/30/24  Plan of Care Reviewed with: patient, daughter    Subjective     Chief Complaint: pain  Patient/Family Comments/goals: to return to PLOF  Pain/Comfort:  Pain Rating 1:  (patient verbalized pain during mobiolity task)  Location - Side 1: Left  Location - Orientation 1: generalized  Location 1: arm  Pain Addressed 1: Reposition, Distraction  Pain Rating Post-Intervention 1:  (patient did verabalized further pain)    Objective:     Communicated with: Nurse prior to session.  Patient found right sidelying with telemetry, PureWick upon OT entry to room.    General Precautions: Standard, fall    Orthopedic Precautions:N/A  Braces: N/A  Respiratory Status: Room air     Occupational Performance:     Bed Mobility:    Patient completed Rolling/Turning to Left with  maximal assistance  Patient completed Rolling/Turning to Right with maximal assistance  Patient completed Scooting/Bridging with maximal assistance of 2 persons   Patient completed Supine to Sit with maximal assistance of 2 persons   Patient completed Sit to Supine with maximal assistance of 2 persons   Patient was able to tolerate sitting up at EOB for ~20 mins with Max A for static sitting balance   Patient engaged on a weight bearing task to provided in put to L UE but was not able to tolerate task due to significant resistance and increased flexor tone   Therapist performed PROM to L UE to prevent further contractures.    [facility] 6 Click ADL: 8    Treatment & Education:  Discussed OT POC and progress  Educated patient on the importance to continue to perform exercises in order to reduce stiffness and promote joint mobility and blood flow  Addressed patient's questions and concerns within [facility] scope of practice      Patient left right sidelying with all lines  intact, call button in reach, and daughter present    GOALS:   Multidisciplinary Problems       Occupational Therapy Goals          Problem: Occupational Therapy    Goal Priority Disciplines Outcome Interventions   Occupational Therapy Goal     OT, PT/OT Progressing    Description: Goals to be met by: 7/30/24     Patient will increase functional independence with ADLs by performing:    UE Dressing with Maximum Assistance.  LE Dressing with Maximum Assistance.  Grooming while EOB with Moderate Assistance.  Toileting from bedside commode with Maximum Assistance for hygiene and clothing management.   Sitting at edge of bed x10 minutes with Minimal Assistance.  Rolling to Right with Minimal Assistance.   Rolling to Left with CGA  Supine to sit with Moderate Assistance.  Squat pivot transfers with Maximum Assistance.  Toilet transfer to bedside commode with Maximum Assistance.    DME justification  Patient has a mobility limitation that significantly impairs their ability to participate in one or more mobility related activities of daily living, including toileting. This deficit can be resolved by using a bedside commode. Patient demonstrates mobility limitations that will cause them to be confined to one room at home without bathroom access for up to 30 days. Using a bedside commode will greatly improve the patient's ability to participate in MRADLs.                           Time Tracking:     OT Date of Treatment: 07/09/24  OT Start Time: 1212  OT Stop Time: 1248  OT Total Time (min): 36 min    Billable Minutes:Therapeutic Activity 20  Neuromuscular Re-education 16    OT/PHILIP: PHILIP     Number of PHILIP visits since last OT visit: 1 7/9/2024

## 2024-07-09 NOTE — PLAN OF CARE
[facility] - [facility] S[facility] ([facility])  Discharge Final Note    Primary Care Provider: Ivana Templeton MD    Expected Discharge Date: 7/9/2024    Final Discharge Note (most recent)       Final Note - 07/09/24 1442          Final Note    Assessment Type Final Discharge Note (P)      Anticipated Discharge Disposition Rehab Facility (P)         Post-Acute Status    Post-Acute Authorization Placement (P)      Post-Acute Placement Status Set-up Complete/Auth obtained (P)      Coverage [employer] (P)      Discharge Delays None known at this time (P)                      Important Message from Medicare    Pt d/c'd to [facility] via ambulance transport.    Debbie Coles, SHIRAN, BS, RN, CCM

## 2024-07-09 NOTE — PLAN OF CARE
Problem: Adult Inpatient Plan of Care  Goal: Plan of Care Review  Outcome: Progressing  Goal: Patient-Specific Goal (Individualized)  Outcome: Progressing  Goal: Absence of Hospital-Acquired Illness or Injury  Outcome: Progressing  Goal: Optimal Comfort and Wellbeing  Outcome: Progressing  Goal: Readiness for Transition of Care  Outcome: Progressing     Problem: Infection  Goal: Absence of Infection Signs and Symptoms  Outcome: Progressing     Problem: Acute Kidney Injury/Impairment  Goal: Fluid and Electrolyte Balance  Outcome: Progressing  Goal: Improved Oral Intake  Outcome: Progressing  Goal: Effective Renal Function  Outcome: Progressing     Problem: Wound  Goal: Optimal Coping  Outcome: Progressing  Goal: Optimal Functional Ability  Outcome: Progressing  Goal: Absence of Infection Signs and Symptoms  Outcome: Progressing  Goal: Improved Oral Intake  Outcome: Progressing  Goal: Optimal Pain Control and Function  Outcome: Progressing  Goal: Skin Health and Integrity  Outcome: Progressing  Goal: Optimal Wound Healing  Outcome: Progressing     Problem: Skin Injury Risk Increased  Goal: Skin Health and Integrity  Outcome: Progressing     Problem: Fall Injury Risk  Goal: Absence of Fall and Fall-Related Injury  Outcome: Progressing     Problem: Confusion Chronic  Goal: Optimal Cognitive Function  Outcome: Progressing

## 2024-07-09 NOTE — PLAN OF CARE
AILEEN spoke with dtr Sonya Higinio [phone], informed of pt's d/c today, she will be going to [facility].  CG v/u, states she already spoke with Giorgi at [facility].    1:19 PM  Per Giorgi: report can be called for 2:30pm to [phone] and transportation can be set for 3:00 pm.  Nurse Jean-Paul notified.  AILEEN ordered ambulance transport for 3 PM.    CELESTINE Fox, BS, RN, CCM

## 2024-07-09 NOTE — PLAN OF CARE
[facility]     Department of Hospital Medicine     [address] [place] [address]     [phone] after hours  [phone] fax                                        FACILITY TRANSFER ORDERS     07/09/2024    Admit to:  IPR    Diagnoses:  Active Hospital Problems    Diagnosis  POA    *PEG tube malfunction [K94.23]  Yes     Priority: 1 - High    Dysphagia due to recent stroke [I69.391]  Not Applicable     Priority: 2     Malnutrition of mild degree [E44.1]  Yes     Priority: 3     Hyperchloremic metabolic acidosis [E87.29]  No    Sacral ulcer, limited to breakdown of skin [L98.421]  Yes    Chronic anticoagulation [Z79.01]  Not Applicable    Reduced mobility [Z74.09]  Yes    Cerebrovascular accident (CVA) due to embolism of right middle cerebral artery [I63.411]  Yes    Hemiplegia [G81.90]  Yes    Hypertension, benign [I10]  Yes    Chronic a-fib [I48.20]  Yes      Resolved Hospital Problems   No resolved problems to display.       Vital Signs: Routine.    Allergies:Review of patient's allergies indicates:  No Known Allergies    Diet: pureed thin    Acitivities: as tolerated    Nursing: routine, fall precautions    Labs:    CONSULTS:        Physical Therapy to evaluate and treat     Occupational Therapy to evaluate and treat     Speech Therapy  to evaluate and treat    WOUND CARE:  Wound spray or saline for wound cleaning with all dressing changes.    All wounds to be measured with first dressing changes and every week.  Sacral ulcer      MISCELLANEOUS CARE:      Rubalcava Care: Empty Rubalcava bag every shift.  Change Rubalcava every month     Routine Skin for Bedridden Patients:  Apply moisture barrier cream to all    skin folds and wet areas in perineal area daily and after baths and                           all bowel movements.             Medication List        START taking these medications      aluminum-magnesium hydroxide-simethicone 200-200-20 mg/5 mL Susp  Commonly  known as: MAALOX  Take 30 mLs by mouth 4 (four) times daily as needed.     dicyclomine 10 MG capsule  Commonly known as: BENTYL  Take 1 capsule (10 mg total) by mouth 4 (four) times daily.     droNABinol 10 MG capsule  Commonly known as: MARINOL  Take 1 capsule (10 mg total) by mouth 2 (two) times daily.     losartan 50 MG tablet  Commonly known as: COZAAR  Take 1 tablet (50 mg total) by mouth once daily.  Start taking on: July 10, 2024     megestroL 400 mg/10 mL (10 mL) Susp  Commonly known as: MEGACE  Take 10 mLs (400 mg total) by mouth 2 (two) times daily.     melatonin 3 mg tablet  Commonly known as: MELATIN  Take 2 tablets (6 mg total) by mouth nightly as needed for Insomnia.     OLANZapine zydis 5 MG Tbdl  Commonly known as: ZyPREXA  Take 1 tablet (5 mg total) by mouth 2 (two) times daily as needed (agitation).     oxyCODONE 5 MG immediate release tablet  Commonly known as: ROXICODONE  Take 1 tablet (5 mg total) by mouth every 6 (six) hours as needed for Pain.     polyethylene glycol 17 gram Pwpk  Commonly known as: GLYCOLAX  Take 17 g by mouth 2 (two) times daily as needed for Constipation.     simethicone 80 MG chewable tablet  Commonly known as: MYLICON  Take 1 tablet (80 mg total) by mouth 4 (four) times daily as needed for Flatulence.            CHANGE how you take these medications      acetaminophen 325 MG tablet  Commonly known as: TYLENOL  Take 2 tablets (650 mg total) by mouth every 6 (six) hours as needed for Pain.  What changed:   medication strength  how much to take  how to take this     amLODIPine 10 MG tablet  Commonly known as: NORVASC  Take 1 tablet (10 mg total) by mouth once daily.  Start taking on: July 10, 2024  What changed:   medication strength  how much to take  how to take this     apixaban 5 mg Tab  Commonly known as: ELIQUIS  Take 1 tablet (5 mg total) by mouth 2 (two) times daily.  What changed: how to take this     atorvastatin 40 MG tablet  Commonly known as: LIPITOR  Take 1  tablet (40 mg total) by mouth once daily.  Start taking on: July 10, 2024  What changed: how to take this     baclofen 5 mg Tab tablet  Commonly known as: LIORESAL  Take 1 tablet (5 mg total) by mouth 2 (two) times daily.  What changed:   how to take this  when to take this     famotidine 20 MG tablet  Commonly known as: PEPCID  Take 1 tablet (20 mg total) by mouth once daily.  Start taking on: July 10, 2024  What changed:   medication strength  how much to take  how to take this            CONTINUE taking these medications      LIDOcaine 5 %  Commonly known as: LIDODERM  Place 1 patch onto the skin once daily. Remove & Discard patch within 12 hours or as directed by MD            STOP taking these medications      GLUCERNA 1.5 SHELLEY ORAL     lisinopriL 20 MG tablet  Commonly known as: PRINIVIL,ZESTRIL               Where to Get Your Medications        These medications were sent to [facility]/pharmacy #[id number] - [place] - [address] [facility]  [address] [facility] [place] [address]      Phone: [phone]   amLODIPine 10 MG tablet  atorvastatin 40 MG tablet  famotidine 20 MG tablet  losartan 50 MG tablet       Information about where to get these medications is not yet available    Ask your nurse or doctor about these medications  acetaminophen 325 MG tablet  aluminum-magnesium hydroxide-simethicone 200-200-20 mg/5 mL Susp  apixaban 5 mg Tab  baclofen 5 mg Tab tablet  dicyclomine 10 MG capsule  droNABinol 10 MG capsule  megestroL 400 mg/10 mL (10 mL) Susp  melatonin 3 mg tablet  OLANZapine zydis 5 MG Tbdl  oxyCODONE 5 MG immediate release tablet  polyethylene glycol 17 gram Pwpk  simethicone 80 MG chewable tablet               _________________________________  Leann Barton MD  07/09/2024

## 2024-07-09 NOTE — PROGRESS NOTES
[facility] ([place] [id number])  Physical Medicine & Rehab  Progress Note    Patient Name: Amirah Davey  MRN: [id number]  Admission Date: 6/29/2024  Length of Stay: 10 days  Attending Physician: Leann Barton MD    Subjective:     Principal Problem:PEG tube malfunction    Hospital Course:   Per chart review,    PT- 07/08    Bed Mobility:     Rolling Left:  moderate assistance and of 2 persons  Rolling Right: moderate assistance and of 2 persons  Scooting: Supine to HOB: Total x2; Seated on EOB: Max x2  Supine to Sit: maximal assistance and of 2 persons  Sit to Supine: maximal assistance and of 2 persons      PT- 07/01    Functional Mobility:  Bed Mobility:     Rolling Left:  maximal assistance  Rolling Right: total assistance  Scooting: total assistance and of 2 persons  Supine to Sit: total assistance and of 2 persons  Sit to Supine: maximal assistance and of 2 persons    OT- 07/02    Bed Mobility:    Patient completed Supine to Sit with total assistance and 2 persons  Patient completed Sit to Supine with total assistance and 2 persons   Patient sat EOB for ~ 10 minutes with Maximum Assistance       Interval History 7/9/2024:  Patient is seen for follow-up PM&R evaluation and recommendations: Pt seen at bedside. Appears sleeping. Appears comfortable. Discussed with daughter at bedside regarding rehab.   HPI, Past Medical, Family, and Social History remains the same as documented in the initial encounter.    Scheduled Medications:    amLODIPine  10 mg Oral Daily    apixaban  5 mg Oral BID    atorvastatin  40 mg Oral Daily    baclofen  5 mg Oral BID    dicyclomine  10 mg Oral QID    droNABinol  10 mg Oral BID    famotidine  20 mg Oral Daily    losartan  50 mg Oral Daily    megestroL  400 mg Oral BID    miconazole nitrate 2%   Topical (Top) BID       Diagnostic Results: Labs: Reviewed    PRN Medications:   Current Facility-Administered Medications:     acetaminophen, 650 mg, Oral, Q6H PRN    aluminum-magnesium  hydroxide-simethicone, 30 mL, Oral, QID PRN    bisacodyL, 10 mg, Rectal, Daily PRN    dextrose 10%, 12.5 g, Intravenous, PRN    dextrose 10%, 25 g, Intravenous, PRN    glucagon (human recombinant), 1 mg, Intramuscular, PRN    glucose, 16 g, Oral, PRN    glucose, 24 g, Oral, PRN    hydrALAZINE, 25 mg, Oral, Q6H PRN    melatonin, 6 mg, Oral, Nightly PRN    naloxone, 0.02 mg, Intravenous, PRN    OLANZapine zydis, 5 mg, Oral, BID PRN    ondansetron, 8 mg, Per NG tube, Q8H PRN    oxyCODONE, 5 mg, Oral, Q6H PRN    polyethylene glycol, 17 g, Oral, BID PRN    simethicone, 1 tablet, Oral, QID PRN    sodium chloride 0.9%, 10 mL, Intravenous, Q12H PRN    Review of Systems   Constitutional:  Positive for activity change.   Musculoskeletal:  Positive for gait problem.   Neurological:  Positive for speech difficulty and weakness.     Objective:     Vital Signs (Most Recent):  Temp: 97.6 °F (36.4 °C) (07/09/24 0818)  Pulse: 73 (07/09/24 0818)  Resp: 18 (07/09/24 0855)  BP: (!) 149/73 (07/09/24 0855)  SpO2: 98 % (07/09/24 0818)    Vital Signs (24h Range):  Temp:  [96.4 °F (35.8 °C)-98.7 °F (37.1 °C)] 97.6 °F (36.4 °C)  Pulse:  [69-73] 73  Resp:  [18] 18  SpO2:  [96 %-99 %] 98 %  BP: (142-193)/(65-91) 149/73         Physical Exam  Vitals and nursing note reviewed.   Constitutional:       General: She is sleeping.      Appearance: Normal appearance.   Pulmonary:      Effort: Pulmonary effort is normal. No respiratory distress.   Abdominal:      General: There is no distension.      Palpations: Abdomen is soft.   Skin:     General: Skin is warm and dry.      Capillary Refill: Capillary refill takes 2 to 3 seconds.   Neurological:      General: No focal deficit present.      GCS: GCS eye subscore is 4. GCS verbal subscore is 4. GCS motor subscore is 5.      Motor: Weakness present.      Coordination: Coordination abnormal.      Gait: Gait abnormal.   Psychiatric:         Mood and Affect: Mood normal.         Behavior: Behavior normal.                Assessment/Plan:      * PEG tube malfunction  -Pt had Hx of OEG dislodgement 3 times since placed in May after CVA.  -Per gen Sx not a candidate for PEG replacement.  -Gen Surgery rec for NGT/ TPN. Family does not want to pursue these.  -Now on Pureed diet. Oral intake is decreased. Per daughter, sometimes good intake, sometimes not.   -PT/OT rec for high intensity therapies. Daughter would like a chance at therapies at [facility]. Feels consistent therapies would help. Asked for SLP maryam. Will re-assess again.   -07/05- Continues to depend on IVF. Oral intake is decreased. Family encouraging and assisting pt to help with oral intake. Appears with some agitation at night. Definitely does better with family.    Malnutrition of mild degree  -Recommend RD eval.     Sacral ulcer, limited to breakdown of skin  -Recommend frequent turning.     Reduced mobility  See hospital course for functional status.    Recommendations  -  Encourage mobility, OOB in chair, and early ambulation as appropriate  -  PT/OT evaluate and treat  -  Pain management  -  DVT prophylaxis if appropriate   -  Monitor for and prevent skin breakdown and pressure ulcers  Early mobility, repositioning/weight shifting every 20-30 minutes when sitting, turn patient every 2 hours, proper mattress/overlay and chair cushioning, pressure relief/heel protector boots     Dysphagia due to recent stroke  -Currently on IVF and pureed diet.   -Recommend SLP maryam.   -Encourage oral intake with supervision.  -Will need aspiration precaution.   -07/05- Seen SLP rec. Pt to continue Puree diet.     Hemiplegia  -Will need aggressive PT/OT.     Cerebrovascular accident (CVA) due to embolism of right middle cerebral artery  -Had CVA in May of 2024.  -Was at [facility] for therapies.  -Now admitted after PEG dislodgement.   -Will need diet plan. Currently on Pureed diet. Will need encouragement for oral intake and supervision.  -Recommend palliative care  involvement.     Chronic a-fib  -Continue Eliquis.     PM&R Recommendation:     At this time, the PM&R team has reviewed this patient's ongoing medical case including inpatient diagnosis, medical history, clinical examination, labs, vitals, current social and functional history to provide the post-acute recommendation as follows:     RECOMMENDATIONS: inpatient rehabilitation due to fair to good potential for improvement with therapies and need of physician oversight for management of ongoing active medical issues, once medically stable.       The patient will be admitted for comprehensive interdisciplinary inpatient rehabilitation to address the impairments due to her medical diagnosis of  CVA. The patient will benefit from an inpatient rehabilitation program to promote functional recovery, implement compensatory strategies and will undergo assessment for needs for durable medical equipment for safe discharge to the community. This patient will benefit from a coordinated interdisciplinary rehabilitation program services that require close monitoring and treatment with 24-hour rehabilitative nursing and physical/occupational/speech therapies for 3 hours/day for 5 days/week. This interdisciplinary program will be performed under the direction of a physiatrist.        We will sign off with the transfer of the patient to [facility] liaison who will continue to follow going forward until admission to [facility].       Alina Maynard NP  Department of Physical Medicine & Rehab   [facility] - Med Surg ([place]

## 2024-07-09 NOTE — ASSESSMENT & PLAN NOTE
Chronic, controlled. Latest blood pressure and vitals reviewed-     Temp:  [96.4 °F (35.8 °C)-98.7 °F (37.1 °C)]   Pulse:  [69-73]   Resp:  [18]   BP: (142-193)/(65-91)   SpO2:  [96 %-99 %] .   Home meds for hypertension were reviewed and noted below.   Hypertension Medications               amLODIPine (NORVASC) 5 MG tablet 1 tablet (5 mg total) by Per G Tube route once daily.    lisinopriL (PRINIVIL,ZESTRIL) 20 MG tablet 1 tablet (20 mg total) by Per G Tube route once daily. DO NOT TAKE UNTIL YOU SEE YOUR PRIMARY DOCTOR. Rehab can add back if BP becomes elevated. Would start with this one over HCTZ     While in the hospital, will manage blood pressure as follows; Continue home amlodipine, Hold lisinopril     Will utilize p.r.n. blood pressure medication only if patient's blood pressure greater than 180/110 and she develops symptoms such as worsening chest pain or shortness of breath.    - Add losartan 25 for persistently elevated BP's

## 2024-07-24 PROBLEM — E87.0 HYPERNATREMIA: Status: ACTIVE | Noted: 2024-01-01

## 2024-07-24 PROBLEM — Z51.5 COMFORT MEASURES ONLY STATUS: Status: ACTIVE | Noted: 2024-01-01

## 2024-07-24 NOTE — ED PROVIDER NOTES
Encounter Date: 7/24/2024       History     Chief Complaint   Patient presents with    Altered Mental Status     Pt comes from [facility]. Pt is DNR w/ c/o low sodium and failure to thrive.      HPI    Patient is an 88-year-old female with a history of AFib, CVA, dysphagia, hypertension presenting to the ED from [facility] with lab abnormalities, including sodium of 156 and ELIAS with a creatinine of 3.6.  Patient was supposed to be transferred to [facility] for hospice, however there was inability to complete communication regarding paperwork and final transfer.  Due to lab abnormalities, patient was brought here for further evaluation/management.    Patient's daughter, Sonya, was spoken with and she confirmed that the patient was to be hospice and confirmed that we could transfer her to [facility] today.    Patient nonverbal.     Review of patient's allergies indicates:  No Known Allergies  Past Medical History:   Diagnosis Date    A-fib     Cerebrovascular accident (CVA) due to embolism of right middle cerebral artery 05/23/2024    Current use of long term anticoagulation     on Xarelto    Dysphagia due to recent stroke 05/23/2024    Hypertension     Stroke 03/20/2013     Past Surgical History:   Procedure Laterality Date    CARDIAC PACEMAKER PLACEMENT  09/25/2019    [employer] Model number [id number]  Serial number [id number] device MRI conditional for 1.5 Estela magnets    CATARACT EXTRACTION W/  INTRAOCULAR LENS IMPLANT Right 05/01/2017    Dr. Case    CATARACT EXTRACTION W/  INTRAOCULAR LENS IMPLANT Left 05/29/2017    Dr. Case    ESOPHAGOGASTRODUODENOSCOPY W/ PEG N/A 5/29/2024    Procedure: EGD, WITH PEG TUBE INSERTION;  Surgeon: Imer Minaya MD;  Location: [facility] OR [address] [place] [address];  Service: General;  Laterality: N/A;    EYE SURGERY      HYSTERECTOMY      INSERTION, PEG TUBE N/A 5/27/2024    Procedure: INSERTION, PEG TUBE;  Surgeon: Ivette Love MD;  [facility]   Service: Endoscopy;  Laterality: N/A;    INSERTION, PEG TUBE N/A 6/17/2024    Procedure: INSERTION, PEG TUBE;  Surgeon: Chloé Sher MD;  Location: [facility] OR [address] [place] [address];  Service: General;  Laterality: N/A;     No family history on file.  Social History     Tobacco Use    Smoking status: Never   Substance Use Topics    Alcohol use: No     Physical Exam     Initial Vitals [07/24/24 1333]   BP Pulse Resp Temp SpO2   111/72 78 16 99.1 °F (37.3 °C) 100 %      MAP       --         Physical Exam    Constitutional: She is not diaphoretic. She appears ill.   HENT:   Head: Normocephalic and atraumatic.   Cardiovascular:  Normal rate and regular rhythm.           Pulmonary/Chest: Breath sounds normal. No respiratory distress.   Abdominal: Abdomen is soft. She exhibits no distension.   Musculoskeletal:         General: No edema.     Neurological:   Groaning, sometimes shouting out, not forming words or sentences, not following commands   Skin: Skin is warm and dry.         ED Course   Procedures  Labs Reviewed   CBC W/ AUTO DIFFERENTIAL - Abnormal       Result Value    WBC 18.21 (*)     RBC 4.55      Hemoglobin 11.0 (*)     Hematocrit 37.1      MCV 82      MCH 24.2 (*)     MCHC 29.6 (*)     RDW 19.4 (*)     Platelets 276      MPV SEE COMMENT      Immature Granulocytes 1.0 (*)     Gran # (ANC) 15.5 (*)     Immature Grans (Abs) 0.18 (*)     Lymph # 1.4      Mono # 1.1 (*)     Eos # 0.0      Baso # 0.02      nRBC 0      Gran % 85.1 (*)     Lymph % 7.5 (*)     Mono % 6.2      Eosinophil % 0.1      Basophil % 0.1      Differential Method Automated     COMPREHENSIVE METABOLIC PANEL - Abnormal    Sodium 160 (*)     Potassium 3.3 (*)     Chloride 122 (*)     CO2 22 (*)     Glucose 94      BUN 61 (*)     Creatinine 3.8 (*)     Calcium 10.2      Total Protein 7.5      Albumin 3.3 (*)     Total Bilirubin 0.5      Alkaline Phosphatase 57      AST 28      ALT 16      eGFR 10.9 (*)     Anion Gap 16     URINALYSIS, REFLEX TO URINE  CULTURE - Abnormal    Specimen UA Urine, Clean Catch      Color, UA Yellow      Appearance, UA Hazy (*)     pH, UA 5.0      Specific Gravity, UA 1.030      Protein, UA 1+ (*)     Glucose, UA Negative      Ketones, UA Trace (*)     Bilirubin (UA) 1+ (*)     Occult Blood UA Trace (*)     Nitrite, UA Negative      Leukocytes, UA Negative      Narrative:     Specimen Source->Urine   URINALYSIS MICROSCOPIC - Abnormal    RBC, UA 2      WBC, UA 4      Bacteria Few (*)     Squam Epithel, UA 1      Hyaline Casts,  (*)     Microscopic Comment SEE COMMENT      Narrative:     Specimen Source->Urine   MAGNESIUM    Magnesium 2.1       EKG Readings: (Independently Interpreted)   Initial Reading: No STEMI. Rhythm: Accelerated Junctional Rhythm. Heart Rate: 85. Ectopy: PVCs. ST Segment Elevation: AVR. ST Segment Depression: AVL. T Waves Flipped: I, II, III, AVF, V5 and V6. Clinical Impression: Accelerated Junctional Rhythm     ECG Results              EKG 12-lead (Final result)        Collection Time Result Time QRS Duration OHS QTC Calculation    07/24/24 14:30:37 07/24/24 14:44:50 110 468                     Final result by Interface, Lab In [facility] (07/24/24 14:44:56)                   Narrative:    Test Reason : R41.82,    Vent. Rate : 085 BPM     Atrial Rate : 090 BPM     P-R Int : 000 ms          QRS Dur : 110 ms      QT Int : 394 ms       P-R-T Axes : 000 074 235 degrees     QTc Int : 468 ms    Accelerated Junctional rhythm with Premature ventricular complexes or   Fusion complexes  Incomplete left bundle branch block  Abnormal ECG  When compared with ECG of 28-JUN-2024 10:48,  The axis Shifted left  Confirmed by Mary Kate Cruz MD ([id number]) on 7/24/2024 2:44:48 PM    Referred By: System System           Confirmed By:Mary Kate Cruz MD                                  Imaging Results              X-Ray Chest AP Portable (Final result)  Result time 07/24/24 16:30:28      Final result by Ganga Lima MD (07/24/24 16:30:28)                    Impression:      No radiographic acute intrathoracic process seen on this single view.      Electronically signed by: Ganga Lima MD  Date:    07/24/2024  Time:    16:30               Narrative:    EXAMINATION:  XR CHEST AP PORTABLE    CLINICAL HISTORY:  FTT;    TECHNIQUE:  Single frontal view of the chest was performed.    COMPARISON:  Chest radiograph 06/28/2024, CT abdomen and pelvis 06/12/2024    FINDINGS:  Patient is somewhat rotated.  Monitoring leads overlie the chest.    No adverse change.  Cardiomediastinal silhouette is midline with similar calcification and tortuosity of the aorta and mildly enlarged heart, without evidence of failure.  Lower left chest loop recorder device stable.  Pulmonary vasculature and hilar contours are within normal limits.  Left basilar minimal platelike scarring versus atelectasis.  No consolidation, pleural effusion or pneumothorax.  No acute osseous process seen.  PA and lateral views can be obtained.                                       Medications   sodium chloride 0.9% flush 10 mL (has no administration in time range)   naloxone 0.4 mg/mL injection 0.02 mg (has no administration in time range)   glucose chewable tablet 16 g (has no administration in time range)   glucose chewable tablet 24 g (has no administration in time range)   glucagon (human recombinant) injection 1 mg (has no administration in time range)   aluminum-magnesium hydroxide-simethicone 200-200-20 mg/5 mL suspension 30 mL (has no administration in time range)   melatonin tablet 6 mg (has no administration in time range)   acetaminophen tablet 650 mg (has no administration in time range)   ondansetron injection 4 mg (has no administration in time range)   prochlorperazine injection Soln 5 mg (has no administration in time range)   bisacodyL suppository 10 mg (has no administration in time range)   dextrose 5 % in lactated ringers infusion ( Intravenous New Bag 7/24/24 7871)   OLANZapine 5  mg/mL injection 5 mg (has no administration in time range)   QUEtiapine tablet 25 mg (has no administration in time range)   haloperidol lactate injection 5 mg (5 mg Intravenous Given 7/24/24 2001)   dextrose 10% bolus 125 mL 125 mL (has no administration in time range)   dextrose 10% bolus 250 mL 250 mL (has no administration in time range)   LORazepam injection 1 mg (has no administration in time range)   morphine injection 4 mg (has no administration in time range)   lactated ringers bolus 1,000 mL (0 mLs Intravenous Stopped 7/24/24 1735)   OLANZapine 5 mg/mL injection 2.5 mg (2.5 mg Intravenous Given 7/24/24 1653)     Medical Decision Making  Amount and/or Complexity of Data Reviewed  Labs: ordered.  Radiology: ordered.    Risk  Prescription drug management.    Patient is an 88-year-old female with a history of AFib, CVA, dysphagia, hypertension presenting to the ED from [facility] with lab abnormalities, including sodium of 156 and ELIAS with a creatinine of 3.6.  Patient was supposed to be transferred to [facility] for hospice, however there was inability to complete communication regarding paperwork and final transfer.     Labs and fluids ordered and obtained prior to attempt to cancel.    Patient's daughter, John Kim, was contacted via telephone.  She confirms that her mother was supposed to be transferred to [facility] for hospice today.  She confirms that the paperwork has been completed and is in the possession of [facility].    Our social work team reached out to [facility], who indicated that they would be unable to accept her today.    Patient's labs resulted.  She was a leukocytosis, hyponatremia with sodium of 160, ELIAS with a creatinine of 3.8.  She was given a L of fluids.  Lab derangements suspected to be secondary to dehydration and hemoconcentration.    Patient was admitted to Hospital Medicine for observation overnight prior to transport to [facility] for  hospice            Attending Attestation:             Attending ED Notes:   Attending Note:  I have seen the patient, have repeated the key portions of the history and physical, reviewed and agree with the medical documentation, and supervised and managed the medical care of the patient. Additionally, I was present for the critical portion of any procedure(s) performed.    88-year-old female history above presenting today for combative behavior, agitation, hyponatremia and ELIAS found on labs at [facility].  Patient was scheduled to transferred to [facility] for hospice.  Per [facility], patient has not been eating or drinking for the past couple days.    Discussion had with daughter via phone, would like to continue plan for transfer to [facility].      Social work contacted, called [facility] however patient is unable to be transferred as they do not have a bed available currently.  They are hoping for a bed to be available tomorrow.  Meantime, treat with IV fluids, admit to Hospital Medicine for ELIAS and hypernatremia.  Patient will remain DNR with plans for transfer to hospice care hopefully tomorrow.    GARY Smiley MD  Staff ED Physician  07/24/2024 9:46 PM                                   Clinical Impression:  Final diagnoses:  [R41.82] Altered mental status  [E87.0] Hypernatremia  [E86.0] Dehydration          ED Disposition Condition    Observation                 Shazia Casas,   Resident  07/24/24 2128       Kasey Smiley MD  07/24/24 214

## 2024-07-24 NOTE — ED NOTES
The patient was incontinent of stool and urine, linens changed and perineal care given. Patient repositioned for comfort, will continue to monitor.

## 2024-07-24 NOTE — ED NOTES
Unable to obtain EKG at this time. Pt is constantly fighting this RN off and ripping off the electrodes.

## 2024-07-24 NOTE — PLAN OF CARE
EDSW contacted [facility] and spoke with Harmony in intake ([phone]).  Harmony stated that paperwork had been signed for patient to go to Hospice.  Harmony stated that 3pm was too late for a new patient to arrive at the facility.  She asked for updates to be sent via [facility] and she will be able to accept the pt in the morning.    Mary Quigley LMSW  [facility]  [phone]

## 2024-07-24 NOTE — ED TRIAGE NOTES
Amirah Davey, a 88 y.o. female presents to the ED via EMS from [facility] w/ failure to thrive and lab abnormalities with a sodium level of 156 and creatinine of 3.6. Per pt daughter, pt is  DNR and is supposed to transfer to [facility] for hospice once paperwork is finalized. Pt is unable to answer questions for self. GCS 10.    Triage note:  Chief Complaint   Patient presents with    Altered Mental Status     Pt comes from [facility]. Pt is DNR w/ c/o low sodium and failure to thrive.      Review of patient's allergies indicates:  No Known Allergies  Past Medical History:   Diagnosis Date    A-fib     Cerebrovascular accident (CVA) due to embolism of right middle cerebral artery 05/23/2024    Current use of long term anticoagulation     on Xarelto    Dysphagia due to recent stroke 05/23/2024    Hypertension     Stroke 03/20/2013

## 2024-07-25 NOTE — ED NOTES
Nurses Note -- 4 Eyes      7/24/2024   10:05 PM      Skin assessed during: Daily Assessment      [] No Altered Skin Integrity Present    []Prevention Measures Documented      [x] Yes- Altered Skin Integrity Present or Discovered   [] LDA Added if Not in Epic (Describe Wound)   [x] New Altered Skin Integrity was Present on Admit and Documented in LDA   [] Wound Image Taken    Wound Care Consulted? Yes    Attending Nurse:  Ivy Beckham RN/Staff Member:   Angelita

## 2024-07-25 NOTE — NURSING
Nurses Note -- 4 Eyes      7/25/2024   3:54 AM      Skin assessed during: Admit      [] No Altered Skin Integrity Present    []Prevention Measures Documented      [] Yes- Altered Skin Integrity Present or Discovered   [] LDA Added if Not in Epic (Describe Wound)   [x] New Altered Skin Integrity was Present on Admit and Documented in LDA   [] Wound Image Taken    Wound Care Consulted? Yes    Attending Nurse:  Kenyatta Beckham RN/Staff Member:   geronimo --RN

## 2024-07-25 NOTE — DISCHARGE SUMMARY
[facility] Medicine  Discharge Summary      Patient Name: Amirah Davey  MRN: [id number]  BHANU: [id number]  Patient Class: OP- Observation  Admission Date: 7/24/2024  Hospital Length of Stay: 0 days  Discharge Date and Time:  07/25/2024 10:30 AM  Attending Physician: Augustine Archibald DO   Discharging Provider: Augustine Archibald DO  Primary Care Provider: Ivana Templeton MD  [facility] Medicine Team: [facility] HOSP MED R Augustine Archibald DO  Primary Care Team: [facility] HOSP MED R    HPI:   Amirah Davey is a 88 y.o. female w/ PMHx of HTN, a-fib, CVA (May 2024), & dysphagia, who presented to [facility] ED on 07/24/2024 from [facility] w/ hypernatremia & ELIAS. Pt was planned to go to [facility] with hospice, but due to a communication issue, pt was brought to ED instead. [facility] confirmed that they  now have the paperwork for hospice, but that they aren't able to take pt until 7/25. ED staff spoke with family (daughter, Sonya) who wished for pt to be admitted for observation overnight. Confirmed pt is DNR/CMO; reports IVF & noninvasive measures still within [facility]. Sonya reports that pt's mentation has progressviely declined since CVA in May, particularly over the past week. States she's often calling out/shouting and agitated. Reports that zyprexa has provided some relief in pt's sx, but is only effective for about 1.5hrs. States she's also received seroquel previously, but resulted in hypersomnolence at the dose she'd previously received (thinks it was 50mg). States she does not reliably take PO. States she will sometimes take meds when mixed with applesauce (not pudding though), but that she'll only take 1/2 spoonful.     * No surgery found *      Hospital Course:   Admitted briefly as patient is transitioning to hospice.  Originally intended to transfer to NH with hospice but was brought to the ED instead as paperwork finalizes. Status post brief IVF hydration, comfort measures in place. Stable for  transfer to NH with hospice     Physical Exam  Gen: in NAD, ill appearing  Neuro: AAOx0, motor, sensory, and strength grossly intact BL  HEENT: NTNC, EOMI, PERRL, MMM  CVS: RRR, no m/r/g; S1/S2 auscultated with no S3 or S4; capillary refill < 2 sec  Resp: lungs CTAB, no w/r/r; no belabored breathing or accessory muscle use appreciated   Abd: BS+ in all 4 quadrants; NTND, soft to palpation; no organomegaly appreciated   Extrem: pulses full, equal, and regular over all 4 extremities; no UE or LE edema BL    Goals of Care Treatment Preferences:  Code Status: DNR      Consults:     No new Assessment & Plan notes have been filed under this hospital service since the last note was generated.  Service: Hospital Medicine    Final Active Diagnoses:    Diagnosis Date Noted POA    PRINCIPAL PROBLEM:  Comfort measures only status [Z51.5] 07/24/2024 Not Applicable    Hypernatremia [E87.0] 07/24/2024 Yes      Problems Resolved During this Admission:       Discharged Condition: stable    Disposition: Hospice/Medical Facility    Follow Up:    Patient Instructions:   No discharge procedures on file.    Significant Diagnostic Studies: N/A    Pending Diagnostic Studies:       None           Medications:  Reconciled Home Medications:      Medication List        ASK your doctor about these medications      acetaminophen 325 MG tablet  Commonly known as: TYLENOL  Take 2 tablets (650 mg total) by mouth every 6 (six) hours as needed for Pain.     aluminum-magnesium hydroxide-simethicone 200-200-20 mg/5 mL Susp  Commonly known as: MAALOX  Take 30 mLs by mouth 4 (four) times daily as needed.     amLODIPine 10 MG tablet  Commonly known as: NORVASC  Take 1 tablet (10 mg total) by mouth once daily.     apixaban 5 mg Tab  Commonly known as: ELIQUIS  Take 1 tablet (5 mg total) by mouth 2 (two) times daily.     atorvastatin 40 MG tablet  Commonly known as: LIPITOR  Take 1 tablet (40 mg total) by mouth once daily.     baclofen 5 mg Tab  tablet  Commonly known as: LIORESAL  Take 1 tablet (5 mg total) by mouth 2 (two) times daily.     dicyclomine 10 MG capsule  Commonly known as: BENTYL  Take 1 capsule (10 mg total) by mouth 4 (four) times daily.     droNABinol 10 MG capsule  Commonly known as: MARINOL  Take 1 capsule (10 mg total) by mouth 2 (two) times daily.     famotidine 20 MG tablet  Commonly known as: PEPCID  Take 1 tablet (20 mg total) by mouth once daily.     LIDOcaine 5 %  Commonly known as: LIDODERM  Place 1 patch onto the skin once daily. Remove & Discard patch within 12 hours or as directed by MD     losartan 50 MG tablet  Commonly known as: COZAAR  Take 1 tablet (50 mg total) by mouth once daily.     megestroL 400 mg/10 mL (10 mL) Susp  Commonly known as: MEGACE  Take 10 mLs (400 mg total) by mouth 2 (two) times daily.     melatonin 3 mg tablet  Commonly known as: MELATIN  Take 2 tablets (6 mg total) by mouth nightly as needed for Insomnia.     OLANZapine zydis 5 MG Tbdl  Commonly known as: ZyPREXA  Take 1 tablet (5 mg total) by mouth 2 (two) times daily as needed (agitation).     oxyCODONE 5 MG immediate release tablet  Commonly known as: ROXICODONE  Take 1 tablet (5 mg total) by mouth every 6 (six) hours as needed for Pain.     polyethylene glycol 17 gram Pwpk  Commonly known as: GLYCOLAX  Take 17 g by mouth 2 (two) times daily as needed for Constipation.     simethicone 80 MG chewable tablet  Commonly known as: MYLICON  Take 1 tablet (80 mg total) by mouth 4 (four) times daily as needed for Flatulence.              Indwelling Lines/Drains at time of discharge:   Lines/Drains/Airways       Drain  Duration             Female External Urinary Catheter w/ Suction 07/24/24 1447 <1 day                    Time spent on the discharge of patient: 35 minutes      Augustine Archibald DO  Department of Hospital Medicine  [facility]

## 2024-07-25 NOTE — ED TRIAGE NOTES
Amirah Davey, a 88 y.o. female presents to the ED w/ complaint of Ams from [facility], high sodium, failure to thrive    Triage note:  Chief Complaint   Patient presents with    Altered Mental Status     Pt comes from [facility]. Pt is DNR w/ c/o low sodium and failure to thrive.      Review of patient's allergies indicates:  No Known Allergies  Past Medical History:   Diagnosis Date    A-fib     Cerebrovascular accident (CVA) due to embolism of right middle cerebral artery 05/23/2024    Current use of long term anticoagulation     on Xarelto    Dysphagia due to recent stroke 05/23/2024    Hypertension     Stroke 03/20/2013

## 2024-07-25 NOTE — SUBJECTIVE & OBJECTIVE
Past Medical History:   Diagnosis Date    A-fib     Cerebrovascular accident (CVA) due to embolism of right middle cerebral artery 05/23/2024    Current use of long term anticoagulation     on Xarelto    Dysphagia due to recent stroke 05/23/2024    Hypertension     Stroke 03/20/2013       Past Surgical History:   Procedure Laterality Date    CARDIAC PACEMAKER PLACEMENT  09/25/2019    [employer] Model number [id number]  Serial number [id number] device MRI conditional for 1.5 Estela magnets    CATARACT EXTRACTION W/  INTRAOCULAR LENS IMPLANT Right 05/01/2017    Dr. Case    CATARACT EXTRACTION W/  INTRAOCULAR LENS IMPLANT Left 05/29/2017    Dr. Case    ESOPHAGOGASTRODUODENOSCOPY W/ PEG N/A 5/29/2024    Procedure: EGD, WITH PEG TUBE INSERTION;  Surgeon: Imer Minaya MD;  Location: [facility] OR [address] [place] [address];  Service: General;  Laterality: N/A;    EYE SURGERY      HYSTERECTOMY      INSERTION, PEG TUBE N/A 5/27/2024    Procedure: INSERTION, PEG TUBE;  Surgeon: Ivette Love MD;  Location: [facility] ENDO (2ND FLR);  Service: Endoscopy;  Laterality: N/A;    INSERTION, PEG TUBE N/A 6/17/2024    Procedure: INSERTION, PEG TUBE;  Surgeon: Chloé Sher MD;  [facility];  Service: General;  Laterality: N/A;       Review of patient's allergies indicates:  No Known Allergies    No current facility-administered medications on file prior to encounter.     Current Outpatient Medications on File Prior to Encounter   Medication Sig    acetaminophen (TYLENOL) 325 MG tablet Take 2 tablets (650 mg total) by mouth every 6 (six) hours as needed for Pain.    aluminum-magnesium hydroxide-simethicone (MAALOX) 200-200-20 mg/5 mL Susp Take 30 mLs by mouth 4 (four) times daily as needed.    amLODIPine (NORVASC) 10 MG tablet Take 1 tablet (10 mg total) by mouth once daily.    apixaban (ELIQUIS) 5 mg Tab Take 1 tablet (5 mg total) by mouth 2 (two) times daily.    atorvastatin (LIPITOR) 40 MG tablet Take 1 tablet (40 mg total) by  mouth once daily.    baclofen (LIORESAL) 5 mg Tab tablet Take 1 tablet (5 mg total) by mouth 2 (two) times daily.    dicyclomine (BENTYL) 10 MG capsule Take 1 capsule (10 mg total) by mouth 4 (four) times daily.    droNABinol (MARINOL) 10 MG capsule Take 1 capsule (10 mg total) by mouth 2 (two) times daily.    famotidine (PEPCID) 20 MG tablet Take 1 tablet (20 mg total) by mouth once daily.    LIDOcaine (LIDODERM) 5 % Place 1 patch onto the skin once daily. Remove & Discard patch within 12 hours or as directed by MD    losartan (COZAAR) 50 MG tablet Take 1 tablet (50 mg total) by mouth once daily.    megestroL (MEGACE) 400 mg/10 mL (10 mL) Susp Take 10 mLs (400 mg total) by mouth 2 (two) times daily.    melatonin (MELATIN) 3 mg tablet Take 2 tablets (6 mg total) by mouth nightly as needed for Insomnia.    OLANZapine zydis (ZYPREXA) 5 MG TbDL Take 1 tablet (5 mg total) by mouth 2 (two) times daily as needed (agitation).    oxyCODONE (ROXICODONE) 5 MG immediate release tablet Take 1 tablet (5 mg total) by mouth every 6 (six) hours as needed for Pain.    polyethylene glycol (GLYCOLAX) 17 gram PwPk Take 17 g by mouth 2 (two) times daily as needed for Constipation.    simethicone (MYLICON) 80 MG chewable tablet Take 1 tablet (80 mg total) by mouth 4 (four) times daily as needed for Flatulence.     Family History    None       Tobacco Use    Smoking status: Never    Smokeless tobacco: Not on file   Substance and Sexual Activity    Alcohol use: No    Drug use: Not on file    Sexual activity: Not on file     Review of Systems   Unable to perform ROS: Patient nonverbal     Objective:     Vital Signs (Most Recent):  Temp: 98 °F (36.7 °C) (07/24/24 2230)  Pulse: 73 (07/24/24 2230)  Resp: 18 (07/24/24 2230)  BP: 121/69 (07/24/24 2230)  SpO2: 95 % (07/24/24 2230) Vital Signs (24h Range):  Temp:  [98 °F (36.7 °C)-99.9 °F (37.7 °C)] 98 °F (36.7 °C)  Pulse:  [69-78] 73  Resp:  [12-18] 18  SpO2:  [95 %-100 %] 95 %  BP:  (111-168)/(61-82) 121/69     Weight: 59 kg (130 lb)  Body mass index is 25.39 kg/m².     Physical Exam  Constitutional:       Appearance: She is normal weight. She is ill-appearing. She is not toxic-appearing or diaphoretic.   HENT:      Mouth/Throat:      Mouth: Mucous membranes are dry.   Cardiovascular:      Rate and Rhythm: Normal rate and regular rhythm.      Heart sounds: Normal heart sounds.   Pulmonary:      Effort: Pulmonary effort is normal.      Breath sounds: Normal breath sounds.   Abdominal:      General: Bowel sounds are normal. There is no distension.      Palpations: Abdomen is soft.   Skin:     General: Skin is warm and dry.   Neurological:      Mental Status: Mental status is at baseline. She is confused.      Motor: Weakness present.      Gait: Gait abnormal.   Psychiatric:         Behavior: Behavior is agitated.         Cognition and Memory: Cognition is impaired.      Comments: Frequently calling out/shouting, otherwise nonverbal             Significant Labs: All pertinent labs within the past 24 hours have been reviewed.    Significant Imaging: I have reviewed all pertinent imaging results/findings within the past 24 hours.

## 2024-07-25 NOTE — ASSESSMENT & PLAN NOTE
- CMO order set  - PRN anxiolytics, sedatives, antiemetics, analgesics, etc  - Delirium precautions

## 2024-07-25 NOTE — H&P
[facility] Medicine  History & Physical    Patient Name: Amirah Davey  MRN: [id number]  Patient Class: OP- Observation  Admission Date: 7/24/2024  Attending Physician: María Valles MD  Primary Care Provider: Ivana Templeton MD         Patient information was obtained from relative(s), past medical records, and ER records.     Subjective:     Principal Problem:<principal problem not specified>    Chief Complaint:   Chief Complaint   Patient presents with    Altered Mental Status     Pt comes from [facility]. Pt is DNR w/ c/o low sodium and failure to thrive.         HPI: Amirah Davey is a 88 y.o. female w/ PMHx of HTN, a-fib, CVA (May 2024), & dysphagia, who presented to [facility] ED on 07/24/2024 from [facility] w/ hypernatremia & ELIAS. Pt was planned to go to [facility] with hospice, but due to a communication issue, pt was brought to ED instead. [facility] confirmed that they  now have the paperwork for hospice, but that they aren't able to take pt until 7/25. ED staff spoke with family (daughter, Sonya) who wished for pt to be admitted for observation overnight. Confirmed pt is DNR/CMO; reports IVF & noninvasive measures still within GOC. Sonya reports that pt's mentation has progressviely declined since CVA in May, particularly over the past week. States she's often calling out/shouting and agitated. Reports that zyprexa has provided some relief in pt's sx, but is only effective for about 1.5hrs. States she's also received seroquel previously, but resulted in hypersomnolence at the dose she'd previously received (thinks it was 50mg). States she does not reliably take PO. States she will sometimes take meds when mixed with applesauce (not pudding though), but that she'll only take 1/2 spoonful.     Past Medical History:   Diagnosis Date    A-fib     Cerebrovascular accident (CVA) due to embolism of right middle cerebral artery 05/23/2024    Current use of long term  anticoagulation     on Xarelto    Dysphagia due to recent stroke 05/23/2024    Hypertension     Stroke 03/20/2013       Past Surgical History:   Procedure Laterality Date    CARDIAC PACEMAKER PLACEMENT  09/25/2019    [employer] Model number [id number]  Serial number [id number] device MRI conditional for 1.5 Estela magnets    CATARACT EXTRACTION W/  INTRAOCULAR LENS IMPLANT Right 05/01/2017    Dr. Case    CATARACT EXTRACTION W/  INTRAOCULAR LENS IMPLANT Left 05/29/2017    Dr. Case    ESOPHAGOGASTRODUODENOSCOPY W/ PEG N/A 5/29/2024    Procedure: EGD, WITH PEG TUBE INSERTION;  Surgeon: Imer Minaya MD;  [facility];  Service: General;  Laterality: N/A;    EYE SURGERY      HYSTERECTOMY      INSERTION, PEG TUBE N/A 5/27/2024    Procedure: INSERTION, PEG TUBE;  Surgeon: Ivette Love MD;  Location: [facility] (2ND FLR);  Service: Endoscopy;  Laterality: N/A;    INSERTION, PEG TUBE N/A 6/17/2024    Procedure: INSERTION, PEG TUBE;  Surgeon: Chloé Sher MD;  Location: [facility] OR [facility] FLR;  Service: General;  Laterality: N/A;       Review of patient's allergies indicates:  No Known Allergies    No current facility-administered medications on file prior to encounter.     Current Outpatient Medications on File Prior to Encounter   Medication Sig    acetaminophen (TYLENOL) 325 MG tablet Take 2 tablets (650 mg total) by mouth every 6 (six) hours as needed for Pain.    aluminum-magnesium hydroxide-simethicone (MAALOX) 200-200-20 mg/5 mL Susp Take 30 mLs by mouth 4 (four) times daily as needed.    amLODIPine (NORVASC) 10 MG tablet Take 1 tablet (10 mg total) by mouth once daily.    apixaban (ELIQUIS) 5 mg Tab Take 1 tablet (5 mg total) by mouth 2 (two) times daily.    atorvastatin (LIPITOR) 40 MG tablet Take 1 tablet (40 mg total) by mouth once daily.    baclofen (LIORESAL) 5 mg Tab tablet Take 1 tablet (5 mg total) by mouth 2 (two) times daily.    dicyclomine (BENTYL) 10 MG capsule Take 1 capsule (10  mg total) by mouth 4 (four) times daily.    droNABinol (MARINOL) 10 MG capsule Take 1 capsule (10 mg total) by mouth 2 (two) times daily.    famotidine (PEPCID) 20 MG tablet Take 1 tablet (20 mg total) by mouth once daily.    LIDOcaine (LIDODERM) 5 % Place 1 patch onto the skin once daily. Remove & Discard patch within 12 hours or as directed by MD    losartan (COZAAR) 50 MG tablet Take 1 tablet (50 mg total) by mouth once daily.    megestroL (MEGACE) 400 mg/10 mL (10 mL) Susp Take 10 mLs (400 mg total) by mouth 2 (two) times daily.    melatonin (MELATIN) 3 mg tablet Take 2 tablets (6 mg total) by mouth nightly as needed for Insomnia.    OLANZapine zydis (ZYPREXA) 5 MG TbDL Take 1 tablet (5 mg total) by mouth 2 (two) times daily as needed (agitation).    oxyCODONE (ROXICODONE) 5 MG immediate release tablet Take 1 tablet (5 mg total) by mouth every 6 (six) hours as needed for Pain.    polyethylene glycol (GLYCOLAX) 17 gram PwPk Take 17 g by mouth 2 (two) times daily as needed for Constipation.    simethicone (MYLICON) 80 MG chewable tablet Take 1 tablet (80 mg total) by mouth 4 (four) times daily as needed for Flatulence.     Family History    None       Tobacco Use    Smoking status: Never    Smokeless tobacco: Not on file   Substance and Sexual Activity    Alcohol use: No    Drug use: Not on file    Sexual activity: Not on file     Review of Systems   Unable to perform ROS: Patient nonverbal     Objective:     Vital Signs (Most Recent):  Temp: 98 °F (36.7 °C) (07/24/24 2230)  Pulse: 73 (07/24/24 2230)  Resp: 18 (07/24/24 2230)  BP: 121/69 (07/24/24 2230)  SpO2: 95 % (07/24/24 2230) Vital Signs (24h Range):  Temp:  [98 °F (36.7 °C)-99.9 °F (37.7 °C)] 98 °F (36.7 °C)  Pulse:  [69-78] 73  Resp:  [12-18] 18  SpO2:  [95 %-100 %] 95 %  BP: (111-168)/(61-82) 121/69     Weight: 59 kg (130 lb)  Body mass index is 25.39 kg/m².     Physical Exam  Constitutional:       Appearance: She is normal weight. She is ill-appearing.  She is not toxic-appearing or diaphoretic.   HENT:      Mouth/Throat:      Mouth: Mucous membranes are dry.   Cardiovascular:      Rate and Rhythm: Normal rate and regular rhythm.      Heart sounds: Normal heart sounds.   Pulmonary:      Effort: Pulmonary effort is normal.      Breath sounds: Normal breath sounds.   Abdominal:      General: Bowel sounds are normal. There is no distension.      Palpations: Abdomen is soft.   Skin:     General: Skin is warm and dry.   Neurological:      Mental Status: Mental status is at baseline. She is confused.      Motor: Weakness present.      Gait: Gait abnormal.   Psychiatric:         Behavior: Behavior is agitated.         Cognition and Memory: Cognition is impaired.      Comments: Frequently calling out/shouting, otherwise nonverbal             Significant Labs: All pertinent labs within the past 24 hours have been reviewed.    Significant Imaging: I have reviewed all pertinent imaging results/findings within the past 24 hours.  Assessment/Plan:     Hypernatremia  ELIAS  - D5LR @ 75cc/hr   - Encourage PO intake as able  - Given hospice/CMO status, family agreeable to deferring repeat labs     Comfort measures only status  - CMO order set  - PRN anxiolytics, sedatives, antiemetics, analgesics, etc  - Delirium precautions       VTE Risk Mitigation (From admission, onward)           Ordered     IP VTE LOW RISK PATIENT  Once         07/24/24 1744                       On 07/24/2024, patient should be placed in hospital observation services under my care.         María Valles MD  Department of Hospital Medicine  [facility]

## 2024-07-25 NOTE — PLAN OF CARE
Problem: Adult Inpatient Plan of Care  Goal: Plan of Care Review  Outcome: Progressing  Goal: Patient-Specific Goal (Individualized)  Outcome: Progressing  Goal: Absence of Hospital-Acquired Illness or Injury  Outcome: Progressing  Goal: Optimal Comfort and Wellbeing  Outcome: Progressing  Goal: Readiness for Transition of Care  Outcome: Progressing     Problem: Palliative Care  Goal: Enhanced Quality of Life  Outcome: Progressing     Problem: Acute Kidney Injury/Impairment  Goal: Fluid and Electrolyte Balance  Outcome: Progressing  Goal: Improved Oral Intake  Outcome: Progressing  Goal: Effective Renal Function  Outcome: Progressing     Problem: Wound  Goal: Optimal Coping  Outcome: Progressing  Goal: Optimal Functional Ability  Outcome: Progressing  Goal: Absence of Infection Signs and Symptoms  Outcome: Progressing  Goal: Improved Oral Intake  Outcome: Progressing  Goal: Optimal Pain Control and Function  Outcome: Progressing  Goal: Skin Health and Integrity  Outcome: Progressing  Goal: Optimal Wound Healing  Outcome: Progressing     Problem: Fall Injury Risk  Goal: Absence of Fall and Fall-Related Injury  Outcome: Progressing     Problem: Skin Injury Risk Increased  Goal: Skin Health and Integrity  Outcome: Progressing

## 2024-07-25 NOTE — PLAN OF CARE
Problem: Adult Inpatient Plan of Care  Goal: Plan of Care Review  Outcome: Progressing  Goal: Optimal Comfort and Wellbeing  Outcome: Progressing  Goal: Readiness for Transition of Care  Outcome: Progressing     Problem: Palliative Care  Goal: Enhanced Quality of Life  Outcome: Progressing     Problem: Acute Kidney Injury/Impairment  Goal: Fluid and Electrolyte Balance  Outcome: Progressing     Problem: Fall Injury Risk  Goal: Absence of Fall and Fall-Related Injury  Outcome: Progressing     Problem: Skin Injury Risk Increased  Goal: Skin Health and Integrity  Outcome: Progressing

## 2024-07-25 NOTE — CLINICAL REVIEW
Sepsis Screen (most recent)       Sepsis Screen (IP) - 07/25/24 1025       Is the patient's history or complaint suggestive of a possible infection? Yes  -    Are there at least two of the following signs and symptoms present? Yes  -    Sepsis signs/symptoms - Tachycardia Tachycardia     >90  -    Sepsis signs/symptoms - WBC WBC < 4,000 or WBC > 12,000  -    Sepsis signs/symptoms - Altered Mental Status Altered Mental Status  -    Are any of the following organ dysfunction criteria present and not considered to be due to a chronic condition? Yes  -    Organ Dysfunction Criteria Creatinine > 2.0  -    Initiate Sepsis Protocol No  -    Reason sepsis not considered End of Life Care   comfort measures -              User Key  (r) = Recorded By, (t) = Taken By, (c) = Cosigned By      Initials Name    María Brown RN

## 2024-07-25 NOTE — HOSPITAL COURSE
Admitted briefly as patient is transitioning to hospice.  Originally intended to transfer to [facility] with hospice but was brought to the ED instead as paperwork finalizes. Status post brief IVF hydration, comfort measures in place. Stable for transfer to NH with hospice. Initially planned for d/c 7/25 however facility reportedly unable to be secured. Now [facility] [place] [facility]

## 2024-07-25 NOTE — HPI
Amirah Davey is a 88 y.o. female w/ PMHx of HTN, a-fib, CVA (May 2024), & dysphagia, who presented to [facility] ED on 07/24/2024 from [facility] w/ hypernatremia & ELIAS. Pt was planned to go to [facility] with hospice, but due to a communication issue, pt was brought to ED instead. [facility] confirmed that they  now have the paperwork for hospice, but that they aren't able to take pt until 7/25. ED staff spoke with family (daughter, Sonya) who wished for pt to be admitted for observation overnight. Confirmed pt is DNR/CMO; reports IVF & noninvasive measures still within GOC. Sonya reports that pt's mentation has progressviely declined since CVA in May, particularly over the past week. States she's often calling out/shouting and agitated. Reports that zyprexa has provided some relief in pt's sx, but is only effective for about 1.5hrs. States she's also received seroquel previously, but resulted in hypersomnolence at the dose she'd previously received (thinks it was 50mg). States she does not reliably take PO. States she will sometimes take meds when mixed with applesauce (not pudding though), but that she'll only take 1/2 spoonful.

## 2024-07-25 NOTE — PLAN OF CARE
[facility] Med Surg  Initial Discharge Assessment       Primary Care Provider: Ivana Templeton MD    Admission Diagnosis: Dehydration [E86.0]  Hypernatremia [E87.0]  Altered mental status [R41.82]  Chest pain [R07.9]    Admission Date: 7/24/2024  Expected Discharge Date: 7/25/2024    Transition of Care Barriers: (P) None    Payor: MEDICARE / Plan: MEDICARE PART A & B / Product Type: Government /     Extended Emergency Contact Information  Primary Emergency Contact: Sonya Sylvester  Address: [address] [place] [address] [facility]  Home Phone: [phone]  Mobile Phone: [phone]  Relation: Daughter    Discharge Plan A: (P) Inpatient Hospice  Discharge Plan B: (P) Hospice/home      [id number] - [place], [place] - [address]  [address]  [facility] [address]  Phone: [phone] Fax: [phone]      CM spoke with Sonya Sylvester (daughter) [phone] to discuss discharge planning. Per patient's daughter, patient admitted from [facility]. Daughter states that patient lived with her in a single story home with no steps to enter. Prior to admission, patient began declining and required total assistance with ADLs. Discharge plan is inpatient hospice vs NH with hospice. Discharge Plan A and Plan B have been determined by review of patient's clinical status, future medical and therapeutic needs, and coverage/benefits for post-acute care in coordination with multidisciplinary team members.  Initial Assessment (most recent)       Adult Discharge Assessment - 07/25/24 1525          Discharge Assessment    Assessment Type Discharge Planning Reassessment     Confirmed/corrected address, phone number and insurance Yes     Confirmed Demographics Correct on Facesheet     Source of Information family     If unable to respond/provide information was family/caregiver contacted? Yes     Contact Name/Number Sonya Sylvester (daughter) [phone]     Does patient/caregiver  understand observation status Yes (P)      Communicated JOAN with patient/caregiver Date not available/Unable to determine (P)      Reason For Admission Comfort measures only (P)      People in Home child(toan), adult (P)      Facility Arrived From: [facility] (P)      Do you expect to return to your current living situation? No (P)      Do you have help at home or someone to help you manage your care at home? No (P)      Prior to hospitilization cognitive status: Unable to Assess (P)      Current cognitive status: Unable to Assess (P)      Walking or Climbing Stairs Difficulty yes (P)      Walking or Climbing Stairs ambulation difficulty, dependent;transferring difficulty, dependent (P)      Dressing/Bathing Difficulty yes (P)      Dressing/Bathing dressing difficulty, dependent;bathing difficulty, dependent (P)      Home Accessibility wheelchair accessible (P)      Equipment Currently Used at Home other (see comments) (P)    TBD    Readmission within 30 days? Yes (P)      Patient currently being followed by outpatient case management? No (P)      Do you currently have service(s) that help you manage your care at home? No (P)      Do you take prescription medications? Yes (P)      Do you have prescription coverage? Yes (P)      Coverage Medicare A and B (P)      Do you have any problems affording any of your prescribed medications? No (P)      Is the patient taking medications as prescribed? yes (P)      Who is going to help you get home at discharge? Case Management (P)      How do you get to doctors appointments? health plan transportation (P)      Are you on dialysis? No (P)      Do you take coumadin? No (P)      Discharge Plan A Inpatient Hospice (P)      Discharge Plan B Hospice/home (P)      DME Needed Upon Discharge  other (see comments) (P)    TBD    Discharge Plan discussed with: Adult children (P)      Transition of Care Barriers None (P)         Physical Activity    On average, how many days per week do  you engage in moderate to strenuous exercise (like a brisk walk)? 0 days (P)      On average, how many minutes do you engage in exercise at this level? 0 min (P)         Financial Resource Strain    How hard is it for you to pay for the very basics like food, housing, medical care, and heating? Not very hard (P)         Housing Stability    In the last 12 months, was there a time when you were not able to pay the mortgage or rent on time? No (P)      At any time in the past 12 months, were you homeless or living in a shelter (including now)? No (P)         Transportation Needs    Has the lack of transportation kept you from medical appointments, meetings, work or from getting things needed for daily living? No (P)         Food Insecurity    Within the past 12 months, you worried that your food would run out before you got the money to buy more. Never true (P)      Within the past 12 months, the food you bought just didn't last and you didn't have money to get more. Never true (P)         Stress    Do you feel stress - tense, restless, nervous, or anxious, or unable to sleep at night because your mind is troubled all the time - these days? Patient unable to answer (P)         Social Isolation    How often do you feel lonely or isolated from those around you?  Rarely (P)         Alcohol Use    Q1: How often do you have a drink containing alcohol? Never (P)      Q2: How many drinks containing alcohol do you have on a typical day when you are drinking? Patient does not drink (P)      Q3: How often do you have six or more drinks on one occasion? Never (P)         Utilities    In the past 12 months has the electric, gas, oil, or water company threatened to shut off services in your home? No (P)         Health Literacy    How often do you need to have someone help you when you read instructions, pamphlets, or other written material from your doctor or pharmacy? Rarely (P)         OTHER    Name(s) of People in Home  Sonya Sylvester (daughter) [phone] (P)                      Readmission Assessment (most recent)       Readmission Assessment - 07/25/24 1522          Readmission    Was this a planned readmission? No     Why were you hospitalized in the last 30 days? PEG tube malfunction     Why were you readmitted? New medical problem     When you left the hospital how did you feel? Per daughter, much better     When you left the hospital where did you go? [facility]     Did patient/caregiver refused recommended DC plan? No     Tell me about what happened between when you left the hospital and the day you returned. Per daughter, she is unsure     When did you start not feeling well? unable to assess     Did you try to manage your symptoms your self? No     Did you call anyone? No     Did you try to see or did see a doctor or nurse before you came? No     Did you have  a follow-up appointment on discharge? No                      JOHN Key  Case Management  [phone]

## 2024-07-25 NOTE — PROGRESS NOTES
[facility] Medicine  Progress Note    Patient Name: Amirah Davey  MRN: [id number]  Patient Class: OP- Observation   Admission Date: 7/24/2024  Length of Stay: 0 days  Attending Physician: Augustine Archibald DO  Primary Care Provider: Ivana Templeton MD        Subjective:     Principal Problem:Comfort measures only status        HPI:  Amirah Davey is a 88 y.o. female w/ PMHx of HTN, a-fib, CVA (May 2024), & dysphagia, who presented to [facility] ED on 07/24/2024 from [facility] w/ hypernatremia & ELIAS. Pt was planned to go to [facility] with hospice, but due to a communication issue, pt was brought to ED instead. [facility] confirmed that they  now have the paperwork for hospice, but that they aren't able to take pt until 7/25. ED staff spoke with family (daughter, Sonya) who wished for pt to be admitted for observation overnight. Confirmed pt is DNR/CMO; reports IVF & noninvasive measures still within [facility]. Sonya reports that pt's mentation has progressviely declined since CVA in May, particularly over the past week. States she's often calling out/shouting and agitated. Reports that zyprexa has provided some relief in pt's sx, but is only effective for about 1.5hrs. States she's also received seroquel previously, but resulted in hypersomnolence at the dose she'd previously received (thinks it was 50mg). States she does not reliably take PO. States she will sometimes take meds when mixed with applesauce (not pudding though), but that she'll only take 1/2 spoonful.     Overview/Hospital Course:  Admitted briefly as patient is transitioning to hospice.  Originally intended to transfer to NH with hospice but was brought to the ED instead as paperwork finalizes. Status post brief IVF hydration, comfort measures in place. Stable for transfer to NH with hospice. Initially planned for d/c 7/25 however facility reportedly unable to be secured    Interval History: Seen this AM at bedside.  Lethargic, nonverbal.     Review of Systems  Objective:     Vital Signs (Most Recent):  Temp: 97.2 °F (36.2 °C) (07/25/24 1016)  Pulse: 84 (07/25/24 1227)  Resp: 16 (07/25/24 1252)  BP: (!) 148/82 (07/25/24 1227)  SpO2: 96 % (07/25/24 1227) Vital Signs (24h Range):  Temp:  [96.9 °F (36.1 °C)-99.9 °F (37.7 °C)] 97.2 °F (36.2 °C)  Pulse:  [] 84  Resp:  [12-18] 16  SpO2:  [92 %-100 %] 96 %  BP: ()/(54-88) 148/82     Weight: 59 kg (129 lb 15.7 oz)  Body mass index is 24.56 kg/m².    Intake/Output Summary (Last 24 hours) at 7/25/2024 1341  Last data filed at 7/24/2024 1735  Gross per 24 hour   Intake 1000 ml   Output --   Net 1000 ml         Physical Exam  Constitutional:       Appearance: She is normal weight. She is ill-appearing. She is not toxic-appearing or diaphoretic.   HENT:      Mouth/Throat:      Mouth: Mucous membranes are dry.   Cardiovascular:      Rate and Rhythm: Normal rate and regular rhythm.      Heart sounds: Normal heart sounds.   Pulmonary:      Effort: Pulmonary effort is normal.      Breath sounds: Normal breath sounds.   Abdominal:      General: Bowel sounds are normal. There is no distension.      Palpations: Abdomen is soft.   Skin:     General: Skin is warm and dry.   Neurological:      Mental Status: Mental status is at baseline. She is confused.      Motor: Weakness present.   Psychiatric:         Behavior: Behavior is not agitated.         Cognition and Memory: Cognition is impaired.      Comments: Unable to assess             Significant Labs: All pertinent labs within the past 24 hours have been reviewed.    Significant Imaging: I have reviewed all pertinent imaging results/findings within the past 24 hours.    Assessment/Plan:      * Comfort measures only status  - CMO order set  - PRN anxiolytics, sedatives, antiemetics, analgesics, etc  - Delirium precautions     Hypernatremia  ELIAS  - D5LR @ 75cc/hr   - Encourage PO intake as able  - Given hospice/CMO status, family  agreeable to deferring repeat labs     ELIAS (acute kidney injury)  ELIAS is likely due to pre-renal azotemia due to dehydration. Baseline creatinine is  0.8 . Most recent creatinine and eGFR are listed below.  Recent Labs     07/24/24  1520   CREATININE 3.8*   EGFRNORACEVR 10.9*      Plan  - ELIAS is  worsening  - Avoid nephrotoxins and renally dose meds for GFR listed above  - Monitor urine output, serial BMP, and adjust therapy as needed      VTE Risk Mitigation (From admission, onward)           Ordered     IP VTE LOW RISK PATIENT  Once         07/24/24 6504                    Discharge Planning   JOAN: 7/25/2024     Code Status: DNR   Is the patient medically ready for discharge?:     Reason for patient still in hospital (select all that apply): Pending disposition                     Augustine Archibald DO  Department of Hospital Medicine   [facility] Surg

## 2024-07-25 NOTE — ASSESSMENT & PLAN NOTE
ELIAS is likely due to pre-renal azotemia due to dehydration. Baseline creatinine is  0.8 . Most recent creatinine and eGFR are listed below.  Recent Labs     07/24/24  1520   CREATININE 3.8*   EGFRNORACEVR 10.9*      Plan  - ELIAS is  worsening  - Avoid nephrotoxins and renally dose meds for GFR listed above  - Monitor urine output, serial BMP, and adjust therapy as needed

## 2024-07-25 NOTE — ASSESSMENT & PLAN NOTE
ELIAS  - D5LR @ 75cc/hr   - Encourage PO intake as able  - Given hospice/CMO status, family agreeable to deferring repeat labs

## 2024-07-25 NOTE — SUBJECTIVE & OBJECTIVE
Interval History: Seen this AM at bedside. Lethargic, nonverbal.     Review of Systems  Objective:     Vital Signs (Most Recent):  Temp: 97.2 °F (36.2 °C) (07/25/24 1016)  Pulse: 84 (07/25/24 1227)  Resp: 16 (07/25/24 1252)  BP: (!) 148/82 (07/25/24 1227)  SpO2: 96 % (07/25/24 1227) Vital Signs (24h Range):  Temp:  [96.9 °F (36.1 °C)-99.9 °F (37.7 °C)] 97.2 °F (36.2 °C)  Pulse:  [] 84  Resp:  [12-18] 16  SpO2:  [92 %-100 %] 96 %  BP: ()/(54-88) 148/82     Weight: 59 kg (129 lb 15.7 oz)  Body mass index is 24.56 kg/m².    Intake/Output Summary (Last 24 hours) at 7/25/2024 1341  Last data filed at 7/24/2024 1735  Gross per 24 hour   Intake 1000 ml   Output --   Net 1000 ml         Physical Exam  Constitutional:       Appearance: She is normal weight. She is ill-appearing. She is not toxic-appearing or diaphoretic.   HENT:      Mouth/Throat:      Mouth: Mucous membranes are dry.   Cardiovascular:      Rate and Rhythm: Normal rate and regular rhythm.      Heart sounds: Normal heart sounds.   Pulmonary:      Effort: Pulmonary effort is normal.      Breath sounds: Normal breath sounds.   Abdominal:      General: Bowel sounds are normal. There is no distension.      Palpations: Abdomen is soft.   Skin:     General: Skin is warm and dry.   Neurological:      Mental Status: Mental status is at baseline. She is confused.      Motor: Weakness present.   Psychiatric:         Behavior: Behavior is not agitated.         Cognition and Memory: Cognition is impaired.      Comments: Unable to assess             Significant Labs: All pertinent labs within the past 24 hours have been reviewed.    Significant Imaging: I have reviewed all pertinent imaging results/findings within the past 24 hours.

## 2024-07-25 NOTE — PLAN OF CARE
Spoke with Harmony at [facility] and was informed that she is in need of clinical information for her team to review before accepting patient into their facility.     CM sent clinical information via careport and is now awaiting a determination.     CM spoke with Harmony and the  at [facility] and was informed that they never agreed to accept patient but was reviewing patient information for admission and they have determined that they are unable to accommodate the patient.  CM Called and spoke with patient's daughter to inform her of [facility]'s decision.  Asked daughter about hospice at home and she stated that she works and her sister is afraid of being home without her to care for patient. Encouraged daughter to seek a family member who is willing to stay with sister when she is at work and have home hospice come into the home as well. Daughter states that she will make some calls and give me a call back. Informed daughter that I will get with [facility] to see if patient qualifies for inpatient hospice.    Spoke with patient's daughter and was asked to send referral to [facility] in [place]. Referral sent via careport.  Called Nargis Pak on the G[facility] unit and asked if they would come and evaluate patient's appropriateness for GIP.     Nargis states that patient appears appropriate but Palliative and DR. Morrison with [facility] will evaluate patient in the morning.      JOHN Key  Case Management  [phone]

## 2024-07-26 PROBLEM — N17.9 AKI (ACUTE KIDNEY INJURY): Status: RESOLVED | Noted: 2024-01-01 | Resolved: 2024-01-01

## 2024-07-26 PROBLEM — Z51.5 ENCOUNTER FOR PALLIATIVE CARE: Status: ACTIVE | Noted: 2024-01-01

## 2024-07-26 NOTE — SUBJECTIVE & OBJECTIVE
Interval History: Emotional conversation held at bedside. In active stages of dying. Transfer to inpatient palliative care unit.    Past Medical History:   Diagnosis Date    A-fib     Cerebrovascular accident (CVA) due to embolism of right middle cerebral artery 05/23/2024    Current use of long term anticoagulation     on Xarelto    Dysphagia due to recent stroke 05/23/2024    Hypertension     Stroke 03/20/2013     Past Surgical History:   Procedure Laterality Date    CARDIAC PACEMAKER PLACEMENT  09/25/2019    [employer] Model number [id number]  Serial number [id number] device MRI conditional for 1.5 Estela magnets    CATARACT EXTRACTION W/  INTRAOCULAR LENS IMPLANT Right 05/01/2017    Dr. Case    CATARACT EXTRACTION W/  INTRAOCULAR LENS IMPLANT Left 05/29/2017    Dr. Case    ESOPHAGOGASTRODUODENOSCOPY W/ PEG N/A 5/29/2024    Procedure: EGD, WITH PEG TUBE INSERTION;  Surgeon: Imer Minaya MD;  Location: [facility] OR [address] [place] [address];  Service: General;  Laterality: N/A;    EYE SURGERY      HYSTERECTOMY      INSERTION, PEG TUBE N/A 5/27/2024    Procedure: INSERTION, PEG TUBE;  Surgeon: Ivette Love MD;  Location: [facility] ([address] [place] [address]);  Service: Endoscopy;  Laterality: N/A;    INSERTION, PEG TUBE N/A 6/17/2024    Procedure: INSERTION, PEG TUBE;  Surgeon: Chloé Sher MD;  Location: [facility] OR [address] [place] [address];  Service: General;  Laterality: N/A;     Review of patient's allergies indicates:  No Known Allergies    Medications:  Continuous Infusions:  Scheduled Meds:  PRN Meds:  Current Facility-Administered Medications:     acetaminophen, 650 mg, Oral, Q4H PRN    aluminum-magnesium hydroxide-simethicone, 30 mL, Oral, QID PRN    bisacodyL, 10 mg, Rectal, Daily PRN    dextrose 10%, 12.5 g, Intravenous, PRN    dextrose 10%, 25 g, Intravenous, PRN    glucagon (human recombinant), 1 mg, Intramuscular, PRN    glucose, 16 g, Oral, PRN    glucose, 24 g, Oral, PRN    haloperidol lactate, 5 mg, Intravenous, Q6H PRN    lorazepam, 1 mg,  Intravenous, Q6H PRN    melatonin, 6 mg, Oral, Nightly PRN    morphine, 4 mg, Intravenous, Q4H PRN    naloxone, 0.02 mg, Intravenous, PRN    OLANZapine, 5 mg, Intravenous, Q6H PRN    ondansetron, 4 mg, Intravenous, Q8H PRN    prochlorperazine, 5 mg, Intravenous, Q6H PRN    QUEtiapine, 25 mg, Oral, BID PRN    sodium chloride 0.9%, 10 mL, Intravenous, Q12H PRN    Family History    None       Tobacco Use    Smoking status: Never    Smokeless tobacco: Not on file   Substance and Sexual Activity    Alcohol use: No    Drug use: Not on file    Sexual activity: Not on file       Review of Systems   Unable to perform ROS: Patient unresponsive     Objective:     Vital Signs (Most Recent):  Temp: 98.5 °F (36.9 °C) (07/26/24 1053)  Pulse: 70 (07/26/24 1053)  Resp: 14 (07/26/24 1302)  BP: (!) 107/59 (07/26/24 1053)  SpO2: 97 % (07/26/24 1053) Vital Signs (24h Range):  Temp:  [97.5 °F (36.4 °C)-98.5 °F (36.9 °C)] 98.5 °F (36.9 °C)  Pulse:  [60-76] 70  Resp:  [14-18] 14  SpO2:  [96 %-97 %] 97 %  BP: ()/(56-94) 107/59     Weight: 59 kg (129 lb 15.7 oz)  Body mass index is 24.56 kg/m².       Physical Exam  Constitutional:       Appearance: She is ill-appearing.   HENT:      Head: Normocephalic.      Right Ear: External ear normal.      Left Ear: External ear normal.      Nose: Nose normal.      Mouth/Throat:      Mouth: Mucous membranes are dry.   Eyes:      Conjunctiva/sclera: Conjunctivae normal.   Cardiovascular:      Rate and Rhythm: Normal rate.   Pulmonary:      Comments: Cheyne white respirations, long apneic pauses  Abdominal:      General: Bowel sounds are normal.      Palpations: Abdomen is soft.   Musculoskeletal:         General: No swelling.   Skin:     General: Skin is warm and dry.   Neurological:      Comments: unresponsive            Review of Symptoms      Symptom Assessment (ESAS 0-10 Scale)  Unable to complete assessment due to Patient unresponsive         Pain Assessment in Advanced Demential Scale  "(PAINAD)   Breathing - Independent of vocalization:  2  Negative vocalization:  0  Facial expression:  1  Body language:  1  Consolability:  1  Total:  5    Psychosocial/Cultural:   See Palliative Psychosocial Note: No  Very independent prior to stroke in May 18, 2024. Was even driving a car! Loved to cook for others, makes the best fried chicken ever according to her daughters. Very loving woman who befriended everyone and called everyone her children even though they weren't truly her birth children.  **Primary  to Follow**  Palliative Care  Consult: No    Spiritual:  F - Lynda and Belief:  Yarsani  I - Importance:  Important  C - Community:  Engaged  A - Address in Care:  Engage  support        Advance Care Planning   Advance Directives:   Living Will: No    LaPOST: No    Do Not Resuscitate Status: Yes    Medical Power of : No      Decision Making:  Family answered questions  Goals of Care: 7/26/24: Emotional conversation held with daughters Kanchan and Ashlie at Ms. Davey's bedside. They had met with Nargis from [facility] yesterday, who was alerted by case management to assess for GIP/inpatient hospice. Kanchan and Ashlie share that their mother was very independent prior to her stroke in May 18, 2024. Their mother was even driving herself! She loved people, often telling her children "you have a new sister! She's white!" Because everyone she met and loved, she treated like family. She loved cooking for people and when she had guests over, she always knew what her guests' favorite foods were and would cook it especially for them. 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 When she suffered her stroke in May 2024, she did recover a little bit in rehab, but ultimately declined. Her personality changed and daughters share that she developed vascular dementia. Their mother's beautiful, loving personality had changed with the alarming " "episodes of agitation/confusion and it has been extremely difficult to watch their mother's decline. In the past, their mother was very clear about her wishes, that when it's her time to pass, she didn't want "people jumping on me" or any life-prolonging measures that would prolong her dying process. She reassured her family that she wanted to focus on comfort if her independence and quality of life was compromised.  - When she was in [facility], she would have moments of severe agitation. A nurse was trying to draw blood from her and her mother started grabbing for the peripheral IV and RN. The daughter pulled back her mother's hand to prevent harm and asked the nurse if it was necessary to do so, as she didn't want to escalate interventions that would potentially prolong her suffering. When [facility] sent her to the emergency department for this admission, the daughters never got a call from [facility] and instead got a call from the ER physician instead. They were alarmed. They wouldn't have wanted their mother hospitalized and instead would have allowed their mother to spend her final days where she was and focus on comfort care. Now during this hospitalization, they were hoping that getting her to [facility] with hospice would allow her mother a final setting to spend peaceful final days of her life. They signed all of the necessary paperwork and were told that their mother would be transported on Wednesday, 7/24. They got a call from case management who asked them if the daughters knew why their mother wasn't actually moving to [facility], and asked them if they would call [facility] to find out what's going on. The daughters were surprised and did call to try to find out what went wrong. They still don't have answers as to why [facility] is not accepting their mother with hospice support. This has been very stressful for them as they have been compliant with " "what they need to do, but are receiving messages that no one wants to accept their beloved mother during a time where she should be spending peace and dignity in a quiet environment.  - During our conversation, I could see that Ms. Davey had long episodes of apnea. Daughters confirm that their mother is not eating and when she wakes, she is very agitated, flailing. I shared with Kanchan and Ashlie that in hearing their mother's story and goals, that they are very concordant with the philosophy of care delivered in the inpatient hospice unit. I reviewed the philosophy and scope of our hospice unit, where care is entirely focused on goal-concordant symptom management and patient/family support. Consequently, only medical interventions and treatment which help with that goal will be continued and other routine medical care such as frequent vitals, lab draws and invasive tests/procedures will not be done. I also explained our unit is not a long-term disposition and that we will continue to work on care coordination efforts in the event that the patient unexpectedly stabilizes. I shared that should she demonstrate inpatient hospice criteria, we would ask [facility] to help flip. Kanchan was a hospice RN for 7 years and was very agreeable with this plan and both expressed deep gratitude.           Significant Labs: CBC: No results for input(s): "WBC", "HGB", "HCT", "PLT" in the last 48 hours.  CMP: No results for input(s): "NA", "K", "CL", "CO2", "GLU", "BUN", "CREATININE", "CALCIUM", "PROT", "ALBUMIN", "BILITOT", "ALKPHOS", "AST", "ALT", "ANIONGAP", "EGFRNONAA" in the last 48 hours.    Invalid input(s): "ESTGFAFRICA"  CBC:   Recent Labs   Lab 07/24/24  1520   WBC 18.21*   HGB 11.0*   HCT 37.1   MCV 82        BMP:  No results for input(s): "GLU", "NA", "K", "CL", "CO2", "BUN", "CREATININE", "CALCIUM", "MG" in the last 24 hours.  LFT:  Lab Results   Component Value Date    AST 28 07/24/2024    ALKPHOS 57 07/24/2024    " BILITOT 0.5 07/24/2024     Albumin:   Albumin   Date Value Ref Range Status   07/24/2024 3.3 (L) 3.5 - 5.2 g/dL Final     Protein:   Total Protein   Date Value Ref Range Status   07/24/2024 7.5 6.0 - 8.4 g/dL Final     Lactic acid:   Lab Results   Component Value Date    LACTATE 1.0 06/06/2024       Significant Imaging: I have reviewed all pertinent imaging results/findings within the past 24 hours.

## 2024-07-26 NOTE — ASSESSMENT & PLAN NOTE
Amirah Davey is an 88-year-old woman with a history of CVA (5/18/24), HTN, atrial fibrillation with complicated course post-stroke, sent to the ER on 7/24/24 from [facility] for ELIAS and metabolic disturbances. Family advocated for their mother's desire to focus on comfort oriented care during her end of life and transitioned to comfort care. Hospitalization is notable for severe agitation and pain. Palliative and Supportive Care was consulted to evaluate for inpatient hospice/GIP.    See ACP section at end of note for complete goals of care discussion.    Interval update 07/26/2024: Emotional conversation held at bedside with daughters Kanchan and Ashlie. Transfer to inpatient palliative care unit on 7/26/24.    Terminal diagnosis: CVA   Supporting diagnoses: acute renal failure with severe metabolic disturbances, not pursuing renal replacement therapy    Need for inpatient care: Active dying process as evidenced by loss of consciousness, cool extremities, and irregular breathing pattern. Requiring IV opioids and benzodiazepines for pain behaviors, respiratory distress, and agitation. Likely prognosis of hours to short days    Dispo: anticipate end-of-life care on the unit. At this time does not appear to meet GIP criteria, whether that's floor staffing unable to meet patient's symptom burden relief needs. If patient unexpectedly stabilizes, will approach family regarding care at alternative in-facility/longterm placement with hospice support    Symptom Assessment  - Pain/ pain behaviors: controlled  - Dyspnea/tachypnea: uncontrolled  - Agitation: uncontrolled  - Mentation: minimally responsive    Symptom Oriented Management:    Tachypnea/Dyspnea:   - Morphine 4 mg IV q30min PRN pain behavior (groaning, grimacing, guarding), labored breathing  - Lorazepam 1 mg IV q30min PRN anxiety, labored breathing refractory to opioids  - Fan at bedside  - Avoid artificial hydration  - Treat fevers symptomatically with PRN APAP,  NSAID's, and if refractory to these measures, dexamethasone     Pain Behaviors:   - Opioids as above  - Turn only as tolerated     Agitation/Anxiety/Encephalopathy:   - Treat for pain/labored breathing as above  - Lorazepam 1 mg IV q30min PRN anxiety/agitation, labored breathing refractory to opioids  - Haloperidol 5 mg IV q4h PRN agitation, nausea/vomiting refractory to ondansetron    Profuse Oropharyngeal Secretions:  - Turn head side to side to help shift secretions out of airway, allow to pool to cheeks and out of mouth  - Gentle, frequent oral care - encourage family at bedside to participate  - Yankauer suction at bedside  - Glycopyrrolate 0.3 mg IV q4h PRN profuse oropharyngeal secretions    Nausea/Vomiting/Retching:  - Ondansetron 4 mg IV q6h PRN nausea/vomiting    Nutrition / Hydration  - No IV fluids and artificial nutrition  - Liberalize diet in order to permit comfort feedings as desired and tolerated  - Simplified medication regimen by eliminating those medications that provide little to no benefit at end-of-life    Bowel Regimen:  - Bisacodyl 10 mg suppository daily PRN no bowel movement in 5 days  - Will not prioritize at end of life    Fever  - Apply cold compresses  - Fan at bedside  - Acetaminophen 650 mg suppository q4h PRN fever  - If turning triggers severe pain, avoid suppository administration and trial ketorolac 15 mg IV q4h PRN fever  - If febrile despite above measures, trial dexamethasone 2 mg IV q6h PRN refractory fevers      - Terminal diagnosis: CVA, ELIAS not pursuing renal replacement therapy  - Prognosis: short days  - Dispo: Continue aggressive symptom management at the end of life in the hospice/palliative unit. Family amenable to discussing home hospice or NH with hospice if patient unexpectedly stabilizes

## 2024-07-26 NOTE — DISCHARGE SUMMARY
[facility]  Discharge Summary      Patient Name: Amirah Davey  MRN: [id number]  [facility]: [id number]  Patient Class: IP- Inpatient  Admission Date: 7/24/2024  Hospital Length of Stay: 0 days  Discharge Date and Time:  07/26/2024 4:04 PM  Attending Physician: Nora Morrison MD   Discharging Provider: Augustine Archibald DO  Primary Care Provider: Ivana Templeton MD  Hospital Medicine Team: [facility] HOSP MED R Augustine Archibald DO  Primary Care Team: [facility] HOSP MED R    HPI:   Amirah Davey is a 88 y.o. female w/ PMHx of HTN, a-fib, CVA (May 2024), & dysphagia, who presented to [facility] ED on 07/24/2024 from [facility] w/ hypernatremia & ELIAS. Pt was planned to go to [facility] with hospice, but due to a communication issue, pt was brought to ED instead. [facility] confirmed that they  now have the paperwork for hospice, but that they aren't able to take pt until 7/25. ED staff spoke with family (daughter, Sonya) who wished for pt to be admitted for observation overnight. Confirmed pt is DNR/CMO; reports IVF & noninvasive measures still within GOC. Sonya reports that pt's mentation has progressviely declined since CVA in May, particularly over the past week. States she's often calling out/shouting and agitated. Reports that zyprexa has provided some relief in pt's sx, but is only effective for about 1.5hrs. States she's also received seroquel previously, but resulted in hypersomnolence at the dose she'd previously received (thinks it was 50mg). States she does not reliably take PO. States she will sometimes take meds when mixed with applesauce (not pudding though), but that she'll only take 1/2 spoonful.     * No surgery found *      Hospital Course:   Admitted briefly as patient is transitioning to hospice.  Originally intended to transfer to [facility] with hospice but was brought to the ED instead as paperwork finalizes. Status post brief IVF hydration, comfort measures in place. Stable for transfer  to NH with hospice. Initially planned for d/c 7/25 however facility reportedly unable to be secured. Now discharging [facility] inpatient hosp[facility]     Physical Exam  Gen:  Ill-appearing, not responsive  Neuro:  Unable to assess  HEENT: NTNC, EOMI, PERRL, MMM  CVS: RRR, no m/r/g; S1/S2 auscultated with no S3 or S4; capillary refill < 2 sec  Resp: lungs CTAB, no w/r/r; no belabored breathing or accessory muscle use appreciated   Abd: BS+ in all 4 quadrants; NTND, soft to palpation; no organomegaly appreciated   Extrem: pulses full, equal, and regular over all 4 extremities; no UE or LE edema BL    Goals of Care Treatment Preferences:  Code Status: DNR      Consults:   Consults (From admission, onward)          Status Ordering Provider     Inpatient consult to Palliative Care  Once        Provider:  (Not yet assigned)    Acknowledged JEREMY MARROQUIN            No new Assessment & Plan notes have been filed under this hospital service since the last note was generated.  Service: Hospital Medicine    Final Active Diagnoses:    Diagnosis Date Noted POA    PRINCIPAL PROBLEM:  Comfort measures only status [Z51.5] 07/24/2024 Not Applicable    Encounter for palliative care [Z51.5] 07/26/2024 Not Applicable    Hypernatremia [E87.0] 07/24/2024 Yes    ELIAS (acute kidney injury) [N17.9] 06/12/2024 Yes      Problems Resolved During this Admission:       Discharged Condition: poor    Disposition: Hospice/Medical Facility    Follow Up:    Patient Instructions:   No discharge procedures on file.    Significant Diagnostic Studies: N/A    Pending Diagnostic Studies:       None           Medications:  Reconciled Home Medications:      Medication List        CONTINUE taking these medications      acetaminophen 325 MG tablet  Commonly known as: TYLENOL  Take 2 tablets (650 mg total) by mouth every 6 (six) hours as needed for Pain.     aluminum-magnesium hydroxide-simethicone 200-200-20 mg/5 mL Susp  Commonly known as: MAALOX  Take 30 mLs by  mouth 4 (four) times daily as needed.     amLODIPine 10 MG tablet  Commonly known as: NORVASC  Take 1 tablet (10 mg total) by mouth once daily.     apixaban 5 mg Tab  Commonly known as: ELIQUIS  Take 1 tablet (5 mg total) by mouth 2 (two) times daily.     atorvastatin 40 MG tablet  Commonly known as: LIPITOR  Take 1 tablet (40 mg total) by mouth once daily.     baclofen 5 mg Tab tablet  Commonly known as: LIORESAL  Take 1 tablet (5 mg total) by mouth 2 (two) times daily.     dicyclomine 10 MG capsule  Commonly known as: BENTYL  Take 1 capsule (10 mg total) by mouth 4 (four) times daily.     droNABinol 10 MG capsule  Commonly known as: MARINOL  Take 1 capsule (10 mg total) by mouth 2 (two) times daily.     famotidine 20 MG tablet  Commonly known as: PEPCID  Take 1 tablet (20 mg total) by mouth once daily.     LIDOcaine 5 %  Commonly known as: LIDODERM  Place 1 patch onto the skin once daily. Remove & Discard patch within 12 hours or as directed by MD     losartan 50 MG tablet  Commonly known as: COZAAR  Take 1 tablet (50 mg total) by mouth once daily.     megestroL 400 mg/10 mL (10 mL) Susp  Commonly known as: MEGACE  Take 10 mLs (400 mg total) by mouth 2 (two) times daily.     melatonin 3 mg tablet  Commonly known as: MELATIN  Take 2 tablets (6 mg total) by mouth nightly as needed for Insomnia.     OLANZapine zydis 5 MG Tbdl  Commonly known as: ZyPREXA  Take 1 tablet (5 mg total) by mouth 2 (two) times daily as needed (agitation).     oxyCODONE 5 MG immediate release tablet  Commonly known as: ROXICODONE  Take 1 tablet (5 mg total) by mouth every 6 (six) hours as needed for Pain.     polyethylene glycol 17 gram Pwpk  Commonly known as: GLYCOLAX  Take 17 g by mouth 2 (two) times daily as needed for Constipation.     simethicone 80 MG chewable tablet  Commonly known as: MYLICON  Take 1 tablet (80 mg total) by mouth 4 (four) times daily as needed for Flatulence.              Indwelling Lines/Drains at time of  discharge:   Lines/Drains/Airways       Drain  Duration             Female External Urinary Catheter w/ Suction 07/24/24 1447 2 days                    Time spent on the discharge of patient: 35 minutes         Augustine Archibald DO  Department of Hospital Medicine  [facility]

## 2024-07-26 NOTE — PLAN OF CARE
Patient is transferring to [facility] for comfort care.         JOHN Key  Case Management  [phone]

## 2024-07-26 NOTE — CONSULTS
[facility]  Palliative Medicine  Consult Note    Patient Name: Amirah Davey  MRN: [id number]  Admission Date: 7/24/2024  Hospital Length of Stay: 0 days  Code Status: DNR   Attending Provider: Nora Morrison MD  Consulting Provider: Nora Morrison MD  Primary Care Physician: Ivana Templeton MD  Principal Problem:Comfort measures only status    Patient information was obtained from relative(s) and primary team.      Inpatient consult to Palliative Care  Consult performed by: Nora Morrison MD  Consult ordered by: Augustine Archibald DO  Reason for consult: inpatient hospice GIP evaluation        Assessment/Plan:     Palliative Care  Encounter for palliative care  Amirah Davey is an 88-year-old woman with a history of CVA (5/18/24), HTN, atrial fibrillation with complicated course post-stroke, sent to the ER on 7/24/24 from [facility] for ELIAS and metabolic disturbances. Family advocated for their mother's desire to focus on comfort oriented care during her end of life and transitioned to comfort care. Hospitalization is notable for severe agitation and pain. Palliative and Supportive Care was consulted to evaluate for inpatient hospice/GIP.    See ACP section at end of note for complete goals of care discussion.    Interval update 07/26/2024: Emotional conversation held at bedside with daughters Kanchan and Ashlie. Transfer to inpatient palliative care unit on 7/26/24.    Terminal diagnosis: CVA   Supporting diagnoses: acute renal failure with severe metabolic disturbances, not pursuing renal replacement therapy    Need for inpatient care: Active dying process as evidenced by loss of consciousness, cool extremities, and irregular breathing pattern. Requiring IV opioids and benzodiazepines for pain behaviors, respiratory distress, and agitation. Likely prognosis of hours to short days    Dispo: anticipate end-of-life care on the unit. At this time does not appear to meet GIP criteria, whether that's floor staffing  unable to meet patient's symptom burden relief needs. If patient unexpectedly stabilizes, will approach family regarding care at alternative in-facility/jail placement with hospice support    Symptom Assessment  - Pain/ pain behaviors: controlled  - Dyspnea/tachypnea: uncontrolled  - Agitation: uncontrolled  - Mentation: minimally responsive    Symptom Oriented Management:    Tachypnea/Dyspnea:   - Morphine 4 mg IV q30min PRN pain behavior (groaning, grimacing, guarding), labored breathing  - Lorazepam 1 mg IV q30min PRN anxiety, labored breathing refractory to opioids  - Fan at bedside  - Avoid artificial hydration  - Treat fevers symptomatically with PRN APAP, NSAID's, and if refractory to these measures, dexamethasone     Pain Behaviors:   - Opioids as above  - Turn only as tolerated     Agitation/Anxiety/Encephalopathy:   - Treat for pain/labored breathing as above  - Lorazepam 1 mg IV q30min PRN anxiety/agitation, labored breathing refractory to opioids  - Haloperidol 5 mg IV q4h PRN agitation, nausea/vomiting refractory to ondansetron    Profuse Oropharyngeal Secretions:  - Turn head side to side to help shift secretions out of airway, allow to pool to cheeks and out of mouth  - Gentle, frequent oral care - encourage family at bedside to participate  - Yankauer suction at bedside  - Glycopyrrolate 0.3 mg IV q4h PRN profuse oropharyngeal secretions    Nausea/Vomiting/Retching:  - Ondansetron 4 mg IV q6h PRN nausea/vomiting    Nutrition / Hydration  - No IV fluids and artificial nutrition  - Liberalize diet in order to permit comfort feedings as desired and tolerated  - Simplified medication regimen by eliminating those medications that provide little to no benefit at end-of-life    Bowel Regimen:  - Bisacodyl 10 mg suppository daily PRN no bowel movement in 5 days  - Will not prioritize at end of life    Fever  - Apply cold compresses  - Fan at bedside  - Acetaminophen 650 mg suppository q4h PRN fever  -  If turning triggers severe pain, avoid suppository administration and trial ketorolac 15 mg IV q4h PRN fever  - If febrile despite above measures, trial dexamethasone 2 mg IV q6h PRN refractory fevers      - Terminal diagnosis: CVA, ELIAS not pursuing renal replacement therapy  - Prognosis: short days  - Dispo: Continue aggressive symptom management at the end of life in the hospice/palliative unit. Family amenable to discussing home hospice or NH with hospice if patient unexpectedly stabilizes        Subjective:     HPI:   Amirah Davey is an 88-year-old woman with a history of CVA (5/18/24), HTN, atrial fibrillation with complicated course post-stroke, sent to the ER on 7/24/24 from [facility] for ELIAS and metabolic disturbances. Family advocated for their mother's desire to focus on comfort oriented care during her end of life and transitioned to comfort care. Hospitalization is notable for severe agitation and pain. Palliative and Supportive Care was consulted to evaluate for inpatient hospice/GIP.    Hospital Course:  No notes on file    Interval History: Emotional conversation held at bedside. In active stages of dying. Transfer to inpatient palliative care unit.    Past Medical History:   Diagnosis Date    A-fib     Cerebrovascular accident (CVA) due to embolism of right middle cerebral artery 05/23/2024    Current use of long term anticoagulation     on Xarelto    Dysphagia due to recent stroke 05/23/2024    Hypertension     Stroke 03/20/2013     Past Surgical History:   Procedure Laterality Date    CARDIAC PACEMAKER PLACEMENT  09/25/2019    [employer] Model number [id number]  Serial number [id number] device MRI conditional for 1.5 Estela magnets    CATARACT EXTRACTION W/  INTRAOCULAR LENS IMPLANT Right 05/01/2017    Dr. Case    CATARACT EXTRACTION W/  INTRAOCULAR LENS IMPLANT Left 05/29/2017    Dr. Case    ESOPHAGOGASTRODUODENOSCOPY W/ PEG N/A 5/29/2024    Procedure: EGD, WITH PEG TUBE INSERTION;  Surgeon:  Imer Minaya MD;  [facility];  Service: General;  Laterality: N/A;    EYE SURGERY      HYSTERECTOMY      INSERTION, PEG TUBE N/A 5/27/2024    Procedure: INSERTION, PEG TUBE;  Surgeon: Ivette Love MD;  Location: [facility] (2ND FLR);  Service: Endoscopy;  Laterality: N/A;    INSERTION, PEG TUBE N/A 6/17/2024    Procedure: INSERTION, PEG TUBE;  Surgeon: Chloé Sher MD;  [facility];  Service: General;  Laterality: N/A;     Review of patient's allergies indicates:  No Known Allergies    Medications:  Continuous Infusions:  Scheduled Meds:  PRN Meds:  Current Facility-Administered Medications:     acetaminophen, 650 mg, Oral, Q4H PRN    aluminum-magnesium hydroxide-simethicone, 30 mL, Oral, QID PRN    bisacodyL, 10 mg, Rectal, Daily PRN    dextrose 10%, 12.5 g, Intravenous, PRN    dextrose 10%, 25 g, Intravenous, PRN    glucagon (human recombinant), 1 mg, Intramuscular, PRN    glucose, 16 g, Oral, PRN    glucose, 24 g, Oral, PRN    haloperidol lactate, 5 mg, Intravenous, Q6H PRN    lorazepam, 1 mg, Intravenous, Q6H PRN    melatonin, 6 mg, Oral, Nightly PRN    morphine, 4 mg, Intravenous, Q4H PRN    naloxone, 0.02 mg, Intravenous, PRN    OLANZapine, 5 mg, Intravenous, Q6H PRN    ondansetron, 4 mg, Intravenous, Q8H PRN    prochlorperazine, 5 mg, Intravenous, Q6H PRN    QUEtiapine, 25 mg, Oral, BID PRN    sodium chloride 0.9%, 10 mL, Intravenous, Q12H PRN    Family History    None       Tobacco Use    Smoking status: Never    Smokeless tobacco: Not on file   Substance and Sexual Activity    Alcohol use: No    Drug use: Not on file    Sexual activity: Not on file       Review of Systems   Unable to perform ROS: Patient unresponsive     Objective:     Vital Signs (Most Recent):  Temp: 98.5 °F (36.9 °C) (07/26/24 1053)  Pulse: 70 (07/26/24 1053)  Resp: 14 (07/26/24 1302)  BP: (!) 107/59 (07/26/24 1053)  SpO2: 97 % (07/26/24 1053) Vital Signs (24h Range):  Temp:  [97.5 °F  (36.4 °C)-98.5 °F (36.9 °C)] 98.5 °F (36.9 °C)  Pulse:  [60-76] 70  Resp:  [14-18] 14  SpO2:  [96 %-97 %] 97 %  BP: ()/(56-94) 107/59     Weight: 59 kg (129 lb 15.7 oz)  Body mass index is 24.56 kg/m².       Physical Exam  Constitutional:       Appearance: She is ill-appearing.   HENT:      Head: Normocephalic.      Right Ear: External ear normal.      Left Ear: External ear normal.      Nose: Nose normal.      Mouth/Throat:      Mouth: Mucous membranes are dry.   Eyes:      Conjunctiva/sclera: Conjunctivae normal.   Cardiovascular:      Rate and Rhythm: Normal rate.   Pulmonary:      Comments: Cheyne white respirations, long apneic pauses  Abdominal:      General: Bowel sounds are normal.      Palpations: Abdomen is soft.   Musculoskeletal:         General: No swelling.   Skin:     General: Skin is warm and dry.   Neurological:      Comments: unresponsive            Review of Symptoms      Symptom Assessment (ESAS 0-10 Scale)  Unable to complete assessment due to Patient unresponsive         Pain Assessment in Advanced Demential Scale (PAINAD)   Breathing - Independent of vocalization:  2  Negative vocalization:  0  Facial expression:  1  Body language:  1  Consolability:  1  Total:  5    Psychosocial/Cultural:   See Palliative Psychosocial Note: No  Very independent prior to stroke in May 18, 2024. Was even driving a car! Loved to cook for others, makes the best fried chicken ever according to her daughters. Very loving woman who befriended everyone and called everyone her children even though they weren't truly her birth children.  **Primary  to Follow**  Palliative Care  Consult: No    Spiritual:  F - Lynda and Belief:  Sikhism  I - Importance:  Important  C - Community:  Engaged  A - Address in Care:  Engage  support        Advance Care Planning  Advance Directives:   Living Will: No    LaPOST: No    Do Not Resuscitate Status: Yes    Medical Power of : No   "    Decision Making:  Family answered questions  Goals of Care: 7/26/24: Emotional conversation held with daughters Kanchan and Ashlie at Ms. Davey's bedside. They had met with Nargis from [facility] yesterday, who was alerted by case management to assess for GIP/inpatient hospice. Kanchan and Ashlie share that their mother was very independent prior to her stroke in May 18, 2024. Their mother was even driving herself! She loved people, often telling her children "you have a new sister! She's white!" Because everyone she met and loved, she treated like family. She loved cooking for people and when she had guests over, she always knew what her guests' favorite foods were and would cook it especially for them. 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 When she suffered her stroke in May 2024, she did recover a little bit in rehab, but ultimately declined. Her personality changed and daughters share that she developed vascular dementia. Their mother's beautiful, loving personality had changed with the alarming episodes of agitation/confusion and it has been extremely difficult to watch their mother's decline. In the past, their mother was very clear about her wishes, that when it's her time to pass, she didn't want "people jumping on me" or any life-prolonging measures that would prolong her dying process. She reassured her family that she wanted to focus on comfort if her independence and quality of life was compromised.  - When she was in [facility], she would have moments of severe agitation. A nurse was trying to draw blood from her and her mother started grabbing for the peripheral IV and RN. The daughter pulled back her mother's hand to prevent harm and asked the nurse if it was necessary to do so, as she didn't want to escalate interventions that would potentially prolong her suffering. When [facility] sent her to the emergency department for this admission, the daughters " never got a call from [facility] and instead got a call from the ER physician instead. They were alarmed. They wouldn't have wanted their mother hospitalized and instead would have allowed their mother to spend her final days where she was and focus on comfort care. Now during this hospitalization, they were hoping that getting her to [facility] with hospice would allow her mother a final setting to spend peaceful final days of her life. They signed all of the necessary paperwork and were told that their mother would be transported on Wednesday, 7/24. They got a call from case management who asked them if the daughters knew why their mother wasn't actually moving to [facility], and asked them if they would call [facility] to find out what's going on. The daughters were surprised and did call to try to find out what went wrong. They still don't have answers as to why [facility] is not accepting their mother with hospice support. This has been very stressful for them as they have been compliant with what they need to do, but are receiving messages that no one wants to accept their beloved mother during a time where she should be spending peace and dignity in a quiet environment.  - During our conversation, I could see that Ms. Davey had long episodes of apnea. Daughters confirm that their mother is not eating and when she wakes, she is very agitated, flailing. I shared with Kanchan and Ashlie that in hearing their mother's story and goals, that they are very concordant with the philosophy of care delivered in the inpatient hospice unit. I reviewed the philosophy and scope of our hospice unit, where care is entirely focused on goal-concordant symptom management and patient/family support. Consequently, only medical interventions and treatment which help with that goal will be continued and other routine medical care such as frequent vitals, lab draws and invasive tests/procedures  "will not be done. I also explained our unit is not a long-term disposition and that we will continue to work on care coordination efforts in the event that the patient unexpectedly stabilizes. I shared that should she demonstrate inpatient hospice criteria, we would ask [facility] to help flip. Kanchan was a hospice RN for 7 years and was very agreeable with this plan and both expressed deep gratitude.           Significant Labs: CBC: No results for input(s): "WBC", "HGB", "HCT", "PLT" in the last 48 hours.  CMP: No results for input(s): "NA", "K", "CL", "CO2", "GLU", "BUN", "CREATININE", "CALCIUM", "PROT", "ALBUMIN", "BILITOT", "ALKPHOS", "AST", "ALT", "ANIONGAP", "EGFRNONAA" in the last 48 hours.    Invalid input(s): "ESTGFAFRICA"  CBC:   Recent Labs   Lab 07/24/24  1520   WBC 18.21*   HGB 11.0*   HCT 37.1   MCV 82        BMP:  No results for input(s): "GLU", "NA", "K", "CL", "CO2", "BUN", "CREATININE", "CALCIUM", "MG" in the last 24 hours.  LFT:  Lab Results   Component Value Date    AST 28 07/24/2024    ALKPHOS 57 07/24/2024    BILITOT 0.5 07/24/2024     Albumin:   Albumin   Date Value Ref Range Status   07/24/2024 3.3 (L) 3.5 - 5.2 g/dL Final     Protein:   Total Protein   Date Value Ref Range Status   07/24/2024 7.5 6.0 - 8.4 g/dL Final     Lactic acid:   Lab Results   Component Value Date    LACTATE 1.0 06/06/2024       Significant Imaging: I have reviewed all pertinent imaging results/findings within the past 24 hours.      I spent a total of 75 minutes on the day of the visit. This includes face to face time in discussion of goals of care, symptom assessment, coordination of care and emotional support. This also includes non-face to face time preparing to see the patient (eg, review of tests/imaging), obtaining and/or reviewing separately obtained history, documenting clinical information in the electronic or other health record, independently interpreting results and communicating results to the " patient/family/caregiver, or care coordinator. A total of 16 minutes was spent on advance care planning, goals of care discussion, emotional support, formulating and communicating prognosis and goals of care, exploring burden/benefit of various approaches of treatment. This discussion occurred on a fully voluntary basis with the verbal consent of the patient and/or family.      Nora Morrison MD  Palliative Medicine  [facility]

## 2024-07-26 NOTE — HPI
Amirah Davey is an 88-year-old woman with a history of CVA (5/18/24), HTN, atrial fibrillation with complicated course post-stroke, sent to the ER on 7/24/24 from [facility] for ELIAS and metabolic disturbances. Family advocated for their mother's desire to focus on comfort oriented care during her end of life and transitioned to comfort care. Hospitalization is notable for severe agitation and pain. Palliative and Supportive Care was consulted to evaluate for inpatient hospice/GIP.

## 2024-07-27 NOTE — PLAN OF CARE
Problem: Adult Inpatient Plan of Care  Goal: Plan of Care Review  Outcome: Progressing  Goal: Patient-Specific Goal (Individualized)  Outcome: Progressing  Goal: Absence of Hospital-Acquired Illness or Injury  Outcome: Progressing  Goal: Optimal Comfort and Wellbeing  Outcome: Progressing  Goal: Readiness for Transition of Care  Outcome: Progressing     Problem: Palliative Care  Goal: Enhanced Quality of Life  Outcome: Progressing     Problem: Acute Kidney Injury/Impairment  Goal: Fluid and Electrolyte Balance  Outcome: Progressing  Goal: Improved Oral Intake  Outcome: Progressing  Goal: Effective Renal Function  Outcome: Progressing     Problem: Wound  Goal: Optimal Coping  Outcome: Progressing  Goal: Optimal Functional Ability  Outcome: Progressing  Goal: Absence of Infection Signs and Symptoms  Outcome: Progressing  Goal: Improved Oral Intake  Outcome: Progressing  Goal: Optimal Pain Control and Function  Outcome: Progressing  Goal: Skin Health and Integrity  Outcome: Progressing  Goal: Optimal Wound Healing  Outcome: Progressing     Problem: Fall Injury Risk  Goal: Absence of Fall and Fall-Related Injury  Outcome: Progressing     Problem: Skin Injury Risk Increased  Goal: Skin Health and Integrity  Outcome: Progressing     Problem: Coping Ineffective  Goal: Effective Coping  Outcome: Progressing

## 2024-07-27 NOTE — NURSING
PICC team in.  Discussed goal of comfort and quality of life with daughter.  Gone from [employer] book provided.  Oral care done,.

## 2024-07-27 NOTE — PLAN OF CARE
Problem: Adult Inpatient Plan of Care  Goal: Plan of Care Review  Outcome: Progressing  Goal: Patient-Specific Goal (Individualized)  Outcome: Progressing  Goal: Absence of Hospital-Acquired Illness or Injury  Outcome: Progressing  Goal: Optimal Comfort and Wellbeing  Outcome: Progressing  Goal: Readiness for Transition of Care  Outcome: Progressing     Problem: Palliative Care  Goal: Enhanced Quality of Life  Outcome: Progressing     Problem: Wound  Goal: Optimal Coping  Outcome: Progressing  Goal: Optimal Functional Ability  Outcome: Progressing  Goal: Absence of Infection Signs and Symptoms  Outcome: Progressing  Goal: Optimal Pain Control and Function  Outcome: Progressing  Goal: Skin Health and Integrity  Outcome: Progressing  Goal: Optimal Wound Healing  Outcome: Progressing     Problem: Skin Injury Risk Increased  Goal: Skin Health and Integrity  Outcome: Progressing     Problem: Coping Ineffective  Goal: Effective Coping  Outcome: Progressing

## 2024-07-27 NOTE — NURSING
Arrived to room transported by nurse and charge nurse, family at bedside, patient non-verbal, no signs of pain noted, IV to right AC not able to be flushed, dc'd with no complications at sight, responds to pain.

## 2024-07-27 NOTE — SUBJECTIVE & OBJECTIVE
Interval History: Daughter at bedside and another daughter on phone. Family very pleased with care since arrival to unit. Per family, patient is much more comfortable than before    Past Medical History:   Diagnosis Date    A-fib     Cerebrovascular accident (CVA) due to embolism of right middle cerebral artery 05/23/2024    Current use of long term anticoagulation     on Xarelto    Dysphagia due to recent stroke 05/23/2024    Hypertension     Stroke 03/20/2013     Past Surgical History:   Procedure Laterality Date    CARDIAC PACEMAKER PLACEMENT  09/25/2019    [employer] Model number [id number]  Serial number [id number] device MRI conditional for 1.5 Estela magnets    CATARACT EXTRACTION W/  INTRAOCULAR LENS IMPLANT Right 05/01/2017    Dr. Case    CATARACT EXTRACTION W/  INTRAOCULAR LENS IMPLANT Left 05/29/2017    Dr. Case    ESOPHAGOGASTRODUODENOSCOPY W/ PEG N/A 5/29/2024    Procedure: EGD, WITH PEG TUBE INSERTION;  Surgeon: Imer Minaya MD;  Location: [facility] OR [address] [place] [address];  Service: General;  Laterality: N/A;    EYE SURGERY      HYSTERECTOMY      INSERTION, PEG TUBE N/A 5/27/2024    Procedure: INSERTION, PEG TUBE;  Surgeon: Ivette Love MD;  Location: [facility] ([address] [place] [address]);  Service: Endoscopy;  Laterality: N/A;    INSERTION, PEG TUBE N/A 6/17/2024    Procedure: INSERTION, PEG TUBE;  Surgeon: Chloé Sher MD;  Location: [facility] OR [address] [place] [address];  Service: General;  Laterality: N/A;     Review of patient's allergies indicates:  No Known Allergies    Medications:  Continuous Infusions:  Scheduled Meds:  PRN Meds:  Current Facility-Administered Medications:     acetaminophen, 650 mg, Rectal, Q6H PRN    bisacodyL, 10 mg, Rectal, Daily PRN    glycopyrrolate, 0.3 mg, Intravenous, Q4H PRN    haloperidol lactate, 5 mg, Intravenous, Q6H PRN    lorazepam, 1 mg, Intravenous, Q30 Min PRN    morphine, 4 mg, Intravenous, Q30 Min PRN    ondansetron, 4 mg, Intravenous, Q8H PRN    prochlorperazine, 5 mg, Intravenous, Q6H PRN     sodium chloride 0.9%, 10 mL, Intravenous, Q12H PRN    Family History    None       Tobacco Use    Smoking status: Never    Smokeless tobacco: Not on file   Substance and Sexual Activity    Alcohol use: No    Drug use: Not on file    Sexual activity: Not on file       Review of Systems   Unable to perform ROS: Patient unresponsive     Objective:     Vital Signs (Most Recent):  Temp: 97.8 °F (36.6 °C) (07/27/24 0749)  Pulse: 70 (07/27/24 0749)  Resp: 11 (07/27/24 0749)  BP: (!) 112/58 (07/27/24 0749)  SpO2: 96 % (07/27/24 0111) Vital Signs (24h Range):  Temp:  [97.8 °F (36.6 °C)-98.2 °F (36.8 °C)] 97.8 °F (36.6 °C)  Pulse:  [69-70] 70  Resp:  [11-18] 11  SpO2:  [95 %-96 %] 96 %  BP: (112-157)/(58-71) 112/58     Weight: 52.8 kg (116 lb 6.5 oz)  Body mass index is 21.99 kg/m².       Physical Exam  Vitals and nursing note reviewed.   Constitutional:       Appearance: She is ill-appearing.   HENT:      Head: Normocephalic.      Right Ear: External ear normal.      Left Ear: External ear normal.      Nose: Nose normal.      Mouth/Throat:      Mouth: Mucous membranes are dry.   Eyes:      Comments: Eyes closed; no eyelid erythema   Cardiovascular:      Rate and Rhythm: Normal rate.   Pulmonary:      Comments: Cheyne white respirations, long apneic pauses  Abdominal:      General: Bowel sounds are normal.      Palpations: Abdomen is soft.   Musculoskeletal:         General: No swelling.   Skin:     General: Skin is warm and dry.   Neurological:      Comments: unresponsive            Review of Symptoms      Symptom Assessment (ESAS 0-10 Scale)  Pain:  0  Dyspnea:  0  Anxiety:  0  Nausea:  0  Depression:  0  Anorexia:  0  Fatigue:  0  Insomnia:  0  Restlessness:  0  Agitation:  0  Unable to complete assessment due to Patient unresponsive         Pain Assessment in Advanced Demential Scale (PAINAD)   Breathing - Independent of vocalization:  1  Negative vocalization:  0  Facial expression:  0  Body language:   "0  Consolability:  1  Total:  2    Psychosocial/Cultural:   See Palliative Psychosocial Note: No  Very independent prior to stroke in May 18, 2024. Was even driving a car! Loved to cook for others, makes the best fried chicken ever according to her daughters. Very loving woman who befriended everyone and called everyone her children even though they weren't truly her birth children.  **Primary  to Follow**  Palliative Care  Consult: No    Spiritual:  F - Lynda and Belief:  Church  I - Importance:  Important  C - Community:  Engaged  A - Address in Care:  Engage  support        Advance Care Planning   Advance Directives:   Living Will: No    LaPOST: No    Do Not Resuscitate Status: Yes    Medical Power of : No      Decision Making:  Family answered questions  Goals of Care: 7/26/24: Emotional conversation held with daughters Kanchan and Ashlie at Ms. Davey's bedside. They had met with Nargis from [facility] yesterday, who was alerted by case management to assess for GIP/inpatient hospice. Kanchan and Ashlie share that their mother was very independent prior to her stroke in May 18, 2024. Their mother was even driving herself! She loved people, often telling her children "you have a new sister! She's white!" Because everyone she met and loved, she treated like family. She loved cooking for people and when she had guests over, she always knew what her guests' favorite foods were and would cook it especially for them. 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 When she suffered her stroke in May 2024, she did recover a little bit in rehab, but ultimately declined. Her personality changed and daughters share that she developed vascular dementia. Their mother's beautiful, loving personality had changed with the alarming episodes of agitation/confusion and it has been extremely difficult to watch their mother's decline. In the past, their mother " "was very clear about her wishes, that when it's her time to pass, she didn't want "people jumping on me" or any life-prolonging measures that would prolong her dying process. She reassured her family that she wanted to focus on comfort if her independence and quality of life was compromised.  - When she was in [facility] re[facility], she would have moments of severe agitation. A nurse was trying to draw blood from her and her mother started grabbing for the peripheral IV and RN. The daughter pulled back her mother's hand to prevent harm and asked the nurse if it was necessary to do so, as she didn't want to escalate interventions that would potentially prolong her suffering. When [facility] sent her to the emergency department for this admission, the daughters never got a call from [facility] and instead got a call from the ER physician instead. They were alarmed. They wouldn't have wanted their mother hospitalized and instead would have allowed their mother to spend her final days where she was and focus on comfort care. Now during this hospitalization, they were hoping that getting her to [facility] with hospice would allow her mother a final setting to spend peaceful final days of her life. They signed all of the necessary paperwork and were told that their mother would be transported on Wednesday, 7/24. They got a call from case management who asked them if the daughters knew why their mother wasn't actually moving to [facility], and asked them if they would call [facility] to find out what's going on. The daughters were surprised and did call to try to find out what went wrong. They still don't have answers as to why [facility] is not accepting their mother with hospice support. This has been very stressful for them as they have been compliant with what they need to do, but are receiving messages that no one wants to accept their beloved mother during a time where she should " "be spending peace and dignity in a quiet environment.  - During our conversation, I could see that Ms. Davey had long episodes of apnea. Daughters confirm that their mother is not eating and when she wakes, she is very agitated, flailing. I shared with Kanchan and Ashlie that in hearing their mother's story and goals, that they are very concordant with the philosophy of care delivered in the inpatient hospice unit. I reviewed the philosophy and scope of our hospice unit, where care is entirely focused on goal-concordant symptom management and patient/family support. Consequently, only medical interventions and treatment which help with that goal will be continued and other routine medical care such as frequent vitals, lab draws and invasive tests/procedures will not be done. I also explained our unit is not a long-term disposition and that we will continue to work on care coordination efforts in the event that the patient unexpectedly stabilizes. I shared that should she demonstrate inpatient hospice criteria, we would ask [facility] to help flip. Kanchan was a hospice RN for 7 years and was very agreeable with this plan and both expressed deep gratitude.           Significant Labs: CBC: No results for input(s): "WBC", "HGB", "HCT", "PLT" in the last 48 hours.  CMP: No results for input(s): "NA", "K", "CL", "CO2", "GLU", "BUN", "CREATININE", "CALCIUM", "PROT", "ALBUMIN", "BILITOT", "ALKPHOS", "AST", "ALT", "ANIONGAP", "EGFRNONAA" in the last 48 hours.    Invalid input(s): "ESTGFAFRICA"  CBC:   Recent Labs   Lab 07/24/24  1520   WBC 18.21*   HGB 11.0*   HCT 37.1   MCV 82        BMP:  No results for input(s): "GLU", "NA", "K", "CL", "CO2", "BUN", "CREATININE", "CALCIUM", "MG" in the last 24 hours.  LFT:  Lab Results   Component Value Date    AST 28 07/24/2024    ALKPHOS 57 07/24/2024    BILITOT 0.5 07/24/2024     Albumin:   Albumin   Date Value Ref Range Status   07/24/2024 3.3 (L) 3.5 - 5.2 g/dL Final     Protein: "   Total Protein   Date Value Ref Range Status   07/24/2024 7.5 6.0 - 8.4 g/dL Final     Lactic acid:   Lab Results   Component Value Date    LACTATE 1.0 06/06/2024       Significant Imaging: I have reviewed all pertinent imaging results/findings within the past 24 hours.

## 2024-07-27 NOTE — PROGRESS NOTES
[facility]  Palliative Medicine  Progress Note    Patient Name: Amirah Davey  MRN: [id number]  Admission Date: 7/24/2024  Hospital Length of Stay: 1 days  Code Status: DNR   Attending Provider: Nora Morrison MD  Consulting Provider: Eloina Sales MD  Primary Care Physician: Ivana Templeton MD  Principal Problem:Comfort measures only status    Patient information was obtained from relative(s) and bedside RN .      Assessment/Plan:     Palliative Care  Encounter for palliative care  Amirah Davey is an 88-year-old woman with a history of CVA (5/18/24), HTN, atrial fibrillation with complicated course post-stroke, sent to the ER on 7/24/24 from [facility] for ELIAS and metabolic disturbances. Family advocated for their mother's desire to focus on comfort oriented care during her end of life and transitioned to comfort care. Hospitalization is notable for severe agitation and pain. Palliative and Supportive Care was consulted to evaluate for inpatient hospice/GIP.    See ACP section at end of note for complete goals of care discussion.    Interval update 07/27/2024: Ashlie at bedside and Kanchan on the phone. Family reporting very happy with care their mother has received since arriving to unit. They denied any other needs at this time.     Terminal diagnosis: CVA   Supporting diagnoses: acute renal failure with severe metabolic disturbances, not pursuing renal replacement therapy    Need for inpatient care: Active dying process as evidenced by loss of consciousness, cool extremities, and irregular breathing pattern. Requiring IV opioids and benzodiazepines for pain behaviors, respiratory distress, and agitation. Likely prognosis of hours to short days    Dispo: anticipate end-of-life care on the unit. At this time does not appear to meet GIP criteria, whether that's floor staffing unable to meet patient's symptom burden relief needs. If patient unexpectedly stabilizes, will approach  family regarding care at alternative in-facility/FCI placement with hospice support    Symptom Assessment  - Pain/ pain behaviors: controlled  - Dyspnea/tachypnea: uncontrolled  - Agitation: improved  - Mentation: minimally responsive    Symptom Oriented Management:    Tachypnea/Dyspnea:   - Morphine 4 mg IV q30min PRN pain behavior (groaning, grimacing, guarding), labored breathing  - Lorazepam 1 mg IV q30min PRN anxiety, labored breathing refractory to opioids  - Fan at bedside  - Avoid artificial hydration  - Treat fevers symptomatically with PRN APAP, NSAID's, and if refractory to these measures, dexamethasone     Pain Behaviors:   - Opioids as above  - Turn only as tolerated     Agitation/Anxiety/Encephalopathy:   - Treat for pain/labored breathing as above  - Lorazepam 1 mg IV q30min PRN anxiety/agitation, labored breathing refractory to opioids  - Haloperidol 5 mg IV q4h PRN agitation, nausea/vomiting refractory to ondansetron    Profuse Oropharyngeal Secretions:  - Turn head side to side to help shift secretions out of airway, allow to pool to cheeks and out of mouth  - Gentle, frequent oral care - encourage family at bedside to participate  - Yankauer suction at bedside  - Glycopyrrolate 0.3 mg IV q4h PRN profuse oropharyngeal secretions    Nausea/Vomiting/Retching:  - Ondansetron 4 mg IV q6h PRN nausea/vomiting    Nutrition / Hydration  - No IV fluids and artificial nutrition  - Liberalize diet in order to permit comfort feedings as desired and tolerated  - Simplified medication regimen by eliminating those medications that provide little to no benefit at end-of-life    Bowel Regimen:  - Bisacodyl 10 mg suppository daily PRN no bowel movement in 5 days  - Will not prioritize at end of life    Fever  - Apply cold compresses  - Fan at bedside  - Acetaminophen 650 mg suppository q4h PRN fever  - If turning triggers severe pain, avoid suppository administration and trial ketorolac 15 mg IV q4h PRN  fever  - If febrile despite above measures, trial dexamethasone 2 mg IV q6h PRN refractory fevers      - Terminal diagnosis: CVA, ELIAS not pursuing renal replacement therapy  - Prognosis: short days  - Dispo: Continue aggressive symptom management at the end of life in the hospice/palliative unit. Family amenable to discussing home hospice or NH with hospice if patient unexpectedly stabilizes        Subjective:     Chief Complaint:   Chief Complaint   Patient presents with    Altered Mental Status     Pt comes from [facility]. Pt is DNR w/ c/o low sodium and failure to thrive.        HPI:   Amirah Davey is an 88-year-old woman with a history of CVA (5/18/24), HTN, atrial fibrillation with complicated course post-stroke, sent to the ER on 7/24/24 from [facility] for ELIAS and metabolic disturbances. Family advocated for their mother's desire to focus on comfort oriented care during her end of life and transitioned to comfort care. Hospitalization is notable for severe agitation and pain. Palliative and Supportive Care was consulted to evaluate for inpatient hospice/GIP.    Hospital Course:  No notes on file    Interval History: Daughter at bedside and another daughter on phone. Family very pleased with care since arrival to unit. Per family, patient is much more comfortable than before    Past Medical History:   Diagnosis Date    A-fib     Cerebrovascular accident (CVA) due to embolism of right middle cerebral artery 05/23/2024    Current use of long term anticoagulation     on Xarelto    Dysphagia due to recent stroke 05/23/2024    Hypertension     Stroke 03/20/2013     Past Surgical History:   Procedure Laterality Date    CARDIAC PACEMAKER PLACEMENT  09/25/2019    [employer] Model number [id number]  Serial number [id number] device MRI conditional for 1.5 Estela magnets    CATARACT EXTRACTION W/  INTRAOCULAR LENS IMPLANT Right 05/01/2017    Dr. Case    CATARACT EXTRACTION W/  INTRAOCULAR LENS IMPLANT Left 05/29/2017     Dr. Case    ESOPHAGOGASTRODUODENOSCOPY W/ PEG N/A 5/29/2024    Procedure: EGD, WITH PEG TUBE INSERTION;  Surgeon: Imer Minaya MD;  Location: [facility] OR 2ND FLR;  Service: General;  Laterality: N/A;    EYE SURGERY      HYSTERECTOMY      INSERTION, PEG TUBE N/A 5/27/2024    Procedure: INSERTION, PEG TUBE;  Surgeon: Ivette Love MD;  Location: [facility] ENDO (2ND FLR);  Service: Endoscopy;  Laterality: N/A;    INSERTION, PEG TUBE N/A 6/17/2024    Procedure: INSERTION, PEG TUBE;  Surgeon: Chloé Sher MD;  Location: [facility] OR 2ND FLR;  Service: General;  Laterality: N/A;     Review of patient's allergies indicates:  No Known Allergies    Medications:  Continuous Infusions:  Scheduled Meds:  PRN Meds:  Current Facility-Administered Medications:     acetaminophen, 650 mg, Rectal, Q6H PRN    bisacodyL, 10 mg, Rectal, Daily PRN    glycopyrrolate, 0.3 mg, Intravenous, Q4H PRN    haloperidol lactate, 5 mg, Intravenous, Q6H PRN    lorazepam, 1 mg, Intravenous, Q30 Min PRN    morphine, 4 mg, Intravenous, Q30 Min PRN    ondansetron, 4 mg, Intravenous, Q8H PRN    prochlorperazine, 5 mg, Intravenous, Q6H PRN    sodium chloride 0.9%, 10 mL, Intravenous, Q12H PRN    Family History    None       Tobacco Use    Smoking status: Never    Smokeless tobacco: Not on file   Substance and Sexual Activity    Alcohol use: No    Drug use: Not on file    Sexual activity: Not on file       Review of Systems   Unable to perform ROS: Patient unresponsive     Objective:     Vital Signs (Most Recent):  Temp: 97.8 °F (36.6 °C) (07/27/24 0749)  Pulse: 70 (07/27/24 0749)  Resp: 11 (07/27/24 0749)  BP: (!) 112/58 (07/27/24 0749)  SpO2: 96 % (07/27/24 0111) Vital Signs (24h Range):  Temp:  [97.8 °F (36.6 °C)-98.2 °F (36.8 °C)] 97.8 °F (36.6 °C)  Pulse:  [69-70] 70  Resp:  [11-18] 11  SpO2:  [95 %-96 %] 96 %  BP: (112-157)/(58-71) 112/58     Weight: 52.8 kg (116 lb 6.5 oz)  Body mass index is 21.99 kg/m².       Physical Exam  Vitals and  nursing note reviewed.   Constitutional:       Appearance: She is ill-appearing.   HENT:      Head: Normocephalic.      Right Ear: External ear normal.      Left Ear: External ear normal.      Nose: Nose normal.      Mouth/Throat:      Mouth: Mucous membranes are dry.   Eyes:      Comments: Eyes closed; no eyelid erythema   Cardiovascular:      Rate and Rhythm: Normal rate.   Pulmonary:      Comments: Cheyne white respirations, long apneic pauses  Abdominal:      General: Bowel sounds are normal.      Palpations: Abdomen is soft.   Musculoskeletal:         General: No swelling.   Skin:     General: Skin is warm and dry.   Neurological:      Comments: unresponsive            Review of Symptoms      Symptom Assessment (ESAS 0-10 Scale)  Pain:  0  Dyspnea:  0  Anxiety:  0  Nausea:  0  Depression:  0  Anorexia:  0  Fatigue:  0  Insomnia:  0  Restlessness:  0  Agitation:  0  Unable to complete assessment due to Patient unresponsive         Pain Assessment in Advanced Demential Scale (PAINAD)   Breathing - Independent of vocalization:  1  Negative vocalization:  0  Facial expression:  0  Body language:  0  Consolability:  1  Total:  2    Psychosocial/Cultural:   See Palliative Psychosocial Note: No  Very independent prior to stroke in May 18, 2024. Was even driving a car! Loved to cook for others, makes the best fried chicken ever according to her daughters. Very loving woman who befriended everyone and called everyone her children even though they weren't truly her birth children.  **Primary  to Follow**  Palliative Care  Consult: No    Spiritual:  F - Lynda and Belief:  Religion  I - Importance:  Important  C - Community:  Engaged  A - Address in Care:  Engage  support        Advance Care Planning  Advance Directives:   Living Will: No    LaPOST: No    Do Not Resuscitate Status: Yes    Medical Power of : No      Decision Making:  Family answered questions  Goals of Care:  "7/26/24: Emotional conversation held with daughters Kanchan and Ashlie at Ms. Davey's bedside. They had met with Nargis from [facility] yesterday, who was alerted by case management to assess for GIP/inpatient hospice. Kanchan and Ashlie share that their mother was very independent prior to her stroke in May 18, 2024. Their mother was even driving herself! She loved people, often telling her children "you have a new sister! She's white!" Because everyone she met and loved, she treated like family. She loved cooking for people and when she had guests over, she always knew what her guests' favorite foods were and would cook it especially for them. 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 When she suffered her stroke in May 2024, she did recover a little bit in rehab, but ultimately declined. Her personality changed and daughters share that she developed vascular dementia. Their mother's beautiful, loving personality had changed with the alarming episodes of agitation/confusion and it has been extremely difficult to watch their mother's decline. In the past, their mother was very clear about her wishes, that when it's her time to pass, she didn't want "people jumping on me" or any life-prolonging measures that would prolong her dying process. She reassured her family that she wanted to focus on comfort if her independence and quality of life was compromised.  - When she was in [facility], she would have moments of severe agitation. A nurse was trying to draw blood from her and her mother started grabbing for the peripheral IV and RN. The daughter pulled back her mother's hand to prevent harm and asked the nurse if it was necessary to do so, as she didn't want to escalate interventions that would potentially prolong her suffering. When [facility] sent her to the emergency department for this admission, the daughters never got a call from [facility] and instead got a call from " the ER physician instead. They were alarmed. They wouldn't have wanted their mother hospitalized and instead would have allowed their mother to spend her final days where she was and focus on comfort care. Now during this hospitalization, they were hoping that getting her to [facility] with hospice would allow her mother a final setting to spend peaceful final days of her life. They signed all of the necessary paperwork and were told that their mother would be transported on Wednesday, 7/24. They got a call from case management who asked them if the daughters knew why their mother wasn't actually moving to [facility], and asked them if they would call [facility] to find out what's going on. The daughters were surprised and did call to try to find out what went wrong. They still don't have answers as to why [facility] is not accepting their mother with hospice support. This has been very stressful for them as they have been compliant with what they need to do, but are receiving messages that no one wants to accept their beloved mother during a time where she should be spending peace and dignity in a quiet environment.  - During our conversation, I could see that Ms. Davey had long episodes of apnea. Daughters confirm that their mother is not eating and when she wakes, she is very agitated, flailing. I shared with Kanchan and Ashlie that in hearing their mother's story and goals, that they are very concordant with the philosophy of care delivered in the inpatient hospice unit. I reviewed the philosophy and scope of our hospice unit, where care is entirely focused on goal-concordant symptom management and patient/family support. Consequently, only medical interventions and treatment which help with that goal will be continued and other routine medical care such as frequent vitals, lab draws and invasive tests/procedures will not be done. I also explained our unit is not a long-term  "disposition and that we will continue to work on care coordination efforts in the event that the patient unexpectedly stabilizes. I shared that should she demonstrate inpatient hospice criteria, we would ask [facility] to help flip. Kanchan was a hospice RN for 7 years and was very agreeable with this plan and both expressed deep gratitude.           Significant Labs: CBC: No results for input(s): "WBC", "HGB", "HCT", "PLT" in the last 48 hours.  CMP: No results for input(s): "NA", "K", "CL", "CO2", "GLU", "BUN", "CREATININE", "CALCIUM", "PROT", "ALBUMIN", "BILITOT", "ALKPHOS", "AST", "ALT", "ANIONGAP", "EGFRNONAA" in the last 48 hours.    Invalid input(s): "ESTGFAFRICA"  CBC:   Recent Labs   Lab 07/24/24  1520   WBC 18.21*   HGB 11.0*   HCT 37.1   MCV 82        BMP:  No results for input(s): "GLU", "NA", "K", "CL", "CO2", "BUN", "CREATININE", "CALCIUM", "MG" in the last 24 hours.  LFT:  Lab Results   Component Value Date    AST 28 07/24/2024    ALKPHOS 57 07/24/2024    BILITOT 0.5 07/24/2024     Albumin:   Albumin   Date Value Ref Range Status   07/24/2024 3.3 (L) 3.5 - 5.2 g/dL Final     Protein:   Total Protein   Date Value Ref Range Status   07/24/2024 7.5 6.0 - 8.4 g/dL Final     Lactic acid:   Lab Results   Component Value Date    LACTATE 1.0 06/06/2024       Significant Imaging: I have reviewed all pertinent imaging results/findings within the past 24 hours.      Eloina Sales MD  Palliative Medicine  [facility] - Hospice and Palliative Medicine                "

## 2024-07-27 NOTE — CONSULTS
OMERO consulted for PIV insertion in real time using ultrasound guidance.    Indication: PVA  Gauge and length: 20 g x 1 3/4 inch  Location: [facility]

## 2024-07-27 NOTE — ASSESSMENT & PLAN NOTE
Amirah Davey is an 88-year-old woman with a history of CVA (5/18/24), HTN, atrial fibrillation with complicated course post-stroke, sent to the ER on 7/24/24 from [facility] for ELIAS and metabolic disturbances. Family advocated for their mother's desire to focus on comfort oriented care during her end of life and transitioned to comfort care. Hospitalization is notable for severe agitation and pain. Palliative and Supportive Care was consulted to evaluate for inpatient hospice/GIP.    See ACP section at end of note for complete goals of care discussion.    Interval update 07/27/2024: Ashlie at bedside and Kanchan on the phone. Family reporting very happy with care their mother has received since arriving to unit. They denied any other needs at this time.     Terminal diagnosis: CVA   Supporting diagnoses: acute renal failure with severe metabolic disturbances, not pursuing renal replacement therapy    Need for inpatient care: Active dying process as evidenced by loss of consciousness, cool extremities, and irregular breathing pattern. Requiring IV opioids and benzodiazepines for pain behaviors, respiratory distress, and agitation. Likely prognosis of hours to short days    Dispo: anticipate end-of-life care on the unit. At this time does not appear to meet GIP criteria, whether that's floor staffing unable to meet patient's symptom burden relief needs. If patient unexpectedly stabilizes, will approach family regarding care at alternative in-facility/shelter placement with hospice support    Symptom Assessment  - Pain/ pain behaviors: controlled  - Dyspnea/tachypnea: uncontrolled  - Agitation: improved  - Mentation: minimally responsive    Symptom Oriented Management:    Tachypnea/Dyspnea:   - Morphine 4 mg IV q30min PRN pain behavior (groaning, grimacing, guarding), labored breathing  - Lorazepam 1 mg IV q30min PRN anxiety, labored breathing refractory to opioids  - Fan at bedside  - Avoid artificial hydration  -  Treat fevers symptomatically with PRN APAP, NSAID's, and if refractory to these measures, dexamethasone     Pain Behaviors:   - Opioids as above  - Turn only as tolerated     Agitation/Anxiety/Encephalopathy:   - Treat for pain/labored breathing as above  - Lorazepam 1 mg IV q30min PRN anxiety/agitation, labored breathing refractory to opioids  - Haloperidol 5 mg IV q4h PRN agitation, nausea/vomiting refractory to ondansetron    Profuse Oropharyngeal Secretions:  - Turn head side to side to help shift secretions out of airway, allow to pool to cheeks and out of mouth  - Gentle, frequent oral care - encourage family at bedside to participate  - Yankauer suction at bedside  - Glycopyrrolate 0.3 mg IV q4h PRN profuse oropharyngeal secretions    Nausea/Vomiting/Retching:  - Ondansetron 4 mg IV q6h PRN nausea/vomiting    Nutrition / Hydration  - No IV fluids and artificial nutrition  - Liberalize diet in order to permit comfort feedings as desired and tolerated  - Simplified medication regimen by eliminating those medications that provide little to no benefit at end-of-life    Bowel Regimen:  - Bisacodyl 10 mg suppository daily PRN no bowel movement in 5 days  - Will not prioritize at end of life    Fever  - Apply cold compresses  - Fan at bedside  - Acetaminophen 650 mg suppository q4h PRN fever  - If turning triggers severe pain, avoid suppository administration and trial ketorolac 15 mg IV q4h PRN fever  - If febrile despite above measures, trial dexamethasone 2 mg IV q6h PRN refractory fevers      - Terminal diagnosis: CVA, ELIAS not pursuing renal replacement therapy  - Prognosis: short days  - Dispo: Continue aggressive symptom management at the end of life in the hospice/palliative unit. Family amenable to discussing home hospice or NH with hospice if patient unexpectedly stabilizes

## 2024-07-27 NOTE — CONSULTS
Wound care consult received for BL feet. Chart reviewed for this encounter. Per chart review, pt CMO, wound care will sign off.

## 2024-07-28 NOTE — NURSING
Dr. Lima notified and here on unit to pronounce death,  and YOU also notified, Referral # [id number], representative Ivy Barrera. Time of death 0245.

## 2024-07-28 NOTE — NURSING
Called to room by family, change in breathing noted, tachypnea with respirations shallow and 26 per minute, warm to touch, appears comfortable and relaxed. Discussion of end of life process with family.

## 2024-07-28 NOTE — SIGNIFICANT EVENT
Called to bedside to pronounce patient. Pupils are fixed and dilated. No spontaneous breath sounds or heart tones noted. Patient  at 0245. Family members at the bedside.

## 2024-07-28 NOTE — NURSING
Called to room, patient respirations stopped and I was unable to hear heart sounds, family at bedside, Dr. Clarence ORTA will be notified, and .

## 2024-07-28 NOTE — DISCHARGE SUMMARY
[facility]  Palliative Medicine  Discharge Summary      Patient Name: Amirah Davey  MRN: [id number]  Admission Date: 2024  Hospital Length of Stay: 2 days  Discharge Date and Time: 2024   Attending Physician: No att. providers found   Discharging Provider: Eloina Sales MD  Primary Care Provider: Ivana Templeton MD    HPI:   Amirah Davey is an 88-year-old woman with a history of CVA (24), HTN, atrial fibrillation with complicated course post-stroke, sent to the ER on 24 from [facility] for ELIAS and metabolic disturbances. Family advocated for their mother's desire to focus on comfort oriented care during her end of life and transitioned to comfort care. Hospitalization is notable for severe agitation and pain. Palliative and Supportive Care was consulted to evaluate for inpatient hospice/GIP.    * No surgery found *      Hospital Course:   Ms. Davey was transferred to 3rd floor palliative care/hospice unit. Patient did not meet GIP criteria at time of death. Patient's symptoms were managed with use of IV PRN medications for pain/tachypnea/dyspnea as well as IV medications for anxiety/agitation/encephalopathy. PICC team was consulted on 24 due to loss of IV access. They were able to place PIV. Patient  on 24 at 02:45 AM.     Goals of Care Treatment Preferences:  Code Status: DNR      Consults:   Consults (From admission, onward)          Status Ordering Provider     [facility] team [facility]  Once        Provider:  (Not yet assigned)    Completed NEGIN MENDOZA     Inpatient consult to Palliative Care  Once        Provider:  (Not yet assigned)    Completed JEREMY MARROQUIN            No new Assessment & Plan notes have been filed under this hospital service since the last note was generated.  Service: Palliative Medicine    Final Active Diagnoses:    Diagnosis Date Noted POA    PRINCIPAL PROBLEM:  Comfort measures only status  [Z51.5] 2024 Not Applicable    Encounter for palliative care [Z51.5] 2024 Not Applicable    Hypernatremia [E87.0] 2024 Yes      Problems Resolved During this Admission:    Diagnosis Date Noted Date Resolved POA    ELIAS (acute kidney injury) [N17.9] 2024 Yes       Discharged Condition:     Disposition:     Follow Up: none    Patient Instructions:   No discharge procedures on file.    Significant Diagnostic Studies: N/A    Pending Diagnostic Studies:       None           Medications:  None    Indwelling Lines/Drains at time of discharge:   Lines/Drains/Airways       None                   Time spent on the discharge of patient: < 30 minutes  Patient was seen and examined on the date of discharge and determined to be suitable for discharge.    Eloina Sales MD  Department of Palliative Medicine  [facility] - Hospice and Palliative Medicine

## (undated) DEVICE — URINARY DRAINAGE BAG

## (undated) DEVICE — SOL BETADINE 5%

## (undated) DEVICE — SYR SLIP TIP 1CC

## (undated) DEVICE — KIT PEG PULL STANDARD 20F

## (undated) DEVICE — APPLICATOR CHLORAPREP ORN 26ML

## (undated) DEVICE — CASSETTE INFINITI

## (undated) DEVICE — BOWL STERILE LARGE 32OZ

## (undated) DEVICE — GOWN SURGICAL X-LARGE

## (undated) DEVICE — TUBING SUC UNIV W/CONN 12FT

## (undated) DEVICE — GLASSES EYE PROTECTIVE

## (undated) DEVICE — SOL WATER STRL IRR 1000ML

## (undated) DEVICE — GLOVE BIOGEL SKINSENSE PI 7.5

## (undated) DEVICE — DRESSING ABSRBNT ISLAND 3.6X8

## (undated) DEVICE — LUBRICANT SURGILUBE 2 OZ

## (undated) DEVICE — SUT ETHILON 2-0 PSLX 30IN

## (undated) DEVICE — Device

## (undated) DEVICE — GLOVE BIOGEL SKINSENSE PI 6.5

## (undated) DEVICE — KIT ENDOVIVE SAF PEG PULL 20FR